# Patient Record
Sex: MALE | Race: WHITE | NOT HISPANIC OR LATINO | Employment: OTHER | URBAN - METROPOLITAN AREA
[De-identification: names, ages, dates, MRNs, and addresses within clinical notes are randomized per-mention and may not be internally consistent; named-entity substitution may affect disease eponyms.]

---

## 2017-01-03 ENCOUNTER — OFFICE VISIT (OUTPATIENT)
Dept: PULMONOLOGY | Facility: CLINIC | Age: 62
End: 2017-01-03
Payer: COMMERCIAL

## 2017-01-03 ENCOUNTER — HOSPITAL ENCOUNTER (OUTPATIENT)
Dept: RADIOLOGY | Facility: HOSPITAL | Age: 62
Discharge: HOME | End: 2017-01-03
Attending: INTERNAL MEDICINE
Payer: COMMERCIAL

## 2017-01-03 VITALS
HEIGHT: 75 IN | DIASTOLIC BLOOD PRESSURE: 80 MMHG | WEIGHT: 276.69 LBS | BODY MASS INDEX: 34.4 KG/M2 | SYSTOLIC BLOOD PRESSURE: 124 MMHG | OXYGEN SATURATION: 95 % | RESPIRATION RATE: 18 BRPM | HEART RATE: 61 BPM

## 2017-01-03 DIAGNOSIS — J44.89 ASTHMA WITH COPD: Primary | ICD-10-CM

## 2017-01-03 DIAGNOSIS — G47.33 OSA ON CPAP: ICD-10-CM

## 2017-01-03 DIAGNOSIS — R06.02 SOB (SHORTNESS OF BREATH): ICD-10-CM

## 2017-01-03 PROCEDURE — 99999 PR PBB SHADOW E&M-EST. PATIENT-LVL IV: CPT | Mod: PBBFAC,,, | Performed by: INTERNAL MEDICINE

## 2017-01-03 PROCEDURE — 1159F MED LIST DOCD IN RCRD: CPT | Mod: S$GLB,,, | Performed by: INTERNAL MEDICINE

## 2017-01-03 PROCEDURE — 71020 XR CHEST PA AND LATERAL: CPT | Mod: 26,,, | Performed by: RADIOLOGY

## 2017-01-03 PROCEDURE — 99214 OFFICE O/P EST MOD 30 MIN: CPT | Mod: S$GLB,,, | Performed by: INTERNAL MEDICINE

## 2017-01-03 RX ORDER — ALBUTEROL SULFATE 0.83 MG/ML
2.5 SOLUTION RESPIRATORY (INHALATION)
Qty: 360 ML | Refills: 12 | Status: SHIPPED | OUTPATIENT
Start: 2017-01-03 | End: 2017-07-11 | Stop reason: SDUPTHER

## 2017-01-03 NOTE — MR AVS SNAPSHOT
[facility]  [address] [facility] [address]  Phone: [phone]  Fax: [phone]                  Yung Mcconnell   1/3/2017 9:00 AM   Office Visit    Description:  Male : 1955   Provider:  Rafal Lozano MD   Department:  [facility]           Reason for Visit     Asthma     Sleep Apnea           Diagnoses this Visit        Comments    Asthma with COPD    -  Primary     KEVEN on CPAP                To Do List           Future Appointments        Provider Department Dept Phone    2017 3:15 PM Lizzie Cates MD [facility] D[facility]atology [phone]    2017 8:20 AM SPIROMETRY, [facility] [phone]    2017 8:40 AM Rafal Lozano MD [facility] Services [phone]      Goals (5 Years of Data)     None      Follow-Up and Disposition     Return in about 6 months (around 7/3/2017), or althea and CPAP download.       These Medications        Disp Refills Start End    formoterol fumarate 12 mcg CpDv 60 capsule 11 1/3/2017 1/3/2018    Inhale 1 capsule (12 mcg total) into the lungs every 12 (twelve) hours. - Inhalation    [facility] [address] [place] [address] [facility] [place] [address] Ph #: [phone]       albuterol (PROVENTIL) 2.5 mg /3 mL (0.083 %) nebulizer solution 360 mL 12 1/3/2017 1/3/2018    Take 3 mLs (2.5 mg total) by nebulization every 6 (six) hours while awake. - Nebulization    [facility]: [facility] [address] [place] [address] [facility] [address] [place] [address] Ph #: [phone]         [facility]     [facility] On [facility] Nurse Care Line -  Assistance  Registered nurses in the [facility] Center provide clinical advisement, health education, appointment booking, and other advisory services.  Call for this free service at [phone].             Medications           Message regarding Medications     Verify the changes and/or  additions to your medication regime listed below are the same as discussed with your clinician today.  If any of these changes or additions are incorrect, please notify your healthcare provider.        START taking these NEW medications        Refills    formoterol fumarate 12 mcg CpDv 11    Sig: Inhale 1 capsule (12 mcg total) into the lungs every 12 (twelve) hours.    Class: Normal    Route: Inhalation    albuterol (PROVENTIL) 2.5 mg /3 mL (0.083 %) nebulizer solution 12    Sig: Take 3 mLs (2.5 mg total) by nebulization every 6 (six) hours while awake.    Class: Normal    Route: Nebulization           Verify that the below list of medications is an accurate representation of the medications you are currently taking.  If none reported, the list may be blank. If incorrect, please contact your healthcare provider. Carry this list with you in case of emergency.           Current Medications     albuterol 90 mcg/actuation inhaler Every 4-6 hours    beclomethasone (QVAR) 80 mcg/actuation Aero 2 puff BID .Wash out mouth after use    cyclobenzaprine (FLEXERIL) 5 MG tablet 1 Tablet Oral Three times a day as needed.    doxycycline (MONODOX) 50 MG Cap Take once daily with food    fluticasone (FLONASE) 50 mcg/actuation nasal spray 2 sprays by Each Nare route once daily.    GLUCOSAMINE HCL/CHONDR ALFONSO A NA (OSTEO BI-FLEX ORAL) Take by mouth.    multivitamin (MULTIPLE VITAMIN) per tablet 1 Tablet Oral Every day    omeprazole (PRILOSEC) 40 MG capsule Take 1 capsule (40 mg total) by mouth once daily. Dispense Generic    pravastatin (PRAVACHOL) 10 MG tablet Take 1 tablet (10 mg total) by mouth once daily.    SOOLANTRA 1 % Crea EDWARD EXT AA QD    tadalafil (CIALIS) 20 MG Tab Take 1 tablet (20 mg total) by mouth daily as needed.    albuterol (PROVENTIL) 2.5 mg /3 mL (0.083 %) nebulizer solution Take 3 mLs (2.5 mg total) by nebulization every 6 (six) hours while awake.    diclofenac 1.3 % PT12 Apply 1 patch topically every 12 (twelve)  "hours as needed.    formoterol fumarate 12 mcg CpDv Inhale 1 capsule (12 mcg total) into the lungs every 12 (twelve) hours.    predniSONE (DELTASONE) 20 MG tablet Take 3 pills daily for 3 days, then 2 pills daily for 3 days, then 1 pill daily for 3 days, then 1/2 pill daily for 4 days.    promethazine-codeine 6.25-10 mg/5 ml (PHENERGAN WITH CODEINE) 6.25-10 mg/5 mL syrup TK 5 ML PO Q 4 HOURS PRF COUGH           Clinical Reference Information           Vital Signs - Last Recorded  Most recent update: 1/3/2017  9:48 AM by Lindsay Ng MA    BP Pulse Resp Ht Wt SpO2    124/80 61 18 6' 3" (1.905 m) 125.5 kg (276 lb 10.8 oz) 95%    BMI                34.58 kg/m2          Blood Pressure          Most Recent Value    BP  124/80      Allergies as of 1/3/2017     Zithromax [Azithromycin]    Aspirin      Immunizations Administered on Date of Encounter - 1/3/2017     None      Orders Placed During Today's Visit      Normal Orders This Visit    CPAP/BIPAP SUPPLIES     Future Labs/Procedures Expected by Expires    Spirometry with/without bronchodilator  7/6/2017 (Approximate) 1/3/2018      Instructions    Formoterol Fumarate Inhalation powder, capsule  What is this medicine?  FORMOTEROL (for MOH te rol) is a slow-acting bronchodilator. It helps to open up the airways of your lungs. This medicine is used to treat COPD and to prevent exercise-induced bronchospasm. It is also used to treat asthma in patients taking other asthma control medicines. This medicine should not be used alone for asthma. Do NOT use for an acute asthma attack. Do NOT use for a COPD attack.  This medicine may be used for other purposes; ask your health care provider or pharmacist if you have questions.  What should I tell my health care provider before I take this medicine?  They need to know if you have any of the following conditions:  · diabetes  · have asthma and are not taking any other asthma medicine  · heart disease or irregular " heartbeat  · high blood pressure  · pheochromocytoma  · seizures  · thyroid disease  · worsening asthma  · an unusual or allergic reaction to formoterol, milk, other medicines, food, dyes, or preservatives  · pregnant or trying to get pregnant  · breast-feeding  How should I use this medicine?  The capsules are only for inhalation through an inhaler device. Do NOT swallow the capsules. Follow the directions on your prescription label. Do not use a spacer device. Do not use more often than directed. Make sure that you are using your inhaler correctly. Ask you doctor or health care provider if you have any questions.  A special MedGuide will be given to you by the pharmacist with each prescription and refill. Be sure to read this information carefully each time.  Talk to your pediatrician regarding the use of this medicine in children. While this drug may be prescribed for children as young as 5 years old for selected conditions, precautions do apply.  Overdosage: If you think you have taken too much of this medicine contact a poison control center or emergency room at once.  NOTE: This medicine is only for you. Do not share this medicine with others.  What if I miss a dose?  If you miss a dose, use it as soon as you can. If it is almost time for your next dose, use only that dose. Do not use double or extra doses.  What may interact with this medicine?  Do not take this medicine with any of the following medications:  · MAOIs like Carbex, Eldepryl, Marplan, Nardil, and Parnate  This medicine may also interact with the following medications:  · caffeine  · cisapride  · diuretics  · furazolidone  · medicines for blood pressure  · medicines for depression, anxiety, or psychotic disturbances  · other medicines for breathing problems  · pimozide  · procarbazine  · risperidone  · sertindole  · some antibiotics like clarithromycin, erythromycin, levofloxacin, and linezolid  · some heart medicines  · steroid hormones like  dexamethasone, cortisone, hydrocortisone  · stimulant medicines for attention disorders, weight loss, or to stay awake  This list may not describe all possible interactions. Give your health care provider a list of all the medicines, herbs, non-prescription drugs, or dietary supplements you use. Also tell them if you smoke, drink alcohol, or use illegal drugs. Some items may interact with your medicine.  What should I watch for while using this medicine?  Visit your doctor for regular check ups. Tell your doctor or health care professional if your symptoms do not get better. If your symptoms get worse or if you need your short-acting inhalers more often, call your doctor right away. Do not use this medicine more than every 12 hours.  If you have asthma, be aware that using this medicine may increase your risk of dying from asthma related problems. Talk to your doctor about the risks and benefits of taking this medicine. NEVER use this medicine for an acute asthma attack.  If you are going to have surgery tell your doctor or health care professional that you are using this medicine.  What side effects may I notice from receiving this medicine?  Side effects that you should report to your doctor or health care professional as soon as possible:  · allergic reactions such as skin rash or itching, hives, swelling of the face, lips or tongue  · chest pain  · difficulty breathing or wheezing that increases or does not go away  · dizziness or fainting  · fever  · irregular heartbeat  · nervousness  · tremors  Side effects that usually do not require medical attention (report to your doctor or health care professional if they continue or are bothersome):  · cough  · headache  · sore throat  · stuffy nose  · stomach upset  This list may not describe all possible side effects. Call your doctor for medical advice about side effects. You may report side effects to FDA at 5-833-FDA-5668.  Where should I keep my medicine?  Keep  out of the reach of children.  Store at room temperature between 20 to 25 degrees C (68 and 77 degrees F). Throw away any unused medicine after the expiration date. Protect from heat and moisture. Remove capsules from the blister pack immediately before using them.  Special rules describing how to store and how long you should keep your medicine may apply. Be sure to read the MedGuide that came with your prescription carefully and follow the directions for storing and using your medicine.  NOTE:This sheet is a summary. It may not cover all possible information. If you have questions about this medicine, talk to your doctor, pharmacist, or health care provider. Copyright© 2016 [employer]      Albuterol Sulfate Nebulizer solution  What is this medicine?  ALBUTEROL (al BYOO ter ole) is a bronchodilator. It helps to open up the airways in your lungs to make it easier to breathe. This medicine is used to treat and to prevent bronchospasm.  This medicine may be used for other purposes; ask your health care provider or pharmacist if you have questions.  What should I tell my health care provider before I take this medicine?  They need to know if you have any of the following conditions:  · diabetes  · heart disease or irregular heartbeat  · high blood pressure  · pheochromocytoma  · seizures  · thyroid disease  · an unusual or allergic reaction to albuterol, levalbuterol, sulfites, other medicines, foods, dyes, or preservatives  · pregnant or trying to get pregnant  · breast-feeding  How should I use this medicine?  This medicine is used in a nebulizer. Nebulizers make a liquid into an aerosol that you breathe in through your mouth or your mouth and nose into your lungs. You will be taught how to use your nebulizer. Follow the directions on your prescription label. Take your medicine at regular intervals. Do not use more often than directed.  Talk to your pediatrician regarding the use of this medicine in children.  Special care may be needed.  Overdosage: If you think you have taken too much of this medicine contact a poison control center or emergency room at once.  NOTE: This medicine is only for you. Do not share this medicine with others.  What if I miss a dose?  If you miss a dose, use it as soon as you can. If it is almost time for your next dose, use only that dose. Do not use double or extra doses.  What may interact with this medicine?  · anti-infectives like chloroquine and pentamidine  · caffeine  · cisapride  · diuretics  · medicines for colds  · medicines for depression or emotional or psychotic conditions  · medicines for weight loss including some herbal products  · methadone  · some antibiotics like clarithromycin, erythromycin, levofloxacin, and linezolid  · some heart medicines  · steroid hormones like dexamethasone, cortisone, hydrocortisone  · theophylline  · thyroid hormones  This list may not describe all possible interactions. Give your health care provider a list of all the medicines, herbs, non-prescription drugs, or dietary supplements you use. Also tell them if you smoke, drink alcohol, or use illegal drugs. Some items may interact with your medicine.  What should I watch for while using this medicine?  Tell your doctor or health care professional if your symptoms do not improve. Do not use extra albuterol. Call your doctor right away if your asthma or bronchitis gets worse while you are using this medicine.  If your mouth gets dry try chewing sugarless gum or sucking hard candy. Drink water as directed.  What side effects may I notice from receiving this medicine?  Side effects that you should report to your doctor or health care professional as soon as possible:  · allergic reactions like skin rash, itching or hives, swelling of the face, lips, or tongue  · breathing problems  · chest pain  · feeling faint or lightheaded, falls  · high blood pressure  · irregular heartbeat  · fever  · muscle cramps  or weakness  · pain, tingling, numbness in the hands or feet  · vomiting  Side effects that usually do not require medical attention (report to your doctor or health care professional if they continue or are bothersome):  · cough  · difficulty sleeping  · headache  · nervousness, trembling  · stomach upset  · stuffy or runny nose  · throat irritation  · unusual taste  This list may not describe all possible side effects. Call your doctor for medical advice about side effects. You may report side effects to FDA at 9-511-CDL-2178.  Where should I keep my medicine?  Keep out of the reach of children.  Store between 2 and 25 degrees C (36 and 77 degrees F). Do not freeze. Protect from light. Throw away any unused medicine after the expiration date. Most products are kept in the foil package until time of use. Some products can be used up to 1 week after they are removed from the foil pouch. Check the instructions that come with your medicine.  NOTE: This sheet is a summary. It may not cover all possible information. If you have questions about this medicine, talk to your doctor, pharmacist, or health care provider.  NOTE:This sheet is a summary. It may not cover all possible information. If you have questions about this medicine, talk to your doctor, pharmacist, or health care provider. Copyright© 2016 [employer] Standard

## 2017-01-03 NOTE — PROGRESS NOTES
Subjective:       Patient ID: Yung Mcconnell is a 61 y.o. male.    Chief Complaint: Asthma and Sleep Apnea    HPI COPD  He presents for evaluation and treatment of COPD.Had 3- 4 episodes of bronchitis this past fall    COPD  He presents for evaluation and treatment of COPD. The patient is not currently have symptoms / an exacerbation. The patient has COPD for approximately 10 years. Symptoms in previous episodes have included dyspnea, cough and wheezing, and typically last 2 weeks. Previous episodes have been exacerbated by moderate activity. Current treatment includes albuterol inhaler, which generally provides some relief of symptoms.  He uses 1 pillows at night. Patient currently is not on home oxygen therapy.. The patient is having no constitutional symptoms, denying fever, chills, anorexia, or weight loss. The patient has not been hospitalized for this condition before. Not smoking The patient is experiencing exercise intolerance (difficulty climbing 2 flights of stairs).      Yung Mcconnell presents for review of CPAP compliance. Information from CPAP machine downloaded and reviewed. Summary of compliance report is as follows:  Percent days with usage: 100 %  Average usage on days used: 7 hours 14 minutes.  Percent of days used > 4 hours : 100 %  device pressure : 11 cmH2O  Average time in large leak per day: minutes  Average AHI: 1.7     Patient has complaints of none  Patient is using CPAP as prescribed and benefiting from therapy.         Past Medical History   Diagnosis Date    Allergy     Asthma, chronic 7/9/2013    Colon polyps     COPD (chronic obstructive pulmonary disease)     GERD (gastroesophageal reflux disease)     Osteoarthritis of both knees 7/9/2013    Sleep apnea      Past Surgical History   Procedure Laterality Date    Cholecystectomy      Lung surgery Right      benign mass     Social History     Social History    Marital status:      Spouse name: N/A    Number of children:  N/A    Years of education: N/A     Occupational History     [employer]     Social History Main Topics    Smoking status: Never Smoker    Smokeless tobacco: Never Used    Alcohol use Yes      Comment: rare    Drug use: No    Sexual activity: Yes     Partners: Female     Birth control/ protection: None     Other Topics Concern    Not on file     Social History Narrative     Review of Systems   Constitutional: Positive for fatigue. Negative for fever.   HENT: Positive for postnasal drip and rhinorrhea. Negative for congestion.    Respiratory: Positive for apnea, cough, sputum production, shortness of breath, dyspnea on extertion, use of rescue inhaler and Paroxysmal Nocturnal Dyspnea.    Cardiovascular: Negative for chest pain, palpitations and leg swelling.   Skin: Negative for rash.   Gastrointestinal: Negative for nausea and abdominal pain.   Neurological: Negative for dizziness, syncope, weakness and light-headedness.   Hematological: Negative for adenopathy. Does not bruise/bleed easily.   Psychiatric/Behavioral: Negative for sleep disturbance. The patient is not nervous/anxious.        Objective:      Physical Exam   Constitutional: He is oriented to person, place, and time. He appears well-developed and well-nourished.   HENT:   Head: Normocephalic and atraumatic.   Mouth/Throat: Oropharyngeal exudate present.   Eyes: Conjunctivae are normal. Pupils are equal, round, and reactive to light.   Neck: Neck supple. No JVD present. No tracheal deviation present. No thyromegaly present.   Cardiovascular: Normal rate, regular rhythm and normal heart sounds.    No murmur heard.  Pulmonary/Chest: Effort normal. He has decreased breath sounds. He has wheezes in the right lower field and the left lower field. He has no rhonchi. He has no rales.   Abdominal: Soft. Bowel sounds are normal.   Musculoskeletal: Normal range of motion. He exhibits no edema or tenderness.   Lymphadenopathy:     He has no  cervical adenopathy.   Neurological: He is alert and oriented to person, place, and time.   Skin: Skin is warm and dry.   Nursing note and vitals reviewed.    Personal Diagnostic Review  Chest X-Ray: I personally reviewed the films and findings are:, air trapping/emphysema  Pulmonary function tests:  No recent  No flowsheet data found.      Assessment:       1. Asthma with COPD    2. KEVEN on CPAP        Outpatient Encounter Prescriptions as of 1/3/2017   Medication Sig Dispense Refill    albuterol 90 mcg/actuation inhaler Every 4-6 hours 3 Inhaler 4    beclomethasone (QVAR) 80 mcg/actuation Aero 2 puff BID .Wash out mouth after use 25.1 each 3    cyclobenzaprine (FLEXERIL) 5 MG tablet 1 Tablet Oral Three times a day as needed. 90 tablet 0    doxycycline (MONODOX) 50 MG Cap Take once daily with food 30 capsule 5    fluticasone (FLONASE) 50 mcg/actuation nasal spray 2 sprays by Each Nare route once daily. 3 Bottle 4    GLUCOSAMINE HCL/CHONDR ALFONSO A NA (OSTEO BI-FLEX ORAL) Take by mouth.      multivitamin (MULTIPLE VITAMIN) per tablet 1 Tablet Oral Every day      omeprazole (PRILOSEC) 40 MG capsule Take 1 capsule (40 mg total) by mouth once daily. Dispense Generic 90 capsule 0    pravastatin (PRAVACHOL) 10 MG tablet Take 1 tablet (10 mg total) by mouth once daily. 90 tablet 3    SOOLANTRA 1 % Crea EDWARD EXT AA QD  5    tadalafil (CIALIS) 20 MG Tab Take 1 tablet (20 mg total) by mouth daily as needed. 20 tablet 11    albuterol (PROVENTIL) 2.5 mg /3 mL (0.083 %) nebulizer solution Take 3 mLs (2.5 mg total) by nebulization every 6 (six) hours while awake. 360 mL 12    diclofenac 1.3 % PT12 Apply 1 patch topically every 12 (twelve) hours as needed. 30 patch 0    formoterol fumarate 12 mcg CpDv Inhale 1 capsule (12 mcg total) into the lungs every 12 (twelve) hours. 60 capsule 11    predniSONE (DELTASONE) 20 MG tablet Take 3 pills daily for 3 days, then 2 pills daily for 3 days, then 1 pill daily for 3 days, then  1/2 pill daily for 4 days. 20 tablet 0    promethazine-codeine 6.25-10 mg/5 ml (PHENERGAN WITH CODEINE) 6.25-10 mg/5 mL syrup TK 5 ML PO Q 4 HOURS PRF COUGH  0     No facility-administered encounter medications on file as of 1/3/2017.      Orders Placed This Encounter   Procedures    CPAP/BIPAP SUPPLIES     Referred to [employer] company:  [employer].  [place] [address]  [phone]  Fax [phone]     Order Specific Question:   Type of mask:     Answer:   FFM     Order Specific Question:   Headgear?     Answer:   Yes     Order Specific Question:   Tubing?     Answer:   Yes     Order Specific Question:   Humidifier chamber?     Answer:   Yes     Order Specific Question:   Chin strap?     Answer:   Yes     Order Specific Question:   Filters?     Answer:   Yes     Order Specific Question:   Length of need (1-99 months):     Answer:   99     Order Specific Question:   Vendor:     Answer:   [employer]     Order Specific Question:   Expected Date of Delivery:     Answer:   1/3/2017    Spirometry with/without bronchodilator     Standing Status:   Future     Standing Expiration Date:   1/3/2018     Plan:       Requested Prescriptions     Signed Prescriptions Disp Refills    formoterol fumarate 12 mcg CpDv 60 capsule 11     Sig: Inhale 1 capsule (12 mcg total) into the lungs every 12 (twelve) hours.    albuterol (PROVENTIL) 2.5 mg /3 mL (0.083 %) nebulizer solution 360 mL 12     Sig: Take 3 mLs (2.5 mg total) by nebulization every 6 (six) hours while awake.     Asthma with COPD  -     formoterol fumarate 12 mcg CpDv; Inhale 1 capsule (12 mcg total) into the lungs every 12 (twelve) hours.  Dispense: 60 capsule; Refill: 11  -     albuterol (PROVENTIL) 2.5 mg /3 mL (0.083 %) nebulizer solution; Take 3 mLs (2.5 mg total) by nebulization every 6 (six) hours while awake.  Dispense: 360 mL; Refill: 12  -     Spirometry with/without bronchodilator; Future; Expected date: 7/6/17    KEVEN on CPAP  -     CPAP/BIPAP  SUPPLIES      Return in about 6 months (around 7/3/2017), or althea and CPAP download.    MEDICAL DECISION MAKING: Moderate to high complexity.  Overall, the multiple problems listed are of moderate to high severity that may impact quality of life and activities of daily living. Side effects of medications, treatment plan as well as options and alternatives reviewed and discussed with patient. There was counseling of patient concerning these issues.    Total time spent in face to face counseling and coordination of care - 40 minutes over 50% of time was used in discussion of prognosis, risks, benefits of treatment, instructions and compliance with regimen . Discussion with other physicians or health care providers (homehealth, durable medical equipment providers).

## 2017-01-03 NOTE — PATIENT INSTRUCTIONS
Formoterol Fumarate Inhalation powder, capsule  What is this medicine?  FORMOTEROL (for MOH te rol) is a slow-acting bronchodilator. It helps to open up the airways of your lungs. This medicine is used to treat COPD and to prevent exercise-induced bronchospasm. It is also used to treat asthma in patients taking other asthma control medicines. This medicine should not be used alone for asthma. Do NOT use for an acute asthma attack. Do NOT use for a COPD attack.  This medicine may be used for other purposes; ask your health care provider or pharmacist if you have questions.  What should I tell my health care provider before I take this medicine?  They need to know if you have any of the following conditions:  · diabetes  · have asthma and are not taking any other asthma medicine  · heart disease or irregular heartbeat  · high blood pressure  · pheochromocytoma  · seizures  · thyroid disease  · worsening asthma  · an unusual or allergic reaction to formoterol, milk, other medicines, food, dyes, or preservatives  · pregnant or trying to get pregnant  · breast-feeding  How should I use this medicine?  The capsules are only for inhalation through an inhaler device. Do NOT swallow the capsules. Follow the directions on your prescription label. Do not use a spacer device. Do not use more often than directed. Make sure that you are using your inhaler correctly. Ask you doctor or health care provider if you have any questions.  A special MedGuide will be given to you by the pharmacist with each prescription and refill. Be sure to read this information carefully each time.  Talk to your pediatrician regarding the use of this medicine in children. While this drug may be prescribed for children as young as 5 years old for selected conditions, precautions do apply.  Overdosage: If you think you have taken too much of this medicine contact a poison control center or emergency room at once.  NOTE: This medicine is only for you. Do  not share this medicine with others.  What if I miss a dose?  If you miss a dose, use it as soon as you can. If it is almost time for your next dose, use only that dose. Do not use double or extra doses.  What may interact with this medicine?  Do not take this medicine with any of the following medications:  · MAOIs like Carbex, Eldepryl, Marplan, Nardil, and Parnate  This medicine may also interact with the following medications:  · caffeine  · cisapride  · diuretics  · furazolidone  · medicines for blood pressure  · medicines for depression, anxiety, or psychotic disturbances  · other medicines for breathing problems  · pimozide  · procarbazine  · risperidone  · sertindole  · some antibiotics like clarithromycin, erythromycin, levofloxacin, and linezolid  · some heart medicines  · steroid hormones like dexamethasone, cortisone, hydrocortisone  · stimulant medicines for attention disorders, weight loss, or to stay awake  This list may not describe all possible interactions. Give your health care provider a list of all the medicines, herbs, non-prescription drugs, or dietary supplements you use. Also tell them if you smoke, drink alcohol, or use illegal drugs. Some items may interact with your medicine.  What should I watch for while using this medicine?  Visit your doctor for regular check ups. Tell your doctor or health care professional if your symptoms do not get better. If your symptoms get worse or if you need your short-acting inhalers more often, call your doctor right away. Do not use this medicine more than every 12 hours.  If you have asthma, be aware that using this medicine may increase your risk of dying from asthma related problems. Talk to your doctor about the risks and benefits of taking this medicine. NEVER use this medicine for an acute asthma attack.  If you are going to have surgery tell your doctor or health care professional that you are using this medicine.  What side effects may I notice  from receiving this medicine?  Side effects that you should report to your doctor or health care professional as soon as possible:  · allergic reactions such as skin rash or itching, hives, swelling of the face, lips or tongue  · chest pain  · difficulty breathing or wheezing that increases or does not go away  · dizziness or fainting  · fever  · irregular heartbeat  · nervousness  · tremors  Side effects that usually do not require medical attention (report to your doctor or health care professional if they continue or are bothersome):  · cough  · headache  · sore throat  · stuffy nose  · stomach upset  This list may not describe all possible side effects. Call your doctor for medical advice about side effects. You may report side effects to FDA at 9-948-DJI-9105.  Where should I keep my medicine?  Keep out of the reach of children.  Store at room temperature between 20 to 25 degrees C (68 and 77 degrees F). Throw away any unused medicine after the expiration date. Protect from heat and moisture. Remove capsules from the blister pack immediately before using them.  Special rules describing how to store and how long you should keep your medicine may apply. Be sure to read the MedGuide that came with your prescription carefully and follow the directions for storing and using your medicine.  NOTE:This sheet is a summary. It may not cover all possible information. If you have questions about this medicine, talk to your doctor, pharmacist, or health care provider. Copyright© 2016 [employer]      Albuterol Sulfate Nebulizer solution  What is this medicine?  ALBUTEROL (al BYOO ter ole) is a bronchodilator. It helps to open up the airways in your lungs to make it easier to breathe. This medicine is used to treat and to prevent bronchospasm.  This medicine may be used for other purposes; ask your health care provider or pharmacist if you have questions.  What should I tell my health care provider before I take this  medicine?  They need to know if you have any of the following conditions:  · diabetes  · heart disease or irregular heartbeat  · high blood pressure  · pheochromocytoma  · seizures  · thyroid disease  · an unusual or allergic reaction to albuterol, levalbuterol, sulfites, other medicines, foods, dyes, or preservatives  · pregnant or trying to get pregnant  · breast-feeding  How should I use this medicine?  This medicine is used in a nebulizer. Nebulizers make a liquid into an aerosol that you breathe in through your mouth or your mouth and nose into your lungs. You will be taught how to use your nebulizer. Follow the directions on your prescription label. Take your medicine at regular intervals. Do not use more often than directed.  Talk to your pediatrician regarding the use of this medicine in children. Special care may be needed.  Overdosage: If you think you have taken too much of this medicine contact a poison control center or emergency room at once.  NOTE: This medicine is only for you. Do not share this medicine with others.  What if I miss a dose?  If you miss a dose, use it as soon as you can. If it is almost time for your next dose, use only that dose. Do not use double or extra doses.  What may interact with this medicine?  · anti-infectives like chloroquine and pentamidine  · caffeine  · cisapride  · diuretics  · medicines for colds  · medicines for depression or emotional or psychotic conditions  · medicines for weight loss including some herbal products  · methadone  · some antibiotics like clarithromycin, erythromycin, levofloxacin, and linezolid  · some heart medicines  · steroid hormones like dexamethasone, cortisone, hydrocortisone  · theophylline  · thyroid hormones  This list may not describe all possible interactions. Give your health care provider a list of all the medicines, herbs, non-prescription drugs, or dietary supplements you use. Also tell them if you smoke, drink alcohol, or use  illegal drugs. Some items may interact with your medicine.  What should I watch for while using this medicine?  Tell your doctor or health care professional if your symptoms do not improve. Do not use extra albuterol. Call your doctor right away if your asthma or bronchitis gets worse while you are using this medicine.  If your mouth gets dry try chewing sugarless gum or sucking hard candy. Drink water as directed.  What side effects may I notice from receiving this medicine?  Side effects that you should report to your doctor or health care professional as soon as possible:  · allergic reactions like skin rash, itching or hives, swelling of the face, lips, or tongue  · breathing problems  · chest pain  · feeling faint or lightheaded, falls  · high blood pressure  · irregular heartbeat  · fever  · muscle cramps or weakness  · pain, tingling, numbness in the hands or feet  · vomiting  Side effects that usually do not require medical attention (report to your doctor or health care professional if they continue or are bothersome):  · cough  · difficulty sleeping  · headache  · nervousness, trembling  · stomach upset  · stuffy or runny nose  · throat irritation  · unusual taste  This list may not describe all possible side effects. Call your doctor for medical advice about side effects. You may report side effects to FDA at 2-763-FDA-3029.  Where should I keep my medicine?  Keep out of the reach of children.  Store between 2 and 25 degrees C (36 and 77 degrees F). Do not freeze. Protect from light. Throw away any unused medicine after the expiration date. Most products are kept in the foil package until time of use. Some products can be used up to 1 week after they are removed from the foil pouch. Check the instructions that come with your medicine.  NOTE: This sheet is a summary. It may not cover all possible information. If you have questions about this medicine, talk to your doctor, pharmacist, or health care  provider.  NOTE:This sheet is a summary. It may not cover all possible information. If you have questions about this medicine, talk to your doctor, pharmacist, or health care provider. Copyright© 2016 [employer]

## 2017-01-04 ENCOUNTER — TELEPHONE (OUTPATIENT)
Dept: PAIN MEDICINE | Facility: CLINIC | Age: 62
End: 2017-01-04

## 2017-01-04 NOTE — TELEPHONE ENCOUNTER
----- Message from Lisa Bhakta sent at 1/4/2017  1:29 PM CST -----  Would like to speak to nurse about injection. Please callback at [phone]. Thanks//cdb

## 2017-01-12 ENCOUNTER — OFFICE VISIT (OUTPATIENT)
Dept: PAIN MEDICINE | Facility: CLINIC | Age: 62
End: 2017-01-12
Payer: COMMERCIAL

## 2017-01-12 VITALS
BODY MASS INDEX: 34.32 KG/M2 | SYSTOLIC BLOOD PRESSURE: 130 MMHG | HEART RATE: 91 BPM | HEIGHT: 75 IN | RESPIRATION RATE: 18 BRPM | DIASTOLIC BLOOD PRESSURE: 85 MMHG | WEIGHT: 276 LBS

## 2017-01-12 DIAGNOSIS — M47.817 LUMBOSACRAL SPONDYLOSIS WITHOUT MYELOPATHY: Primary | ICD-10-CM

## 2017-01-12 DIAGNOSIS — M51.36 DDD (DEGENERATIVE DISC DISEASE), LUMBAR: ICD-10-CM

## 2017-01-12 DIAGNOSIS — M54.16 BILATERAL LUMBAR RADICULOPATHY: Primary | ICD-10-CM

## 2017-01-12 PROCEDURE — 99999 PR PBB SHADOW E&M-EST. PATIENT-LVL III: CPT | Mod: PBBFAC,,, | Performed by: ANESTHESIOLOGY

## 2017-01-12 PROCEDURE — 1159F MED LIST DOCD IN RCRD: CPT | Mod: S$GLB,,, | Performed by: ANESTHESIOLOGY

## 2017-01-12 PROCEDURE — 99213 OFFICE O/P EST LOW 20 MIN: CPT | Mod: S$GLB,,, | Performed by: ANESTHESIOLOGY

## 2017-01-12 RX ORDER — HYDROCODONE BITARTRATE AND ACETAMINOPHEN 7.5; 325 MG/1; MG/1
1 TABLET ORAL 3 TIMES DAILY PRN
Qty: 90 TABLET | Refills: 0 | Status: SHIPPED | OUTPATIENT
Start: 2017-01-12 | End: 2017-02-11

## 2017-01-12 NOTE — PROGRESS NOTES
Chief Pain Complaint:  Right leg pain    History of Present Illness:  This patient is a 61 y.o. male who presents today complaining of the above noted pain/s. The patient describes this pain as follows. Back pain left leg and ankle    - duration of pain: pain for > 1 year, recently returned following LESI in April 2016  - timing (constant, intermittent): constant  - character (sharp, dull, aching, burning): aching, sharp, shooting  - radiating, dermatomal: pain primarily nonradiating   - antecedent trauma, prior spinal surgery: none  - pertinent negatives: No fever, No chills, No weight loss, No bladder dysfunction, No bowel dysfunction, no lower extremity weakness, No saddle anesthesia  - pertinent positives: none  - medications, other therapies tried (physical therapy, injections): norco, flexeril, IBU, apap, celebrex, Medrol Dose pack, gabapentin (did not tolerate), no PT, he underwent left L5/S1 SOFI on 12-18-15 and right L5/S1 SOFI in April 2016      IMAGING (reviewed on 1/12/2017):      10-26-15 XR Lumbar:  Findings: The vertebral bodies demonstrate normal height. There is a couple millimeters of anterolisthesis of L3 on L4. There is moderate to severe disk space narrowing noted from the L2-3 through the L5-S1 levels. Prominent facet arthropathy noted from the L3-4 through L5-S1 levels. There is levoscoliosis of the lumbar spine with the apex located at L4. There are surgical clips noted within the right upper quadrant.       LUMBAR MRI 11-16-15:  History: Low back and left lower extremity pain., left L5 radiculopathy  Standard multiplanar noncontrast MRI sequences of the lumbar spine.  Mild lumbar levoscoliosis is present with pelvic tilting. The distal cord and conus appear grossly normal.  T12-L1: Minor facet arthropathy.  L1-2: Minor facet arthropathy.  L2-3: Minor disk desiccation. Mild hypertrophic ligamentum flavum and facet arthropathy. Mild left foraminal stenosis.  L3-4: Advanced degenerative disk  "disease with prominent disk space narrowing. Degenerative endplate marrow signal changes. Moderate right and mild left hypertrophic facet arthropathy with dorsal sac impingement. Mild central canal stenosis. Mild left and moderate right foraminal stenosis.  L4-5: Moderate degenerative disk disease with disk narrowing. Mild hypertrophic ligament flavum and facet arthropathy right greater than left with right lateral recess stenosis as best seen on axial image number 23. Moderate right foraminal stenosis with mild left foraminal stenosis.  L5-S1: Moderate degenerative disk disease with disk narrowing and desiccation. Moderate left and mild right hypertrophic facet arthritis. Mild right and moderate to severe left foraminal stenosis with possible compression of the exiting left L5 nerve root.      Review of Systems:  CONSTITUTIONAL: No fever, chills, weight loss  SKIN: No rash or itching  RESPIRATORY: No shortness of breath  GASTROINTESTINAL: No diarrhea, No constipation  GENITOURINARY: No urinary incontinence  MUSCULOSKELETAL:  - pain as above  NEUROLOGICAL:   - Pain as above  - Strength in lower extremities normal  - Sensation in lower extremities is abnormal, numbness, pain  - No bowel or bladder incontinence  PSYCHIATRIC: No change in mood noticed     Physical Exam:  Visit Vitals    /85 (BP Location: Right arm, Patient Position: Sitting, BP Method: Automatic)    Pulse 91    Resp 18    Ht 6' 3" (1.905 m)    Wt 125.2 kg (276 lb)    BMI 34.5 kg/m2     General: alert and oriented   Gait: normal gait  Skin: No rashes, No discoloration, No obvious lesions  HEENT: EOMI  Respiratory: respirations nonlabored  Musculoskeletal:  - Any pain on flexion, extension, rotation: pain with flex and extension   - Straight Leg Raise is positive on RIGHT, neg on LEFT  - Any tenderness to palpation across lumbar paraspinal muscles, Sacroiliac joint/s, greater trochanteric bursae: some tenderness along lumbar " paraspinals  Neuro:  - Lower extremities: strength intact b/l  - Reflexes: Patellar 2+ on the (R) & 2+ on the (L)   - Sensory: sensation to light touch intact  Psych: mood and affect appropriate      Assessment:  - Lumbar Radiculopathy   - Lumbar DDD      Plan:  - Patient presents for f/u.  He initially presented with low back and left leg pain along L5 pattern then into the right lower extremity pain along a Right L4/5 pattern.  Lumbar MRI again reviewed in clinic today.  He underwent a LESI in December 2015 and in April 2016, both at L5/S1.  He has no leg pain since the RONY's.  He expresses interest in another rony, I will order today and refill norco.  I discussed in detail the risks, benefits, and alternatives to any and all potential treatment options.  All questions and concerns were fully addressed today in clinic.    >> PDI:  1/12/2017 :: 100    >>ORT:  1/12/2017 :: 0

## 2017-02-21 ENCOUNTER — PATIENT MESSAGE (OUTPATIENT)
Dept: FAMILY MEDICINE | Facility: CLINIC | Age: 62
End: 2017-02-21

## 2017-02-21 DIAGNOSIS — M54.16 LUMBAR RADICULOPATHY: Primary | ICD-10-CM

## 2017-03-01 ENCOUNTER — OFFICE VISIT (OUTPATIENT)
Dept: DERMATOLOGY | Facility: CLINIC | Age: 62
End: 2017-03-01
Payer: COMMERCIAL

## 2017-03-01 ENCOUNTER — HOSPITAL ENCOUNTER (OUTPATIENT)
Dept: RADIOLOGY | Facility: HOSPITAL | Age: 62
Discharge: HOME | End: 2017-03-01
Attending: ANESTHESIOLOGY | Admitting: ANESTHESIOLOGY
Payer: COMMERCIAL

## 2017-03-01 ENCOUNTER — HOSPITAL ENCOUNTER (OUTPATIENT)
Facility: HOSPITAL | Age: 62
Discharge: HOME | End: 2017-03-01
Attending: ANESTHESIOLOGY | Admitting: ANESTHESIOLOGY
Payer: COMMERCIAL

## 2017-03-01 ENCOUNTER — SURGERY (OUTPATIENT)
Age: 62
End: 2017-03-01

## 2017-03-01 VITALS
OXYGEN SATURATION: 95 % | BODY MASS INDEX: 32.83 KG/M2 | HEART RATE: 80 BPM | HEIGHT: 75 IN | DIASTOLIC BLOOD PRESSURE: 73 MMHG | SYSTOLIC BLOOD PRESSURE: 145 MMHG | RESPIRATION RATE: 12 BRPM | WEIGHT: 264 LBS

## 2017-03-01 DIAGNOSIS — L71.9 ROSACEA: ICD-10-CM

## 2017-03-01 DIAGNOSIS — M54.16 LUMBAR RADICULOPATHY: Primary | ICD-10-CM

## 2017-03-01 DIAGNOSIS — D18.00 ANGIOMA: ICD-10-CM

## 2017-03-01 DIAGNOSIS — L82.1 SEBORRHEIC KERATOSIS: Primary | ICD-10-CM

## 2017-03-01 DIAGNOSIS — M54.16 BILATERAL LUMBAR RADICULOPATHY: ICD-10-CM

## 2017-03-01 PROCEDURE — 99999 PR PBB SHADOW E&M-EST. PATIENT-LVL II: CPT | Mod: PBBFAC,,, | Performed by: DERMATOLOGY

## 2017-03-01 PROCEDURE — 63600175 PHARM REV CODE 636 W HCPCS: Performed by: ANESTHESIOLOGY

## 2017-03-01 PROCEDURE — 25500020 PHARM REV CODE 255

## 2017-03-01 PROCEDURE — 99213 OFFICE O/P EST LOW 20 MIN: CPT | Mod: S$GLB,,, | Performed by: DERMATOLOGY

## 2017-03-01 PROCEDURE — 62323 NJX INTERLAMINAR LMBR/SAC: CPT | Mod: ,,, | Performed by: ANESTHESIOLOGY

## 2017-03-01 PROCEDURE — 1160F RVW MEDS BY RX/DR IN RCRD: CPT | Mod: S$GLB,,, | Performed by: DERMATOLOGY

## 2017-03-01 PROCEDURE — 99152 MOD SED SAME PHYS/QHP 5/>YRS: CPT | Mod: ,,, | Performed by: ANESTHESIOLOGY

## 2017-03-01 RX ORDER — METHYLPREDNISOLONE ACETATE 80 MG/ML
INJECTION, SUSPENSION INTRA-ARTICULAR; INTRALESIONAL; INTRAMUSCULAR; SOFT TISSUE
Status: DISCONTINUED | OUTPATIENT
Start: 2017-03-01 | End: 2017-03-01 | Stop reason: HOSPADM

## 2017-03-01 RX ORDER — DOXYCYCLINE 40 MG/1
40 CAPSULE ORAL DAILY
Qty: 30 CAPSULE | Refills: 5 | Status: SHIPPED | OUTPATIENT
Start: 2017-03-01 | End: 2017-11-07

## 2017-03-01 RX ORDER — IVERMECTIN 10 MG/G
1 CREAM TOPICAL DAILY
Qty: 30 G | Refills: 5 | Status: SHIPPED | OUTPATIENT
Start: 2017-03-01 | End: 2017-03-31

## 2017-03-01 RX ORDER — DIAZEPAM 5 MG/1
5 TABLET ORAL ONCE
Status: DISCONTINUED | OUTPATIENT
Start: 2017-03-01 | End: 2017-03-01 | Stop reason: HOSPADM

## 2017-03-01 RX ADMIN — METHYLPREDNISOLONE ACETATE 80 MG: 80 INJECTION, SUSPENSION INTRA-ARTICULAR; INTRALESIONAL; INTRAMUSCULAR; SOFT TISSUE at 10:03

## 2017-03-01 NOTE — MR AVS SNAPSHOT
[facility]  [address] [address] [facility] [address]  Phone: [phone]  Fax: [phone]                  Yung Mcconnell   3/1/2017 1:15 PM   Office Visit    Description:  Male : 1955   Provider:  Lizzie Cates MD   Department:  [facility]           Reason for Visit     Follow-up     Spot           Diagnoses this Visit        Comments    Seborrheic keratosis    -  Primary     Angioma         Rosacea                To Do List           Future Appointments        Provider Department Dept Phone    2017 8:20 AM SPIROMETRY, [facility] [phone]    2017 8:40 AM Rafal Lozano MD [facility] [phone]      Your Future Surgeries/Procedures     Mar 08, 2017   Surgery with Sean Campos MD   [facility]    [address] [address] [phone]              Goals (5 Years of Data)     None      Follow-Up and Disposition     Return in about 6 months (around 2017).       These Medications        Disp Refills Start End    ivermectin (SOOLANTRA) 1 % Crea 30 g 5 3/1/2017 3/31/2017    Apply 1 application topically once daily. - Topical (Top)    Pharmacy: [address] Ph #: [phone]       doxycycline (ORACEA) 40 mg capsule 30 capsule 5 3/1/2017     Take 1 capsule (40 mg total) by mouth once daily. - Oral    Pharmacy: [address] Ph #: [phone]       Notes to Pharmacy: Dispense oracea or generic oracea      [facility] On Call     [facility] On Call Nurse Care Line -  Assistance  Registered nurses in the [facility] On Call Center provide clinical advisement, health education, appointment booking, and other advisory services.  Call for this free service at [phone].             Medications           Message regarding Medications     Verify the changes and/or  additions to your medication regime listed below are the same as discussed with your clinician today.  If any of these changes or additions are incorrect, please notify your healthcare provider.        START taking these NEW medications        Refills    ivermectin (SOOLANTRA) 1 % Crea 5    Sig: Apply 1 application topically once daily.    Class: Normal    Route: Topical (Top)    doxycycline (ORACEA) 40 mg capsule 5    Sig: Take 1 capsule (40 mg total) by mouth once daily.    Class: Normal    Route: Oral      STOP taking these medications     predniSONE (DELTASONE) 20 MG tablet Take 3 pills daily for 3 days, then 2 pills daily for 3 days, then 1 pill daily for 3 days, then 1/2 pill daily for 4 days.    promethazine-codeine 6.25-10 mg/5 ml (PHENERGAN WITH CODEINE) 6.25-10 mg/5 mL syrup TK 5 ML PO Q 4 HOURS PRF COUGH           Verify that the below list of medications is an accurate representation of the medications you are currently taking.  If none reported, the list may be blank. If incorrect, please contact your healthcare provider. Carry this list with you in case of emergency.           Current Medications     albuterol (PROVENTIL) 2.5 mg /3 mL (0.083 %) nebulizer solution Take 3 mLs (2.5 mg total) by nebulization every 6 (six) hours while awake.    albuterol 90 mcg/actuation inhaler Every 4-6 hours    beclomethasone (QVAR) 80 mcg/actuation Aero 2 puff BID .Wash out mouth after use    cyclobenzaprine (FLEXERIL) 5 MG tablet 1 Tablet Oral Three times a day as needed.    diclofenac 1.3 % PT12 Apply 1 patch topically every 12 (twelve) hours as needed.    doxycycline (MONODOX) 50 MG Cap Take once daily with food    doxycycline (ORACEA) 40 mg capsule Take 1 capsule (40 mg total) by mouth once daily.    fluticasone (FLONASE) 50 mcg/actuation nasal spray 2 sprays by Each Nare route once daily.    formoterol fumarate 12 mcg CpDv Inhale 1 capsule (12 mcg total) into the lungs every 12 (twelve) hours.    GLUCOSAMINE  HCL/CHONDR FRANCOIS A NA (OSTEO BI-FLEX ORAL) Take by mouth.    ivermectin (SOOLANTRA) 1 % Crea Apply 1 application topically once daily.    multivitamin (MULTIPLE VITAMIN) per tablet 1 Tablet Oral Every day    omeprazole (PRILOSEC) 40 MG capsule Take 1 capsule (40 mg total) by mouth once daily. Dispense Generic    pravastatin (PRAVACHOL) 10 MG tablet Take 1 tablet (10 mg total) by mouth once daily.    SOOLANTRA 1 % Crea EDWARD EXT AA QD    tadalafil (CIALIS) 20 MG Tab Take 1 tablet (20 mg total) by mouth daily as needed.           Clinical Reference Information           Allergies as of 3/1/2017     Zithromax [Azithromycin]    Aspirin      Immunizations Administered on Date of Encounter - 3/1/2017     None      Language Assistance Services     ATTENTION: Language assistance services are available, free of charge. Please call [phone].      ATENCIÓN: Si habla perry, tiene a francois disposición servicios gratuitos de asistencia lingüística. Llame al [phone].     BUSTER Ý: N?u b?n nói Ti?ng Vi?t, có các d?ch v? h? tr? ngôn ng? mi?n phí dành cho b?n. G?i s? [phone].         [facility] complies with applicable Federal civil rights laws and does not discriminate on the basis of race, color, national origin, age, disability, or sex.

## 2017-03-01 NOTE — H&P
Chief Pain Complaint:  Right leg pain     History of Present Illness:  This patient is a 61 y.o. male who presents today complaining of the above noted pain/s. The patient describes this pain as follows. Back pain left leg and ankle     - duration of pain: pain for > 1 year, recently returned following LESI in April 2016  - timing (constant, intermittent): constant  - character (sharp, dull, aching, burning): aching, sharp, shooting  - radiating, dermatomal: pain primarily nonradiating   - antecedent trauma, prior spinal surgery: none  - pertinent negatives: No fever, No chills, No weight loss, No bladder dysfunction, No bowel dysfunction, no lower extremity weakness, No saddle anesthesia  - pertinent positives: none  - medications, other therapies tried (physical therapy, injections): norco, flexeril, IBU, apap, celebrex, Medrol Dose pack, gabapentin (did not tolerate), no PT, he underwent left L5/S1 SOFI on 12-18-15 and right L5/S1 SOFI in April 2016        IMAGING (reviewed on 1/12/2017):        10-26-15 XR Lumbar:  Findings: The vertebral bodies demonstrate normal height. There is a couple millimeters of anterolisthesis of L3 on L4. There is moderate to severe disk space narrowing noted from the L2-3 through the L5-S1 levels. Prominent facet arthropathy noted from the L3-4 through L5-S1 levels. There is levoscoliosis of the lumbar spine with the apex located at L4. There are surgical clips noted within the right upper quadrant.         LUMBAR MRI 11-16-15:  History: Low back and left lower extremity pain., left L5 radiculopathy  Standard multiplanar noncontrast MRI sequences of the lumbar spine.  Mild lumbar levoscoliosis is present with pelvic tilting. The distal cord and conus appear grossly normal.  T12-L1: Minor facet arthropathy.  L1-2: Minor facet arthropathy.  L2-3: Minor disk desiccation. Mild hypertrophic ligamentum flavum and facet arthropathy. Mild left foraminal stenosis.  L3-4: Advanced degenerative  "disk disease with prominent disk space narrowing. Degenerative endplate marrow signal changes. Moderate right and mild left hypertrophic facet arthropathy with dorsal sac impingement. Mild central canal stenosis. Mild left and moderate right foraminal stenosis.  L4-5: Moderate degenerative disk disease with disk narrowing. Mild hypertrophic ligament flavum and facet arthropathy right greater than left with right lateral recess stenosis as best seen on axial image number 23. Moderate right foraminal stenosis with mild left foraminal stenosis.  L5-S1: Moderate degenerative disk disease with disk narrowing and desiccation. Moderate left and mild right hypertrophic facet arthritis. Mild right and moderate to severe left foraminal stenosis with possible compression of the exiting left L5 nerve root.        Review of Systems:  CONSTITUTIONAL: No fever, chills, weight loss  SKIN: No rash or itching  RESPIRATORY: No shortness of breath  GASTROINTESTINAL: No diarrhea, No constipation  GENITOURINARY: No urinary incontinence  MUSCULOSKELETAL:  - pain as above  NEUROLOGICAL:   - Pain as above  - Strength in lower extremities normal  - Sensation in lower extremities is abnormal, numbness, pain  - No bowel or bladder incontinence  PSYCHIATRIC: No change in mood noticed      Physical Exam:       Visit Vitals    /85 (BP Location: Right arm, Patient Position: Sitting, BP Method: Automatic)    Pulse 91    Resp 18    Ht 6' 3" (1.905 m)    Wt 125.2 kg (276 lb)    BMI 34.5 kg/m2      General: alert and oriented   Gait: normal gait  Skin: No rashes, No discoloration, No obvious lesions  HEENT: EOMI  Respiratory: respirations nonlabored  Musculoskeletal:  - Any pain on flexion, extension, rotation: pain with flex and extension   - Straight Leg Raise is positive on RIGHT, neg on LEFT  - Any tenderness to palpation across lumbar paraspinal muscles, Sacroiliac joint/s, greater trochanteric bursae: some tenderness along lumbar " paraspinals  Neuro:  - Lower extremities: strength intact b/l  - Reflexes: Patellar 2+ on the (R) & 2+ on the (L)   - Sensory: sensation to light touch intact  Psych: mood and affect appropriate     Assessment:  - Lumbar Radiculopathy   - Lumbar DDD     Plan:  - Patient presents for f/u. He initially presented with low back and left leg pain along L5 pattern then into the right lower extremity pain along a Right L4/5 pattern. Lumbar MRI again reviewed in clinic today. He underwent a LESI in December 2015 and in April 2016, both at L5/S1. He has no leg pain since the RONY's. He expresses interest in another rony, I will order today and refill norco. I discussed in detail the risks, benefits, and alternatives to any and all potential treatment options. All questions and concerns were fully addressed today in clinic.     >> PDI:  1/12/2017 :: 100     >>ORT:  1/12/2017 :: 0    * I agree.

## 2017-03-01 NOTE — OP NOTE
PROCEDURE: Lumbar epidural steroid injection under fluoroscopic guidance    LEVEL: L5/S1  SIDE: Right     PROCEDURE DATE: 3/1/2017    PREOPERATIVE DIAGNOSIS: Lumbar radiculopathy  POSTOPERATIVE DIAGNOSIS: Lumbar radiculopathy    PROVIDER: Sean Campos MD  Assistant(s): None    ANESTHESIA: Local, Diazepam 5 mg po    >> 0 mg of VERSED    >> 0 mcg of FENTANYL    INDICATION: The patient has low back pain and radiculopathy symptoms unresponsive to conservative treatments. Fluoroscopy was used to optimize visualization of needle placement and to maximize safety.        PROCEDURE DESCRIPTION / TECHNIQUE:   The patient was seen and identified in the preoperative area. Risks, benefits, complications, and alternatives were discussed with the patient. The patient agreed to proceed with the procedure and signed the consent. The site and side of the procedure was identified and marked. An IV was not placed for this procedure.    The patient was taken to the procedural suite. The patient was positioned in prone orientation on procedure table and a pillow was placed under the abdomen to reduce lumbar lordosis. A time out was performed prior to any intervention. The procedure, site, side, and allergies were stated and agreed to by all present. The lumbosacral area was widely prepped with ChloraPrep. The procedural site was draped in usual sterile fashion. Vital signs were closely monitored throughout this procedure. Conscious sedation was used for this procedure to decrease patient anxiety.    Using anterior-posterior fluoroscopy, the above noted INTERLAMINAR SPACE was identified and the skin over this site of intended entry was marked and then infiltrated with 3-4 mL of PF 1% LIDOCAINE subcutaneously using a 1.5 inch 27 gauge needle. A 20-gauge Tuohy epidural needle was then inserted and advanced toward the epidural space incrementally under fluoroscopic guidance. A Loss of Resistance technique was used as the epidural needle  was advanced. Once loss of resistance was realized and after negative aspiration of blood and spinal fluid, correct needle position within the epidural space was verified with injection of 0.5 to 1 mL of contrast dye (Omnipaque 240). Appropriate epidural spread was seen on imaging. Again, after negative aspiration for blood and spinal fluid a 5 mL mixture containing 2 mL of Methylprednisolone (40 mg/mL) and 3 mL of preservative free Normal Saline was then injected. No pain or paresthesias were noted with injection. There was low resistance during the injection. Washout of epidurogram was seen on fluoroscopy following injection of the above solution. The stylet was replaced and the Tuohy needle was withdrawn intact. The skin was cleansed, and bandages were applied.    Description of Findings: Not applicable    Prosthetic devices, grafts, tissues, or devices implanted: None    Specimen Removed: No    Estimated Blood Loss: minimal    COMPLICATIONS: None    DISPOSITION / PLANS: The patient was transferred to the recovery area in a stable condition for observation. The patient was reexamined prior to discharge. There was no evidence of acute neurologic injury following the procedure.  Patient was discharged from the recovery room after meeting discharge criteria. Home discharge instructions were given to the patient by the staff.

## 2017-03-01 NOTE — PROGRESS NOTES
Subjective:       Patient ID:  Yung Mcconnell is a 61 y.o. male who presents for   Chief Complaint   Patient presents with    Follow-up     pt using soolantra pt sees some improvement    Spot     c/o of 3 spots on back that have changed in color but are asymptamatic     HPI Comments: Hx of rosacea and AK's, last seen on 9/9/16.  He has been using soolantra and doxy 50 mg qD. He states rosacea has improved, but has persisted.        Prior treatments: metrogel, soolantra, doxy 50 mg qD          Spot         Review of Systems   Constitutional: Negative for fever and chills.   Gastrointestinal: Negative for nausea and vomiting.   Skin: Negative for daily sunscreen use, activity-related sunscreen use and recent sunburn.   Hematologic/Lymphatic: Does not bruise/bleed easily.        Objective:    Physical Exam   Constitutional: He appears well-developed and well-nourished. No distress.   Neurological: He is alert and oriented to person, place, and time. He is not disoriented.   Psychiatric: He has a normal mood and affect.   Skin:   Areas Examined (abnormalities noted in diagram):   Head / Face Inspection Performed  Neck Inspection Performed  Chest / Axilla Inspection Performed  Abdomen Inspection Performed  Back Inspection Performed  RUE Inspected  LUE Inspection Performed  Nails and Digits Inspection Performed                   Diagram Legend     Erythematous scaling macule/papule c/w actinic keratosis       Vascular papule c/w angioma      Pigmented verrucoid papule/plaque c/w seborrheic keratosis      Yellow umbilicated papule c/w sebaceous hyperplasia      Irregularly shaped tan macule c/w lentigo     1-2 mm smooth white papules consistent with Milia      Movable subcutaneous cyst with punctum c/w epidermal inclusion cyst      Subcutaneous movable cyst c/w pilar cyst      Firm pink to brown papule c/w dermatofibroma      Pedunculated fleshy papule(s) c/w skin tag(s)      Evenly pigmented macule c/w junctional  nevus     Mildly variegated pigmented, slightly irregular-bordered macule c/w mildly atypical nevus      Flesh colored to evenly pigmented papule c/w intradermal nevus       Pink pearly papule/plaque c/w basal cell carcinoma      Erythematous hyperkeratotic cursted plaque c/w SCC      Surgical scar with no sign of skin cancer recurrence      Open and closed comedones      Inflammatory papules and pustules      Verrucoid papule consistent consistent with wart     Erythematous eczematous patches and plaques     Dystrophic onycholytic nail with subungual debris c/w onychomycosis     Umbilicated papule    Erythematous-base heme-crusted tan verrucoid plaque consistent with inflamed seborrheic keratosis     Erythematous Silvery Scaling Plaque c/w Psoriasis     See annotation      Assessment / Plan:        Seborrheic keratosis  Reassurance given. Discussed diagnosis and that lesions are benign.  AAD handout given.     Angioma  Reassurance given.  Lesions are benign.    Rosacea  -     ivermectin (SOOLANTRA) 1 % Crea; Apply 1 application topically once daily.  Dispense: 30 g; Refill: 5  -     doxycycline (ORACEA) 40 mg capsule; Take 1 capsule (40 mg total) by mouth once daily.  Dispense: 30 capsule; Refill: 5  -     Improving, discussed PDL, pt defers.  Will change from generic doxy to generic oracea.  Continue soolantra.             Return in about 6 months (around 9/1/2017).

## 2017-03-01 NOTE — PLAN OF CARE
Problem: Patient Care Overview  Goal: Plan of Care Review  Outcome: Outcome(s) achieved Date Met:  03/01/17  Patient d/c home in stable condition via wheelchair with ride. Verbalized understanding of d/c instructions. Patient voiced no complaints at this time. Patient stood at side of bed, walked steps with no new motor deficits. Neurologically intact.

## 2017-07-06 ENCOUNTER — OFFICE VISIT (OUTPATIENT)
Dept: PAIN MEDICINE | Facility: CLINIC | Age: 62
End: 2017-07-06
Payer: COMMERCIAL

## 2017-07-06 VITALS
DIASTOLIC BLOOD PRESSURE: 81 MMHG | HEART RATE: 82 BPM | HEIGHT: 75 IN | RESPIRATION RATE: 16 BRPM | SYSTOLIC BLOOD PRESSURE: 127 MMHG | BODY MASS INDEX: 32.83 KG/M2 | WEIGHT: 264 LBS

## 2017-07-06 DIAGNOSIS — M47.812 SPONDYLOSIS OF CERVICAL JOINT WITHOUT MYELOPATHY: Primary | ICD-10-CM

## 2017-07-06 PROCEDURE — 99213 OFFICE O/P EST LOW 20 MIN: CPT | Mod: 25,S$GLB,, | Performed by: ANESTHESIOLOGY

## 2017-07-06 PROCEDURE — 99999 PR PBB SHADOW E&M-EST. PATIENT-LVL IV: CPT | Mod: PBBFAC,,, | Performed by: ANESTHESIOLOGY

## 2017-07-06 PROCEDURE — 96372 THER/PROPH/DIAG INJ SC/IM: CPT | Mod: S$GLB,,, | Performed by: ANESTHESIOLOGY

## 2017-07-06 RX ORDER — TRIAMCINOLONE ACETONIDE 40 MG/ML
40 INJECTION, SUSPENSION INTRA-ARTICULAR; INTRAMUSCULAR
Status: COMPLETED | OUTPATIENT
Start: 2017-07-06 | End: 2017-07-06

## 2017-07-06 RX ORDER — HYDROCODONE BITARTRATE AND ACETAMINOPHEN 7.5; 325 MG/1; MG/1
1 TABLET ORAL 3 TIMES DAILY PRN
Qty: 90 TABLET | Refills: 0 | Status: SHIPPED | OUTPATIENT
Start: 2017-07-06 | End: 2017-08-05

## 2017-07-06 RX ADMIN — TRIAMCINOLONE ACETONIDE 40 MG: 40 INJECTION, SUSPENSION INTRA-ARTICULAR; INTRAMUSCULAR at 03:07

## 2017-07-06 NOTE — PROGRESS NOTES
Chief Pain Complaint:  Neck pain, left shoulder    History of Present Illness:  This patient is a 61 y.o. male who presents today complaining of the above noted pain/s. The patient describes this pain as follows. Neck, left shoulder    - duration of pain: pain for > 1 week  - timing (constant, intermittent): constant  - character (sharp, dull, aching, burning): aching, sharp, shooting  - radiating, dermatomal: pain primarily left shoulder  - antecedent trauma, prior spinal surgery: none  - pertinent negatives: No fever, No chills, No weight loss, No bladder dysfunction, No bowel dysfunction, no lower extremity weakness, No saddle anesthesia  - pertinent positives: none  - medications, other therapies tried (physical therapy, injections): norco, flexeril, IBU, apap, celebrex, Medrol Dose pack, gabapentin (did not tolerate), no PT, he underwent left L5/S1 SOFI on 12-18-15 and right L5/S1 SOFI in April 2016      IMAGING (reviewed on 7/6/2017):      10-26-15 XR Lumbar:  Findings: The vertebral bodies demonstrate normal height. There is a couple millimeters of anterolisthesis of L3 on L4. There is moderate to severe disk space narrowing noted from the L2-3 through the L5-S1 levels. Prominent facet arthropathy noted from the L3-4 through L5-S1 levels. There is levoscoliosis of the lumbar spine with the apex located at L4. There are surgical clips noted within the right upper quadrant.       LUMBAR MRI 11-16-15:  History: Low back and left lower extremity pain., left L5 radiculopathy  Standard multiplanar noncontrast MRI sequences of the lumbar spine.  Mild lumbar levoscoliosis is present with pelvic tilting. The distal cord and conus appear grossly normal.  T12-L1: Minor facet arthropathy.  L1-2: Minor facet arthropathy.  L2-3: Minor disk desiccation. Mild hypertrophic ligamentum flavum and facet arthropathy. Mild left foraminal stenosis.  L3-4: Advanced degenerative disk disease with prominent disk space narrowing.  "Degenerative endplate marrow signal changes. Moderate right and mild left hypertrophic facet arthropathy with dorsal sac impingement. Mild central canal stenosis. Mild left and moderate right foraminal stenosis.  L4-5: Moderate degenerative disk disease with disk narrowing. Mild hypertrophic ligament flavum and facet arthropathy right greater than left with right lateral recess stenosis as best seen on axial image number 23. Moderate right foraminal stenosis with mild left foraminal stenosis.  L5-S1: Moderate degenerative disk disease with disk narrowing and desiccation. Moderate left and mild right hypertrophic facet arthritis. Mild right and moderate to severe left foraminal stenosis with possible compression of the exiting left L5 nerve root.      Review of Systems:  CONSTITUTIONAL: No fever, chills, weight loss  SKIN: No rash or itching  RESPIRATORY: No shortness of breath  GASTROINTESTINAL: No diarrhea, No constipation  GENITOURINARY: No urinary incontinence  MUSCULOSKELETAL:  - pain as above  NEUROLOGICAL:   - Pain as above  - Strength in lower extremities normal  - Sensation in lower extremities is abnormal, numbness, pain  - No bowel or bladder incontinence  PSYCHIATRIC: No change in mood noticed     Physical Exam:  /81 (BP Location: Right arm, Patient Position: Sitting, BP Method: Automatic)   Pulse 82   Resp 16   Ht 6' 3" (1.905 m)   Wt 119.7 kg (264 lb)   BMI 33.00 kg/m²   General: alert and oriented   Gait: normal gait  Skin: No rashes, No discoloration, No obvious lesions  HEENT: EOMI  Respiratory: respirations nonlabored  Musculoskeletal:  - Any pain on flexion, extension, rotation: pain with flex and extension   - Straight Leg Raise is positive on RIGHT, neg on LEFT  - Any tenderness to palpation across lumbar paraspinal muscles, Sacroiliac joint/s, greater trochanteric bursae: some tenderness along lumbar paraspinals  Neuro:  - Lower extremities: strength intact b/l  - Reflexes: Patellar " 2+ on the (R) & 2+ on the (L)   - Sensory: sensation to light touch intact  Psych: mood and affect appropriate      Assessment:  - Lumbar Radiculopathy   - Lumbar DDD      Plan:  - Patient presents for f/u.  He initially presented with low back and left leg pain along L5 pattern then into the right lower extremity pain along a Right L4/5 pattern.  Lumbar MRI again reviewed in clinic today.  He underwent a LESI in December 2015 and in April 2016, both at L5/S1.  He has no leg pain since the RONY's.  He expresses interest in another rony, I will order today and refill norco.  I discussed in detail the risks, benefits, and alternatives to any and all potential treatment options.  All questions and concerns were fully addressed today in clinic.    >> PDI:  1/12/2017 :: 100    >>ORT:  1/12/2017 :: 0

## 2017-07-06 NOTE — PROCEDURES
Procedures   Procedure: Trigger point injection    Muscle/s injected: Thoracic Paraspinals, Cervical Paraspinals and Trapezius muscle    Side: Left    Description of Procedure:  Prior to starting this procedure, risks, benefits, complications, and alternatives were discussed with the patient.  The patient agreed to proceed with the procedure.  The procedural sites were identified, marked, and widely prepped with ChloraPrep.      A 25-gauge 1.5 inch needle was introduced into the above noted muscle/s.  Following negative aspiration, a volume of 1 to 1.5 mL of a solution comprised of 9 mL of 1% Lidocaine and 1 mL of Triamcinolone (40 mg/mL) was injected.  No pain or paresthesia was noted on injection.  This process was repeated at multiple sites throughout the above noted muscle/s until the above noted solution was exhausted.  The patient tolerated the procedure well.  The injection sites were cleaned and bandaged as needed.    Complications: None

## 2017-07-10 ENCOUNTER — TELEPHONE (OUTPATIENT)
Dept: PULMONOLOGY | Facility: CLINIC | Age: 62
End: 2017-07-10

## 2017-07-10 NOTE — TELEPHONE ENCOUNTER
----- Message from Cheyanne Ng sent at 7/10/2017 10:16 AM CDT -----  Pt needs to speak to the nurse regarding why his xray was cancelled/ please call [phone]/ma

## 2017-07-11 ENCOUNTER — OFFICE VISIT (OUTPATIENT)
Dept: PULMONOLOGY | Facility: CLINIC | Age: 62
End: 2017-07-11
Payer: COMMERCIAL

## 2017-07-11 ENCOUNTER — PROCEDURE VISIT (OUTPATIENT)
Dept: PULMONOLOGY | Facility: CLINIC | Age: 62
End: 2017-07-11
Payer: COMMERCIAL

## 2017-07-11 ENCOUNTER — HOSPITAL ENCOUNTER (OUTPATIENT)
Dept: RADIOLOGY | Facility: HOSPITAL | Age: 62
Discharge: HOME | End: 2017-07-11
Attending: INTERNAL MEDICINE
Payer: COMMERCIAL

## 2017-07-11 VITALS
OXYGEN SATURATION: 97 % | DIASTOLIC BLOOD PRESSURE: 82 MMHG | HEART RATE: 63 BPM | HEIGHT: 75 IN | SYSTOLIC BLOOD PRESSURE: 130 MMHG | WEIGHT: 263.88 LBS | BODY MASS INDEX: 32.81 KG/M2

## 2017-07-11 DIAGNOSIS — J45.40 ASTHMA, CHRONIC, MODERATE PERSISTENT, UNCOMPLICATED: ICD-10-CM

## 2017-07-11 DIAGNOSIS — J45.20 MILD INTERMITTENT ASTHMA, UNCOMPLICATED: ICD-10-CM

## 2017-07-11 DIAGNOSIS — J30.1 CHRONIC SEASONAL ALLERGIC RHINITIS DUE TO POLLEN: ICD-10-CM

## 2017-07-11 DIAGNOSIS — M51.36 DDD (DEGENERATIVE DISC DISEASE), LUMBAR: ICD-10-CM

## 2017-07-11 DIAGNOSIS — G47.33 OSA (OBSTRUCTIVE SLEEP APNEA): Primary | ICD-10-CM

## 2017-07-11 DIAGNOSIS — M54.16 RIGHT LUMBAR RADICULOPATHY: ICD-10-CM

## 2017-07-11 DIAGNOSIS — J44.89 ASTHMA WITH COPD: ICD-10-CM

## 2017-07-11 PROCEDURE — 94060 EVALUATION OF WHEEZING: CPT | Mod: S$GLB,,, | Performed by: INTERNAL MEDICINE

## 2017-07-11 PROCEDURE — 71020 XR CHEST PA AND LATERAL: CPT | Mod: 26,,, | Performed by: RADIOLOGY

## 2017-07-11 PROCEDURE — 99214 OFFICE O/P EST MOD 30 MIN: CPT | Mod: 25,S$GLB,, | Performed by: INTERNAL MEDICINE

## 2017-07-11 PROCEDURE — 99999 PR PBB SHADOW E&M-EST. PATIENT-LVL III: CPT | Mod: PBBFAC,,, | Performed by: INTERNAL MEDICINE

## 2017-07-11 RX ORDER — FLUTICASONE PROPIONATE 50 MCG
2 SPRAY, SUSPENSION (ML) NASAL DAILY
Qty: 3 BOTTLE | Refills: 4 | Status: SHIPPED | OUTPATIENT
Start: 2017-07-11 | End: 2018-06-26 | Stop reason: SDUPTHER

## 2017-07-11 RX ORDER — CYCLOBENZAPRINE HCL 5 MG
TABLET ORAL
Qty: 270 TABLET | Refills: 3 | Status: SHIPPED | OUTPATIENT
Start: 2017-07-11 | End: 2018-07-12 | Stop reason: SDUPTHER

## 2017-07-11 RX ORDER — ALBUTEROL SULFATE 0.83 MG/ML
2.5 SOLUTION RESPIRATORY (INHALATION)
Qty: 1080 ML | Refills: 3 | Status: SHIPPED | OUTPATIENT
Start: 2017-07-11 | End: 2018-07-12

## 2017-07-11 RX ORDER — ALBUTEROL SULFATE 90 UG/1
AEROSOL, METERED RESPIRATORY (INHALATION)
Qty: 3 INHALER | Refills: 4 | Status: SHIPPED | OUTPATIENT
Start: 2017-07-11 | End: 2018-06-26 | Stop reason: SDUPTHER

## 2017-07-11 NOTE — PROGRESS NOTES
Subjective:       Patient ID: Yung Mcconnell is a 61 y.o. male.    Chief Complaint: He       No chief complaint on file.    HPI     COPD  He presents for evaluation and treatment of COPD. The patient is not currently have symptoms / an exacerbation. The patient has COPD for approximately 10 years. Symptoms in previous episodes have included dyspnea, cough and wheezing, and typically last 2 weeks. Previous episodes have been exacerbated by moderate activity. Current treatment includes albuterol inhaler, which generally provides some relief of symptoms.  He uses 1 pillows at night. Patient currently is not on home oxygen therapy.. The patient is having no constitutional symptoms, denying fever, chills, anorexia, or weight loss. The patient has not been hospitalized for this condition before. Not smoking The patient is experiencing exercise intolerance (difficulty climbing 2 flights of stairs).     Obstructive Sleep Apnea:   Yung Mcconnell presents for review of CPAP compliance. Information from CPAP machine downloaded and reviewed. Summary of compliance report is as follows:  Percent days with usage: 100 %    Left Shoulder Pain:  2 week history of Left shoulder pain. Slow improvement. Woke up after sleeping in abnormal position  [facility]  States he will follow up with pain managment    Past Medical History:   Diagnosis Date    Allergy     Asthma, chronic 7/9/2013    Colon polyps     COPD (chronic obstructive pulmonary disease)     GERD (gastroesophageal reflux disease)     Osteoarthritis of both knees 7/9/2013    Sleep apnea      Past Surgical History:   Procedure Laterality Date    CHOLECYSTECTOMY      LUNG SURGERY Right     benign mass     Social History     Social History    Marital status:      Spouse name: N/A    Number of children: N/A    Years of education: N/A     Occupational History     [employer]     Social History Main Topics    Smoking status:  Never Smoker    Smokeless tobacco: Never Used    Alcohol use Yes      Comment: rare    Drug use: No    Sexual activity: Yes     Partners: Female     Birth control/ protection: None     Other Topics Concern    Not on file     Social History Narrative    No narrative on file     Review of Systems   Constitutional: Negative for fever and fatigue.   HENT: Positive for postnasal drip and rhinorrhea.    Eyes: Negative for redness and itching.   Respiratory: Positive for apnea and shortness of breath. Negative for cough, wheezing, dyspnea on extertion and Paroxysmal Nocturnal Dyspnea.    Cardiovascular: Negative for chest pain.   Genitourinary: Negative for difficulty urinating and hematuria.   Endocrine: Negative for polyphagia, cold intolerance and heat intolerance.    Musculoskeletal: Positive for arthralgias and back pain.   Skin: Negative for rash.   Gastrointestinal: Negative for nausea, vomiting, abdominal pain and abdominal distention.   Neurological: Negative for dizziness and headaches.   Hematological: Negative for adenopathy. Does not bruise/bleed easily and no excessive bruising.   Psychiatric/Behavioral: The patient is not nervous/anxious.        Objective:      Physical Exam   Constitutional: He is oriented to person, place, and time. He appears well-developed and well-nourished.   HENT:   Head: Normocephalic and atraumatic.   Eyes: Conjunctivae are normal. Pupils are equal, round, and reactive to light.   Neck: Neck supple. No JVD present. No tracheal deviation present. No thyromegaly present.   Cardiovascular: Normal rate, regular rhythm and normal heart sounds.    Pulmonary/Chest: Effort normal. He has decreased breath sounds in the right lower field and the left lower field.   Abdominal: Soft.   Musculoskeletal: Normal range of motion.   Lymphadenopathy:     He has no cervical adenopathy.   Neurological: He is alert and oriented to person, place, and time.   Skin: Skin is warm and dry.   Nursing  note and vitals reviewed.    Personal Diagnostic Review  Chest x-ray: stable , right upper lobe scar  Spirometry: normal    No flowsheet data found.      Assessment:       1. KEVEN (obstructive sleep apnea)    2. Asthma with COPD    3. Chronic seasonal allergic rhinitis due to pollen    4. Asthma, chronic, moderate persistent, uncomplicated    5. Mild intermittent asthma, uncomplicated    6. Right lumbar radiculopathy    7. DDD (degenerative disc disease), lumbar        Outpatient Encounter Prescriptions as of 7/11/2017   Medication Sig Dispense Refill    albuterol (PROVENTIL) 2.5 mg /3 mL (0.083 %) nebulizer solution Take 3 mLs (2.5 mg total) by nebulization every 6 (six) hours while awake. 1080 mL 3    albuterol 90 mcg/actuation inhaler Every 4-6 hours 3 Inhaler 4    beclomethasone (QVAR) 80 mcg/actuation Aero 2 puff BID .Wash out mouth after use 25.1 each 3    cyclobenzaprine (FLEXERIL) 5 MG tablet 1 Tablet Oral Three times a day as needed. 270 tablet 3    diclofenac 1.3 % PT12 Apply 1 patch topically every 12 (twelve) hours as needed. 30 patch 0    doxycycline (MONODOX) 50 MG Cap Take once daily with food 30 capsule 5    doxycycline (ORACEA) 40 mg capsule Take 1 capsule (40 mg total) by mouth once daily. 30 capsule 5    fluticasone (FLONASE) 50 mcg/actuation nasal spray 2 sprays by Each Nare route once daily. 3 Bottle 4    formoterol fumarate 12 mcg CpDv Inhale 1 capsule (12 mcg total) into the lungs every 12 (twelve) hours. 180 capsule 4    GLUCOSAMINE HCL/CHONDR ALFONSO A NA (OSTEO BI-FLEX ORAL) Take by mouth.      hydrocodone-acetaminophen 7.5-325mg (NORCO) 7.5-325 mg per tablet Take 1 tablet by mouth 3 (three) times daily as needed for Pain. 90 tablet 0    multivitamin (MULTIPLE VITAMIN) per tablet 1 Tablet Oral Every day      omeprazole (PRILOSEC) 40 MG capsule Take 1 capsule (40 mg total) by mouth once daily. Dispense Generic 90 capsule 0    pravastatin (PRAVACHOL) 10 MG tablet Take 1 tablet (10 mg  total) by mouth once daily. 90 tablet 3    SOOLANTRA 1 % Crea EDWARD EXT AA QD  5    tadalafil (CIALIS) 20 MG Tab Take 1 tablet (20 mg total) by mouth daily as needed. 20 tablet 11    [DISCONTINUED] albuterol (PROVENTIL) 2.5 mg /3 mL (0.083 %) nebulizer solution Take 3 mLs (2.5 mg total) by nebulization every 6 (six) hours while awake. 360 mL 12    [DISCONTINUED] albuterol 90 mcg/actuation inhaler Every 4-6 hours 3 Inhaler 4    [DISCONTINUED] beclomethasone (QVAR) 80 mcg/actuation Aero 2 puff BID .Wash out mouth after use 25.1 each 3    [DISCONTINUED] cyclobenzaprine (FLEXERIL) 5 MG tablet 1 Tablet Oral Three times a day as needed. 90 tablet 0    [DISCONTINUED] fluticasone (FLONASE) 50 mcg/actuation nasal spray 2 sprays by Each Nare route once daily. 3 Bottle 4    [DISCONTINUED] formoterol fumarate 12 mcg CpDv Inhale 1 capsule (12 mcg total) into the lungs every 12 (twelve) hours. 60 capsule 11     No facility-administered encounter medications on file as of 7/11/2017.      Orders Placed This Encounter   Procedures    CPAP/BIPAP SUPPLIES     Referred to [employer] company:  [employer].  [address] [address]  [phone]  Fax [phone]     Order Specific Question:   Type of mask:     Answer:   FFM     Order Specific Question:   Headgear?     Answer:   Yes     Order Specific Question:   Tubing?     Answer:   Yes     Order Specific Question:   Humidifier chamber?     Answer:   Yes     Order Specific Question:   Chin strap?     Answer:   Yes     Order Specific Question:   Filters?     Answer:   Yes     Order Specific Question:   Other supplies:     Answer:   90 day supply with 4 refills     Order Specific Question:   Length of need (1-99 months):     Answer:   99    X-Ray Chest PA And Lateral     Standing Status:   Future     Standing Expiration Date:   1/11/2019     Order Specific Question:   Reason for Exam:     Answer:   SOB    Spirometry with/without bronchodilator     Standing Status:    Future     Standing Expiration Date:   2018     Plan:       Requested Prescriptions     Signed Prescriptions Disp Refills    cyclobenzaprine (FLEXERIL) 5 MG tablet 270 tablet 3     Si Tablet Oral Three times a day as needed.    albuterol (PROVENTIL) 2.5 mg /3 mL (0.083 %) nebulizer solution 1080 mL 3     Sig: Take 3 mLs (2.5 mg total) by nebulization every 6 (six) hours while awake.    fluticasone (FLONASE) 50 mcg/actuation nasal spray 3 Bottle 4     Si sprays by Each Nare route once daily.    formoterol fumarate 12 mcg CpDv 180 capsule 4     Sig: Inhale 1 capsule (12 mcg total) into the lungs every 12 (twelve) hours.    albuterol 90 mcg/actuation inhaler 3 Inhaler 4     Sig: Every 4-6 hours    beclomethasone (QVAR) 80 mcg/actuation Aero 25.1 each 3     Si puff BID .Wash out mouth after use     KEVEN (obstructive sleep apnea)  -     CPAP/BIPAP SUPPLIES    Asthma with COPD  -     albuterol (PROVENTIL) 2.5 mg /3 mL (0.083 %) nebulizer solution; Take 3 mLs (2.5 mg total) by nebulization every 6 (six) hours while awake.  Dispense: 1080 mL; Refill: 3  -     formoterol fumarate 12 mcg CpDv; Inhale 1 capsule (12 mcg total) into the lungs every 12 (twelve) hours.  Dispense: 180 capsule; Refill: 4  -     X-Ray Chest PA And Lateral; Future; Expected date: 2017  -     Spirometry with/without bronchodilator; Future; Expected date: 2018    Chronic seasonal allergic rhinitis due to pollen  -     fluticasone (FLONASE) 50 mcg/actuation nasal spray; 2 sprays by Each Nare route once daily.  Dispense: 3 Bottle; Refill: 4    Asthma, chronic, moderate persistent, uncomplicated  -     albuterol 90 mcg/actuation inhaler; Every 4-6 hours  Dispense: 3 Inhaler; Refill: 4  -     beclomethasone (QVAR) 80 mcg/actuation Aero; 2 puff BID .Wash out mouth after use  Dispense: 25.1 each; Refill: 3    Mild intermittent asthma, uncomplicated  -     albuterol 90 mcg/actuation inhaler; Every 4-6 hours  Dispense: 3  Inhaler; Refill: 4  -     beclomethasone (QVAR) 80 mcg/actuation Aero; 2 puff BID .Wash out mouth after use  Dispense: 25.1 each; Refill: 3    Right lumbar radiculopathy  -     cyclobenzaprine (FLEXERIL) 5 MG tablet; 1 Tablet Oral Three times a day as needed.  Dispense: 270 tablet; Refill: 3    DDD (degenerative disc disease), lumbar  -     cyclobenzaprine (FLEXERIL) 5 MG tablet; 1 Tablet Oral Three times a day as needed.  Dispense: 270 tablet; Refill: 3           Return in about 1 year (around 7/11/2018) for althea and cxr.    MEDICAL DECISION MAKING: Moderate to high complexity.  Overall, the multiple problems listed are of moderate to high severity that may impact quality of life and activities of daily living. Side effects of medications, treatment plan as well as options and alternatives reviewed and discussed with patient. There was counseling of patient concerning these issues.    Total time spent in face to face counseling and coordination of care - 25  minutes over 50% of time was used in discussion of prognosis, risks, benefits of treatment, instructions and compliance with regimen . Discussion with other physicians or health care providers (DME, NP, pharmacy, respiratory therapy) occurred.

## 2017-07-13 LAB
PRE FEV1 FVC: 74.16 % (ref 65.37–84.73)
PRE FEV1: 3.48 L (ref 3.26–5.07)
PRE FVC: 4.7 L (ref 4.48–6.61)
PRE PEF: 7.56 L/S (ref 7.5–12.82)

## 2017-07-14 ENCOUNTER — PATIENT MESSAGE (OUTPATIENT)
Dept: PAIN MEDICINE | Facility: CLINIC | Age: 62
End: 2017-07-14

## 2017-07-17 ENCOUNTER — OFFICE VISIT (OUTPATIENT)
Dept: PAIN MEDICINE | Facility: CLINIC | Age: 62
End: 2017-07-17
Payer: COMMERCIAL

## 2017-07-17 ENCOUNTER — HOSPITAL ENCOUNTER (OUTPATIENT)
Dept: RADIOLOGY | Facility: HOSPITAL | Age: 62
Discharge: HOME | End: 2017-07-17
Attending: ANESTHESIOLOGY
Payer: COMMERCIAL

## 2017-07-17 VITALS
SYSTOLIC BLOOD PRESSURE: 119 MMHG | WEIGHT: 263 LBS | RESPIRATION RATE: 16 BRPM | BODY MASS INDEX: 32.7 KG/M2 | HEIGHT: 75 IN | DIASTOLIC BLOOD PRESSURE: 78 MMHG | HEART RATE: 58 BPM

## 2017-07-17 DIAGNOSIS — M79.18 MYOFASCIAL PAIN: ICD-10-CM

## 2017-07-17 DIAGNOSIS — M47.812 FACET ARTHROPATHY, CERVICAL: ICD-10-CM

## 2017-07-17 DIAGNOSIS — M47.812 SPONDYLOSIS OF CERVICAL JOINT WITHOUT MYELOPATHY: ICD-10-CM

## 2017-07-17 DIAGNOSIS — M25.512 ACUTE PAIN OF LEFT SHOULDER: ICD-10-CM

## 2017-07-17 DIAGNOSIS — M25.512 ACUTE PAIN OF LEFT SHOULDER: Primary | ICD-10-CM

## 2017-07-17 PROCEDURE — 99214 OFFICE O/P EST MOD 30 MIN: CPT | Mod: S$GLB,,, | Performed by: PHYSICIAN ASSISTANT

## 2017-07-17 PROCEDURE — 73030 X-RAY EXAM OF SHOULDER: CPT | Mod: 26,LT,, | Performed by: RADIOLOGY

## 2017-07-17 PROCEDURE — 99999 PR PBB SHADOW E&M-EST. PATIENT-LVL V: CPT | Mod: PBBFAC,,, | Performed by: PHYSICIAN ASSISTANT

## 2017-07-17 PROCEDURE — 73030 X-RAY EXAM OF SHOULDER: CPT | Mod: TC,PO,LT

## 2017-07-17 PROCEDURE — 72052 X-RAY EXAM NECK SPINE 6/>VWS: CPT | Mod: TC,PO

## 2017-07-17 PROCEDURE — 72052 X-RAY EXAM NECK SPINE 6/>VWS: CPT | Mod: 26,,, | Performed by: RADIOLOGY

## 2017-07-17 NOTE — PROGRESS NOTES
Chief Pain Complaint:  Neck pain, left shoulder    History of Present Illness:  This patient is a 61 y.o. male who presents today complaining of the above noted pain/s. The patient describes this pain as follows. Neck, left shoulder    - duration of pain: pain since ~June 25  - timing (constant, intermittent): constant  - character (sharp, dull, aching, burning): aching, sharp, shooting  - radiating, dermatomal: pain primarily radiating into left shoulder  - antecedent trauma, prior spinal surgery: none  - pertinent negatives: No fever, No chills, No weight loss, No bladder dysfunction, No bowel dysfunction, no lower extremity weakness, No saddle anesthesia  - pertinent positives: none  - medications, other therapies tried (physical therapy, injections):     >> Medications: norco, flexeril, IBU, apap, celebrex, Medrol Dose pack, gabapentin (did not tolerate)    >> No PT    >> Injections:    - left L5/S1 SOFI on 12-18-15    - right L5/S1 SOFI in April 2016   - left cervical/thoracic trigger point injections in the clinic on 7-6-17       IMAGING (reviewed on 7/17/2017):    7/17/17 Left Shoulder X-ray  Comparison: None   4 views  Findings:  Degenerative changes at the a.c. joint with superior marginal osteophyte formation.  Mild irregularity along the greater tuberosity.  No soft tissue calcification or foreign body.  Normal articulation at the glenohumeral joint.  Remaining osseous structures appear intact.       7/17/17 Cervical X-ray  Comparison: None   C-spine 5 views with flexion and extension, total 7 views, 10 images  Findings:  Multilevel anterior and posterior marginal spurring with concomitant uniform loss of disc height throughout the C-spine.  There is faint visualization to the T1-2 level on the swimmer's view.  Limited range of motion noted on flexion-extension views with no evidence of instability.  Specifically no subluxation identified.  Prevertebral soft tissues, C1-2 articulation and odontoid normal in  appearance allowing for positioning.  Multilevel uncal vertebral osteophyte formation and mild foraminal stenosis on the RIGHT.  Evaluation of the LEFT neural foramen Limited secondary positioning.  Findings suggest possible mild to moderate foraminal stenosis in the mid C-spine on the LEFT.       10-26-15 XR Lumbar:  Findings: The vertebral bodies demonstrate normal height. There is a couple millimeters of anterolisthesis of L3 on L4. There is moderate to severe disk space narrowing noted from the L2-3 through the L5-S1 levels. Prominent facet arthropathy noted from the L3-4 through L5-S1 levels. There is levoscoliosis of the lumbar spine with the apex located at L4. There are surgical clips noted within the right upper quadrant.       LUMBAR MRI 11-16-15:  History: Low back and left lower extremity pain., left L5 radiculopathy  Standard multiplanar noncontrast MRI sequences of the lumbar spine.  Mild lumbar levoscoliosis is present with pelvic tilting. The distal cord and conus appear grossly normal.  T12-L1: Minor facet arthropathy.  L1-2: Minor facet arthropathy.  L2-3: Minor disk desiccation. Mild hypertrophic ligamentum flavum and facet arthropathy. Mild left foraminal stenosis.  L3-4: Advanced degenerative disk disease with prominent disk space narrowing. Degenerative endplate marrow signal changes. Moderate right and mild left hypertrophic facet arthropathy with dorsal sac impingement. Mild central canal stenosis. Mild left and moderate right foraminal stenosis.  L4-5: Moderate degenerative disk disease with disk narrowing. Mild hypertrophic ligament flavum and facet arthropathy right greater than left with right lateral recess stenosis as best seen on axial image number 23. Moderate right foraminal stenosis with mild left foraminal stenosis.  L5-S1: Moderate degenerative disk disease with disk narrowing and desiccation. Moderate left and mild right hypertrophic facet arthritis. Mild right and moderate to  "severe left foraminal stenosis with possible compression of the exiting left L5 nerve root.      Review of Systems:  CONSTITUTIONAL: No fever, chills, weight loss  SKIN: No rash or itching  RESPIRATORY: No shortness of breath  GASTROINTESTINAL: No diarrhea, No constipation  GENITOURINARY: No urinary incontinence  MUSCULOSKELETAL:  - pain as above  NEUROLOGICAL:   - Pain as above  - Strength in lower extremities normal  - Sensation in lower extremities is abnormal, numbness, pain  - No bowel or bladder incontinence  PSYCHIATRIC: No change in mood noticed         Physical Exam:  Vitals:  /78 (BP Location: Right arm, Patient Position: Sitting, BP Method: Automatic)   Pulse (!) 58   Resp 16   Ht 6' 3" (1.905 m)   Wt 119.3 kg (263 lb)   BMI 32.87 kg/m²    (reviewed on 7/17/2017)    General: alert and oriented   Gait: normal gait  Skin: No rashes, No discoloration, No obvious lesions  HEENT: EOMI  Respiratory: respirations nonlabored    Musculoskeletal - Cervical:  - Any pain on flexion, extension, rotation:    >> pain with flex and extension on left  - Spurling's:     >> negative bilaterally  - Any tenderness to palpation across lumbar paraspinal muscles, Sacroiliac joint/s, greater trochanteric bursae:    >> some tenderness along left lower cervical paraspinals     Left Shoulder:  - Limited ROM, <40 degree abduction  - Mild tenderness    Musculoskeletal - Lumbar:  - Any pain on flexion, extension, rotation:    >> pain with flex and extension  - Straight Leg Raise:    >> positive on RIGHT    >> negative on LEFT  - Any tenderness to palpation across lumbar paraspinal muscles, Sacroiliac joint/s, greater trochanteric bursae:     >> some tenderness along lumbar paraspinals    Neuro:  - Upper extremities:    >> decreased with left abduction  - Lower extremities:    >> strength intact b/l  - LE Reflexes:     >> Patellar 2+ on the (R) & 2+ on the (L)   - Sensory:    >> sensation to light touch intact of BUE & " BLE    Psych: mood and affect appropriate        Assessment:  - Cervical Spondylosis  - Cervical DDD  - Left Shoulder Pain  - Cervical Radiculopathy     Plan:  Patient presents for follow-up.  He initially presented with low back and left leg pain along L5 pattern then into the right lower extremity pain along a Right L4/5 pattern. He underwent a L5/S1 LESI in December 2015 and in April 2016, and he has not had leg pain since the SOFI's.  - He has been complaining of neck pain since ~6/25/17 after sleeping on a bad pillow. It has been persistent since then. He had trigger point injections in the clinic on 7-6-17 with Dr. Campos with limited relief. He now cannot lift his shoulder while in seated position.  - Order cervical + left shoulder x-ray.  - Schedule left cervical MBB.  - Consider ordering cervical MRI if no pan relief from procedure and ortho referral for left shoulder pain.  - He has Norco to take for severe pain.  RTC for injection f/u. I discussed the risks, benefits, and alternatives to potential treatment options. All questions and concerns were fully addressed today in clinic. Dr. Campos was consulted regarding the patient plan and agrees.            >>ORT:  1/12/2017 :: 0

## 2017-07-18 DIAGNOSIS — M47.812 CERVICAL SPONDYLOSIS WITHOUT MYELOPATHY: Primary | ICD-10-CM

## 2017-07-21 DIAGNOSIS — J45.30 MILD PERSISTENT ASTHMA WITHOUT COMPLICATION: Primary | ICD-10-CM

## 2017-07-21 NOTE — TELEPHONE ENCOUNTER
Foradil is being discontinued  Will send in prescription for Dulera and d/c qvair and foradil  Spoke to patient  (the other replacement would be symbicort)

## 2017-07-25 ENCOUNTER — TELEPHONE (OUTPATIENT)
Dept: PULMONOLOGY | Facility: CLINIC | Age: 62
End: 2017-07-25

## 2017-07-25 DIAGNOSIS — J45.30 MILD PERSISTENT ASTHMA WITHOUT COMPLICATION: ICD-10-CM

## 2017-07-25 RX ORDER — FLUTICASONE FUROATE AND VILANTEROL 200; 25 UG/1; UG/1
1 POWDER RESPIRATORY (INHALATION) DAILY
Qty: 60 EACH | Refills: 11 | Status: SHIPPED | OUTPATIENT
Start: 2017-07-25 | End: 2017-07-25 | Stop reason: SDUPTHER

## 2017-07-25 RX ORDER — FLUTICASONE FUROATE AND VILANTEROL 200; 25 UG/1; UG/1
1 POWDER RESPIRATORY (INHALATION) DAILY
Qty: 60 EACH | Refills: 11 | Status: SHIPPED | OUTPATIENT
Start: 2017-07-25 | End: 2017-11-07

## 2017-07-26 ENCOUNTER — PATIENT MESSAGE (OUTPATIENT)
Dept: ADMINISTRATIVE | Facility: OTHER | Age: 62
End: 2017-07-26

## 2017-07-27 DIAGNOSIS — M54.12 CERVICAL RADICULOPATHY: Primary | ICD-10-CM

## 2017-08-01 ENCOUNTER — TELEPHONE (OUTPATIENT)
Dept: PAIN MEDICINE | Facility: CLINIC | Age: 62
End: 2017-08-01

## 2017-08-01 ENCOUNTER — PATIENT MESSAGE (OUTPATIENT)
Dept: PAIN MEDICINE | Facility: CLINIC | Age: 62
End: 2017-08-01

## 2017-08-01 DIAGNOSIS — M54.12 BRACHIAL NEURITIS OR RADICULITIS NOS: Primary | ICD-10-CM

## 2017-08-01 NOTE — TELEPHONE ENCOUNTER
----- Message from Guillermo Mcgrath sent at 8/1/2017 11:27 AM CDT -----  Contact: pt  He's calling in regards to schedule injection for Friday 8/4/17, please advise, [phone] (cell)

## 2017-08-04 ENCOUNTER — HOSPITAL ENCOUNTER (OUTPATIENT)
Facility: HOSPITAL | Age: 62
Discharge: HOME | End: 2017-08-04
Attending: ANESTHESIOLOGY | Admitting: ANESTHESIOLOGY
Payer: COMMERCIAL

## 2017-08-04 ENCOUNTER — HOSPITAL ENCOUNTER (OUTPATIENT)
Dept: RADIOLOGY | Facility: HOSPITAL | Age: 62
Discharge: HOME | End: 2017-08-04
Attending: ANESTHESIOLOGY | Admitting: ANESTHESIOLOGY
Payer: COMMERCIAL

## 2017-08-04 VITALS
HEIGHT: 75 IN | WEIGHT: 259 LBS | BODY MASS INDEX: 32.2 KG/M2 | SYSTOLIC BLOOD PRESSURE: 151 MMHG | RESPIRATION RATE: 16 BRPM | OXYGEN SATURATION: 95 % | DIASTOLIC BLOOD PRESSURE: 82 MMHG | HEART RATE: 65 BPM

## 2017-08-04 DIAGNOSIS — M47.812 SPONDYLOSIS OF CERVICAL JOINT WITHOUT MYELOPATHY: ICD-10-CM

## 2017-08-04 DIAGNOSIS — M47.812 CERVICAL SPONDYLOSIS: ICD-10-CM

## 2017-08-04 DIAGNOSIS — M47.812 SPONDYLOSIS OF CERVICAL REGION WITHOUT MYELOPATHY OR RADICULOPATHY: Primary | ICD-10-CM

## 2017-08-04 PROCEDURE — 64491 INJ PARAVERT F JNT C/T 2 LEV: CPT

## 2017-08-04 PROCEDURE — 25000003 PHARM REV CODE 250: Performed by: ANESTHESIOLOGY

## 2017-08-04 PROCEDURE — 63600175 PHARM REV CODE 636 W HCPCS

## 2017-08-04 PROCEDURE — 25000003 PHARM REV CODE 250

## 2017-08-04 PROCEDURE — 63600175 PHARM REV CODE 636 W HCPCS: Performed by: ANESTHESIOLOGY

## 2017-08-04 PROCEDURE — 64492 INJ PARAVERT F JNT C/T 3 LEV: CPT | Mod: LT,,, | Performed by: ANESTHESIOLOGY

## 2017-08-04 PROCEDURE — 64490 INJ PARAVERT F JNT C/T 1 LEV: CPT | Mod: LT,,, | Performed by: ANESTHESIOLOGY

## 2017-08-04 PROCEDURE — 64492 INJ PARAVERT F JNT C/T 3 LEV: CPT

## 2017-08-04 PROCEDURE — 64490 INJ PARAVERT F JNT C/T 1 LEV: CPT

## 2017-08-04 PROCEDURE — 64491 INJ PARAVERT F JNT C/T 2 LEV: CPT | Mod: LT,,, | Performed by: ANESTHESIOLOGY

## 2017-08-04 RX ORDER — DIAZEPAM 5 MG/1
5 TABLET ORAL ONCE
Status: COMPLETED | OUTPATIENT
Start: 2017-08-04 | End: 2017-08-04

## 2017-08-04 RX ORDER — DEXAMETHASONE SODIUM PHOSPHATE 4 MG/ML
INJECTION, SOLUTION INTRA-ARTICULAR; INTRALESIONAL; INTRAMUSCULAR; INTRAVENOUS; SOFT TISSUE
Status: DISCONTINUED | OUTPATIENT
Start: 2017-08-04 | End: 2017-08-04 | Stop reason: HOSPADM

## 2017-08-04 RX ORDER — LIDOCAINE HYDROCHLORIDE 20 MG/ML
INJECTION, SOLUTION INFILTRATION; PERINEURAL
Status: DISCONTINUED | OUTPATIENT
Start: 2017-08-04 | End: 2017-08-04 | Stop reason: HOSPADM

## 2017-08-04 RX ADMIN — DIAZEPAM 5 MG: 5 TABLET ORAL at 07:08

## 2017-08-04 NOTE — OP NOTE
"Procedure: CERVICAL Medial Branch Block (posterior approach) under Fluorsocopic Guidance    Side: left    Level:  C3 articular pillar (Corresponding to the C3 medial branch), C4 articular pillar (Corresponding to the C4 medial branch), C5 articular pillar (Corresponding to the C5 medial branch) and C6 articular pillar (Corresponding to the C6 medial branch)     Procedure Date: 8/4/2017    Pre-operative Diagnosis: Cervical Spondylosis  Post-operative Diagnosis: Cervical Spondylosis    Provider: Sean Campos MD  Assistant(s): none     Anesthesia: Local, Diazepam 5 mg po     >> 0 mg of VERSED    >> 0 mcg of FENTANYL     Indication: Cervical pain without radiculopathy. Symptoms unresponsive to conservative treatments. Fluoroscopy was used to optimize visualization of needle placement and to maximize safety.     Procedure Description / Technique:  The patient was seen and identified in the preoperative area. Risks, benefits, complications, and alternatives were discussed with the patient. The patient agreed to proceed with the procedure and signed the consent. The site and side of the procedure was identified and marked. An IV was started. The patient was taken to the procedural suite.     The patient was positioned in PRONE orientation on procedure table. A time out was performed prior to any intervention. The procedure, site, side, and allergies were stated and agreed to by all present. The cervical area was widely prepped with ChloraPrep (10.5 mL) x 2. The procedural site was draped in usual sterile fashion. Vital signs were closely monitored throughout this procedure. Conscious sedation was used for this procedure to decrease patient anxiety.     The articular pillars at the above noted levels and side/s were identified using AP fluoroscopy. The "scallops" of the articular pillars at each level were the sites targeted. Each site was marked and localized with 0.5 mL of 1% PF Lidocaine using a 27 gauge 1.5 inch " needle. A 25 gauge 3.5 inch spinal needle was then advanced at each targeted area under AP fluoroscopic guidance until the needle rested on OS at lateral most border of the articular pillar. After negative aspiration, 0.5 mL of a solution containing 5 mL of 1% PF Lidocaine and 1 mL of Dexamethasone (10 mg/mL) was injected. No pain or paresthesia was noted on injection. This same technique was performed for each of the above noted levels. The stylet was replaced and the needle was removed intact following injection at each targeted site.    Description of Findings: Not applicable    Prosthetic devices, grafts, tissues, or devices implanted: None    Specimen Removed: No    Estimated Blood Loss: minimal    COMPLICATIONS: None    DISPOSITION / PLANS: The patient was transferred to the recovery area in a stable condition for observation. The patient was reexamined prior to discharge. There was no evidence of acute neurologic injury following the procedure.  Patient was discharged from the recovery room after meeting discharge criteria. Home discharge instructions were given to the patient by the staff.

## 2017-08-04 NOTE — DISCHARGE SUMMARY
[facility]  Discharge Note       Description of Procedure: Cervical Medial Branch Block under Fluoroscopic Guidance    Procedure Date: 8/4/2017    Admit Date: 8/4/2017  Discharge Date: 8/4/2017     Attending Physician: Sean Campos   Discharge Provider: Sean Campos    Preoperative Diagnosis:   Active Hospital Problems    Diagnosis  POA    Cervical spondylosis [M47.812]  Yes     Priority: High      Resolved Hospital Problems    Diagnosis Date Resolved POA   No resolved problems to display.        Postoperative Diagnosis: as above, same as preoperative diagnosis    Discharged Condition: Stable    Hospital Course: Patient was admitted for an outpatient procedure. The procedure was tolerated well with no complications.    Final Diagnoses: Same as principal problem.    Disposition: Home, self-care.    Follow up/Patient Instructions:  Follow-up in clinic in 2-3 weeks.    Medications: No medications were prescribed today. The patient was advised to resume normal medication regimen without change.  Specific information was provided regarding restarting any anticoagulant/s.    Discharge Procedure Orders (must include Diet, Follow-up, Activity):  Light activity for the remainder of the day, resume normal activity tomorrow. Resume normal diet. Follow-up in clinic in 2-3 weeks.

## 2017-08-04 NOTE — INTERVAL H&P NOTE
The patient has been examined and the H&P has been reviewed:    I concur with the findings and no changes have occurred since H&P was written.    Anesthesia/Surgery risks, benefits and alternative options discussed and understood by patient/family.          Active Hospital Problems    Diagnosis  POA    Cervical spondylosis [M47.812]  Yes     Priority: High      Resolved Hospital Problems    Diagnosis Date Resolved POA   No resolved problems to display.

## 2017-09-04 ENCOUNTER — PATIENT MESSAGE (OUTPATIENT)
Dept: PULMONOLOGY | Facility: CLINIC | Age: 62
End: 2017-09-04

## 2017-09-04 DIAGNOSIS — J45.31 MILD PERSISTENT ASTHMA WITH ACUTE EXACERBATION: Primary | ICD-10-CM

## 2017-09-07 RX ORDER — LEVOFLOXACIN 500 MG/1
500 TABLET, FILM COATED ORAL DAILY
Qty: 10 TABLET | Refills: 1 | Status: SHIPPED | OUTPATIENT
Start: 2017-09-07 | End: 2017-11-07

## 2017-09-07 RX ORDER — METHYLPREDNISOLONE 4 MG/1
TABLET ORAL
Qty: 1 PACKAGE | Refills: 1 | Status: SHIPPED | OUTPATIENT
Start: 2017-09-07 | End: 2017-09-28

## 2017-09-08 NOTE — TELEPHONE ENCOUNTER
Returned call  Patient with cough and phlegm production  SOB  No high fever or chills  No pleurisy  DX: asthma exacerbation  PLAN:  Antibiotics  Levofloxix  Medol  Office appointment if not improved of fever  Greater than 101      RC for medrol and levofloxin sent to [facility]

## 2017-11-07 ENCOUNTER — OFFICE VISIT (OUTPATIENT)
Dept: FAMILY MEDICINE | Facility: CLINIC | Age: 62
End: 2017-11-07
Payer: COMMERCIAL

## 2017-11-07 VITALS
BODY MASS INDEX: 33.52 KG/M2 | TEMPERATURE: 98 F | DIASTOLIC BLOOD PRESSURE: 82 MMHG | SYSTOLIC BLOOD PRESSURE: 122 MMHG | HEART RATE: 70 BPM | OXYGEN SATURATION: 94 % | HEIGHT: 75 IN | WEIGHT: 269.63 LBS

## 2017-11-07 DIAGNOSIS — E78.5 DYSLIPIDEMIA: Primary | ICD-10-CM

## 2017-11-07 DIAGNOSIS — Z12.5 PROSTATE CANCER SCREENING: ICD-10-CM

## 2017-11-07 DIAGNOSIS — J45.20 MILD INTERMITTENT ASTHMA, UNSPECIFIED WHETHER COMPLICATED: ICD-10-CM

## 2017-11-07 DIAGNOSIS — M47.22 OSTEOARTHRITIS OF SPINE WITH RADICULOPATHY, CERVICAL REGION: ICD-10-CM

## 2017-11-07 PROCEDURE — 99999 PR PBB SHADOW E&M-EST. PATIENT-LVL III: CPT | Mod: PBBFAC,,, | Performed by: FAMILY MEDICINE

## 2017-11-07 PROCEDURE — 99396 PREV VISIT EST AGE 40-64: CPT | Mod: S$GLB,,, | Performed by: FAMILY MEDICINE

## 2017-11-07 RX ORDER — SCOLOPAMINE TRANSDERMAL SYSTEM 1 MG/1
1 PATCH, EXTENDED RELEASE TRANSDERMAL
Qty: 6 PATCH | Refills: 0 | Status: SHIPPED | OUTPATIENT
Start: 2017-11-07 | End: 2017-12-07

## 2017-11-07 RX ORDER — SCOLOPAMINE TRANSDERMAL SYSTEM 1 MG/1
1 PATCH, EXTENDED RELEASE TRANSDERMAL
Qty: 6 PATCH | Refills: 0 | Status: SHIPPED | OUTPATIENT
Start: 2017-11-07 | End: 2017-11-07 | Stop reason: SDUPTHER

## 2017-11-07 NOTE — PROGRESS NOTES
Subjective:       Patient ID: Yung Mcconnell is a 62 y.o. male.    Chief Complaint: Annual Exam    62 y old male with mild intermittent asthma , dlp , c spondylosis  and obesity here for wellness exam . He  had colonoscopy in 2015 , must rep in 5 y . Current with Tdap and  Pneumovax(smoker) . No  Depression. Walks some , watches carbs and calories . 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 Last Opthalmology visit: Current eye exam .  He has Used  Rescue inhaler once monthly since last summer . See Physiatry for C spondylosis . Could  not tolerate Pravastatin 10 mg : leg cramps        Review of Systems   Constitutional: Negative.    HENT: Negative.    Respiratory: Negative.    Cardiovascular: Negative.    Gastrointestinal: Negative.    Musculoskeletal: Positive for arthralgias.   Hematological: Negative.    Psychiatric/Behavioral: Negative.        Objective:      Physical Exam   Constitutional: He is oriented to person, place, and time. He appears well-developed and well-nourished. No distress.   HENT:   Head: Normocephalic and atraumatic.   Right Ear: External ear normal.   Left Ear: External ear normal.   Nose: Nose normal.   Mouth/Throat: No oropharyngeal exudate.   Eyes: Conjunctivae and EOM are normal. Pupils are equal, round, and reactive to light. Right eye exhibits no discharge. Left eye exhibits no discharge. No scleral icterus.   Neck: Normal range of motion. Neck supple. No JVD present. No tracheal deviation present. No thyromegaly present.   Cardiovascular: Normal rate, regular rhythm, normal heart sounds and intact distal pulses.  Exam reveals no gallop and no friction rub.    No murmur heard.  Pulmonary/Chest: Effort normal and breath sounds normal. No stridor. No respiratory distress. He has no wheezes. He has no rales. He exhibits no tenderness.   Abdominal: Soft. Bowel sounds are normal. He exhibits no distension. There is no tenderness. There is no rebound and no  guarding.   Musculoskeletal: Normal range of motion. He exhibits no edema or tenderness.   Lymphadenopathy:     He has no cervical adenopathy.   Neurological: He is alert and oriented to person, place, and time. He has normal reflexes. He displays normal reflexes. No cranial nerve deficit. He exhibits normal muscle tone. Coordination normal.   Skin: Skin is warm and dry. No rash noted. He is not diaphoretic. No erythema. No pallor.   Psychiatric: He has a normal mood and affect. His behavior is normal. Judgment and thought content normal.       Assessment:     Yung was seen today for annual exam.    Diagnoses and all orders for this visit:    Dyslipidemia  -     CBC auto differential; Future  -     Comprehensive metabolic panel; Future  -     Hemoglobin A1c; Future  -     Lipid panel; Future    Prostate cancer screening  -     PSA, Screening; Future    Osteoarthritis of spine with radiculopathy, cervical region    Mild intermittent asthma, unspecified whether complicated    Other orders  -     Discontinue: scopolamine (TRANSDERM-SCOP) 1.3-1.5 mg (1 mg over 3 days); Place 1 patch onto the skin every 72 hours.  -     scopolamine (TRANSDERM-SCOP) 1.3-1.5 mg (1 mg over 3 days); Place 1 patch onto the skin every 72 hours.      Plan:   The patient is asked to make an attempt to improve diet and exercise patterns to aid in medical management of this problem.  Try pravastatin 5 mg every other day . Notify if SE recur     Stable . Fu with physiatry   Stable . F.u with pulm     Discussed and recommended healthy eating habits and lifestyle changes including daily exercise of 30 mins, low salt diet, low fat diet and weight loss/maintenance to a normal body mass index 25 or below. I recommend routine use of seatbelts. Discouraged illicit drug use including tobacco use. I recommend no alcohol use but if you decide to drink, limit to 1 drink per day for females and 2 drinks per day for males. Keep up with your yearly physical  visit with age appropriate screenings, vaccinations, and labs.

## 2017-11-11 ENCOUNTER — LAB VISIT (OUTPATIENT)
Dept: LAB | Facility: HOSPITAL | Age: 62
End: 2017-11-11
Attending: FAMILY MEDICINE
Payer: COMMERCIAL

## 2017-11-11 DIAGNOSIS — Z12.5 PROSTATE CANCER SCREENING: ICD-10-CM

## 2017-11-11 DIAGNOSIS — E78.5 DYSLIPIDEMIA: ICD-10-CM

## 2017-11-11 LAB
ALBUMIN SERPL BCP-MCNC: 3.8 G/DL
ALP SERPL-CCNC: 68 U/L
ALT SERPL W/O P-5'-P-CCNC: 29 U/L
ANION GAP SERPL CALC-SCNC: 11 MMOL/L
AST SERPL-CCNC: 24 U/L
BASOPHILS # BLD AUTO: 0.06 K/UL
BASOPHILS NFR BLD: 1 %
BILIRUB SERPL-MCNC: 0.9 MG/DL
BUN SERPL-MCNC: 16 MG/DL
CALCIUM SERPL-MCNC: 9.6 MG/DL
CHLORIDE SERPL-SCNC: 105 MMOL/L
CHOLEST SERPL-MCNC: 197 MG/DL
CHOLEST/HDLC SERPL: 4.5 {RATIO}
CO2 SERPL-SCNC: 27 MMOL/L
COMPLEXED PSA SERPL-MCNC: 0.43 NG/ML
CREAT SERPL-MCNC: 0.8 MG/DL
DIFFERENTIAL METHOD: ABNORMAL
EOSINOPHIL # BLD AUTO: 0.2 K/UL
EOSINOPHIL NFR BLD: 2.8 %
ERYTHROCYTE [DISTWIDTH] IN BLOOD BY AUTOMATED COUNT: 13.9 %
EST. GFR  (AFRICAN AMERICAN): >60 ML/MIN/1.73 M^2
EST. GFR  (NON AFRICAN AMERICAN): >60 ML/MIN/1.73 M^2
ESTIMATED AVG GLUCOSE: 100 MG/DL
GLUCOSE SERPL-MCNC: 95 MG/DL
HBA1C MFR BLD HPLC: 5.1 %
HCT VFR BLD AUTO: 41.8 %
HDLC SERPL-MCNC: 44 MG/DL
HDLC SERPL: 22.3 %
HGB BLD-MCNC: 14.8 G/DL
IMM GRANULOCYTES # BLD AUTO: 0.02 K/UL
IMM GRANULOCYTES NFR BLD AUTO: 0.3 %
LDLC SERPL CALC-MCNC: 134.2 MG/DL
LYMPHOCYTES # BLD AUTO: 2.7 K/UL
LYMPHOCYTES NFR BLD: 44.4 %
MCH RBC QN AUTO: 31.7 PG
MCHC RBC AUTO-ENTMCNC: 35.4 G/DL
MCV RBC AUTO: 90 FL
MONOCYTES # BLD AUTO: 0.5 K/UL
MONOCYTES NFR BLD: 8 %
NEUTROPHILS # BLD AUTO: 2.6 K/UL
NEUTROPHILS NFR BLD: 43.5 %
NONHDLC SERPL-MCNC: 153 MG/DL
NRBC BLD-RTO: 0 /100 WBC
PLATELET # BLD AUTO: 226 K/UL
PMV BLD AUTO: 10.3 FL
POTASSIUM SERPL-SCNC: 3.8 MMOL/L
PROT SERPL-MCNC: 7.1 G/DL
RBC # BLD AUTO: 4.67 M/UL
SODIUM SERPL-SCNC: 143 MMOL/L
TRIGL SERPL-MCNC: 94 MG/DL
WBC # BLD AUTO: 5.97 K/UL

## 2017-11-11 PROCEDURE — 80061 LIPID PANEL: CPT

## 2017-11-11 PROCEDURE — 84153 ASSAY OF PSA TOTAL: CPT

## 2017-11-11 PROCEDURE — 85025 COMPLETE CBC W/AUTO DIFF WBC: CPT

## 2017-11-11 PROCEDURE — 83036 HEMOGLOBIN GLYCOSYLATED A1C: CPT

## 2017-11-11 PROCEDURE — 36415 COLL VENOUS BLD VENIPUNCTURE: CPT | Mod: PO

## 2017-11-11 PROCEDURE — 80053 COMPREHEN METABOLIC PANEL: CPT

## 2017-11-13 ENCOUNTER — TELEPHONE (OUTPATIENT)
Dept: FAMILY MEDICINE | Facility: CLINIC | Age: 62
End: 2017-11-13

## 2017-11-13 NOTE — TELEPHONE ENCOUNTER
----- Message from Jamilah Pelletier sent at 11/13/2017 10:30 AM CST -----  Contact: pt  States he's returning a call regarding his lab results. Please call pt at [phone]. Thank you

## 2018-02-08 ENCOUNTER — PATIENT MESSAGE (OUTPATIENT)
Dept: FAMILY MEDICINE | Facility: CLINIC | Age: 63
End: 2018-02-08

## 2018-02-08 DIAGNOSIS — N52.9 ERECTILE DYSFUNCTION, UNSPECIFIED ERECTILE DYSFUNCTION TYPE: ICD-10-CM

## 2018-02-08 RX ORDER — TADALAFIL 20 MG/1
20 TABLET ORAL DAILY PRN
Qty: 20 TABLET | Refills: 3 | Status: SHIPPED | OUTPATIENT
Start: 2018-02-08 | End: 2018-09-11 | Stop reason: SDUPTHER

## 2018-02-09 ENCOUNTER — PATIENT MESSAGE (OUTPATIENT)
Dept: FAMILY MEDICINE | Facility: CLINIC | Age: 63
End: 2018-02-09

## 2018-04-03 ENCOUNTER — OFFICE VISIT (OUTPATIENT)
Dept: FAMILY MEDICINE | Facility: CLINIC | Age: 63
End: 2018-04-03
Payer: COMMERCIAL

## 2018-04-03 VITALS
WEIGHT: 266.13 LBS | OXYGEN SATURATION: 95 % | HEIGHT: 75 IN | DIASTOLIC BLOOD PRESSURE: 62 MMHG | SYSTOLIC BLOOD PRESSURE: 110 MMHG | BODY MASS INDEX: 33.09 KG/M2 | TEMPERATURE: 98 F | HEART RATE: 73 BPM

## 2018-04-03 DIAGNOSIS — L72.3 SEBACEOUS CYST: ICD-10-CM

## 2018-04-03 DIAGNOSIS — J02.9 PHARYNGITIS, UNSPECIFIED ETIOLOGY: Primary | ICD-10-CM

## 2018-04-03 LAB
CTP QC/QA: YES
S PYO RRNA THROAT QL PROBE: NEGATIVE

## 2018-04-03 PROCEDURE — 87081 CULTURE SCREEN ONLY: CPT

## 2018-04-03 PROCEDURE — 87880 STREP A ASSAY W/OPTIC: CPT | Mod: QW,S$GLB,, | Performed by: FAMILY MEDICINE

## 2018-04-03 PROCEDURE — 99999 PR PBB SHADOW E&M-EST. PATIENT-LVL III: CPT | Mod: PBBFAC,,, | Performed by: FAMILY MEDICINE

## 2018-04-03 PROCEDURE — 99213 OFFICE O/P EST LOW 20 MIN: CPT | Mod: S$GLB,,, | Performed by: FAMILY MEDICINE

## 2018-04-03 NOTE — PROGRESS NOTES
"Subjective:       Patient ID: Yung Mcconnell is a 62 y.o. male.    Chief Complaint: Sore Throat and Bump on face      HPI Comments:       Current Outpatient Prescriptions:     albuterol (PROVENTIL) 2.5 mg /3 mL (0.083 %) nebulizer solution, Take 3 mLs (2.5 mg total) by nebulization every 6 (six) hours while awake., Disp: 1080 mL, Rfl: 3    albuterol 90 mcg/actuation inhaler, Every 4-6 hours (Patient taking differently: every 4 (four) hours as needed. Every 4-6 hours), Disp: 3 Inhaler, Rfl: 4    cyclobenzaprine (FLEXERIL) 5 MG tablet, 1 Tablet Oral Three times a day as needed., Disp: 270 tablet, Rfl: 3    fluticasone (FLONASE) 50 mcg/actuation nasal spray, 2 sprays by Each Nare route once daily. (Patient taking differently: 2 sprays by Each Nare route as needed. ), Disp: 3 Bottle, Rfl: 4    GLUCOSAMINE HCL/CHONDR ALFONSO A NA (OSTEO BI-FLEX ORAL), Take 1 tablet by mouth once daily. , Disp: , Rfl:     multivitamin (MULTIPLE VITAMIN) per tablet, 1 Tablet Oral Every day, Disp: , Rfl:     omeprazole (PRILOSEC) 40 MG capsule, Take 1 capsule (40 mg total) by mouth once daily. Dispense Generic, Disp: 90 capsule, Rfl: 0    SOOLANTRA 1 % Crea, EDWARD EXT AA prn, Disp: , Rfl: 5    tadalafil (CIALIS) 20 MG Tab, Take 1 tablet (20 mg total) by mouth daily as needed., Disp: 20 tablet, Rfl: 3    diclofenac 1.3 % PT12, Apply 1 patch topically every 12 (twelve) hours as needed., Disp: 30 patch, Rfl: 0    pravastatin (PRAVACHOL) 10 MG tablet, Take 1 tablet (10 mg total) by mouth once daily., Disp: 90 tablet, Rfl: 3      He's had a sore throat with strep exposure in the last 48 hours but seems to be getting better.  Not much cough.  History of asthma.  No smoking.  No fever, chills or body aches.    More concerned about a "bump" on his left cheek.  This been there for a few months.  No change in size or color.  No drainage      Review of Systems   Constitutional: Negative for activity change, appetite change and fever.   HENT: " "Positive for congestion and sore throat.    Respiratory: Negative for cough and shortness of breath.    Cardiovascular: Negative for chest pain.   Gastrointestinal: Negative for abdominal pain, diarrhea and nausea.   Genitourinary: Negative for difficulty urinating.   Musculoskeletal: Negative for arthralgias and myalgias.   Neurological: Negative for dizziness and headaches.       Objective:      Vitals:    04/03/18 1001   BP: 110/62   Pulse: 73   Temp: 98.4 °F (36.9 °C)   TempSrc: Tympanic   SpO2: 95%   Weight: 120.7 kg (266 lb 1.5 oz)   Height: 6' 3" (1.905 m)     Physical Exam   Constitutional: He is oriented to person, place, and time. He appears well-developed and well-nourished. No distress.   HENT:   Head: Normocephalic.       Right Ear: Tympanic membrane, external ear and ear canal normal.   Left Ear: Tympanic membrane, external ear and ear canal normal.   Nose: No mucosal edema or rhinorrhea.   Mouth/Throat: Mucous membranes are normal. Posterior oropharyngeal erythema present. No oropharyngeal exudate or posterior oropharyngeal edema.   Neck: Neck supple. No thyromegaly present.   Cardiovascular: Normal rate, regular rhythm and normal heart sounds.    No murmur heard.  Pulmonary/Chest: Effort normal and breath sounds normal. He has no wheezes. He has no rales.   Abdominal: Soft. He exhibits no distension.   Musculoskeletal: He exhibits no edema.   Lymphadenopathy:     He has no cervical adenopathy.   Neurological: He is alert and oriented to person, place, and time.   Skin: Skin is warm and dry. He is not diaphoretic.   Psychiatric: He has a normal mood and affect. His behavior is normal. Judgment and thought content normal.   Nursing note and vitals reviewed.      Assessment:       1. Pharyngitis, unspecified etiology    2. Sebaceous cyst        Plan:   Pharyngitis, unspecified etiology  Comments:  Rapid strep negative.  Symptomatic care if needed  Orders:  -     POCT rapid strep A  -     Strep A " culture, throat    Sebaceous cyst  Comments:  Left cheek.  Reassured.  Notify M.D. if change in size or color

## 2018-04-05 ENCOUNTER — TELEPHONE (OUTPATIENT)
Dept: FAMILY MEDICINE | Facility: CLINIC | Age: 63
End: 2018-04-05

## 2018-04-06 ENCOUNTER — PATIENT MESSAGE (OUTPATIENT)
Dept: FAMILY MEDICINE | Facility: CLINIC | Age: 63
End: 2018-04-06

## 2018-04-06 LAB — BACTERIA THROAT CULT: NORMAL

## 2018-04-06 RX ORDER — AMOXICILLIN AND CLAVULANATE POTASSIUM 875; 125 MG/1; MG/1
1 TABLET, FILM COATED ORAL EVERY 12 HOURS
Qty: 20 TABLET | Refills: 0 | Status: ON HOLD | OUTPATIENT
Start: 2018-04-06 | End: 2018-06-24 | Stop reason: HOSPADM

## 2018-06-18 ENCOUNTER — HOSPITAL ENCOUNTER (INPATIENT)
Facility: HOSPITAL | Age: 63
LOS: 6 days | Discharge: HOME | DRG: 355 | End: 2018-06-24
Attending: EMERGENCY MEDICINE | Admitting: INTERNAL MEDICINE
Payer: COMMERCIAL

## 2018-06-18 DIAGNOSIS — K56.609 SBO (SMALL BOWEL OBSTRUCTION): ICD-10-CM

## 2018-06-18 PROBLEM — K42.9 UMBILICAL HERNIA WITHOUT OBSTRUCTION AND WITHOUT GANGRENE: Status: ACTIVE | Noted: 2018-06-18

## 2018-06-18 PROBLEM — D72.829 LEUKOCYTOSIS: Status: ACTIVE | Noted: 2018-06-18

## 2018-06-18 LAB
ALBUMIN SERPL BCP-MCNC: 4.2 G/DL
ALP SERPL-CCNC: 94 U/L
ALT SERPL W/O P-5'-P-CCNC: 36 U/L
ANION GAP SERPL CALC-SCNC: 14 MMOL/L
AST SERPL-CCNC: 30 U/L
BASOPHILS # BLD AUTO: 0.02 K/UL
BASOPHILS NFR BLD: 0.2 %
BILIRUB SERPL-MCNC: 1.8 MG/DL
BILIRUB UR QL STRIP: ABNORMAL
BUN SERPL-MCNC: 18 MG/DL
CALCIUM SERPL-MCNC: 10.4 MG/DL
CHLORIDE SERPL-SCNC: 98 MMOL/L
CLARITY UR: CLEAR
CO2 SERPL-SCNC: 25 MMOL/L
COLOR UR: YELLOW
CREAT SERPL-MCNC: 0.9 MG/DL
DIFFERENTIAL METHOD: ABNORMAL
EOSINOPHIL # BLD AUTO: 0.2 K/UL
EOSINOPHIL NFR BLD: 1.2 %
ERYTHROCYTE [DISTWIDTH] IN BLOOD BY AUTOMATED COUNT: 14.2 %
EST. GFR  (AFRICAN AMERICAN): >60 ML/MIN/1.73 M^2
EST. GFR  (NON AFRICAN AMERICAN): >60 ML/MIN/1.73 M^2
GLUCOSE SERPL-MCNC: 110 MG/DL
GLUCOSE UR QL STRIP: NEGATIVE
HCT VFR BLD AUTO: 49.4 %
HGB BLD-MCNC: 17.7 G/DL
HGB UR QL STRIP: NEGATIVE
KETONES UR QL STRIP: ABNORMAL
LEUKOCYTE ESTERASE UR QL STRIP: NEGATIVE
LIPASE SERPL-CCNC: 17 U/L
LYMPHOCYTES # BLD AUTO: 1.8 K/UL
LYMPHOCYTES NFR BLD: 14.1 %
MCH RBC QN AUTO: 31.3 PG
MCHC RBC AUTO-ENTMCNC: 35.8 G/DL
MCV RBC AUTO: 87 FL
MONOCYTES # BLD AUTO: 0.9 K/UL
MONOCYTES NFR BLD: 7.3 %
NEUTROPHILS # BLD AUTO: 9.9 K/UL
NEUTROPHILS NFR BLD: 77.2 %
NITRITE UR QL STRIP: NEGATIVE
PH UR STRIP: 6 [PH] (ref 5–8)
PLATELET # BLD AUTO: 256 K/UL
PMV BLD AUTO: 9.8 FL
POTASSIUM SERPL-SCNC: 3.7 MMOL/L
PROT SERPL-MCNC: 8.3 G/DL
PROT UR QL STRIP: NEGATIVE
RBC # BLD AUTO: 5.65 M/UL
SODIUM SERPL-SCNC: 137 MMOL/L
SP GR UR STRIP: 1.02 (ref 1–1.03)
TROPONIN I SERPL DL<=0.01 NG/ML-MCNC: <0.006 NG/ML
URN SPEC COLLECT METH UR: ABNORMAL
UROBILINOGEN UR STRIP-ACNC: 1 EU/DL
WBC # BLD AUTO: 12.8 K/UL

## 2018-06-18 PROCEDURE — 25000003 PHARM REV CODE 250: Performed by: EMERGENCY MEDICINE

## 2018-06-18 PROCEDURE — 96375 TX/PRO/DX INJ NEW DRUG ADDON: CPT

## 2018-06-18 PROCEDURE — 93010 ELECTROCARDIOGRAM REPORT: CPT | Mod: ,,, | Performed by: INTERNAL MEDICINE

## 2018-06-18 PROCEDURE — 84484 ASSAY OF TROPONIN QUANT: CPT

## 2018-06-18 PROCEDURE — 11000001 HC ACUTE MED/SURG PRIVATE ROOM

## 2018-06-18 PROCEDURE — 99285 EMERGENCY DEPT VISIT HI MDM: CPT | Mod: 25

## 2018-06-18 PROCEDURE — 96374 THER/PROPH/DIAG INJ IV PUSH: CPT

## 2018-06-18 PROCEDURE — 83690 ASSAY OF LIPASE: CPT

## 2018-06-18 PROCEDURE — 80053 COMPREHEN METABOLIC PANEL: CPT

## 2018-06-18 PROCEDURE — C9113 INJ PANTOPRAZOLE SODIUM, VIA: HCPCS | Performed by: EMERGENCY MEDICINE

## 2018-06-18 PROCEDURE — 99233 SBSQ HOSP IP/OBS HIGH 50: CPT | Mod: ,,, | Performed by: SURGERY

## 2018-06-18 PROCEDURE — 63600175 PHARM REV CODE 636 W HCPCS: Performed by: EMERGENCY MEDICINE

## 2018-06-18 PROCEDURE — 36415 COLL VENOUS BLD VENIPUNCTURE: CPT

## 2018-06-18 PROCEDURE — 85025 COMPLETE CBC W/AUTO DIFF WBC: CPT

## 2018-06-18 PROCEDURE — 25000003 PHARM REV CODE 250: Performed by: NURSE PRACTITIONER

## 2018-06-18 PROCEDURE — 81003 URINALYSIS AUTO W/O SCOPE: CPT

## 2018-06-18 PROCEDURE — 63600175 PHARM REV CODE 636 W HCPCS: Performed by: NURSE PRACTITIONER

## 2018-06-18 PROCEDURE — 96361 HYDRATE IV INFUSION ADD-ON: CPT

## 2018-06-18 PROCEDURE — 25500020 PHARM REV CODE 255: Performed by: EMERGENCY MEDICINE

## 2018-06-18 RX ORDER — MORPHINE SULFATE 4 MG/ML
2 INJECTION, SOLUTION INTRAMUSCULAR; INTRAVENOUS EVERY 6 HOURS PRN
Status: DISCONTINUED | OUTPATIENT
Start: 2018-06-18 | End: 2018-06-21

## 2018-06-18 RX ORDER — IPRATROPIUM BROMIDE AND ALBUTEROL SULFATE 2.5; .5 MG/3ML; MG/3ML
3 SOLUTION RESPIRATORY (INHALATION) EVERY 4 HOURS PRN
Status: DISCONTINUED | OUTPATIENT
Start: 2018-06-18 | End: 2018-06-24 | Stop reason: HOSPADM

## 2018-06-18 RX ORDER — HYDROMORPHONE HYDROCHLORIDE 1 MG/ML
0.5 INJECTION, SOLUTION INTRAMUSCULAR; INTRAVENOUS; SUBCUTANEOUS
Status: COMPLETED | OUTPATIENT
Start: 2018-06-18 | End: 2018-06-18

## 2018-06-18 RX ORDER — ONDANSETRON 2 MG/ML
4 INJECTION INTRAMUSCULAR; INTRAVENOUS
Status: COMPLETED | OUTPATIENT
Start: 2018-06-18 | End: 2018-06-18

## 2018-06-18 RX ORDER — HYDRALAZINE HYDROCHLORIDE 20 MG/ML
10 INJECTION INTRAMUSCULAR; INTRAVENOUS EVERY 8 HOURS PRN
Status: DISCONTINUED | OUTPATIENT
Start: 2018-06-18 | End: 2018-06-24 | Stop reason: HOSPADM

## 2018-06-18 RX ORDER — ONDANSETRON 2 MG/ML
4 INJECTION INTRAMUSCULAR; INTRAVENOUS EVERY 8 HOURS PRN
Status: DISCONTINUED | OUTPATIENT
Start: 2018-06-18 | End: 2018-06-24 | Stop reason: HOSPADM

## 2018-06-18 RX ORDER — SODIUM CHLORIDE 9 MG/ML
INJECTION, SOLUTION INTRAVENOUS CONTINUOUS
Status: DISCONTINUED | OUTPATIENT
Start: 2018-06-18 | End: 2018-06-21

## 2018-06-18 RX ADMIN — ONDANSETRON 4 MG: 2 INJECTION INTRAMUSCULAR; INTRAVENOUS at 11:06

## 2018-06-18 RX ADMIN — IOHEXOL 75 ML: 350 INJECTION, SOLUTION INTRAVENOUS at 12:06

## 2018-06-18 RX ADMIN — DICYCLOMINE HYDROCHLORIDE 50 ML: 10 SOLUTION ORAL at 03:06

## 2018-06-18 RX ADMIN — MORPHINE SULFATE 2 MG: 4 INJECTION INTRAVENOUS at 05:06

## 2018-06-18 RX ADMIN — MORPHINE SULFATE 2 MG: 4 INJECTION INTRAVENOUS at 11:06

## 2018-06-18 RX ADMIN — ONDANSETRON 4 MG: 2 INJECTION INTRAMUSCULAR; INTRAVENOUS at 05:06

## 2018-06-18 RX ADMIN — DEXTROSE 40 MG: 50 INJECTION, SOLUTION INTRAVENOUS at 03:06

## 2018-06-18 RX ADMIN — Medication 0.5 MG: at 03:06

## 2018-06-18 RX ADMIN — IOHEXOL 30 ML: 350 INJECTION, SOLUTION INTRAVENOUS at 10:06

## 2018-06-18 RX ADMIN — SODIUM CHLORIDE 1000 ML: 0.9 INJECTION, SOLUTION INTRAVENOUS at 11:06

## 2018-06-18 RX ADMIN — PROMETHAZINE HYDROCHLORIDE 6.25 MG: 25 INJECTION INTRAMUSCULAR; INTRAVENOUS at 11:06

## 2018-06-18 RX ADMIN — SODIUM CHLORIDE: 0.9 INJECTION, SOLUTION INTRAVENOUS at 03:06

## 2018-06-18 NOTE — HOSPITAL COURSE
Patient presented to the emergency room with a three-day history of nausea and vomiting.  He has also had some loose stools or diarrhea.  Abdominal x-rays and CAT scans were concerning for a bowel obstruction.  He was admitted by the medical service and surgery was consulted    Less abdominal pain, three bowel movement    Vomited 4 times yesterday after clear liquids were started, feels bloated, little pain    Postop day 1 from a laparoscopy and umbilical hernia repair.  Patient states he is feeling better.  No nausea.  NG tube in place. Pain controlled    Postop day 2 denies any nausea, passing flatus and bowel movement

## 2018-06-18 NOTE — SUBJECTIVE & OBJECTIVE
No current facility-administered medications on file prior to encounter.      Current Outpatient Prescriptions on File Prior to Encounter   Medication Sig    albuterol (PROVENTIL) 2.5 mg /3 mL (0.083 %) nebulizer solution Take 3 mLs (2.5 mg total) by nebulization every 6 (six) hours while awake.    albuterol 90 mcg/actuation inhaler Every 4-6 hours (Patient taking differently: every 4 (four) hours as needed. Every 4-6 hours)    amoxicillin-clavulanate 875-125mg (AUGMENTIN) 875-125 mg per tablet Take 1 tablet by mouth every 12 (twelve) hours.    cyclobenzaprine (FLEXERIL) 5 MG tablet 1 Tablet Oral Three times a day as needed.    diclofenac 1.3 % PT12 Apply 1 patch topically every 12 (twelve) hours as needed.    fluticasone (FLONASE) 50 mcg/actuation nasal spray 2 sprays by Each Nare route once daily. (Patient taking differently: 2 sprays by Each Nare route as needed. )    GLUCOSAMINE HCL/CHONDR ALFONSO A NA (OSTEO BI-FLEX ORAL) Take 1 tablet by mouth once daily.     multivitamin (MULTIPLE VITAMIN) per tablet 1 Tablet Oral Every day    omeprazole (PRILOSEC) 40 MG capsule Take 1 capsule (40 mg total) by mouth once daily. Dispense Generic    pravastatin (PRAVACHOL) 10 MG tablet Take 1 tablet (10 mg total) by mouth once daily.    SOOLANTRA 1 % Crea EDWARD EXT AA prn    tadalafil (CIALIS) 20 MG Tab Take 1 tablet (20 mg total) by mouth daily as needed.       Review of patient's allergies indicates:   Allergen Reactions    Zithromax [azithromycin] Palpitations    Lipitor [atorvastatin] Other (See Comments)     Leg cramps/hand cramps       Past Medical History:   Diagnosis Date    Allergy     Asthma, chronic 7/9/2013    Colon polyps     COPD (chronic obstructive pulmonary disease)     GERD (gastroesophageal reflux disease)     High cholesterol     Osteoarthritis of both knees 7/9/2013    Sleep apnea      Past Surgical History:   Procedure Laterality Date    CHOLECYSTECTOMY      LUNG SURGERY Right     benign  mass     Family History     Problem Relation (Age of Onset)    Aneurysm Father    Cancer Mother    Colon polyps Father    Diabetes Mother    Heart disease Father    Hypertension Mother        Social History Main Topics    Smoking status: Never Smoker    Smokeless tobacco: Never Used    Alcohol use Yes      Comment: rare    Drug use: No    Sexual activity: Yes     Partners: Female     Birth control/ protection: None     Review of Systems   Constitutional: Positive for activity change.   HENT: Negative.    Eyes: Negative.    Respiratory: Negative.    Cardiovascular: Negative.    Gastrointestinal:        Less pain today. Nausea resolved  Passing flatus and loose bm.    Endocrine: Negative.    Genitourinary: Negative.    Musculoskeletal: Negative.    Skin: Negative.    Allergic/Immunologic: Negative.    Neurological: Negative.    Hematological: Negative.    Psychiatric/Behavioral: Negative.      Objective:     Vital Signs (Most Recent):  Temp: 98.4 °F (36.9 °C) (06/18/18 1625)  Pulse: 65 (06/18/18 1625)  Resp: 16 (06/18/18 1625)  BP: (!) 163/76 (06/18/18 1625)  SpO2: 96 % (06/18/18 1625) Vital Signs (24h Range):  Temp:  [98 °F (36.7 °C)-98.8 °F (37.1 °C)] 98.4 °F (36.9 °C)  Pulse:  [63-70] 65  Resp:  [16-24] 16  SpO2:  [94 %-98 %] 96 %  BP: (132-163)/(70-85) 163/76     Weight: 118.4 kg (261 lb)  Body mass index is 32.62 kg/m².    Physical Exam   Constitutional: He is oriented to person, place, and time. He appears well-developed and well-nourished. No distress.   HENT:   Head: Normocephalic and atraumatic.   Eyes: EOM are normal.   Neck: Normal range of motion.   Cardiovascular: Normal rate, regular rhythm and normal heart sounds.    Pulmonary/Chest: Effort normal and breath sounds normal. No respiratory distress.   Abdominal: Soft. Bowel sounds are normal. He exhibits distension (somewhat less distended). Tenderness: Mild diffuse. There is no rebound and no guarding. No hernia ( reducible umbilical at a trocar  site).   Ng with bilious drainage   Genitourinary: Penis normal.   Musculoskeletal: Normal range of motion. He exhibits no edema.   Neurological: He is alert and oriented to person, place, and time.   Skin: Skin is warm and dry. Capillary refill takes less than 2 seconds.   Psychiatric: He has a normal mood and affect. His behavior is normal. Judgment and thought content normal.   Nursing note and vitals reviewed.      Significant Labs:  CBC:     Recent Labs  Lab 06/19/18  0506   WBC 13.31*   RBC 5.03   HGB 15.8   HCT 44.8      MCV 89   MCH 31.4*   MCHC 35.3     BMP:     Recent Labs  Lab 06/19/18  0506         K 3.9      CO2 28   BUN 19   CREATININE 0.9   CALCIUM 9.1     CMP:     Recent Labs  Lab 06/19/18  0506      CALCIUM 9.1   ALBUMIN 3.5   PROT 6.8      K 3.9   CO2 28      BUN 19   CREATININE 0.9   ALKPHOS 78   ALT 37   AST 27   BILITOT 2.1*       Significant Diagnostics:      Nasogastric tube is coiled within the stomach.  Dilated small bowel loops are noted measuring up to 3.6 cm.  No definite transition.  Contrast and feces are seen within the colon.  In comparison to the prior study, there is no adverse interval changes

## 2018-06-18 NOTE — HPI
Patient reports a 3 day history of epigastric abdominal pain.  Pain is sharp, crampy, stopping, and achy in nature.  If it was associated with multiple episodes of nausea and vomiting starting on Saturday.  He also had several episodes of loose stool.    The patient stated that when he ever he drank some liquid several hours later he would vomit.  He has never had any abdominal pain like this before.  He denies any blood per rectum.    He states that his a bulge of his umbilicus that was not there before.    He reports having city water.  He has eaten no unusual foods recently nor eaten out recently

## 2018-06-18 NOTE — PLAN OF CARE
SALVADOR met with patient and his spouse Carmela [phone].  Patient is independent with ADL's at home.  Patient/spouse voiced no d/c concerns  No anticipated needs at present. Case mgt to follow up with d/c needs.     06/18/18 1444   Discharge Assessment   Assessment Type Discharge Planning Assessment   Confirmed/corrected address and phone number on facesheet? Yes   Assessment information obtained from? Caregiver;Patient   Communicated expected length of stay with patient/caregiver no   Current cognitive status: Alert/Oriented   Current Functional Status: Independent   Lives With spouse   Able to Return to Prior Arrangements yes   Is patient able to care for self after discharge? Yes   Who are your caregiver(s) and their phone number(s)? Carmela Mcconnell (spouse) [phone]   Patient's perception of discharge disposition home or selfcare   Readmission Within The Last 30 Days no previous admission in last 30 days   Patient currently being followed by outpatient case management? No   Patient currently receives any other outside agency services? No   Equipment Currently Used at Home none   Do you have any problems affording any of your prescribed medications? No   Is the patient taking medications as prescribed? yes   Does the patient have transportation home? Yes   Transportation Available family or friend will provide   Does the patient receive services at the [facility]? No   Discharge Plan A Home with family

## 2018-06-18 NOTE — CONSULTS
[facility] -   General Surgery  Consult Note    Patient Name: Yung Mcconnell  MRN: [id number]  Code Status: Full Code  Admission Date: 6/18/2018  Hospital Length of Stay: 0 days  Attending Physician: Long Whatley MD  Primary Care Provider: LEVI GOVEA MD    Patient information was obtained from patient, spouse/SO, past medical records and ER records.     Consults  Subjective:     Principal Problem: SBO (small bowel obstruction)    History of Present Illness: Patient reports a 3 day history of epigastric abdominal pain.  Pain is sharp, crampy, stopping, and achy in nature.  If it was associated with multiple episodes of nausea and vomiting starting on Saturday.  He also had several episodes of loose stool.    The patient stated that when he ever he drank some liquid several hours later he would vomit.  He has never had any abdominal pain like this before.  He denies any blood per rectum.    He states that his a bulge of his umbilicus that was not there before.    He reports having city water.  He has eaten no unusual foods recently nor eaten out recently    No current facility-administered medications on file prior to encounter.      Current Outpatient Prescriptions on File Prior to Encounter   Medication Sig    albuterol (PROVENTIL) 2.5 mg /3 mL (0.083 %) nebulizer solution Take 3 mLs (2.5 mg total) by nebulization every 6 (six) hours while awake.    albuterol 90 mcg/actuation inhaler Every 4-6 hours (Patient taking differently: every 4 (four) hours as needed. Every 4-6 hours)    amoxicillin-clavulanate 875-125mg (AUGMENTIN) 875-125 mg per tablet Take 1 tablet by mouth every 12 (twelve) hours.    cyclobenzaprine (FLEXERIL) 5 MG tablet 1 Tablet Oral Three times a day as needed.    diclofenac 1.3 % PT12 Apply 1 patch topically every 12 (twelve) hours as needed.    fluticasone (FLONASE) 50 mcg/actuation nasal spray 2 sprays by Each Nare route once daily. (Patient taking differently: 2  sprays by Each Nare route as needed. )    GLUCOSAMINE HCL/CHONDR ALFONSO A NA (OSTEO BI-FLEX ORAL) Take 1 tablet by mouth once daily.     multivitamin (MULTIPLE VITAMIN) per tablet 1 Tablet Oral Every day    omeprazole (PRILOSEC) 40 MG capsule Take 1 capsule (40 mg total) by mouth once daily. Dispense Generic    pravastatin (PRAVACHOL) 10 MG tablet Take 1 tablet (10 mg total) by mouth once daily.    SOOLANTRA 1 % Crea EDWARD EXT AA prn    tadalafil (CIALIS) 20 MG Tab Take 1 tablet (20 mg total) by mouth daily as needed.       Review of patient's allergies indicates:   Allergen Reactions    Zithromax [azithromycin] Palpitations    Lipitor [atorvastatin] Other (See Comments)     Leg cramps/hand cramps       Past Medical History:   Diagnosis Date    Allergy     Asthma, chronic 7/9/2013    Colon polyps     COPD (chronic obstructive pulmonary disease)     GERD (gastroesophageal reflux disease)     High cholesterol     Osteoarthritis of both knees 7/9/2013    Sleep apnea      Past Surgical History:   Procedure Laterality Date    CHOLECYSTECTOMY      LUNG SURGERY Right     benign mass     Family History     Problem Relation (Age of Onset)    Aneurysm Father    Cancer Mother    Colon polyps Father    Diabetes Mother    Heart disease Father    Hypertension Mother        Social History Main Topics    Smoking status: Never Smoker    Smokeless tobacco: Never Used    Alcohol use Yes      Comment: rare    Drug use: No    Sexual activity: Yes     Partners: Female     Birth control/ protection: None     Review of Systems   Constitutional: Negative.    HENT: Negative.    Eyes: Negative.    Respiratory: Negative.    Cardiovascular: Negative.    Gastrointestinal: Positive for abdominal pain, diarrhea, nausea and vomiting.        Bulging umbilicus   Endocrine: Negative.    Genitourinary: Negative.    Musculoskeletal: Negative.    Skin: Negative.    Allergic/Immunologic: Negative.    Neurological: Negative.     Hematological: Negative.    Psychiatric/Behavioral: Negative.      Objective:     Vital Signs (Most Recent):  Temp: 98.4 °F (36.9 °C) (06/18/18 1625)  Pulse: 65 (06/18/18 1625)  Resp: 16 (06/18/18 1625)  BP: (!) 163/76 (06/18/18 1625)  SpO2: 96 % (06/18/18 1625) Vital Signs (24h Range):  Temp:  [98 °F (36.7 °C)-98.8 °F (37.1 °C)] 98.4 °F (36.9 °C)  Pulse:  [63-70] 65  Resp:  [16-24] 16  SpO2:  [94 %-98 %] 96 %  BP: (132-163)/(70-85) 163/76     Weight: 118.4 kg (261 lb)  Body mass index is 32.62 kg/m².    Physical Exam   Constitutional: He is oriented to person, place, and time. He appears well-developed and well-nourished. No distress.   HENT:   Head: Normocephalic and atraumatic.   Neck: Normal range of motion.   Cardiovascular: Normal rate, regular rhythm and normal heart sounds.    Pulmonary/Chest: Effort normal and breath sounds normal.   Abdominal: Soft. Bowel sounds are normal. He exhibits distension. Tenderness: Mild diffuse. There is no rebound and no guarding. No hernia ( reducible umbilical at a trocar site).   Genitourinary: Penis normal.   Musculoskeletal: He exhibits no edema.   Neurological: He is alert and oriented to person, place, and time.   Skin: Skin is warm and dry. Capillary refill takes less than 2 seconds.   Psychiatric: He has a normal mood and affect. His behavior is normal. Judgment and thought content normal.   Nursing note and vitals reviewed.      Significant Labs:  CBC:   Recent Labs  Lab 06/18/18  1050   WBC 12.80*   RBC 5.65   HGB 17.7   HCT 49.4      MCV 87   MCH 31.3*   MCHC 35.8     BMP:   Recent Labs  Lab 06/18/18  1050         K 3.7   CL 98   CO2 25   BUN 18   CREATININE 0.9   CALCIUM 10.4     CMP:   Recent Labs  Lab 06/18/18  1050      CALCIUM 10.4   ALBUMIN 4.2   PROT 8.3      K 3.7   CO2 25   CL 98   BUN 18   CREATININE 0.9   ALKPHOS 94   ALT 36   AST 30   BILITOT 1.8*       Significant Diagnostics:      EXAMINATION:  XR ABDOMEN FLAT AND  ERECT    CLINICAL HISTORY:  Abdominal Pain;    TECHNIQUE:  3 View of the abdomen was performed.    COMPARISON:  None    FINDINGS:  Dilated loops of small bowel are seen measuring up to 4.3 cm with some transition within the lower abdomen.  Air and feces is noted within the colon.    No obvious free air.  No portal venous gas.  Cholecystectomy clips are noted.    No acute fracture. Moderate DJD within the lower lumbar spine.    Lung bases demonstrate mild basilar atelectasis or small infiltrates..   Impression       Dilated loops of small bowel are seen measuring up to 4.3 cm with some transition within the lower abdomen.  Findings are concerning for partial or early small bowel obstruction.  Cross-sectional imaging with oral and IV contrast recommended.  Findings were discussed with Dr. Carrillo at 06/18/2018 11:10 a.m.      Electronically signed by: Hussain Whitfield MD  Date: 06/18/2018  Time: 11:11         Assessment/Plan:     * SBO (small bowel obstruction)    NG tube to low continuous suction as patient continues to have nausea and vomiting  Repeat x-rays tomorrow and if there is no significant improvement or worsening possible laparoscopy versus laparotomy.  If the x-ray shows some improvement are equivocal then a Gastrografin small-bowel follow-through        KEVEN (obstructive sleep apnea)    Management per Medicine        GERD (gastroesophageal reflux disease)    IV PPI per Medicine          VTE Risk Mitigation         Ordered     IP VTE HIGH RISK PATIENT  Once      06/18/18 1423     Place KELLY hose  Until discontinued      06/18/18 1423     Place sequential compression device  Until discontinued      06/18/18 1423          Thank you for your consult. I will follow-up with patient. Please contact us if you have any additional questions.    Casper Martinez MD  General Surgery  [facility] -

## 2018-06-18 NOTE — ASSESSMENT & PLAN NOTE
- WBC 12.80K  - Currently afebrile but tachypneic at 24 BPM  - UA negative for infectious process. Will obtain CXR  - SIRS -- low threshold to start antibiotics due to unknown source of possible infection. May be stress response  - Daily CBC

## 2018-06-18 NOTE — H&P
[facility] Hospital [facility]  History & Physical    Patient Name: Yung Mcconnell  MRN: [id number]  Admission Date: 6/18/2018  Attending Physician: Long Whatley MD   Primary Care Provider: LEVI GOVEA MD         Patient information was obtained from patient, spouse/SO and ER records.     Subjective:     Principal Problem:SBO (small bowel obstruction)    Chief Complaint:   Chief Complaint   Patient presents with    Abdominal Pain     epigastric area with NVD since Saturday        HPI: Yung Mcconnell is a 62 year old male with a PMHx of KEVEN on CPAP, OA, HLD, GERD, COPD, and Asthma who presented to the Emergency Department with c/o abdominal pain since Saturday afternoon. Associated symptoms include: N/V/D. Pt denies being around sick/ill contacts. Pt reports having gallbladder removed laparoscopically about 5-6 years ago. Pt denies any other abdominal surgeries. ED workup revealed: WBC 12.80K, Total bilirubin 1.8, UA with 1+ ketones and bilirubin. KUB showed dilated loops of bowel are seen measuring up to 4.3 cm with some transition within the lower abdomen, findings concerning for partial or early small bowel obstruction. CT abdomen/pelvis with contrast showed small bowel loops are dilated to the mid ileum consistent with partial or early complete bowel obstruction. ED consulted General surgery. Pt admitted to Med Surg for partial SBO.     Past Medical History:   Diagnosis Date    Allergy     Asthma, chronic 7/9/2013    Colon polyps     COPD (chronic obstructive pulmonary disease)     GERD (gastroesophageal reflux disease)     High cholesterol     Osteoarthritis of both knees 7/9/2013    Sleep apnea        Past Surgical History:   Procedure Laterality Date    CHOLECYSTECTOMY      LUNG SURGERY Right     benign mass       Review of patient's allergies indicates:   Allergen Reactions    Zithromax [azithromycin] Palpitations    Lipitor [atorvastatin] Other (See Comments)      Leg cramps/hand cramps       No current facility-administered medications on file prior to encounter.      Current Outpatient Prescriptions on File Prior to Encounter   Medication Sig    albuterol (PROVENTIL) 2.5 mg /3 mL (0.083 %) nebulizer solution Take 3 mLs (2.5 mg total) by nebulization every 6 (six) hours while awake.    albuterol 90 mcg/actuation inhaler Every 4-6 hours (Patient taking differently: every 4 (four) hours as needed. Every 4-6 hours)    amoxicillin-clavulanate 875-125mg (AUGMENTIN) 875-125 mg per tablet Take 1 tablet by mouth every 12 (twelve) hours.    cyclobenzaprine (FLEXERIL) 5 MG tablet 1 Tablet Oral Three times a day as needed.    diclofenac 1.3 % PT12 Apply 1 patch topically every 12 (twelve) hours as needed.    fluticasone (FLONASE) 50 mcg/actuation nasal spray 2 sprays by Each Nare route once daily. (Patient taking differently: 2 sprays by Each Nare route as needed. )    GLUCOSAMINE HCL/CHONDR ALFONSO A NA (OSTEO BI-FLEX ORAL) Take 1 tablet by mouth once daily.     multivitamin (MULTIPLE VITAMIN) per tablet 1 Tablet Oral Every day    omeprazole (PRILOSEC) 40 MG capsule Take 1 capsule (40 mg total) by mouth once daily. Dispense Generic    pravastatin (PRAVACHOL) 10 MG tablet Take 1 tablet (10 mg total) by mouth once daily.    SOOLANTRA 1 % Crea EDWARD EXT AA prn    tadalafil (CIALIS) 20 MG Tab Take 1 tablet (20 mg total) by mouth daily as needed.     Family History     Problem Relation (Age of Onset)    Aneurysm Father    Cancer Mother    Colon polyps Father    Diabetes Mother    Heart disease Father    Hypertension Mother        Social History Main Topics    Smoking status: Never Smoker    Smokeless tobacco: Never Used    Alcohol use Yes      Comment: rare    Drug use: No    Sexual activity: Yes     Partners: Female     Birth control/ protection: None     Review of Systems   Constitutional: Positive for activity change. Negative for chills and fatigue.   HENT: Negative.     Respiratory: Negative for apnea, cough, choking, chest tightness, shortness of breath, wheezing and stridor.    Cardiovascular: Negative for chest pain, palpitations and leg swelling.   Gastrointestinal: Positive for abdominal distention, abdominal pain, diarrhea, nausea and vomiting. Negative for anal bleeding, blood in stool, constipation and rectal pain.   Endocrine: Negative.    Genitourinary: Negative.    Musculoskeletal: Negative.    Skin: Negative.    Allergic/Immunologic: Negative.    Neurological: Negative for dizziness, tremors, seizures, syncope, facial asymmetry, speech difficulty, weakness, light-headedness, numbness and headaches.   Hematological: Negative.    Psychiatric/Behavioral: Negative.      Objective:     Vital Signs (Most Recent):  Temp: 98.8 °F (37.1 °C) (06/18/18 1023)  Pulse: 63 (06/18/18 1233)  Resp: (!) 24 (06/18/18 1233)  BP: 132/75 (06/18/18 1233)  SpO2: 96 % (06/18/18 1233) Vital Signs (24h Range):  Temp:  [98.8 °F (37.1 °C)] 98.8 °F (37.1 °C)  Pulse:  [63-70] 63  Resp:  [16-24] 24  SpO2:  [94 %-96 %] 96 %  BP: (132-152)/(70-85) 132/75     Weight: 117.9 kg (260 lb)  Body mass index is 32.5 kg/m².    Physical Exam   Constitutional: He appears well-developed. He is cooperative. He is easily aroused. No distress.   HENT:   Head: Normocephalic and atraumatic.   Eyes: EOM are normal.   Neck: Normal range of motion. Neck supple.   Cardiovascular: Normal rate, regular rhythm, normal heart sounds and intact distal pulses.    No murmur heard.  Pulmonary/Chest: Effort normal and breath sounds normal. No respiratory distress. He has no wheezes. He has no rales. He exhibits no tenderness.   Abdominal: Soft. Bowel sounds are normal. He exhibits distension (mild). There is no tenderness. There is no rebound and no guarding.   Genitourinary:   Genitourinary Comments: Deferred   Musculoskeletal: Normal range of motion. He exhibits no edema, tenderness or deformity.   Neurological: He is alert and  easily aroused.   Skin: Skin is warm and dry. Capillary refill takes less than 2 seconds.   Psychiatric: He has a normal mood and affect. His behavior is normal. Judgment and thought content normal.   Nursing note and vitals reviewed.        CRANIAL NERVES     CN III, IV, VI   Extraocular motions are normal.        Significant Labs:   CBC:   Recent Labs  Lab 06/18/18  1050   WBC 12.80*   HGB 17.7   HCT 49.4        CMP:   Recent Labs  Lab 06/18/18  1050      K 3.7   CL 98   CO2 25      BUN 18   CREATININE 0.9   CALCIUM 10.4   PROT 8.3   ALBUMIN 4.2   BILITOT 1.8*   ALKPHOS 94   AST 30   ALT 36   ANIONGAP 14   EGFRNONAA >60     Lipase:   Recent Labs  Lab 06/18/18  1050   LIPASE 17     Troponin:   Recent Labs  Lab 06/18/18  1050   TROPONINI <0.006     Urine Studies:   Recent Labs  Lab 06/18/18  1247   COLORU Yellow   APPEARANCEUA Clear   PHUR 6.0   SPECGRAV 1.025   PROTEINUA Negative   GLUCUA Negative   KETONESU 1+*   BILIRUBINUA 1+*   OCCULTUA Negative   NITRITE Negative   UROBILINOGEN 1.0   LEUKOCYTESUR Negative       Significant Imaging:   Imaging Results          CT Abdomen Pelvis With Contrast (Final result)  Result time 06/18/18 13:41:03    Final result by Hussain Whitfield MD (06/18/18 13:41:03)                 Impression:      Small bowel loops are dilated to the mid ileum consistent with partial or early complete bowel obstruction.  Findings discussed with Dr. Carrillo at 1:26 p.m. on 06/18/2018.    See above for additional findings.    All CT scans at this facility use dose modulation, iterative reconstruction, and/or weight based dosing when appropriate to reduce radiation dose to as low as reasonable achievable.      Electronically signed by: Hussain Whitfield MD  Date:    06/18/2018  Time:    13:41             Narrative:    EXAMINATION:  CT ABDOMEN PELVIS WITH CONTRAST    CLINICAL HISTORY:  Nausea, vomiting, diarrhea;    TECHNIQUE:  Low dose axial images, sagittal and coronal reformations were  obtained from the lung bases to the pubic symphysis following the IV administration of 75 mL of Omnipaque 350.  Oral contrast was also administered.    COMPARISON:  07/30/2015    FINDINGS:  Heart: Normal size. No effusion.    Lung Bases: Mild dependent changes and/or atelectasis and scarring within the lower lobes.  No consolidation.  Diffuse calcification is seen with mild pleural thickening involving the right hemithorax which could reflect prior hemothorax versus asbestos related pleural disease.    Liver: Normal size and attenuation. No focal lesions.    Gallbladder: Surgically absent    Bile Ducts: No dilatation.    Pancreas: No mass. No peripancreatic fat stranding.    Spleen: Normal.    Adrenals: Normal.    Kidneys/Ureters: Normal enhancement.  No mass or  hydroureteronephrosis.    Bladder: No wall thickening.    Reproductive organs: Normal.    GI Tract/Mesentery: Mild gastric distension.  Small bowel loops are dilated to the mid ileum consistent with partial or early complete bowel obstruction.  Appendix is normal.  Air and feces is seen within the colon.  No evidence of pneumatosis.    Peritoneal Space: No ascites or free air.    Retroperitoneum: No significant adenopathy.    Abdominal wall: Small fat containing right inguinal hernia.  Small fat containing umbilical hernia.    Vasculature: No aneurysm.    Bones: No acute fracture. No suspicious lytic or sclerotic lesions.                               X-Ray Abdomen Flat And Erect (Final result)  Result time 06/18/18 11:11:20    Final result by Hussain Whitfield MD (06/18/18 11:11:20)                 Impression:      Dilated loops of small bowel are seen measuring up to 4.3 cm with some transition within the lower abdomen.  Findings are concerning for partial or early small bowel obstruction.  Cross-sectional imaging with oral and IV contrast recommended.  Findings were discussed with Dr. Carrillo at 06/18/2018 11:10 a.m.      Electronically signed by: Hussain  MD Roseann  Date:    06/18/2018  Time:    11:11             Narrative:    EXAMINATION:  XR ABDOMEN FLAT AND ERECT    CLINICAL HISTORY:  Abdominal Pain;    TECHNIQUE:  3 View of the abdomen was performed.    COMPARISON:  None    FINDINGS:  Dilated loops of small bowel are seen measuring up to 4.3 cm with some transition within the lower abdomen.  Air and feces is noted within the colon.    No obvious free air.  No portal venous gas.  Cholecystectomy clips are noted.    No acute fracture. Moderate DJD within the lower lumbar spine.    Lung bases demonstrate mild basilar atelectasis or small infiltrates..                              Assessment/Plan:     * SBO (small bowel obstruction)    - Admit to Med Surg  - Partial vs SBO  - Pt reports abdominal pain x 3 days with N/V/D. Pt reports had gallbladder removed about 5-6 years ago, no other abdominal surgeries reported  - KUB findings concerning for partial or early small bowel obstruction  - CT abdomen/pelvis with contrast showed small bowel loops are dilated to the mid ileum consistent with partial or early complete bowel obstruction  - Inpatient consult to General surgery  - NPO  - IVF  - Analgesics/Antiemetics prn          Leukocytosis    - WBC 12.80K  - Currently afebrile but tachypneic at 24 BPM  - UA negative for infectious process. Will obtain CXR  - SIRS -- low threshold to start antibiotics due to unknown source of possible infection. May be stress response  - Daily CBC          Asthma with COPD    - Currently stable  - Duonebs prn          KEVEN (obstructive sleep apnea)    - Nightly CPAP -- titrate to patient's O2 saturation and comfort          GERD (gastroesophageal reflux disease)    - IV PPI            VTE Risk Mitigation         Ordered     IP VTE HIGH RISK PATIENT  Once      06/18/18 1423     Place KELLY hose  Until discontinued      06/18/18 1423     Place sequential compression device  Until discontinued      06/18/18 1423             Thao Moss,  FNP  Department of Hospital Medicine   [facility]

## 2018-06-18 NOTE — ED PROVIDER NOTES
SCRIBE #1 NOTE: I, Corinne Mack, am scribing for, and in the presence of, Brigido Carrillo MD. I have scribed the entire note.      History      Chief Complaint   Patient presents with    Abdominal Pain     epigastric area with NVD since Saturday       Review of patient's allergies indicates:   Allergen Reactions    Zithromax [azithromycin] Palpitations    Lipitor [atorvastatin] Other (See Comments)     Leg cramps/hand cramps        HPI   HPI    6/18/2018, 10:45 AM   History obtained from the patient      History of Present Illness: Yung Mcconnell is a 62 y.o. male patient with PMHx of GERD who presents to the Emergency Department for abd pain which onset gradually 3 days ago. Symptoms are constant and moderate in severity. No mitigating or exacerbating factors reported. Associated sxs include N/V/D. Patient denies any fever, chills, back pain, neck pain, HA, dizziness, and all other sxs at this time. No prior Tx reported. No further complaints or concerns at this time.         Arrival mode: Personal vehicle    PCP: LEVI GOVEA MD       Past Medical History:  Past Medical History:   Diagnosis Date    Allergy     Asthma, chronic 7/9/2013    Colon polyps     COPD (chronic obstructive pulmonary disease)     GERD (gastroesophageal reflux disease)     High cholesterol     Osteoarthritis of both knees 7/9/2013    Sleep apnea        Past Surgical History:  Past Surgical History:   Procedure Laterality Date    CHOLECYSTECTOMY      LUNG SURGERY Right     benign mass         Family History:  Family History   Problem Relation Age of Onset    Hypertension Mother     Diabetes Mother     Cancer Mother         Breast Ca     Heart disease Father         CAD , CHF     Aneurysm Father     Colon polyps Father     Melanoma Neg Hx     Eczema Neg Hx     Lupus Neg Hx     Psoriasis Neg Hx        Social History:  Social History     Social History Main Topics    Smoking status: Never Smoker    Smokeless  tobacco: Never Used    Alcohol use Yes      Comment: rare    Drug use: No    Sexual activity: Yes     Partners: Female     Birth control/ protection: None       ROS   Review of Systems   Constitutional: Negative for chills and fever.   Respiratory: Negative for cough and shortness of breath.    Cardiovascular: Negative for chest pain and leg swelling.   Gastrointestinal: Positive for abdominal pain, diarrhea, nausea and vomiting.   Musculoskeletal: Negative for back pain, neck pain and neck stiffness.   Skin: Negative for rash and wound.   Neurological: Negative for dizziness, light-headedness, numbness and headaches.   All other systems reviewed and are negative.    Physical Exam      Initial Vitals [06/18/18 1023]   BP Pulse Resp Temp SpO2   (!) 148/83 70 18 98.8 °F (37.1 °C) 95 %      MAP       --          Physical Exam  Nursing Notes and Vital Signs Reviewed.  Constitutional: Patient is in no apparent distress. Well-developed and well-nourished.  Head: Atraumatic. Normocephalic.  Eyes: PERRL. EOM intact. Conjunctivae are not pale. No scleral icterus.  ENT: Mucous membranes are moist. Oropharynx is clear and symmetric.    Neck: Supple. Full ROM. No lymphadenopathy.  Cardiovascular: Regular rate. Regular rhythm. No murmurs, rubs, or gallops. Distal pulses are 2+ and symmetric.  Pulmonary/Chest: No respiratory distress. Clear to auscultation bilaterally. No wheezing or rales.  Abdominal: Soft and distended.  There is minimal tenderness to palpation.  No rebound, guarding, or rigidity. Hyperactive, high-pitched bowel sounds.  Musculoskeletal: Moves all extremities. No obvious deformities.   Skin: Warm and dry.  Neurological:  Alert, awake, and appropriate.  Normal speech.  No acute focal neurological deficits are appreciated.  Psychiatric: Normal affect. Good eye contact. Appropriate in content.    ED Course    Procedures  ED Vital Signs:  Vitals:    06/18/18 1023 06/18/18 1111 06/18/18 1202 06/18/18 1233   BP:  "(!) 148/83 (!) 152/70 (!) 152/85 132/75   Pulse: 70 69 64 63   Resp: 18 (!) 24 16 (!) 24   Temp: 98.8 °F (37.1 °C)      TempSrc: Oral      SpO2: 95% 95% (!) 94% 96%   Weight: 117.9 kg (260 lb)      Height: 6' 3" (1.905 m)          Abnormal Lab Results:  Labs Reviewed   CBC W/ AUTO DIFFERENTIAL - Abnormal; Notable for the following:        Result Value    WBC 12.80 (*)     MCH 31.3 (*)     Gran # (ANC) 9.9 (*)     Gran% 77.2 (*)     Lymph% 14.1 (*)     All other components within normal limits   COMPREHENSIVE METABOLIC PANEL - Abnormal; Notable for the following:     Total Bilirubin 1.8 (*)     All other components within normal limits   URINALYSIS - Abnormal; Notable for the following:     Ketones, UA 1+ (*)     Bilirubin (UA) 1+ (*)     All other components within normal limits   LIPASE   TROPONIN I        All Lab Results:  Results for orders placed or performed during the hospital encounter of 06/18/18   CBC W/ AUTO DIFFERENTIAL   Result Value Ref Range    WBC 12.80 (H) 3.90 - 12.70 K/uL    RBC 5.65 4.60 - 6.20 M/uL    Hemoglobin 17.7 14.0 - 18.0 g/dL    Hematocrit 49.4 40.0 - 54.0 %    MCV 87 82 - 98 fL    MCH 31.3 (H) 27.0 - 31.0 pg    MCHC 35.8 32.0 - 36.0 g/dL    RDW 14.2 11.5 - 14.5 %    Platelets 256 150 - 350 K/uL    MPV 9.8 9.2 - 12.9 fL    Gran # (ANC) 9.9 (H) 1.8 - 7.7 K/uL    Lymph # 1.8 1.0 - 4.8 K/uL    Mono # 0.9 0.3 - 1.0 K/uL    Eos # 0.2 0.0 - 0.5 K/uL    Baso # 0.02 0.00 - 0.20 K/uL    Gran% 77.2 (H) 38.0 - 73.0 %    Lymph% 14.1 (L) 18.0 - 48.0 %    Mono% 7.3 4.0 - 15.0 %    Eosinophil% 1.2 0.0 - 8.0 %    Basophil% 0.2 0.0 - 1.9 %    Differential Method Automated    Comp. Metabolic Panel   Result Value Ref Range    Sodium 137 136 - 145 mmol/L    Potassium 3.7 3.5 - 5.1 mmol/L    Chloride 98 95 - 110 mmol/L    CO2 25 23 - 29 mmol/L    Glucose 110 70 - 110 mg/dL    BUN, Bld 18 8 - 23 mg/dL    Creatinine 0.9 0.5 - 1.4 mg/dL    Calcium 10.4 8.7 - 10.5 mg/dL    Total Protein 8.3 6.0 - 8.4 g/dL    " Albumin 4.2 3.5 - 5.2 g/dL    Total Bilirubin 1.8 (H) 0.1 - 1.0 mg/dL    Alkaline Phosphatase 94 55 - 135 U/L    AST 30 10 - 40 U/L    ALT 36 10 - 44 U/L    Anion Gap 14 8 - 16 mmol/L    eGFR if African American >60 >60 mL/min/1.73 m^2    eGFR if non African American >60 >60 mL/min/1.73 m^2   Lipase   Result Value Ref Range    Lipase 17 4 - 60 U/L   Urinalysis - Clean Catch   Result Value Ref Range    Specimen UA Urine, Clean Catch     Color, UA Yellow Yellow, Straw, Jyoti    Appearance, UA Clear Clear    pH, UA 6.0 5.0 - 8.0    Specific Gravity, UA 1.025 1.005 - 1.030    Protein, UA Negative Negative    Glucose, UA Negative Negative    Ketones, UA 1+ (A) Negative    Bilirubin (UA) 1+ (A) Negative    Occult Blood UA Negative Negative    Nitrite, UA Negative Negative    Urobilinogen, UA 1.0 <2.0 EU/dL    Leukocytes, UA Negative Negative   Troponin I   Result Value Ref Range    Troponin I <0.006 0.000 - 0.026 ng/mL       Imaging Results:  Imaging Results          CT Abdomen Pelvis With Contrast (In process)                X-Ray Abdomen Flat And Erect (Final result)  Result time 06/18/18 11:11:20    Final result by Hussain Whitfield MD (06/18/18 11:11:20)                 Impression:      Dilated loops of small bowel are seen measuring up to 4.3 cm with some transition within the lower abdomen.  Findings are concerning for partial or early small bowel obstruction.  Cross-sectional imaging with oral and IV contrast recommended.  Findings were discussed with Dr. Carrillo at 06/18/2018 11:10 a.m.      Electronically signed by: Hussain Whitfield MD  Date:    06/18/2018  Time:    11:11             Narrative:    EXAMINATION:  XR ABDOMEN FLAT AND ERECT    CLINICAL HISTORY:  Abdominal Pain;    TECHNIQUE:  3 View of the abdomen was performed.    COMPARISON:  None    FINDINGS:  Dilated loops of small bowel are seen measuring up to 4.3 cm with some transition within the lower abdomen.  Air and feces is noted within the colon.    No obvious  free air.  No portal venous gas.  Cholecystectomy clips are noted.    No acute fracture. Moderate DJD within the lower lumbar spine.    Lung bases demonstrate mild basilar atelectasis or small infiltrates..                                 The EKG was ordered, reviewed, and independently interpreted by the ED provider.  Interpretation time: 1051  Rate: 68 BPM  Rhythm: normal sinus rhythm  Interpretation: Nonspecific ST and T wave abnormalities. No STEMI.             The Emergency Provider reviewed the vital signs and test results, which are outlined above.    ED Discussion     1:28 PM: Re-evaluated pt. I have discussed test results, shared treatment plan, and the need for admission with patient and family at bedside. Pt and family express understanding at this time and agree with all information. All questions answered. Pt and family have no further questions or concerns at this time. Pt is ready for admit.    1:30 PM: Discussed case with PHIL Wilson ([facility]). Dr. Whatley agrees with current care and management of pt and accepts admission.   Admitting Service: Hospital medicine   Admitting Physician: Dr. Whatley  Admit to: Med-Surg    1:37 PM: Dr. Carrillo discussed the pt's case with Dr. Martinez (General Surgery) who states he agrees with current plan of care and will see the pt.      ED Medication(s):  Medications   sodium chloride 0.9% bolus 1,000 mL (0 mLs Intravenous Stopped 6/18/18 1300)   ondansetron injection 4 mg (4 mg Intravenous Given 6/18/18 1112)   omnipaque 350 iohexol 30 mL (30 mLs Oral Given 6/18/18 1055)   omnipaque 350 iohexol 75 mL (75 mLs Intravenous Given 6/18/18 1257)       New Prescriptions    No medications on file             Medical Decision Making    Medical Decision Making:   Clinical Tests:   Lab Tests: Ordered and Reviewed  Radiological Study: Ordered and Reviewed  Medical Tests: Ordered and Reviewed           Scribe Attestation:   Scribe #1: I performed the above  scribed service and the documentation accurately describes the services I performed. I attest to the accuracy of the note.    Attending:   Physician Attestation Statement for Scribe #1: I, Brigido Carrillo MD, personally performed the services described in this documentation, as scribed by Corinne Mack, in my presence, and it is both accurate and complete.          Clinical Impression       ICD-10-CM ICD-9-CM   1. SBO (small bowel obstruction) K56.609 560.9         Disposition:   Disposition: Admitted  Condition: Stable           Brigido Carrillo MD  06/20/18 1246

## 2018-06-18 NOTE — HPI
Yung Mcconnell is a 62 year old male with a PMHx of KEVEN on CPAP, OA, HLD, GERD, COPD, and Asthma who presented to the Emergency Department with c/o abdominal pain since Saturday afternoon. Associated symptoms include: N/V/D. Pt denies being around sick/ill contacts. Pt reports having gallbladder removed laparoscopically about 5-6 years ago. Pt denies any other abdominal surgeries. ED workup revealed: WBC 12.80K, Total bilirubin 1.8, UA with 1+ ketones and bilirubin. KUB showed dilated loops of bowel are seen measuring up to 4.3 cm with some transition within the lower abdomen, findings concerning for partial or early small bowel obstruction. CT abdomen/pelvis with contrast showed small bowel loops are dilated to the mid ileum consistent with partial or early complete bowel obstruction. ED consulted General surgery. Pt admitted to Med Surg for partial SBO.

## 2018-06-18 NOTE — ASSESSMENT & PLAN NOTE
- Admit to Med Surg  - Partial vs SBO  - Pt reports abdominal pain x 3 days with N/V/D. Pt reports had gallbladder removed about 5-6 years ago, no other abdominal surgeries reported  - KUB findings concerning for partial or early small bowel obstruction  - CT abdomen/pelvis with contrast showed small bowel loops are dilated to the mid ileum consistent with partial or early complete bowel obstruction  - Inpatient consult to General surgery  - NPO  - IVF  - Analgesics/Antiemetics prn

## 2018-06-18 NOTE — PLAN OF CARE
Problem: Patient Care Overview  Goal: Plan of Care Review  Outcome: Ongoing (interventions implemented as appropriate)  Fall precautions maintained, pt free from falls/injuries  PT repositions and ambulates independently  C/o pain, relieved by iv pain meds, zofran given for nausea  Pt is npo, receiving iv fluids  POC and medications reviewed with pt, pt verbalized understanding.  Side rails x 2 up, bed locked and low.  No signs/symptoms of acute distress.  Chart check done. Will cont to monitor.

## 2018-06-18 NOTE — ASSESSMENT & PLAN NOTE
Follow up XR shows less dilated bowel and contrast in the colon  He feels somewhat better today with less abdominal distention and pain.   Will proceed with SBFT today  If obstruction noted on SBFT he will need surgical intervention. This was discussed with patient.

## 2018-06-18 NOTE — SUBJECTIVE & OBJECTIVE
Past Medical History:   Diagnosis Date    Allergy     Asthma, chronic 7/9/2013    Colon polyps     COPD (chronic obstructive pulmonary disease)     GERD (gastroesophageal reflux disease)     High cholesterol     Osteoarthritis of both knees 7/9/2013    Sleep apnea        Past Surgical History:   Procedure Laterality Date    CHOLECYSTECTOMY      LUNG SURGERY Right     benign mass       Review of patient's allergies indicates:   Allergen Reactions    Zithromax [azithromycin] Palpitations    Lipitor [atorvastatin] Other (See Comments)     Leg cramps/hand cramps       No current facility-administered medications on file prior to encounter.      Current Outpatient Prescriptions on File Prior to Encounter   Medication Sig    albuterol (PROVENTIL) 2.5 mg /3 mL (0.083 %) nebulizer solution Take 3 mLs (2.5 mg total) by nebulization every 6 (six) hours while awake.    albuterol 90 mcg/actuation inhaler Every 4-6 hours (Patient taking differently: every 4 (four) hours as needed. Every 4-6 hours)    amoxicillin-clavulanate 875-125mg (AUGMENTIN) 875-125 mg per tablet Take 1 tablet by mouth every 12 (twelve) hours.    cyclobenzaprine (FLEXERIL) 5 MG tablet 1 Tablet Oral Three times a day as needed.    diclofenac 1.3 % PT12 Apply 1 patch topically every 12 (twelve) hours as needed.    fluticasone (FLONASE) 50 mcg/actuation nasal spray 2 sprays by Each Nare route once daily. (Patient taking differently: 2 sprays by Each Nare route as needed. )    GLUCOSAMINE HCL/CHONDR ALFONSO A NA (OSTEO BI-FLEX ORAL) Take 1 tablet by mouth once daily.     multivitamin (MULTIPLE VITAMIN) per tablet 1 Tablet Oral Every day    omeprazole (PRILOSEC) 40 MG capsule Take 1 capsule (40 mg total) by mouth once daily. Dispense Generic    pravastatin (PRAVACHOL) 10 MG tablet Take 1 tablet (10 mg total) by mouth once daily.    SOOLANTRA 1 % Crea EDWADR EXT AA prn    tadalafil (CIALIS) 20 MG Tab Take 1 tablet (20 mg total) by mouth daily as  needed.     Family History     Problem Relation (Age of Onset)    Aneurysm Father    Cancer Mother    Colon polyps Father    Diabetes Mother    Heart disease Father    Hypertension Mother        Social History Main Topics    Smoking status: Never Smoker    Smokeless tobacco: Never Used    Alcohol use Yes      Comment: rare    Drug use: No    Sexual activity: Yes     Partners: Female     Birth control/ protection: None     Review of Systems   Constitutional: Positive for activity change. Negative for chills and fatigue.   HENT: Negative.    Respiratory: Negative for apnea, cough, choking, chest tightness, shortness of breath, wheezing and stridor.    Cardiovascular: Negative for chest pain, palpitations and leg swelling.   Gastrointestinal: Positive for abdominal distention, abdominal pain, diarrhea, nausea and vomiting. Negative for anal bleeding, blood in stool, constipation and rectal pain.   Endocrine: Negative.    Genitourinary: Negative.    Musculoskeletal: Negative.    Skin: Negative.    Allergic/Immunologic: Negative.    Neurological: Negative for dizziness, tremors, seizures, syncope, facial asymmetry, speech difficulty, weakness, light-headedness, numbness and headaches.   Hematological: Negative.    Psychiatric/Behavioral: Negative.      Objective:     Vital Signs (Most Recent):  Temp: 98.8 °F (37.1 °C) (06/18/18 1023)  Pulse: 63 (06/18/18 1233)  Resp: (!) 24 (06/18/18 1233)  BP: 132/75 (06/18/18 1233)  SpO2: 96 % (06/18/18 1233) Vital Signs (24h Range):  Temp:  [98.8 °F (37.1 °C)] 98.8 °F (37.1 °C)  Pulse:  [63-70] 63  Resp:  [16-24] 24  SpO2:  [94 %-96 %] 96 %  BP: (132-152)/(70-85) 132/75     Weight: 117.9 kg (260 lb)  Body mass index is 32.5 kg/m².    Physical Exam   Constitutional: He appears well-developed. He is cooperative. He is easily aroused. No distress.   HENT:   Head: Normocephalic and atraumatic.   Eyes: EOM are normal.   Neck: Normal range of motion. Neck supple.   Cardiovascular:  Normal rate, regular rhythm, normal heart sounds and intact distal pulses.    No murmur heard.  Pulmonary/Chest: Effort normal and breath sounds normal. No respiratory distress. He has no wheezes. He has no rales. He exhibits no tenderness.   Abdominal: Soft. Bowel sounds are normal. He exhibits distension (mild). There is no tenderness. There is no rebound and no guarding.   Genitourinary:   Genitourinary Comments: Deferred   Musculoskeletal: Normal range of motion. He exhibits no edema, tenderness or deformity.   Neurological: He is alert and easily aroused.   Skin: Skin is warm and dry. Capillary refill takes less than 2 seconds.   Psychiatric: He has a normal mood and affect. His behavior is normal. Judgment and thought content normal.   Nursing note and vitals reviewed.        CRANIAL NERVES     CN III, IV, VI   Extraocular motions are normal.        Significant Labs:   CBC:   Recent Labs  Lab 06/18/18  1050   WBC 12.80*   HGB 17.7   HCT 49.4        CMP:   Recent Labs  Lab 06/18/18  1050      K 3.7   CL 98   CO2 25      BUN 18   CREATININE 0.9   CALCIUM 10.4   PROT 8.3   ALBUMIN 4.2   BILITOT 1.8*   ALKPHOS 94   AST 30   ALT 36   ANIONGAP 14   EGFRNONAA >60     Lipase:   Recent Labs  Lab 06/18/18  1050   LIPASE 17     Troponin:   Recent Labs  Lab 06/18/18  1050   TROPONINI <0.006     Urine Studies:   Recent Labs  Lab 06/18/18  1247   COLORU Yellow   APPEARANCEUA Clear   PHUR 6.0   SPECGRAV 1.025   PROTEINUA Negative   GLUCUA Negative   KETONESU 1+*   BILIRUBINUA 1+*   OCCULTUA Negative   NITRITE Negative   UROBILINOGEN 1.0   LEUKOCYTESUR Negative       Significant Imaging:   Imaging Results          CT Abdomen Pelvis With Contrast (Final result)  Result time 06/18/18 13:41:03    Final result by Hussain Whitfield MD (06/18/18 13:41:03)                 Impression:      Small bowel loops are dilated to the mid ileum consistent with partial or early complete bowel obstruction.  Findings discussed  with Dr. Carrillo at 1:26 p.m. on 06/18/2018.    See above for additional findings.    All CT scans at this facility use dose modulation, iterative reconstruction, and/or weight based dosing when appropriate to reduce radiation dose to as low as reasonable achievable.      Electronically signed by: Hussain Whitfield MD  Date:    06/18/2018  Time:    13:41             Narrative:    EXAMINATION:  CT ABDOMEN PELVIS WITH CONTRAST    CLINICAL HISTORY:  Nausea, vomiting, diarrhea;    TECHNIQUE:  Low dose axial images, sagittal and coronal reformations were obtained from the lung bases to the pubic symphysis following the IV administration of 75 mL of Omnipaque 350.  Oral contrast was also administered.    COMPARISON:  07/30/2015    FINDINGS:  Heart: Normal size. No effusion.    Lung Bases: Mild dependent changes and/or atelectasis and scarring within the lower lobes.  No consolidation.  Diffuse calcification is seen with mild pleural thickening involving the right hemithorax which could reflect prior hemothorax versus asbestos related pleural disease.    Liver: Normal size and attenuation. No focal lesions.    Gallbladder: Surgically absent    Bile Ducts: No dilatation.    Pancreas: No mass. No peripancreatic fat stranding.    Spleen: Normal.    Adrenals: Normal.    Kidneys/Ureters: Normal enhancement.  No mass or  hydroureteronephrosis.    Bladder: No wall thickening.    Reproductive organs: Normal.    GI Tract/Mesentery: Mild gastric distension.  Small bowel loops are dilated to the mid ileum consistent with partial or early complete bowel obstruction.  Appendix is normal.  Air and feces is seen within the colon.  No evidence of pneumatosis.    Peritoneal Space: No ascites or free air.    Retroperitoneum: No significant adenopathy.    Abdominal wall: Small fat containing right inguinal hernia.  Small fat containing umbilical hernia.    Vasculature: No aneurysm.    Bones: No acute fracture. No suspicious lytic or sclerotic  lesions.                               X-Ray Abdomen Flat And Erect (Final result)  Result time 06/18/18 11:11:20    Final result by Hussain Whitfield MD (06/18/18 11:11:20)                 Impression:      Dilated loops of small bowel are seen measuring up to 4.3 cm with some transition within the lower abdomen.  Findings are concerning for partial or early small bowel obstruction.  Cross-sectional imaging with oral and IV contrast recommended.  Findings were discussed with Dr. Carrillo at 06/18/2018 11:10 a.m.      Electronically signed by: Hussain Whitfield MD  Date:    06/18/2018  Time:    11:11             Narrative:    EXAMINATION:  XR ABDOMEN FLAT AND ERECT    CLINICAL HISTORY:  Abdominal Pain;    TECHNIQUE:  3 View of the abdomen was performed.    COMPARISON:  None    FINDINGS:  Dilated loops of small bowel are seen measuring up to 4.3 cm with some transition within the lower abdomen.  Air and feces is noted within the colon.    No obvious free air.  No portal venous gas.  Cholecystectomy clips are noted.    No acute fracture. Moderate DJD within the lower lumbar spine.    Lung bases demonstrate mild basilar atelectasis or small infiltrates..

## 2018-06-19 ENCOUNTER — ANESTHESIA EVENT (OUTPATIENT)
Dept: SURGERY | Facility: HOSPITAL | Age: 63
DRG: 355 | End: 2018-06-19
Payer: COMMERCIAL

## 2018-06-19 LAB
ALBUMIN SERPL BCP-MCNC: 3.5 G/DL
ALP SERPL-CCNC: 78 U/L
ALT SERPL W/O P-5'-P-CCNC: 37 U/L
ANION GAP SERPL CALC-SCNC: 10 MMOL/L
AST SERPL-CCNC: 27 U/L
BASOPHILS # BLD AUTO: 0.02 K/UL
BASOPHILS NFR BLD: 0.2 %
BILIRUB SERPL-MCNC: 2.1 MG/DL
BUN SERPL-MCNC: 19 MG/DL
CALCIUM SERPL-MCNC: 9.1 MG/DL
CHLORIDE SERPL-SCNC: 100 MMOL/L
CO2 SERPL-SCNC: 28 MMOL/L
CREAT SERPL-MCNC: 0.9 MG/DL
DIFFERENTIAL METHOD: ABNORMAL
EOSINOPHIL # BLD AUTO: 0.1 K/UL
EOSINOPHIL NFR BLD: 1.1 %
ERYTHROCYTE [DISTWIDTH] IN BLOOD BY AUTOMATED COUNT: 14.3 %
EST. GFR  (AFRICAN AMERICAN): >60 ML/MIN/1.73 M^2
EST. GFR  (NON AFRICAN AMERICAN): >60 ML/MIN/1.73 M^2
GLUCOSE SERPL-MCNC: 101 MG/DL
HCT VFR BLD AUTO: 44.8 %
HGB BLD-MCNC: 15.8 G/DL
LYMPHOCYTES # BLD AUTO: 2.3 K/UL
LYMPHOCYTES NFR BLD: 17.3 %
MCH RBC QN AUTO: 31.4 PG
MCHC RBC AUTO-ENTMCNC: 35.3 G/DL
MCV RBC AUTO: 89 FL
MONOCYTES # BLD AUTO: 1.2 K/UL
MONOCYTES NFR BLD: 9 %
NEUTROPHILS # BLD AUTO: 9.7 K/UL
NEUTROPHILS NFR BLD: 72.4 %
PLATELET # BLD AUTO: 217 K/UL
PMV BLD AUTO: 9.6 FL
POTASSIUM SERPL-SCNC: 3.9 MMOL/L
PROT SERPL-MCNC: 6.8 G/DL
RBC # BLD AUTO: 5.03 M/UL
SODIUM SERPL-SCNC: 138 MMOL/L
WBC # BLD AUTO: 13.31 K/UL

## 2018-06-19 PROCEDURE — 85025 COMPLETE CBC W/AUTO DIFF WBC: CPT

## 2018-06-19 PROCEDURE — 11000001 HC ACUTE MED/SURG PRIVATE ROOM

## 2018-06-19 PROCEDURE — 63600175 PHARM REV CODE 636 W HCPCS: Performed by: EMERGENCY MEDICINE

## 2018-06-19 PROCEDURE — 99900035 HC TECH TIME PER 15 MIN (STAT)

## 2018-06-19 PROCEDURE — 36415 COLL VENOUS BLD VENIPUNCTURE: CPT

## 2018-06-19 PROCEDURE — 25500020 PHARM REV CODE 255: Performed by: INTERNAL MEDICINE

## 2018-06-19 PROCEDURE — 27000190 HC CPAP FULL FACE MASK W/VALVE

## 2018-06-19 PROCEDURE — 99232 SBSQ HOSP IP/OBS MODERATE 35: CPT | Mod: ,,, | Performed by: PHYSICIAN ASSISTANT

## 2018-06-19 PROCEDURE — C9113 INJ PANTOPRAZOLE SODIUM, VIA: HCPCS | Performed by: EMERGENCY MEDICINE

## 2018-06-19 PROCEDURE — 94660 CPAP INITIATION&MGMT: CPT

## 2018-06-19 PROCEDURE — 63600175 PHARM REV CODE 636 W HCPCS: Performed by: NURSE PRACTITIONER

## 2018-06-19 PROCEDURE — 80053 COMPREHEN METABOLIC PANEL: CPT

## 2018-06-19 PROCEDURE — 25000003 PHARM REV CODE 250: Performed by: EMERGENCY MEDICINE

## 2018-06-19 RX ORDER — METRONIDAZOLE 500 MG/100ML
500 INJECTION, SOLUTION INTRAVENOUS ONCE
Status: CANCELLED | OUTPATIENT
Start: 2018-06-19

## 2018-06-19 RX ADMIN — ONDANSETRON 4 MG: 2 INJECTION INTRAMUSCULAR; INTRAVENOUS at 10:06

## 2018-06-19 RX ADMIN — DIATRIZOATE MEGLUMINE AND DIATRIZOATE SODIUM 240 ML: 660; 100 LIQUID ORAL; RECTAL at 10:06

## 2018-06-19 RX ADMIN — DEXTROSE 40 MG: 50 INJECTION, SOLUTION INTRAVENOUS at 08:06

## 2018-06-19 RX ADMIN — MORPHINE SULFATE 2 MG: 4 INJECTION INTRAVENOUS at 01:06

## 2018-06-19 RX ADMIN — MORPHINE SULFATE 2 MG: 4 INJECTION INTRAVENOUS at 06:06

## 2018-06-19 NOTE — ASSESSMENT & PLAN NOTE
- WBC increased from 12.80K to 13.31K.   - Currently afebrile.   - SIRS -- low threshold to start antibiotics due to unknown source of possible infection. May be stress response  - Daily CBC

## 2018-06-19 NOTE — ASSESSMENT & PLAN NOTE
-Obtain small bowel follow through to establish partial vs SBO  - Pt reports abdominal pain x 3 days with N/V/D. Pt reports had gallbladder removed about 5-6 years ago, no other abdominal surgeries reported  - KUB findings concerning for partial or early small bowel obstruction  - CT abdomen/pelvis with contrast showed small bowel loops are dilated to the mid ileum consistent with partial or early complete bowel obstruction  - General surgery may do surgery pending the small bowel follow through   - NPO  - IVF  - Analgesics/Antiemetics prn

## 2018-06-19 NOTE — PLAN OF CARE
CM went to the patient's room and was in the prodcedure. CM spoke with the patient's spouse and no needs at this time. CM to f/u for safe transition

## 2018-06-19 NOTE — ASSESSMENT & PLAN NOTE
Likely related to a trocar site from his cholecystectomy.  If the patient requires surgery this admission the hernia can be dealt with if not this can be repaired electively

## 2018-06-19 NOTE — HOSPITAL COURSE
Patient had Laparoscopy and umbilical hernia repair [id number]. Following surgery, he ambulated 4 x day. Bowel sounds returned, he had BM's, and tolerated advanced diet to solid foods. He will follow-up with Dr. Martinez in 2 weeks. Patient seen and examined and deemed stable for d/c.

## 2018-06-19 NOTE — PROGRESS NOTES
"Patient states it feels like NGT is coming out. NGT in place, however, it has come out some. Patient states it feels as if it is a "ball stuck in his throat". Secured NGT and messaged MD to verify if re-advancing is appropriate.    Dr. Martinez responds stating to clamp the NGT and monitor.     Spoke with patient about Dr. Martinez response and attempted to advance. Patient states "It hurts so bad, I just want it out." Educated patient the risks of having the NGT removed and patient verbalized understanding.     Messaged Dr. Martinez again and expressed the patient's concerns. Dr. Martinez stated to remove NGT and have patient remain on ice chips only.     Removed NGT. Patient tolerated. Catheter in place. Will monitor. Patient understands to only have ice chips by mouth  "

## 2018-06-19 NOTE — NURSING
Patient returned to room from radiology. SHEREE. Patient a/ox4. No complaints at this time. Patient asking about removal of NGT. Will message MD. Patient verbalized understanding.

## 2018-06-19 NOTE — PLAN OF CARE
Problem: Patient Care Overview  Goal: Plan of Care Review  Outcome: Ongoing (interventions implemented as appropriate)  Pt NG tube LCWS, pH of 4, light brown/green drainage, n/v intermitted. Pt NPO. IVF per MD order. Pt abdomen firm, non tender, distended occasionally. Pt remains free of injury, pain managed adequately, no s/s of distress. 24 hour chart check completed. Will continue to monitor.

## 2018-06-19 NOTE — PROGRESS NOTES
[facility] -   General Surgery  Progress Note    Subjective:     History of Present Illness:  Patient reports a 3 day history of epigastric abdominal pain.  Pain is sharp, crampy, stopping, and achy in nature.  If it was associated with multiple episodes of nausea and vomiting starting on Saturday.  He also had several episodes of loose stool.    The patient stated that when he ever he drank some liquid several hours later he would vomit.  He has never had any abdominal pain like this before.  He denies any blood per rectum.    He states that his a bulge of his umbilicus that was not there before.    He reports having city water.  He has eaten no unusual foods recently nor eaten out recently    Post-Op Info:  Procedure(s) (LRB):  LAPAROTOMY,EXPLORATORY (N/A)         No current facility-administered medications on file prior to encounter.      Current Outpatient Prescriptions on File Prior to Encounter   Medication Sig    albuterol (PROVENTIL) 2.5 mg /3 mL (0.083 %) nebulizer solution Take 3 mLs (2.5 mg total) by nebulization every 6 (six) hours while awake.    albuterol 90 mcg/actuation inhaler Every 4-6 hours (Patient taking differently: every 4 (four) hours as needed. Every 4-6 hours)    amoxicillin-clavulanate 875-125mg (AUGMENTIN) 875-125 mg per tablet Take 1 tablet by mouth every 12 (twelve) hours.    cyclobenzaprine (FLEXERIL) 5 MG tablet 1 Tablet Oral Three times a day as needed.    diclofenac 1.3 % PT12 Apply 1 patch topically every 12 (twelve) hours as needed.    fluticasone (FLONASE) 50 mcg/actuation nasal spray 2 sprays by Each Nare route once daily. (Patient taking differently: 2 sprays by Each Nare route as needed. )    GLUCOSAMINE HCL/CHONDR ALFONSO A NA (OSTEO BI-FLEX ORAL) Take 1 tablet by mouth once daily.     multivitamin (MULTIPLE VITAMIN) per tablet 1 Tablet Oral Every day    omeprazole (PRILOSEC) 40 MG capsule Take 1 capsule (40 mg total) by mouth once daily. Dispense Generic     pravastatin (PRAVACHOL) 10 MG tablet Take 1 tablet (10 mg total) by mouth once daily.    SOOLANTRA 1 % Crea EDWARD EXT AA prn    tadalafil (CIALIS) 20 MG Tab Take 1 tablet (20 mg total) by mouth daily as needed.       Review of patient's allergies indicates:   Allergen Reactions    Zithromax [azithromycin] Palpitations    Lipitor [atorvastatin] Other (See Comments)     Leg cramps/hand cramps       Past Medical History:   Diagnosis Date    Allergy     Asthma, chronic 7/9/2013    Colon polyps     COPD (chronic obstructive pulmonary disease)     GERD (gastroesophageal reflux disease)     High cholesterol     Osteoarthritis of both knees 7/9/2013    Sleep apnea      Past Surgical History:   Procedure Laterality Date    CHOLECYSTECTOMY      LUNG SURGERY Right     benign mass     Family History     Problem Relation (Age of Onset)    Aneurysm Father    Cancer Mother    Colon polyps Father    Diabetes Mother    Heart disease Father    Hypertension Mother        Social History Main Topics    Smoking status: Never Smoker    Smokeless tobacco: Never Used    Alcohol use Yes      Comment: rare    Drug use: No    Sexual activity: Yes     Partners: Female     Birth control/ protection: None     Review of Systems   Constitutional: Positive for activity change.   HENT: Negative.    Eyes: Negative.    Respiratory: Negative.    Cardiovascular: Negative.    Gastrointestinal:        Less pain today. Nausea resolved  Passing flatus and loose bm.    Endocrine: Negative.    Genitourinary: Negative.    Musculoskeletal: Negative.    Skin: Negative.    Allergic/Immunologic: Negative.    Neurological: Negative.    Hematological: Negative.    Psychiatric/Behavioral: Negative.      Objective:     Vital Signs (Most Recent):  Temp: 98.4 °F (36.9 °C) (06/18/18 1625)  Pulse: 65 (06/18/18 1625)  Resp: 16 (06/18/18 1625)  BP: (!) 163/76 (06/18/18 1625)  SpO2: 96 % (06/18/18 1625) Vital Signs (24h Range):  Temp:  [98 °F (36.7  °C)-98.8 °F (37.1 °C)] 98.4 °F (36.9 °C)  Pulse:  [63-70] 65  Resp:  [16-24] 16  SpO2:  [94 %-98 %] 96 %  BP: (132-163)/(70-85) 163/76     Weight: 118.4 kg (261 lb)  Body mass index is 32.62 kg/m².    Physical Exam   Constitutional: He is oriented to person, place, and time. He appears well-developed and well-nourished. No distress.   HENT:   Head: Normocephalic and atraumatic.   Eyes: EOM are normal.   Neck: Normal range of motion.   Cardiovascular: Normal rate, regular rhythm and normal heart sounds.    Pulmonary/Chest: Effort normal and breath sounds normal. No respiratory distress.   Abdominal: Soft. Bowel sounds are normal. He exhibits distension (somewhat less distended). Tenderness: Mild diffuse. There is no rebound and no guarding. No hernia ( reducible umbilical at a trocar site).   Ng with bilious drainage   Genitourinary: Penis normal.   Musculoskeletal: Normal range of motion. He exhibits no edema.   Neurological: He is alert and oriented to person, place, and time.   Skin: Skin is warm and dry. Capillary refill takes less than 2 seconds.   Psychiatric: He has a normal mood and affect. His behavior is normal. Judgment and thought content normal.   Nursing note and vitals reviewed.      Significant Labs:  CBC:     Recent Labs  Lab 06/19/18  0506   WBC 13.31*   RBC 5.03   HGB 15.8   HCT 44.8      MCV 89   MCH 31.4*   MCHC 35.3     BMP:     Recent Labs  Lab 06/19/18  0506         K 3.9      CO2 28   BUN 19   CREATININE 0.9   CALCIUM 9.1     CMP:     Recent Labs  Lab 06/19/18  0506      CALCIUM 9.1   ALBUMIN 3.5   PROT 6.8      K 3.9   CO2 28      BUN 19   CREATININE 0.9   ALKPHOS 78   ALT 37   AST 27   BILITOT 2.1*       Significant Diagnostics:      Nasogastric tube is coiled within the stomach.  Dilated small bowel loops are noted measuring up to 3.6 cm.  No definite transition.  Contrast and feces are seen within the colon.  In comparison to the prior study,  there is no adverse interval changes    Assessment/Plan:     * SBO (small bowel obstruction)    Follow up XR shows less dilated bowel and contrast in the colon  He feels somewhat better today with less abdominal distention and pain.   Will proceed with SBFT today  If obstruction noted on SBFT he will need surgical intervention. This was discussed with patient.         Umbilical hernia without obstruction and without gangrene    Likely related to a trocar site from his cholecystectomy.  If the patient requires surgery this admission the hernia can be dealt with if not this can be repaired electively        KEVEN (obstructive sleep apnea)    Management per Medicine        GERD (gastroesophageal reflux disease)    IV PPI per Medicine            Edwige Mccall PA-C  General Surgery  [facility]

## 2018-06-19 NOTE — PLAN OF CARE
Problem: Patient Care Overview  Goal: Plan of Care Review  Outcome: Ongoing (interventions implemented as appropriate)  Fall precautions maintained. Pt free from falls/injuries. Pt repositions and ambulates indendently. Pt has occasional c/o pain. Pain moderately controlled with PRN medication. NPO diet maintained and tolerated. NGT maintained at low continuous. Patient tolerated. Fluid promotion with IV fluids. Patient turns self every 2 hours. POC and meds reviewed. Patient verbalized understanding. Side rails up x2. Bed Low and locked. Personal items and call light within reach. No signs/symptoms of acute distress. Chart check done. Will monitor

## 2018-06-19 NOTE — CONSULTS
[facility] -   General Surgery  Consult Note    Patient Name: Yung Mcconnell  MRN: [id number]  Code Status: Full Code  Admission Date: 6/18/2018  Hospital Length of Stay: 0 days  Attending Physician: Long Whatley MD  Primary Care Provider: LEVI GOVEA MD    Patient information was obtained from patient, spouse/SO, past medical records and ER records.     Inpatient consult to General surgery  Consult performed by: ZEYAD KAMARA  Consult ordered by: MIKE DISLA  Reason for consult: Bowel obstruction  Assessment/Recommendations: NG tube  IV fluids  Repeated x-rays in the morning  May need small-bowel follow-through        Subjective:     Principal Problem: SBO (small bowel obstruction)    History of Present Illness: Patient reports a 3 day history of epigastric abdominal pain.  Pain is sharp, crampy, stopping, and achy in nature.  If it was associated with multiple episodes of nausea and vomiting starting on Saturday.  He also had several episodes of loose stool.    The patient stated that when he ever he drank some liquid several hours later he would vomit.  He has never had any abdominal pain like this before.  He denies any blood per rectum.    He states that his a bulge of his umbilicus that was not there before.    He reports having city water.  He has eaten no unusual foods recently nor eaten out recently    No new subjective & objective note has been filed under this hospital service since the last note was generated.    Assessment/Plan:     * SBO (small bowel obstruction)    NG tube to low continuous suction as patient continues to have nausea and vomiting  Repeat x-rays tomorrow and if there is no significant improvement or worsening possible laparoscopy versus laparotomy.  If the x-ray shows some improvement are equivocal then a Gastrografin small-bowel follow-through        KEVEN (obstructive sleep apnea)    Management per Medicine        GERD (gastroesophageal reflux disease)     IV PPI per Medicine          VTE Risk Mitigation         Ordered     IP VTE HIGH RISK PATIENT  Once      06/18/18 1423     Place KELLY hose  Until discontinued      06/18/18 1423     Place sequential compression device  Until discontinued      06/18/18 1423          Thank you for your consult. I will follow-up with patient. Please contact us if you have any additional questions.    Casper Martinez MD  General Surgery  [facility]

## 2018-06-19 NOTE — SUBJECTIVE & OBJECTIVE
Interval History: The patient reports abdominal pain, distention, and tenderness in the epigastric region. He denies fever, shortness of breath and chest pain. He reports recent nausea and vomiting.     Review of Systems   Constitutional: Positive for appetite change (decrease). Negative for chills, diaphoresis, fatigue and fever.   HENT: Negative for drooling, ear pain, rhinorrhea and sore throat.    Eyes: Negative.    Respiratory: Negative for cough, shortness of breath and wheezing.    Cardiovascular: Negative for chest pain, palpitations and leg swelling.   Gastrointestinal: Positive for abdominal pain, constipation, nausea and vomiting. Negative for diarrhea.   Endocrine: Negative.    Genitourinary: Negative for dysuria, hematuria and urgency.   Musculoskeletal: Negative.    Skin: Negative for color change and wound.   Allergic/Immunologic: Negative.    Neurological: Negative for dizziness, syncope and speech difficulty.   Hematological: Negative.    Psychiatric/Behavioral: Negative.      Objective:     Vital Signs (Most Recent):  Temp: 97.8 °F (36.6 °C) (06/19/18 0824)  Pulse: 65 (06/19/18 0824)  Resp: 16 (06/19/18 0824)  BP: 132/65 (06/19/18 0824)  SpO2: (!) 93 % (06/19/18 0824) Vital Signs (24h Range):  Temp:  [97.8 °F (36.6 °C)-99.1 °F (37.3 °C)] 97.8 °F (36.6 °C)  Pulse:  [63-82] 65  Resp:  [16-24] 16  SpO2:  [93 %-98 %] 93 %  BP: (132-163)/(65-81) 132/65     Weight: 118.4 kg (261 lb)  Body mass index is 32.62 kg/m².    Intake/Output Summary (Last 24 hours) at 06/19/18 1202  Last data filed at 06/19/18 0600   Gross per 24 hour   Intake           331.67 ml   Output             1550 ml   Net         -1218.33 ml      Physical Exam   Constitutional: He is oriented to person, place, and time. He appears well-developed and well-nourished. No distress.   HENT:   Head: Normocephalic and atraumatic.   Eyes: EOM are normal.   Neck: Normal range of motion. Neck supple.   Cardiovascular: Normal rate, regular rhythm  and normal heart sounds.    Pulmonary/Chest: Effort normal and breath sounds normal. No respiratory distress.   Abdominal: Soft. Bowel sounds are normal. He exhibits distension. There is tenderness. There is rigidity and guarding.       Musculoskeletal: Normal range of motion. He exhibits edema.   Neurological: He is alert and oriented to person, place, and time.   Skin: Skin is warm and dry.   Psychiatric: He has a normal mood and affect. His behavior is normal. Judgment and thought content normal.   Nursing note and vitals reviewed.      Significant Labs:   CBC:   Recent Labs  Lab 06/18/18  1050 06/19/18  0506   WBC 12.80* 13.31*   HGB 17.7 15.8   HCT 49.4 44.8    217     CMP:   Recent Labs  Lab 06/18/18  1050 06/19/18  0506    138   K 3.7 3.9   CL 98 100   CO2 25 28    101   BUN 18 19   CREATININE 0.9 0.9   CALCIUM 10.4 9.1   PROT 8.3 6.8   ALBUMIN 4.2 3.5   BILITOT 1.8* 2.1*   ALKPHOS 94 78   AST 30 27   ALT 36 37   ANIONGAP 14 10   EGFRNONAA >60 >60       Significant Imaging:   Imaging Results          X-Ray Chest PA And Lateral (Final result)  Result time 06/18/18 15:20:58    Final result by Hussain Whitfield MD (06/18/18 15:20:58)                 Impression:      No acute process seen.      Electronically signed by: Hussain Whitfield MD  Date:    06/18/2018  Time:    15:20             Narrative:    EXAMINATION:  XR CHEST PA AND LATERAL    CLINICAL HISTORY:  leukocytosis, r/o infectious process;    COMPARISON:  None    FINDINGS:  Cardiac silhouette is normal.  The lungs demonstrate no evidence of active disease.  Mild atelectasis or scarring bilateral costophrenic angles.  Aorta demonstrates atherosclerotic disease.  No evidence of pleural effusion or pneumothorax.  Bones appear intact.                               CT Abdomen Pelvis With Contrast (Final result)  Result time 06/18/18 13:41:03    Final result by Hussain Whitfield MD (06/18/18 13:41:03)                 Impression:      Small bowel  loops are dilated to the mid ileum consistent with partial or early complete bowel obstruction.  Findings discussed with Dr. Carrillo at 1:26 p.m. on 06/18/2018.    See above for additional findings.    All CT scans at this facility use dose modulation, iterative reconstruction, and/or weight based dosing when appropriate to reduce radiation dose to as low as reasonable achievable.      Electronically signed by: Hussain Whitfield MD  Date:    06/18/2018  Time:    13:41             Narrative:    EXAMINATION:  CT ABDOMEN PELVIS WITH CONTRAST    CLINICAL HISTORY:  Nausea, vomiting, diarrhea;    TECHNIQUE:  Low dose axial images, sagittal and coronal reformations were obtained from the lung bases to the pubic symphysis following the IV administration of 75 mL of Omnipaque 350.  Oral contrast was also administered.    COMPARISON:  07/30/2015    FINDINGS:  Heart: Normal size. No effusion.    Lung Bases: Mild dependent changes and/or atelectasis and scarring within the lower lobes.  No consolidation.  Diffuse calcification is seen with mild pleural thickening involving the right hemithorax which could reflect prior hemothorax versus asbestos related pleural disease.    Liver: Normal size and attenuation. No focal lesions.    Gallbladder: Surgically absent    Bile Ducts: No dilatation.    Pancreas: No mass. No peripancreatic fat stranding.    Spleen: Normal.    Adrenals: Normal.    Kidneys/Ureters: Normal enhancement.  No mass or  hydroureteronephrosis.    Bladder: No wall thickening.    Reproductive organs: Normal.    GI Tract/Mesentery: Mild gastric distension.  Small bowel loops are dilated to the mid ileum consistent with partial or early complete bowel obstruction.  Appendix is normal.  Air and feces is seen within the colon.  No evidence of pneumatosis.    Peritoneal Space: No ascites or free air.    Retroperitoneum: No significant adenopathy.    Abdominal wall: Small fat containing right inguinal hernia.  Small fat  containing umbilical hernia.    Vasculature: No aneurysm.    Bones: No acute fracture. No suspicious lytic or sclerotic lesions.                               X-Ray Abdomen Flat And Erect (Final result)  Result time 06/18/18 11:11:20    Final result by Hussain Whitfield MD (06/18/18 11:11:20)                 Impression:      Dilated loops of small bowel are seen measuring up to 4.3 cm with some transition within the lower abdomen.  Findings are concerning for partial or early small bowel obstruction.  Cross-sectional imaging with oral and IV contrast recommended.  Findings were discussed with Dr. Carrillo at 06/18/2018 11:10 a.m.      Electronically signed by: Hussain Whitfield MD  Date:    06/18/2018  Time:    11:11             Narrative:    EXAMINATION:  XR ABDOMEN FLAT AND ERECT    CLINICAL HISTORY:  Abdominal Pain;    TECHNIQUE:  3 View of the abdomen was performed.    COMPARISON:  None    FINDINGS:  Dilated loops of small bowel are seen measuring up to 4.3 cm with some transition within the lower abdomen.  Air and feces is noted within the colon.    No obvious free air.  No portal venous gas.  Cholecystectomy clips are noted.    No acute fracture. Moderate DJD within the lower lumbar spine.    Lung bases demonstrate mild basilar atelectasis or small infiltrates..

## 2018-06-19 NOTE — PROGRESS NOTES
[facility] Hospital Medicine  Progress Note    Patient Name: Yung Mcconnell  MRN: [id number]  Patient Class: IP- Inpatient   Admission Date: 6/18/2018  Length of Stay: 1 days  Attending Physician: Long Whatley MD  Primary Care Provider: LEVI GOVEA MD        Subjective:     Principal Problem:SBO (small bowel obstruction)    HPI:  Yung Mcconnell is a 62 year old male with a PMHx of KEVEN on CPAP, OA, HLD, GERD, COPD, and Asthma who presented to the Emergency Department with c/o abdominal pain since Saturday afternoon. Associated symptoms include: N/V/D. Pt denies being around sick/ill contacts. Pt reports having gallbladder removed laparoscopically about 5-6 years ago. Pt denies any other abdominal surgeries. ED workup revealed: WBC 12.80K, Total bilirubin 1.8, UA with 1+ ketones and bilirubin. KUB showed dilated loops of bowel are seen measuring up to 4.3 cm with some transition within the lower abdomen, findings concerning for partial or early small bowel obstruction. CT abdomen/pelvis with contrast showed small bowel loops are dilated to the mid ileum consistent with partial or early complete bowel obstruction. ED consulted General surgery. Pt admitted to Med Surg for partial SBO.     Hospital Course:  6/19/18- Yung Mcconnell is a 62 year old male who presents with a small bowel obstruction. A Small bowel follow through is being preformed today. Pending the results, the patient may have surgery.     Interval History: The patient reports abdominal pain, distention, and tenderness in the epigastric region. He denies fever, shortness of breath and chest pain. He reports recent nausea and vomiting.     Review of Systems   Constitutional: Positive for appetite change (decrease). Negative for chills, diaphoresis, fatigue and fever.   HENT: Negative for drooling, ear pain, rhinorrhea and sore throat.    Eyes: Negative.    Respiratory: Negative for cough, shortness of breath and wheezing.     Cardiovascular: Negative for chest pain, palpitations and leg swelling.   Gastrointestinal: Positive for abdominal pain, constipation, nausea and vomiting. Negative for diarrhea.   Endocrine: Negative.    Genitourinary: Negative for dysuria, hematuria and urgency.   Musculoskeletal: Negative.    Skin: Negative for color change and wound.   Allergic/Immunologic: Negative.    Neurological: Negative for dizziness, syncope and speech difficulty.   Hematological: Negative.    Psychiatric/Behavioral: Negative.      Objective:     Vital Signs (Most Recent):  Temp: 97.8 °F (36.6 °C) (06/19/18 0824)  Pulse: 65 (06/19/18 0824)  Resp: 16 (06/19/18 0824)  BP: 132/65 (06/19/18 0824)  SpO2: (!) 93 % (06/19/18 0824) Vital Signs (24h Range):  Temp:  [97.8 °F (36.6 °C)-99.1 °F (37.3 °C)] 97.8 °F (36.6 °C)  Pulse:  [63-82] 65  Resp:  [16-24] 16  SpO2:  [93 %-98 %] 93 %  BP: (132-163)/(65-81) 132/65     Weight: 118.4 kg (261 lb)  Body mass index is 32.62 kg/m².    Intake/Output Summary (Last 24 hours) at 06/19/18 1202  Last data filed at 06/19/18 0600   Gross per 24 hour   Intake           331.67 ml   Output             1550 ml   Net         -1218.33 ml      Physical Exam   Constitutional: He is oriented to person, place, and time. He appears well-developed and well-nourished. No distress.   HENT:   Head: Normocephalic and atraumatic.   Eyes: EOM are normal.   Neck: Normal range of motion. Neck supple.   Cardiovascular: Normal rate, regular rhythm and normal heart sounds.    Pulmonary/Chest: Effort normal and breath sounds normal. No respiratory distress.   Abdominal: Soft. Bowel sounds are absent. He exhibits distension. There is tenderness. There is rigidity and guarding.       Musculoskeletal: Normal range of motion. He exhibits no edema.   Neurological: He is alert and oriented to person, place, and time.   Skin: Skin is warm and dry.   Psychiatric: He has a normal mood and affect. His behavior is normal. Judgment and thought  content normal.   Nursing note and vitals reviewed.      Significant Labs:   CBC:   Recent Labs  Lab 06/18/18  1050 06/19/18  0506   WBC 12.80* 13.31*   HGB 17.7 15.8   HCT 49.4 44.8    217     CMP:   Recent Labs  Lab 06/18/18  1050 06/19/18  0506    138   K 3.7 3.9   CL 98 100   CO2 25 28    101   BUN 18 19   CREATININE 0.9 0.9   CALCIUM 10.4 9.1   PROT 8.3 6.8   ALBUMIN 4.2 3.5   BILITOT 1.8* 2.1*   ALKPHOS 94 78   AST 30 27   ALT 36 37   ANIONGAP 14 10   EGFRNONAA >60 >60       Significant Imaging:   Imaging Results          X-Ray Chest PA And Lateral (Final result)  Result time 06/18/18 15:20:58    Final result by Hussain Whitfield MD (06/18/18 15:20:58)                 Impression:      No acute process seen.      Electronically signed by: Hussain Whitfield MD  Date:    06/18/2018  Time:    15:20             Narrative:    EXAMINATION:  XR CHEST PA AND LATERAL    CLINICAL HISTORY:  leukocytosis, r/o infectious process;    COMPARISON:  None    FINDINGS:  Cardiac silhouette is normal.  The lungs demonstrate no evidence of active disease.  Mild atelectasis or scarring bilateral costophrenic angles.  Aorta demonstrates atherosclerotic disease.  No evidence of pleural effusion or pneumothorax.  Bones appear intact.                               CT Abdomen Pelvis With Contrast (Final result)  Result time 06/18/18 13:41:03    Final result by Hussain Whitfield MD (06/18/18 13:41:03)                 Impression:      Small bowel loops are dilated to the mid ileum consistent with partial or early complete bowel obstruction.  Findings discussed with Dr. Carrillo at 1:26 p.m. on 06/18/2018.    See above for additional findings.    All CT scans at this facility use dose modulation, iterative reconstruction, and/or weight based dosing when appropriate to reduce radiation dose to as low as reasonable achievable.      Electronically signed by: Hussain Whitfield MD  Date:    06/18/2018  Time:    13:41             Narrative:     EXAMINATION:  CT ABDOMEN PELVIS WITH CONTRAST    CLINICAL HISTORY:  Nausea, vomiting, diarrhea;    TECHNIQUE:  Low dose axial images, sagittal and coronal reformations were obtained from the lung bases to the pubic symphysis following the IV administration of 75 mL of Omnipaque 350.  Oral contrast was also administered.    COMPARISON:  07/30/2015    FINDINGS:  Heart: Normal size. No effusion.    Lung Bases: Mild dependent changes and/or atelectasis and scarring within the lower lobes.  No consolidation.  Diffuse calcification is seen with mild pleural thickening involving the right hemithorax which could reflect prior hemothorax versus asbestos related pleural disease.    Liver: Normal size and attenuation. No focal lesions.    Gallbladder: Surgically absent    Bile Ducts: No dilatation.    Pancreas: No mass. No peripancreatic fat stranding.    Spleen: Normal.    Adrenals: Normal.    Kidneys/Ureters: Normal enhancement.  No mass or  hydroureteronephrosis.    Bladder: No wall thickening.    Reproductive organs: Normal.    GI Tract/Mesentery: Mild gastric distension.  Small bowel loops are dilated to the mid ileum consistent with partial or early complete bowel obstruction.  Appendix is normal.  Air and feces is seen within the colon.  No evidence of pneumatosis.    Peritoneal Space: No ascites or free air.    Retroperitoneum: No significant adenopathy.    Abdominal wall: Small fat containing right inguinal hernia.  Small fat containing umbilical hernia.    Vasculature: No aneurysm.    Bones: No acute fracture. No suspicious lytic or sclerotic lesions.                               X-Ray Abdomen Flat And Erect (Final result)  Result time 06/18/18 11:11:20    Final result by Hussain Whitfield MD (06/18/18 11:11:20)                 Impression:      Dilated loops of small bowel are seen measuring up to 4.3 cm with some transition within the lower abdomen.  Findings are concerning for partial or early small bowel  obstruction.  Cross-sectional imaging with oral and IV contrast recommended.  Findings were discussed with Dr. Carrillo at 06/18/2018 11:10 a.m.      Electronically signed by: Hussain Whitfield MD  Date:    06/18/2018  Time:    11:11             Narrative:    EXAMINATION:  XR ABDOMEN FLAT AND ERECT    CLINICAL HISTORY:  Abdominal Pain;    TECHNIQUE:  3 View of the abdomen was performed.    COMPARISON:  None    FINDINGS:  Dilated loops of small bowel are seen measuring up to 4.3 cm with some transition within the lower abdomen.  Air and feces is noted within the colon.    No obvious free air.  No portal venous gas.  Cholecystectomy clips are noted.    No acute fracture. Moderate DJD within the lower lumbar spine.    Lung bases demonstrate mild basilar atelectasis or small infiltrates..                              Assessment/Plan:      * SBO (small bowel obstruction)    -Obtain small bowel follow through to establish partial vs SBO  - Pt reports abdominal pain x 3 days with N/V/D. Pt reports had gallbladder removed about 5-6 years ago, no other abdominal surgeries reported  - KUB findings concerning for partial or early small bowel obstruction  - CT abdomen/pelvis with contrast showed small bowel loops are dilated to the mid ileum consistent with partial or early complete bowel obstruction  - General surgery may do surgery pending the small bowel follow through   - NPO  - IVF  - Analgesics/Antiemetics prn          Leukocytosis    - WBC increased from 12.80K to 13.31K.   - Currently afebrile.   - SIRS -- low threshold to start antibiotics due to unknown source of possible infection. May be stress response  - Daily CBC          Asthma with COPD    - Currently stable  - Duonebs prn          KEVEN (obstructive sleep apnea)    - Nightly CPAP -- titrate to patient's O2 saturation and comfort          GERD (gastroesophageal reflux disease)    - IV PPI            VTE Risk Mitigation         Ordered     IP VTE HIGH RISK PATIENT  Once       06/18/18 1423     Place KELLY hose  Until discontinued      06/18/18 1423     Place sequential compression device  Until discontinued      06/18/18 1423              Matilda Garcia NP  Department of Hospital Medicine   [facility]

## 2018-06-20 ENCOUNTER — ANESTHESIA (OUTPATIENT)
Dept: SURGERY | Facility: HOSPITAL | Age: 63
DRG: 355 | End: 2018-06-20
Payer: COMMERCIAL

## 2018-06-20 PROBLEM — D72.829 LEUKOCYTOSIS: Status: RESOLVED | Noted: 2018-06-18 | Resolved: 2018-06-20

## 2018-06-20 LAB
ALBUMIN SERPL BCP-MCNC: 3.5 G/DL
ALP SERPL-CCNC: 74 U/L
ALT SERPL W/O P-5'-P-CCNC: 42 U/L
ANION GAP SERPL CALC-SCNC: 12 MMOL/L
AST SERPL-CCNC: 31 U/L
BASOPHILS # BLD AUTO: 0.03 K/UL
BASOPHILS NFR BLD: 0.3 %
BILIRUB SERPL-MCNC: 1.7 MG/DL
BUN SERPL-MCNC: 21 MG/DL
CALCIUM SERPL-MCNC: 9.1 MG/DL
CHLORIDE SERPL-SCNC: 106 MMOL/L
CO2 SERPL-SCNC: 20 MMOL/L
CREAT SERPL-MCNC: 0.8 MG/DL
DIFFERENTIAL METHOD: ABNORMAL
EOSINOPHIL # BLD AUTO: 0.3 K/UL
EOSINOPHIL NFR BLD: 3.4 %
ERYTHROCYTE [DISTWIDTH] IN BLOOD BY AUTOMATED COUNT: 14.4 %
EST. GFR  (AFRICAN AMERICAN): >60 ML/MIN/1.73 M^2
EST. GFR  (NON AFRICAN AMERICAN): >60 ML/MIN/1.73 M^2
GLUCOSE SERPL-MCNC: 76 MG/DL
HCT VFR BLD AUTO: 43.8 %
HGB BLD-MCNC: 15.6 G/DL
LYMPHOCYTES # BLD AUTO: 2.7 K/UL
LYMPHOCYTES NFR BLD: 28.5 %
MCH RBC QN AUTO: 31.7 PG
MCHC RBC AUTO-ENTMCNC: 35.6 G/DL
MCV RBC AUTO: 89 FL
MONOCYTES # BLD AUTO: 0.7 K/UL
MONOCYTES NFR BLD: 7.7 %
NEUTROPHILS # BLD AUTO: 5.7 K/UL
NEUTROPHILS NFR BLD: 60.1 %
PLATELET # BLD AUTO: 202 K/UL
PMV BLD AUTO: 9.8 FL
POTASSIUM SERPL-SCNC: 3.5 MMOL/L
PROT SERPL-MCNC: 6.9 G/DL
RBC # BLD AUTO: 4.92 M/UL
SODIUM SERPL-SCNC: 138 MMOL/L
WBC # BLD AUTO: 9.55 K/UL

## 2018-06-20 PROCEDURE — 63600175 PHARM REV CODE 636 W HCPCS: Performed by: NURSE PRACTITIONER

## 2018-06-20 PROCEDURE — 25000003 PHARM REV CODE 250: Performed by: NURSE PRACTITIONER

## 2018-06-20 PROCEDURE — 36415 COLL VENOUS BLD VENIPUNCTURE: CPT

## 2018-06-20 PROCEDURE — 80053 COMPREHEN METABOLIC PANEL: CPT

## 2018-06-20 PROCEDURE — C9113 INJ PANTOPRAZOLE SODIUM, VIA: HCPCS | Performed by: EMERGENCY MEDICINE

## 2018-06-20 PROCEDURE — 63600175 PHARM REV CODE 636 W HCPCS: Performed by: EMERGENCY MEDICINE

## 2018-06-20 PROCEDURE — 25000003 PHARM REV CODE 250: Performed by: EMERGENCY MEDICINE

## 2018-06-20 PROCEDURE — 11000001 HC ACUTE MED/SURG PRIVATE ROOM

## 2018-06-20 PROCEDURE — 99232 SBSQ HOSP IP/OBS MODERATE 35: CPT | Mod: ,,, | Performed by: SURGERY

## 2018-06-20 PROCEDURE — 85025 COMPLETE CBC W/AUTO DIFF WBC: CPT

## 2018-06-20 RX ADMIN — PROMETHAZINE HYDROCHLORIDE 6.25 MG: 25 INJECTION INTRAMUSCULAR; INTRAVENOUS at 03:06

## 2018-06-20 RX ADMIN — MORPHINE SULFATE 2 MG: 4 INJECTION INTRAVENOUS at 10:06

## 2018-06-20 RX ADMIN — SODIUM CHLORIDE: 0.9 INJECTION, SOLUTION INTRAVENOUS at 02:06

## 2018-06-20 RX ADMIN — ONDANSETRON 4 MG: 2 INJECTION INTRAMUSCULAR; INTRAVENOUS at 12:06

## 2018-06-20 RX ADMIN — MORPHINE SULFATE 2 MG: 4 INJECTION INTRAVENOUS at 03:06

## 2018-06-20 RX ADMIN — DEXTROSE 40 MG: 50 INJECTION, SOLUTION INTRAVENOUS at 08:06

## 2018-06-20 RX ADMIN — ONDANSETRON 4 MG: 2 INJECTION INTRAMUSCULAR; INTRAVENOUS at 10:06

## 2018-06-20 NOTE — PROGRESS NOTES
[facility] -   General Surgery  Progress Note    Subjective:     History of Present Illness:  Patient reports a 3 day history of epigastric abdominal pain.  Pain is sharp, crampy, stopping, and achy in nature.  If it was associated with multiple episodes of nausea and vomiting starting on Saturday.  He also had several episodes of loose stool.    The patient stated that when he ever he drank some liquid several hours later he would vomit.  He has never had any abdominal pain like this before.  He denies any blood per rectum.    He states that his a bulge of his umbilicus that was not there before.    He reports having city water.  He has eaten no unusual foods recently nor eaten out recently    Post-Op Info:  Procedure(s) (LRB):  LAPAROTOMY,EXPLORATORY (N/A)         Interval History: NO PAIN, BOWEL MOVEMENTS    Medications:  Continuous Infusions:   sodium chloride 0.9% 100 mL/hr at 06/20/18 0214     Scheduled Meds:   pantoprazole 40 mg in dextrose 5 % 100 mL IVPB (over 15 minutes) (ready to mix system)  40 mg Intravenous Daily     PRN Meds:albuterol-ipratropium, hydrALAZINE, morphine, ondansetron, promethazine (PHENERGAN) IVPB     Review of patient's allergies indicates:   Allergen Reactions    Zithromax [azithromycin] Palpitations    Lipitor [atorvastatin] Other (See Comments)     Leg cramps/hand cramps     Objective:     Vital Signs (Most Recent):  Temp: 98 °F (36.7 °C) (06/20/18 0719)  Pulse: 62 (06/20/18 0719)  Resp: 16 (06/20/18 0719)  BP: 123/66 (06/20/18 0719)  SpO2: 95 % (06/20/18 0719) Vital Signs (24h Range):  Temp:  [97.9 °F (36.6 °C)-98.3 °F (36.8 °C)] 98 °F (36.7 °C)  Pulse:  [58-83] 62  Resp:  [12-18] 16  SpO2:  [95 %-100 %] 95 %  BP: (118-163)/(66-79) 123/66     Weight: 118.4 kg (261 lb)  Body mass index is 32.62 kg/m².    Intake/Output - Last 3 Shifts       06/18 0700 - 06/19 0659 06/19 0700 - 06/20 0659 06/20 0700 - 06/21 0659    P.O.  0     I.V. (mL/kg) 331.7 (2.8) 3351.7 (28.3)      IV Piggyback 50      Total Intake(mL/kg) 381.7 (3.2) 3351.7 (28.3)     Urine (mL/kg/hr) 350      Drains 1200 400 (0.1)     Total Output 1550 400      Net -1168.3 +2951.7             Stool Occurrence  3 x           Physical Exam   Constitutional: He is oriented to person, place, and time. He appears well-developed and well-nourished. No distress.   HENT:   Head: Normocephalic and atraumatic.   Eyes: No scleral icterus.   Cardiovascular: Normal rate, regular rhythm and normal heart sounds.    Pulmonary/Chest: Effort normal and breath sounds normal.   Abdominal: Soft. Bowel sounds are normal. Distention: less. A hernia (reducible umbilical) is present.   Neurological: He is alert and oriented to person, place, and time.   Skin: Skin is warm and dry. Capillary refill takes less than 2 seconds.   Nursing note and vitals reviewed.      Significant Labs:  CBC:   Recent Labs  Lab 06/20/18  0542   WBC 9.55   RBC 4.92   HGB 15.6   HCT 43.8      MCV 89   MCH 31.7*   MCHC 35.6     BMP:   Recent Labs  Lab 06/20/18  0542   GLU 76      K 3.5      CO2 20*   BUN 21   CREATININE 0.8   CALCIUM 9.1     CMP:   Recent Labs  Lab 06/20/18  0542   GLU 76   CALCIUM 9.1   ALBUMIN 3.5   PROT 6.9      K 3.5   CO2 20*      BUN 21   CREATININE 0.8   ALKPHOS 74   ALT 42   AST 31   BILITOT 1.7*       Significant Diagnostics:      EXAMINATION:  XR ABDOMEN FLAT AND ERECT    CLINICAL HISTORY:  follow up after sbft;    TECHNIQUE:  Single frontal view of the chest was performed.    COMPARISON:  06/19/2018    FINDINGS:  Dilated small bowel loops are seen measuring up to 4.1 cm.  Nasogastric tube is not identified.  Contrast is seen within the colon.  Findings could reflect ileus or partial small bowel obstruction.  In comparison to the prior study, there is no adverse interval changes   Impression       In comparison to the prior study, there is no adverse interval changes      Electronically signed by: Hussain Whitfield  MD  Date: 06/20/2018  Time: 07:5         Assessment/Plan:     * SBO (small bowel obstruction)    SBFT NO OBSTRUCTION  SB STILL DILATED, BUT NO WORSE  CLEAR LIQUIDS  AM XRAYS        Umbilical hernia without obstruction and without gangrene    Likely related to a trocar site from his cholecystectomy.  If the patient requires surgery this admission the hernia can be dealt with if not this can be repaired electively        KEVEN (obstructive sleep apnea)    Management per Medicine        GERD (gastroesophageal reflux disease)    IV PPI per Medicine            Casper Martinez MD  General Surgery  [facility]

## 2018-06-20 NOTE — ASSESSMENT & PLAN NOTE
-Obtain small bowel follow through to establish partial vs SBO  -Pt reports abdominal pain x 3 days with N/V/D. Pt reports had gallbladder removed about 5-6 years ago, no other abdominal surgeries reported  -CT abdomen/pelvis with contrast showed small bowel loops are dilated to the mid ileum consistent with partial or early complete bowel obstruction  -SBFT negative.  -KUB 6/20/18 shows ileus vs SBO  -plans for clear liquid today  -surgical intervention still undetermined  -Analgesics/Antiemetics prn

## 2018-06-20 NOTE — SUBJECTIVE & OBJECTIVE
Interval History: KUB still shows ileus vs SBO. No nausea or vomiting today. Plans to start clear liquids today.     Review of Systems   Constitutional: Positive for appetite change (decrease). Negative for chills, diaphoresis, fatigue and fever.   HENT: Negative for drooling, ear pain, rhinorrhea and sore throat.    Eyes: Negative.    Respiratory: Negative for cough, shortness of breath and wheezing.    Cardiovascular: Negative for chest pain, palpitations and leg swelling.   Gastrointestinal: Positive for abdominal pain, constipation, nausea and vomiting. Negative for diarrhea.   Endocrine: Negative.    Genitourinary: Negative for dysuria, hematuria and urgency.   Musculoskeletal: Negative.    Skin: Negative for color change and wound.   Allergic/Immunologic: Negative.    Neurological: Negative for dizziness, syncope and speech difficulty.   Hematological: Negative.    Psychiatric/Behavioral: Negative.      Objective:     Vital Signs (Most Recent):  Temp: 98 °F (36.7 °C) (06/20/18 0719)  Pulse: 62 (06/20/18 0719)  Resp: 16 (06/20/18 0719)  BP: 123/66 (06/20/18 0719)  SpO2: 95 % (06/20/18 0719) Vital Signs (24h Range):  Temp:  [97.9 °F (36.6 °C)-98.3 °F (36.8 °C)] 98 °F (36.7 °C)  Pulse:  [58-83] 62  Resp:  [12-18] 16  SpO2:  [95 %-100 %] 95 %  BP: (118-163)/(66-79) 123/66     Weight: 118.4 kg (261 lb)  Body mass index is 32.62 kg/m².    Intake/Output Summary (Last 24 hours) at 06/20/18 1137  Last data filed at 06/20/18 0800   Gross per 24 hour   Intake          3451.66 ml   Output              400 ml   Net          3051.66 ml      Physical Exam   Constitutional: He is oriented to person, place, and time. He appears well-developed and well-nourished. No distress.   HENT:   Head: Normocephalic and atraumatic.   Eyes: EOM are normal.   Neck: Normal range of motion. Neck supple.   Cardiovascular: Normal rate, regular rhythm and normal heart sounds.    Pulmonary/Chest: Effort normal and breath sounds normal. No  respiratory distress.   Abdominal: Soft. Bowel sounds are normal. He exhibits distension. There is tenderness. There is rigidity and guarding.       Musculoskeletal: Normal range of motion. He exhibits edema.   Neurological: He is alert and oriented to person, place, and time.   Skin: Skin is warm and dry.   Psychiatric: He has a normal mood and affect. His behavior is normal. Judgment and thought content normal.   Nursing note and vitals reviewed.    Significant Labs:   CBC:   Recent Labs  Lab 06/19/18  0506 06/20/18  0542   WBC 13.31* 9.55   HGB 15.8 15.6   HCT 44.8 43.8    202     CMP:   Recent Labs  Lab 06/19/18  0506 06/20/18  0542    138   K 3.9 3.5    106   CO2 28 20*    76   BUN 19 21   CREATININE 0.9 0.8   CALCIUM 9.1 9.1   PROT 6.8 6.9   ALBUMIN 3.5 3.5   BILITOT 2.1* 1.7*   ALKPHOS 78 74   AST 27 31   ALT 37 42   ANIONGAP 10 12   EGFRNONAA >60 >60       Significant Imaging:   Imaging Results          X-Ray Chest PA And Lateral (Final result)  Result time 06/18/18 15:20:58    Final result by Hussain Whitfield MD (06/18/18 15:20:58)                 Impression:      No acute process seen.      Electronically signed by: Hussain Whitfield MD  Date:    06/18/2018  Time:    15:20             Narrative:    EXAMINATION:  XR CHEST PA AND LATERAL    CLINICAL HISTORY:  leukocytosis, r/o infectious process;    COMPARISON:  None    FINDINGS:  Cardiac silhouette is normal.  The lungs demonstrate no evidence of active disease.  Mild atelectasis or scarring bilateral costophrenic angles.  Aorta demonstrates atherosclerotic disease.  No evidence of pleural effusion or pneumothorax.  Bones appear intact.                               CT Abdomen Pelvis With Contrast (Final result)  Result time 06/18/18 13:41:03    Final result by Hussain Whitfield MD (06/18/18 13:41:03)                 Impression:      Small bowel loops are dilated to the mid ileum consistent with partial or early complete bowel  obstruction.  Findings discussed with Dr. Carrillo at 1:26 p.m. on 06/18/2018.    See above for additional findings.    All CT scans at this facility use dose modulation, iterative reconstruction, and/or weight based dosing when appropriate to reduce radiation dose to as low as reasonable achievable.      Electronically signed by: Hussain Whitfield MD  Date:    06/18/2018  Time:    13:41             Narrative:    EXAMINATION:  CT ABDOMEN PELVIS WITH CONTRAST    CLINICAL HISTORY:  Nausea, vomiting, diarrhea;    TECHNIQUE:  Low dose axial images, sagittal and coronal reformations were obtained from the lung bases to the pubic symphysis following the IV administration of 75 mL of Omnipaque 350.  Oral contrast was also administered.    COMPARISON:  07/30/2015    FINDINGS:  Heart: Normal size. No effusion.    Lung Bases: Mild dependent changes and/or atelectasis and scarring within the lower lobes.  No consolidation.  Diffuse calcification is seen with mild pleural thickening involving the right hemithorax which could reflect prior hemothorax versus asbestos related pleural disease.    Liver: Normal size and attenuation. No focal lesions.    Gallbladder: Surgically absent    Bile Ducts: No dilatation.    Pancreas: No mass. No peripancreatic fat stranding.    Spleen: Normal.    Adrenals: Normal.    Kidneys/Ureters: Normal enhancement.  No mass or  hydroureteronephrosis.    Bladder: No wall thickening.    Reproductive organs: Normal.    GI Tract/Mesentery: Mild gastric distension.  Small bowel loops are dilated to the mid ileum consistent with partial or early complete bowel obstruction.  Appendix is normal.  Air and feces is seen within the colon.  No evidence of pneumatosis.    Peritoneal Space: No ascites or free air.    Retroperitoneum: No significant adenopathy.    Abdominal wall: Small fat containing right inguinal hernia.  Small fat containing umbilical hernia.    Vasculature: No aneurysm.    Bones: No acute fracture. No  suspicious lytic or sclerotic lesions.                               X-Ray Abdomen Flat And Erect (Final result)  Result time 06/18/18 11:11:20    Final result by Hussain Whitfield MD (06/18/18 11:11:20)                 Impression:      Dilated loops of small bowel are seen measuring up to 4.3 cm with some transition within the lower abdomen.  Findings are concerning for partial or early small bowel obstruction.  Cross-sectional imaging with oral and IV contrast recommended.  Findings were discussed with Dr. Carrillo at 06/18/2018 11:10 a.m.      Electronically signed by: Hussain Whitfield MD  Date:    06/18/2018  Time:    11:11             Narrative:    EXAMINATION:  XR ABDOMEN FLAT AND ERECT    CLINICAL HISTORY:  Abdominal Pain;    TECHNIQUE:  3 View of the abdomen was performed.    COMPARISON:  None    FINDINGS:  Dilated loops of small bowel are seen measuring up to 4.3 cm with some transition within the lower abdomen.  Air and feces is noted within the colon.    No obvious free air.  No portal venous gas.  Cholecystectomy clips are noted.    No acute fracture. Moderate DJD within the lower lumbar spine.    Lung bases demonstrate mild basilar atelectasis or small infiltrates..

## 2018-06-20 NOTE — PROGRESS NOTES
[facility] Hospital Medicine  Progress Note    Patient Name: Yung Mcconnell  MRN: [id number]  Patient Class: IP- Inpatient   Admission Date: 6/18/2018  Length of Stay: 2 days  Attending Physician: Long Whatley MD  Primary Care Provider: LEVI GOVEA MD    Subjective:     Principal Problem:SBO (small bowel obstruction)    HPI:  Yung Mcconnell is a 62 year old male with a PMHx of KEVEN on CPAP, OA, HLD, GERD, COPD, and Asthma who presented to the Emergency Department with c/o abdominal pain since Saturday afternoon. Associated symptoms include: N/V/D. Pt denies being around sick/ill contacts. Pt reports having gallbladder removed laparoscopically about 5-6 years ago. Pt denies any other abdominal surgeries. ED workup revealed: WBC 12.80K, Total bilirubin 1.8, UA with 1+ ketones and bilirubin. KUB showed dilated loops of bowel are seen measuring up to 4.3 cm with some transition within the lower abdomen, findings concerning for partial or early small bowel obstruction. CT abdomen/pelvis with contrast showed small bowel loops are dilated to the mid ileum consistent with partial or early complete bowel obstruction. ED consulted General surgery. Pt admitted to Med Surg for partial SBO.     Hospital Course:  6/19/18- Yung Mcconnell is a 62 year old male who presents with a small bowel obstruction. A Small bowel follow through is being preformed today. Pending the results, the patient may have surgery.     6/20/18 SBFT negative. KUB today again showed ileus vs SBO. Had bowel movement yesterday. Electrolytes stable. Clear liquids started today.     Interval History: KUB still shows ileus vs SBO. No nausea or vomiting today. Plans to start clear liquids today.     Review of Systems   Constitutional: Positive for appetite change (decrease). Negative for chills, diaphoresis, fatigue and fever.   HENT: Negative for drooling, ear pain, rhinorrhea and sore throat.    Eyes: Negative.    Respiratory:  Negative for cough, shortness of breath and wheezing.    Cardiovascular: Negative for chest pain, palpitations and leg swelling.   Gastrointestinal: Positive for abdominal pain, constipation, nausea and vomiting. Negative for diarrhea.   Endocrine: Negative.    Genitourinary: Negative for dysuria, hematuria and urgency.   Musculoskeletal: Negative.    Skin: Negative for color change and wound.   Allergic/Immunologic: Negative.    Neurological: Negative for dizziness, syncope and speech difficulty.   Hematological: Negative.    Psychiatric/Behavioral: Negative.      Objective:     Vital Signs (Most Recent):  Temp: 98 °F (36.7 °C) (06/20/18 0719)  Pulse: 62 (06/20/18 0719)  Resp: 16 (06/20/18 0719)  BP: 123/66 (06/20/18 0719)  SpO2: 95 % (06/20/18 0719) Vital Signs (24h Range):  Temp:  [97.9 °F (36.6 °C)-98.3 °F (36.8 °C)] 98 °F (36.7 °C)  Pulse:  [58-83] 62  Resp:  [12-18] 16  SpO2:  [95 %-100 %] 95 %  BP: (118-163)/(66-79) 123/66     Weight: 118.4 kg (261 lb)  Body mass index is 32.62 kg/m².    Intake/Output Summary (Last 24 hours) at 06/20/18 1137  Last data filed at 06/20/18 0800   Gross per 24 hour   Intake          3451.66 ml   Output              400 ml   Net          3051.66 ml      Physical Exam   Constitutional: He is oriented to person, place, and time. He appears well-developed and well-nourished. No distress.   HENT:   Head: Normocephalic and atraumatic.   Eyes: EOM are normal.   Neck: Normal range of motion. Neck supple.   Cardiovascular: Normal rate, regular rhythm and normal heart sounds.    Pulmonary/Chest: Effort normal and breath sounds normal. No respiratory distress.   Abdominal: Soft. Bowel sounds are normal. He exhibits distension. There is tenderness. There is rigidity and guarding.       Musculoskeletal: Normal range of motion. He exhibits edema.   Neurological: He is alert and oriented to person, place, and time.   Skin: Skin is warm and dry.   Psychiatric: He has a normal mood and affect.  His behavior is normal. Judgment and thought content normal.   Nursing note and vitals reviewed.    Significant Labs:   CBC:   Recent Labs  Lab 06/19/18  0506 06/20/18  0542   WBC 13.31* 9.55   HGB 15.8 15.6   HCT 44.8 43.8    202     CMP:   Recent Labs  Lab 06/19/18  0506 06/20/18  0542    138   K 3.9 3.5    106   CO2 28 20*    76   BUN 19 21   CREATININE 0.9 0.8   CALCIUM 9.1 9.1   PROT 6.8 6.9   ALBUMIN 3.5 3.5   BILITOT 2.1* 1.7*   ALKPHOS 78 74   AST 27 31   ALT 37 42   ANIONGAP 10 12   EGFRNONAA >60 >60       Significant Imaging:   Imaging Results          X-Ray Chest PA And Lateral (Final result)  Result time 06/18/18 15:20:58    Final result by Hussain Whitfield MD (06/18/18 15:20:58)                 Impression:      No acute process seen.      Electronically signed by: Hussain Whitfield MD  Date:    06/18/2018  Time:    15:20             Narrative:    EXAMINATION:  XR CHEST PA AND LATERAL    CLINICAL HISTORY:  leukocytosis, r/o infectious process;    COMPARISON:  None    FINDINGS:  Cardiac silhouette is normal.  The lungs demonstrate no evidence of active disease.  Mild atelectasis or scarring bilateral costophrenic angles.  Aorta demonstrates atherosclerotic disease.  No evidence of pleural effusion or pneumothorax.  Bones appear intact.                               CT Abdomen Pelvis With Contrast (Final result)  Result time 06/18/18 13:41:03    Final result by Hussain Whitfield MD (06/18/18 13:41:03)                 Impression:      Small bowel loops are dilated to the mid ileum consistent with partial or early complete bowel obstruction.  Findings discussed with Dr. Carrillo at 1:26 p.m. on 06/18/2018.    See above for additional findings.    All CT scans at this facility use dose modulation, iterative reconstruction, and/or weight based dosing when appropriate to reduce radiation dose to as low as reasonable achievable.      Electronically signed by: Hussain Whitfield  MD  Date:    06/18/2018  Time:    13:41             Narrative:    EXAMINATION:  CT ABDOMEN PELVIS WITH CONTRAST    CLINICAL HISTORY:  Nausea, vomiting, diarrhea;    TECHNIQUE:  Low dose axial images, sagittal and coronal reformations were obtained from the lung bases to the pubic symphysis following the IV administration of 75 mL of Omnipaque 350.  Oral contrast was also administered.    COMPARISON:  07/30/2015    FINDINGS:  Heart: Normal size. No effusion.    Lung Bases: Mild dependent changes and/or atelectasis and scarring within the lower lobes.  No consolidation.  Diffuse calcification is seen with mild pleural thickening involving the right hemithorax which could reflect prior hemothorax versus asbestos related pleural disease.    Liver: Normal size and attenuation. No focal lesions.    Gallbladder: Surgically absent    Bile Ducts: No dilatation.    Pancreas: No mass. No peripancreatic fat stranding.    Spleen: Normal.    Adrenals: Normal.    Kidneys/Ureters: Normal enhancement.  No mass or  hydroureteronephrosis.    Bladder: No wall thickening.    Reproductive organs: Normal.    GI Tract/Mesentery: Mild gastric distension.  Small bowel loops are dilated to the mid ileum consistent with partial or early complete bowel obstruction.  Appendix is normal.  Air and feces is seen within the colon.  No evidence of pneumatosis.    Peritoneal Space: No ascites or free air.    Retroperitoneum: No significant adenopathy.    Abdominal wall: Small fat containing right inguinal hernia.  Small fat containing umbilical hernia.    Vasculature: No aneurysm.    Bones: No acute fracture. No suspicious lytic or sclerotic lesions.                               X-Ray Abdomen Flat And Erect (Final result)  Result time 06/18/18 11:11:20    Final result by Hussain Whitfield MD (06/18/18 11:11:20)                 Impression:      Dilated loops of small bowel are seen measuring up to 4.3 cm with some transition within the lower abdomen.   Findings are concerning for partial or early small bowel obstruction.  Cross-sectional imaging with oral and IV contrast recommended.  Findings were discussed with Dr. Carrillo at 06/18/2018 11:10 a.m.      Electronically signed by: Hussain Whitfield MD  Date:    06/18/2018  Time:    11:11             Narrative:    EXAMINATION:  XR ABDOMEN FLAT AND ERECT    CLINICAL HISTORY:  Abdominal Pain;    TECHNIQUE:  3 View of the abdomen was performed.    COMPARISON:  None    FINDINGS:  Dilated loops of small bowel are seen measuring up to 4.3 cm with some transition within the lower abdomen.  Air and feces is noted within the colon.    No obvious free air.  No portal venous gas.  Cholecystectomy clips are noted.    No acute fracture. Moderate DJD within the lower lumbar spine.    Lung bases demonstrate mild basilar atelectasis or small infiltrates..                                  Assessment/Plan:      * SBO (small bowel obstruction)    -Obtain small bowel follow through to establish partial vs SBO  -Pt reports abdominal pain x 3 days with N/V/D. Pt reports had gallbladder removed about 5-6 years ago, no other abdominal surgeries reported  -CT abdomen/pelvis with contrast showed small bowel loops are dilated to the mid ileum consistent with partial or early complete bowel obstruction  -SBFT negative.  -KUB 6/20/18 shows ileus vs SBO  -plans for clear liquid today  -surgical intervention still undetermined  -Analgesics/Antiemetics prn          Asthma with COPD    - Currently stable  - Duonebs prn          KEVEN (obstructive sleep apnea)    - Nightly CPAP -- titrate to patient's O2 saturation and comfort          GERD (gastroesophageal reflux disease)    - IV PPI            VTE Risk Mitigation         Ordered     IP VTE HIGH RISK PATIENT  Once      06/18/18 1423     Place KELLY hose  Until discontinued      06/18/18 1423     Place sequential compression device  Until discontinued      06/18/18 1423        Matilda Garcia NP  Department  of [facility] Medicine   [facility]

## 2018-06-20 NOTE — PLAN OF CARE
Problem: Patient Care Overview  Goal: Plan of Care Review  Outcome: Ongoing (interventions implemented as appropriate)  Fall precautions maintained. Pt free from falls/injuries. Pt repositions and ambulates independently. Pt has frequent c/o nausea. Pain moderately controlled with PRN medication. Fluid promotion with IV fluids. Patient turns self every 2 hours. POC and meds reviewed. Patient verbalized understanding. Side rails up x2. Bed Low and locked. Personal items and call light within reach. No signs/symptoms of acute distress. Chart check done. Will monitor

## 2018-06-20 NOTE — PROGRESS NOTES
Patient vomiting and continued complaints of nausea. Admin PRN promethazine per MAR. Messages PHIL Stokes. Informed of patient's condition at this time. Patient stating he feels as if burning acid is in the bilateral upper quadrants of his abdomen.     PHIL Stokes replies stating she will speak to Dr. Martinez and to have patient be NPO.     Informed patient. Patient verbalized understanding and is receiving moderate relief from the promethazine.    Will continue to monitor

## 2018-06-20 NOTE — PLAN OF CARE
Problem: Patient Care Overview  Goal: Plan of Care Review  Outcome: Ongoing (interventions implemented as appropriate)  Pt has not complained of any pain. Abdomen is distended with hypoactive bowel sounds. Pt has had 1 loose bowel movement.  Pt denies any nausea/vomiting. IV fluids are infusing. No injuries. Will continue to monitor. 12 hour chart check is completed.

## 2018-06-20 NOTE — SUBJECTIVE & OBJECTIVE
Interval History: NO PAIN, BOWEL MOVEMENTS    Medications:  Continuous Infusions:   sodium chloride 0.9% 100 mL/hr at 06/20/18 0214     Scheduled Meds:   pantoprazole 40 mg in dextrose 5 % 100 mL IVPB (over 15 minutes) (ready to mix system)  40 mg Intravenous Daily     PRN Meds:albuterol-ipratropium, hydrALAZINE, morphine, ondansetron, promethazine (PHENERGAN) IVPB     Review of patient's allergies indicates:   Allergen Reactions    Zithromax [azithromycin] Palpitations    Lipitor [atorvastatin] Other (See Comments)     Leg cramps/hand cramps     Objective:     Vital Signs (Most Recent):  Temp: 98 °F (36.7 °C) (06/20/18 0719)  Pulse: 62 (06/20/18 0719)  Resp: 16 (06/20/18 0719)  BP: 123/66 (06/20/18 0719)  SpO2: 95 % (06/20/18 0719) Vital Signs (24h Range):  Temp:  [97.9 °F (36.6 °C)-98.3 °F (36.8 °C)] 98 °F (36.7 °C)  Pulse:  [58-83] 62  Resp:  [12-18] 16  SpO2:  [95 %-100 %] 95 %  BP: (118-163)/(66-79) 123/66     Weight: 118.4 kg (261 lb)  Body mass index is 32.62 kg/m².    Intake/Output - Last 3 Shifts       06/18 0700 - 06/19 0659 06/19 0700 - 06/20 0659 06/20 0700 - 06/21 0659    P.O.  0     I.V. (mL/kg) 331.7 (2.8) 3351.7 (28.3)     IV Piggyback 50      Total Intake(mL/kg) 381.7 (3.2) 3351.7 (28.3)     Urine (mL/kg/hr) 350      Drains 1200 400 (0.1)     Total Output 1550 400      Net -1168.3 +2951.7             Stool Occurrence  3 x           Physical Exam   Constitutional: He is oriented to person, place, and time. He appears well-developed and well-nourished. No distress.   HENT:   Head: Normocephalic and atraumatic.   Eyes: No scleral icterus.   Cardiovascular: Normal rate, regular rhythm and normal heart sounds.    Pulmonary/Chest: Effort normal and breath sounds normal.   Abdominal: Soft. Bowel sounds are normal. Distention: less. A hernia (reducible umbilical) is present.   Neurological: He is alert and oriented to person, place, and time.   Skin: Skin is warm and dry. Capillary refill takes less  than 2 seconds.   Nursing note and vitals reviewed.      Significant Labs:  CBC:   Recent Labs  Lab 06/20/18  0542   WBC 9.55   RBC 4.92   HGB 15.6   HCT 43.8      MCV 89   MCH 31.7*   MCHC 35.6     BMP:   Recent Labs  Lab 06/20/18  0542   GLU 76      K 3.5      CO2 20*   BUN 21   CREATININE 0.8   CALCIUM 9.1     CMP:   Recent Labs  Lab 06/20/18  0542   GLU 76   CALCIUM 9.1   ALBUMIN 3.5   PROT 6.9      K 3.5   CO2 20*      BUN 21   CREATININE 0.8   ALKPHOS 74   ALT 42   AST 31   BILITOT 1.7*       Significant Diagnostics:      EXAMINATION:  XR ABDOMEN FLAT AND ERECT    CLINICAL HISTORY:  follow up after sbft;    TECHNIQUE:  Single frontal view of the chest was performed.    COMPARISON:  06/19/2018    FINDINGS:  Dilated small bowel loops are seen measuring up to 4.1 cm.  Nasogastric tube is not identified.  Contrast is seen within the colon.  Findings could reflect ileus or partial small bowel obstruction.  In comparison to the prior study, there is no adverse interval changes   Impression       In comparison to the prior study, there is no adverse interval changes      Electronically signed by: Hussain Whitfield MD  Date: 06/20/2018  Time: 07:5

## 2018-06-21 LAB
ALBUMIN SERPL BCP-MCNC: 3.2 G/DL
ALP SERPL-CCNC: 71 U/L
ALT SERPL W/O P-5'-P-CCNC: 43 U/L
ANION GAP SERPL CALC-SCNC: 9 MMOL/L
AST SERPL-CCNC: 29 U/L
BASOPHILS # BLD AUTO: 0.02 K/UL
BASOPHILS NFR BLD: 0.3 %
BILIRUB SERPL-MCNC: 1.2 MG/DL
BUN SERPL-MCNC: 15 MG/DL
CALCIUM SERPL-MCNC: 8.5 MG/DL
CHLORIDE SERPL-SCNC: 104 MMOL/L
CO2 SERPL-SCNC: 26 MMOL/L
CREAT SERPL-MCNC: 0.7 MG/DL
DIFFERENTIAL METHOD: ABNORMAL
EOSINOPHIL # BLD AUTO: 0.2 K/UL
EOSINOPHIL NFR BLD: 2.1 %
ERYTHROCYTE [DISTWIDTH] IN BLOOD BY AUTOMATED COUNT: 14 %
EST. GFR  (AFRICAN AMERICAN): >60 ML/MIN/1.73 M^2
EST. GFR  (NON AFRICAN AMERICAN): >60 ML/MIN/1.73 M^2
GLUCOSE SERPL-MCNC: 86 MG/DL
HCT VFR BLD AUTO: 41.5 %
HGB BLD-MCNC: 15 G/DL
LYMPHOCYTES # BLD AUTO: 1.8 K/UL
LYMPHOCYTES NFR BLD: 25.3 %
MAGNESIUM SERPL-MCNC: 1.8 MG/DL
MCH RBC QN AUTO: 31.8 PG
MCHC RBC AUTO-ENTMCNC: 36.1 G/DL
MCV RBC AUTO: 88 FL
MONOCYTES # BLD AUTO: 0.7 K/UL
MONOCYTES NFR BLD: 10.3 %
NEUTROPHILS # BLD AUTO: 4.4 K/UL
NEUTROPHILS NFR BLD: 62 %
PHOSPHATE SERPL-MCNC: 3 MG/DL
PLATELET # BLD AUTO: 221 K/UL
PMV BLD AUTO: 9.9 FL
POTASSIUM SERPL-SCNC: 3.3 MMOL/L
PROT SERPL-MCNC: 6.4 G/DL
RBC # BLD AUTO: 4.72 M/UL
SODIUM SERPL-SCNC: 139 MMOL/L
WBC # BLD AUTO: 7.08 K/UL

## 2018-06-21 PROCEDURE — 63600175 PHARM REV CODE 636 W HCPCS: Performed by: SURGERY

## 2018-06-21 PROCEDURE — S0030 INJECTION, METRONIDAZOLE: HCPCS | Performed by: SURGERY

## 2018-06-21 PROCEDURE — 25000003 PHARM REV CODE 250: Performed by: NURSE PRACTITIONER

## 2018-06-21 PROCEDURE — 36000708 HC OR TIME LEV III 1ST 15 MIN: Performed by: SURGERY

## 2018-06-21 PROCEDURE — 83735 ASSAY OF MAGNESIUM: CPT

## 2018-06-21 PROCEDURE — 25000003 PHARM REV CODE 250: Performed by: NURSE ANESTHETIST, CERTIFIED REGISTERED

## 2018-06-21 PROCEDURE — 63600175 PHARM REV CODE 636 W HCPCS: Performed by: NURSE ANESTHETIST, CERTIFIED REGISTERED

## 2018-06-21 PROCEDURE — 0WQF0ZZ REPAIR ABDOMINAL WALL, OPEN APPROACH: ICD-10-PCS | Performed by: SURGERY

## 2018-06-21 PROCEDURE — 80053 COMPREHEN METABOLIC PANEL: CPT

## 2018-06-21 PROCEDURE — 63600175 PHARM REV CODE 636 W HCPCS: Performed by: EMERGENCY MEDICINE

## 2018-06-21 PROCEDURE — 94799 UNLISTED PULMONARY SVC/PX: CPT

## 2018-06-21 PROCEDURE — 99233 SBSQ HOSP IP/OBS HIGH 50: CPT | Mod: 57,,, | Performed by: SURGERY

## 2018-06-21 PROCEDURE — 49585 PR REPAIR UMBILICAL HERN,5+Y/O,REDUC: CPT | Mod: ,,, | Performed by: SURGERY

## 2018-06-21 PROCEDURE — 71000033 HC RECOVERY, INTIAL HOUR: Performed by: SURGERY

## 2018-06-21 PROCEDURE — 84100 ASSAY OF PHOSPHORUS: CPT

## 2018-06-21 PROCEDURE — 49320 DIAG LAPARO SEPARATE PROC: CPT | Mod: 59,,, | Performed by: SURGERY

## 2018-06-21 PROCEDURE — 37000009 HC ANESTHESIA EA ADD 15 MINS: Performed by: SURGERY

## 2018-06-21 PROCEDURE — 25000003 PHARM REV CODE 250: Performed by: SURGERY

## 2018-06-21 PROCEDURE — 11000001 HC ACUTE MED/SURG PRIVATE ROOM

## 2018-06-21 PROCEDURE — 36000709 HC OR TIME LEV III EA ADD 15 MIN: Performed by: SURGERY

## 2018-06-21 PROCEDURE — 85025 COMPLETE CBC W/AUTO DIFF WBC: CPT

## 2018-06-21 PROCEDURE — 27201423 OPTIME MED/SURG SUP & DEVICES STERILE SUPPLY: Performed by: SURGERY

## 2018-06-21 PROCEDURE — 63600175 PHARM REV CODE 636 W HCPCS: Performed by: ANESTHESIOLOGY

## 2018-06-21 PROCEDURE — 63600175 PHARM REV CODE 636 W HCPCS: Performed by: NURSE PRACTITIONER

## 2018-06-21 PROCEDURE — C9113 INJ PANTOPRAZOLE SODIUM, VIA: HCPCS | Performed by: EMERGENCY MEDICINE

## 2018-06-21 PROCEDURE — 25000003 PHARM REV CODE 250: Performed by: EMERGENCY MEDICINE

## 2018-06-21 PROCEDURE — 37000008 HC ANESTHESIA 1ST 15 MINUTES: Performed by: SURGERY

## 2018-06-21 PROCEDURE — 0WJF4ZZ INSPECTION OF ABDOMINAL WALL, PERCUTANEOUS ENDOSCOPIC APPROACH: ICD-10-PCS | Performed by: SURGERY

## 2018-06-21 RX ORDER — ENOXAPARIN SODIUM 100 MG/ML
40 INJECTION SUBCUTANEOUS EVERY 24 HOURS
Status: DISCONTINUED | OUTPATIENT
Start: 2018-06-22 | End: 2018-06-24 | Stop reason: HOSPADM

## 2018-06-21 RX ORDER — ONDANSETRON 2 MG/ML
INJECTION INTRAMUSCULAR; INTRAVENOUS
Status: DISCONTINUED | OUTPATIENT
Start: 2018-06-21 | End: 2018-06-21

## 2018-06-21 RX ORDER — DIPHENHYDRAMINE HYDROCHLORIDE 50 MG/ML
25 INJECTION INTRAMUSCULAR; INTRAVENOUS EVERY 4 HOURS PRN
Status: DISCONTINUED | OUTPATIENT
Start: 2018-06-21 | End: 2018-06-24 | Stop reason: HOSPADM

## 2018-06-21 RX ORDER — SODIUM CHLORIDE, SODIUM LACTATE, POTASSIUM CHLORIDE, CALCIUM CHLORIDE 600; 310; 30; 20 MG/100ML; MG/100ML; MG/100ML; MG/100ML
INJECTION, SOLUTION INTRAVENOUS CONTINUOUS PRN
Status: DISCONTINUED | OUTPATIENT
Start: 2018-06-21 | End: 2018-06-21

## 2018-06-21 RX ORDER — CHLORHEXIDINE GLUCONATE ORAL RINSE 1.2 MG/ML
10 SOLUTION DENTAL 2 TIMES DAILY
Status: DISCONTINUED | OUTPATIENT
Start: 2018-06-21 | End: 2018-06-24 | Stop reason: HOSPADM

## 2018-06-21 RX ORDER — MEPERIDINE HYDROCHLORIDE 50 MG/ML
12.5 INJECTION INTRAMUSCULAR; INTRAVENOUS; SUBCUTANEOUS ONCE AS NEEDED
Status: DISCONTINUED | OUTPATIENT
Start: 2018-06-21 | End: 2018-06-21

## 2018-06-21 RX ORDER — METRONIDAZOLE 500 MG/100ML
500 INJECTION, SOLUTION INTRAVENOUS
Status: DISCONTINUED | OUTPATIENT
Start: 2018-06-21 | End: 2018-06-21

## 2018-06-21 RX ORDER — SUCCINYLCHOLINE CHLORIDE 20 MG/ML
INJECTION INTRAMUSCULAR; INTRAVENOUS
Status: DISCONTINUED | OUTPATIENT
Start: 2018-06-21 | End: 2018-06-21

## 2018-06-21 RX ORDER — PROPOFOL 10 MG/ML
VIAL (ML) INTRAVENOUS
Status: DISCONTINUED | OUTPATIENT
Start: 2018-06-21 | End: 2018-06-21

## 2018-06-21 RX ORDER — ONDANSETRON 2 MG/ML
4 INJECTION INTRAMUSCULAR; INTRAVENOUS ONCE AS NEEDED
Status: DISCONTINUED | OUTPATIENT
Start: 2018-06-21 | End: 2018-06-21

## 2018-06-21 RX ORDER — ROCURONIUM BROMIDE 10 MG/ML
INJECTION, SOLUTION INTRAVENOUS
Status: DISCONTINUED | OUTPATIENT
Start: 2018-06-21 | End: 2018-06-21

## 2018-06-21 RX ORDER — FENTANYL CITRATE 50 UG/ML
INJECTION, SOLUTION INTRAMUSCULAR; INTRAVENOUS
Status: DISCONTINUED | OUTPATIENT
Start: 2018-06-21 | End: 2018-06-21

## 2018-06-21 RX ORDER — SODIUM CHLORIDE 0.9 % (FLUSH) 0.9 %
3 SYRINGE (ML) INJECTION
Status: DISCONTINUED | OUTPATIENT
Start: 2018-06-21 | End: 2018-06-21

## 2018-06-21 RX ORDER — LIDOCAINE HYDROCHLORIDE 10 MG/ML
INJECTION INFILTRATION; PERINEURAL
Status: DISCONTINUED | OUTPATIENT
Start: 2018-06-21 | End: 2018-06-21

## 2018-06-21 RX ORDER — BUPIVACAINE HYDROCHLORIDE 2.5 MG/ML
INJECTION, SOLUTION EPIDURAL; INFILTRATION; INTRACAUDAL
Status: DISCONTINUED | OUTPATIENT
Start: 2018-06-21 | End: 2018-06-21 | Stop reason: HOSPADM

## 2018-06-21 RX ORDER — SODIUM CHLORIDE, SODIUM LACTATE, POTASSIUM CHLORIDE, CALCIUM CHLORIDE 600; 310; 30; 20 MG/100ML; MG/100ML; MG/100ML; MG/100ML
INJECTION, SOLUTION INTRAVENOUS CONTINUOUS
Status: DISCONTINUED | OUTPATIENT
Start: 2018-06-21 | End: 2018-06-22

## 2018-06-21 RX ORDER — HYDROMORPHONE HYDROCHLORIDE 1 MG/ML
0.5 INJECTION, SOLUTION INTRAMUSCULAR; INTRAVENOUS; SUBCUTANEOUS
Status: DISCONTINUED | OUTPATIENT
Start: 2018-06-21 | End: 2018-06-23

## 2018-06-21 RX ORDER — LIDOCAINE HYDROCHLORIDE 20 MG/ML
JELLY TOPICAL
Status: DISCONTINUED | OUTPATIENT
Start: 2018-06-21 | End: 2018-06-21

## 2018-06-21 RX ORDER — HYDROMORPHONE HYDROCHLORIDE 1 MG/ML
0.2 INJECTION, SOLUTION INTRAMUSCULAR; INTRAVENOUS; SUBCUTANEOUS EVERY 5 MIN PRN
Status: DISCONTINUED | OUTPATIENT
Start: 2018-06-21 | End: 2018-06-21

## 2018-06-21 RX ORDER — METRONIDAZOLE 500 MG/100ML
500 INJECTION, SOLUTION INTRAVENOUS ONCE
Status: COMPLETED | OUTPATIENT
Start: 2018-06-21 | End: 2018-06-21

## 2018-06-21 RX ORDER — MORPHINE SULFATE 4 MG/ML
2 INJECTION, SOLUTION INTRAMUSCULAR; INTRAVENOUS EVERY 5 MIN PRN
Status: DISCONTINUED | OUTPATIENT
Start: 2018-06-21 | End: 2018-06-21

## 2018-06-21 RX ORDER — HYDROMORPHONE HYDROCHLORIDE 1 MG/ML
1 INJECTION, SOLUTION INTRAMUSCULAR; INTRAVENOUS; SUBCUTANEOUS EVERY 4 HOURS PRN
Status: DISCONTINUED | OUTPATIENT
Start: 2018-06-21 | End: 2018-06-24

## 2018-06-21 RX ORDER — ONDANSETRON 8 MG/1
8 TABLET, ORALLY DISINTEGRATING ORAL EVERY 8 HOURS PRN
Status: DISCONTINUED | OUTPATIENT
Start: 2018-06-21 | End: 2018-06-24 | Stop reason: HOSPADM

## 2018-06-21 RX ORDER — OXYCODONE AND ACETAMINOPHEN 5; 325 MG/1; MG/1
1 TABLET ORAL
Status: DISCONTINUED | OUTPATIENT
Start: 2018-06-21 | End: 2018-06-21

## 2018-06-21 RX ORDER — DEXAMETHASONE SODIUM PHOSPHATE 4 MG/ML
INJECTION, SOLUTION INTRA-ARTICULAR; INTRALESIONAL; INTRAMUSCULAR; INTRAVENOUS; SOFT TISSUE
Status: DISCONTINUED | OUTPATIENT
Start: 2018-06-21 | End: 2018-06-21

## 2018-06-21 RX ADMIN — LIDOCAINE HYDROCHLORIDE 2 ML: 20 JELLY TOPICAL at 05:06

## 2018-06-21 RX ADMIN — PROMETHAZINE HYDROCHLORIDE 6.25 MG: 25 INJECTION INTRAMUSCULAR; INTRAVENOUS at 03:06

## 2018-06-21 RX ADMIN — MORPHINE SULFATE 2 MG: 4 INJECTION INTRAVENOUS at 05:06

## 2018-06-21 RX ADMIN — FENTANYL CITRATE 50 MCG: 50 INJECTION, SOLUTION INTRAMUSCULAR; INTRAVENOUS at 05:06

## 2018-06-21 RX ADMIN — DEXAMETHASONE SODIUM PHOSPHATE 4 MG: 4 INJECTION, SOLUTION INTRA-ARTICULAR; INTRALESIONAL; INTRAMUSCULAR; INTRAVENOUS; SOFT TISSUE at 05:06

## 2018-06-21 RX ADMIN — ONDANSETRON 4 MG: 2 INJECTION, SOLUTION INTRAMUSCULAR; INTRAVENOUS at 06:06

## 2018-06-21 RX ADMIN — SODIUM CHLORIDE, SODIUM LACTATE, POTASSIUM CHLORIDE, AND CALCIUM CHLORIDE: 600; 310; 30; 20 INJECTION, SOLUTION INTRAVENOUS at 05:06

## 2018-06-21 RX ADMIN — ROCURONIUM BROMIDE 5 MG: 10 INJECTION, SOLUTION INTRAVENOUS at 05:06

## 2018-06-21 RX ADMIN — CEFTRIAXONE SODIUM 2 G: 2 INJECTION, POWDER, FOR SOLUTION INTRAMUSCULAR; INTRAVENOUS at 05:06

## 2018-06-21 RX ADMIN — MORPHINE SULFATE 2 MG: 4 INJECTION INTRAVENOUS at 06:06

## 2018-06-21 RX ADMIN — MORPHINE SULFATE 2 MG: 4 INJECTION INTRAVENOUS at 11:06

## 2018-06-21 RX ADMIN — ROCURONIUM BROMIDE 25 MG: 10 INJECTION, SOLUTION INTRAVENOUS at 05:06

## 2018-06-21 RX ADMIN — SODIUM CHLORIDE: 0.9 INJECTION, SOLUTION INTRAVENOUS at 09:06

## 2018-06-21 RX ADMIN — SUCCINYLCHOLINE CHLORIDE 120 MG: 20 INJECTION, SOLUTION INTRAMUSCULAR; INTRAVENOUS at 05:06

## 2018-06-21 RX ADMIN — DEXTROSE 40 MG: 50 INJECTION, SOLUTION INTRAVENOUS at 09:06

## 2018-06-21 RX ADMIN — CHLORHEXIDINE GLUCONATE 10 ML: 1.2 RINSE ORAL at 08:06

## 2018-06-21 RX ADMIN — METRONIDAZOLE 500 MG: 500 SOLUTION INTRAVENOUS at 05:06

## 2018-06-21 RX ADMIN — ONDANSETRON 4 MG: 2 INJECTION INTRAMUSCULAR; INTRAVENOUS at 11:06

## 2018-06-21 RX ADMIN — LIDOCAINE HYDROCHLORIDE 50 MG: 10 INJECTION, SOLUTION INFILTRATION; PERINEURAL at 05:06

## 2018-06-21 RX ADMIN — SODIUM CHLORIDE, SODIUM LACTATE, POTASSIUM CHLORIDE, AND CALCIUM CHLORIDE: .6; .31; .03; .02 INJECTION, SOLUTION INTRAVENOUS at 08:06

## 2018-06-21 RX ADMIN — HYDROMORPHONE HYDROCHLORIDE 0.5 MG: 1 INJECTION, SOLUTION INTRAMUSCULAR; INTRAVENOUS; SUBCUTANEOUS at 10:06

## 2018-06-21 RX ADMIN — PROPOFOL 200 MG: 10 INJECTION, EMULSION INTRAVENOUS at 05:06

## 2018-06-21 RX ADMIN — METRONIDAZOLE 500 MG: 500 INJECTION, SOLUTION INTRAVENOUS at 12:06

## 2018-06-21 NOTE — OP NOTE
[facility]  Surgery Department  Operative Note    SUMMARY     Date of Procedure: 6/21/2018     Procedure: Procedure(s) (LRB):  LAPAROSCOPY, DIAGNOSTIC (N/A)  REPAIR, HERNIA, UMBILICAL, AGE 5 YEARS OR OLDER (N/A)     Surgeon(s) and Role:     * Casper Martinez MD - Primary    Assisting Surgeon: None    Pre-Operative Diagnosis: SBO (small bowel obstruction) [K56.609]    Post-Operative Diagnosis: Post-Op Diagnosis Codes:     * SBO (small bowel obstruction) [K56.609]    Anesthesia: General    Technical Procedures Used:  Laparoscopy and open umbilical hernia repair with suture    Description of the Findings of the Procedure:     He was brought into the operative room placed on the operative table in the supine position.  IV antibiotics were administered after the induction of general endotracheal anesthesia. A Mart catheter was inserted.  A nasogastric tube was already in place. Pneumatic compression devices were placed on the lower extremities.  The abdomen was clipped, prepped and draped in a standard manner.    A time-out was performed.    The patient was placed in reverse Trendelenburg position and rolled to his right.  A small incision was made in left upper quadrant.  A Veress needle was inserted as the fascia was elevated.  Position was confirmed and pneumoperitoneum was established put a 5 mm trocar was placed using a visualization technique. A 5 mm trocar was placed in the left mid abdomen and left lower abdomen.    The patient was then rolled to his left and placed in Trendelenburg position.  The terminal ileum was identified at its junction with the cecum and the small bowel was traced in a retrograde manner..  The ileum had a segment that was slightly hyperemic but the small bowel was grossly normal.  We did see a transition from normal bowel slightly dilated bowel but there was no mechanical obstruction causing this problem.  The small bowel was then inspected from the ligament of tritz to  the terminal ileum and no abnormalities were once again noted.  The lateral trocars removed and no bleeding was noted.  The upper abdominal trocar was then removed and the abdomen was desufflated.    A curvilinear infraumbilical incision was made.  Subcutaneous tissues were dissected using electrocautery.  The undersurface of the umbilicus was then dissected from the hernia sac.  The fascia was exposed circumferentially.  The hernia sac was incised and the preperitoneal fat was reduced.  The hernia was repaired with 0 Prolene suture in a interrupted fashion x5.  Each suture was placed individually and held.  Once all sutures were placed they were tied. The wound was then irrigated.  Marcaine was infiltrated.    The undersurface of the umbilicus was tucked to the fascia.  The loose areolar tissue around the umbilical stalk was loosely closed with 3 0 Vicryl. The deep dermis was closed with 3 0 Vicryl   The skin was closed with 4 0 Monocryl.    The lateral incisions were closed with 3 0 Vicryl. Steri-Strips and dry sterile dressings were placed.  Counts were correct.  Patient tolerated procedure well    Significant Surgical Tasks Conducted by the Assistant(s), if Applicable:  None    Complications: No    Estimated Blood Loss (EBL): * 10 mL  IV fluids 500 mL  Marcaine 0.25% 30 mL           Implants: * No implants in log *    Specimens:   Specimen (12h ago through future)    None                  Condition: Stable    Disposition: PACU - hemodynamically stable.    Attestation: I performed the procedure.

## 2018-06-21 NOTE — SUBJECTIVE & OBJECTIVE
Interval History: KUB unchanged. NGT in placed. Plans for surgery later today.     Review of Systems   Constitutional: Positive for appetite change (decrease). Negative for chills, diaphoresis, fatigue and fever.   HENT: Negative for drooling, ear pain, rhinorrhea and sore throat.    Eyes: Negative.    Respiratory: Negative for cough, shortness of breath and wheezing.    Cardiovascular: Negative for chest pain, palpitations and leg swelling.   Gastrointestinal: Positive for abdominal pain, constipation, nausea and vomiting. Negative for diarrhea.   Endocrine: Negative.    Genitourinary: Negative for dysuria, hematuria and urgency.   Musculoskeletal: Negative.    Skin: Negative for color change and wound.   Allergic/Immunologic: Negative.    Neurological: Negative for dizziness, syncope and speech difficulty.   Hematological: Negative.    Psychiatric/Behavioral: Negative.       Objective:     Vital Signs (Most Recent):  Temp: 97.8 °F (36.6 °C) (06/21/18 1117)  Pulse: (!) 57 (06/21/18 1117)  Resp: 18 (06/21/18 1117)  BP: 129/73 (06/21/18 1117)  SpO2: 96 % (06/21/18 1117) Vital Signs (24h Range):  Temp:  [97.5 °F (36.4 °C)-98.2 °F (36.8 °C)] 97.8 °F (36.6 °C)  Pulse:  [53-78] 57  Resp:  [18] 18  SpO2:  [96 %-99 %] 96 %  BP: (129-176)/(68-81) 129/73     Weight: 118.4 kg (261 lb)  Body mass index is 32.62 kg/m².    Intake/Output Summary (Last 24 hours) at 06/21/18 1553  Last data filed at 06/21/18 1500   Gross per 24 hour   Intake          1450.84 ml   Output             1651 ml   Net          -200.16 ml      Physical Exam   Constitutional: He is oriented to person, place, and time. He appears well-developed and well-nourished. No distress.   HENT:   Head: Atraumatic.   ngt   Neck: Normal range of motion. Neck supple.   Cardiovascular: Normal rate, regular rhythm and normal heart sounds.    Pulmonary/Chest: Effort normal and breath sounds normal.   Abdominal: Soft. Bowel sounds are normal. He exhibits distension. There  is no tenderness. A hernia (umbilical) is present.   Neurological: He is alert and oriented to person, place, and time.   Skin: Skin is warm. Capillary refill takes less than 2 seconds.   Psychiatric: His behavior is normal. Thought content normal.   Nursing note and vitals reviewed.    Significant Labs:   CBC:   Recent Labs  Lab 06/20/18  0542 06/21/18  0508   WBC 9.55 7.08   HGB 15.6 15.0   HCT 43.8 41.5    221     CMP:   Recent Labs  Lab 06/20/18  0542 06/21/18  0508    139   K 3.5 3.3*    104   CO2 20* 26   GLU 76 86   BUN 21 15   CREATININE 0.8 0.7   CALCIUM 9.1 8.5*   PROT 6.9 6.4   ALBUMIN 3.5 3.2*   BILITOT 1.7* 1.2*   ALKPHOS 74 71   AST 31 29   ALT 42 43   ANIONGAP 12 9   EGFRNONAA >60 >60       Significant Imaging:   Imaging Results          X-Ray Chest PA And Lateral (Final result)  Result time 06/18/18 15:20:58    Final result by Hussain Whitfield MD (06/18/18 15:20:58)                 Impression:      No acute process seen.      Electronically signed by: Hussain Whitfield MD  Date:    06/18/2018  Time:    15:20             Narrative:    EXAMINATION:  XR CHEST PA AND LATERAL    CLINICAL HISTORY:  leukocytosis, r/o infectious process;    COMPARISON:  None    FINDINGS:  Cardiac silhouette is normal.  The lungs demonstrate no evidence of active disease.  Mild atelectasis or scarring bilateral costophrenic angles.  Aorta demonstrates atherosclerotic disease.  No evidence of pleural effusion or pneumothorax.  Bones appear intact.                               CT Abdomen Pelvis With Contrast (Final result)  Result time 06/18/18 13:41:03    Final result by Hussain Whitfield MD (06/18/18 13:41:03)                 Impression:      Small bowel loops are dilated to the mid ileum consistent with partial or early complete bowel obstruction.  Findings discussed with Dr. Carrillo at 1:26 p.m. on 06/18/2018.    See above for additional findings.    All CT scans at this facility use dose modulation, iterative  reconstruction, and/or weight based dosing when appropriate to reduce radiation dose to as low as reasonable achievable.      Electronically signed by: Hussain Whitfield MD  Date:    06/18/2018  Time:    13:41             Narrative:    EXAMINATION:  CT ABDOMEN PELVIS WITH CONTRAST    CLINICAL HISTORY:  Nausea, vomiting, diarrhea;    TECHNIQUE:  Low dose axial images, sagittal and coronal reformations were obtained from the lung bases to the pubic symphysis following the IV administration of 75 mL of Omnipaque 350.  Oral contrast was also administered.    COMPARISON:  07/30/2015    FINDINGS:  Heart: Normal size. No effusion.    Lung Bases: Mild dependent changes and/or atelectasis and scarring within the lower lobes.  No consolidation.  Diffuse calcification is seen with mild pleural thickening involving the right hemithorax which could reflect prior hemothorax versus asbestos related pleural disease.    Liver: Normal size and attenuation. No focal lesions.    Gallbladder: Surgically absent    Bile Ducts: No dilatation.    Pancreas: No mass. No peripancreatic fat stranding.    Spleen: Normal.    Adrenals: Normal.    Kidneys/Ureters: Normal enhancement.  No mass or  hydroureteronephrosis.    Bladder: No wall thickening.    Reproductive organs: Normal.    GI Tract/Mesentery: Mild gastric distension.  Small bowel loops are dilated to the mid ileum consistent with partial or early complete bowel obstruction.  Appendix is normal.  Air and feces is seen within the colon.  No evidence of pneumatosis.    Peritoneal Space: No ascites or free air.    Retroperitoneum: No significant adenopathy.    Abdominal wall: Small fat containing right inguinal hernia.  Small fat containing umbilical hernia.    Vasculature: No aneurysm.    Bones: No acute fracture. No suspicious lytic or sclerotic lesions.                               X-Ray Abdomen Flat And Erect (Final result)  Result time 06/18/18 11:11:20    Final result by Hussain Whitfield,  MD (06/18/18 11:11:20)                 Impression:      Dilated loops of small bowel are seen measuring up to 4.3 cm with some transition within the lower abdomen.  Findings are concerning for partial or early small bowel obstruction.  Cross-sectional imaging with oral and IV contrast recommended.  Findings were discussed with Dr. Carrillo at 06/18/2018 11:10 a.m.      Electronically signed by: Hussain Whitfield MD  Date:    06/18/2018  Time:    11:11             Narrative:    EXAMINATION:  XR ABDOMEN FLAT AND ERECT    CLINICAL HISTORY:  Abdominal Pain;    TECHNIQUE:  3 View of the abdomen was performed.    COMPARISON:  None    FINDINGS:  Dilated loops of small bowel are seen measuring up to 4.3 cm with some transition within the lower abdomen.  Air and feces is noted within the colon.    No obvious free air.  No portal venous gas.  Cholecystectomy clips are noted.    No acute fracture. Moderate DJD within the lower lumbar spine.    Lung bases demonstrate mild basilar atelectasis or small infiltrates..

## 2018-06-21 NOTE — PLAN OF CARE
Pt resting on stretcher s/p Diag Lap with umbilical hernia repair performed under general anesthesia by Dr. Martinez. Respirations even and unlabored on 98% face tent with O2 sats of 100% and will be weaned to room air shortly. NG remains to rt nare and secured. Abd sites remain c/d/i. Mart intact and secured. VSS. Will cont to monitor. See flow sheet for detailed assessment.

## 2018-06-21 NOTE — TRANSFER OF CARE
"Anesthesia Transfer of Care Note    Patient: Yugn Mcconnell    Procedure(s) Performed: Procedure(s) (LRB):  LAPAROSCOPY, DIAGNOSTIC (N/A)  REPAIR, HERNIA, UMBILICAL, AGE 3 YEARS OR OLDER (N/A)    Patient location: PACU    Anesthesia Type: general    Transport from OR: Transported from OR on room air with adequate spontaneous ventilation    Post pain: adequate analgesia    Post assessment: no apparent anesthetic complications    Post vital signs: stable    Level of consciousness: awake    Nausea/Vomiting: no nausea/vomiting    Complications: none    Transfer of care protocol was followed      Last vitals:   Visit Vitals  /67 (BP Location: Right arm, Patient Position: Lying)   Pulse 66   Temp 36.7 °C (98 °F) (Oral)   Resp 14   Ht 6' 3" (1.905 m)   Wt 118.4 kg (261 lb)   SpO2 96%   BMI 32.62 kg/m²     "

## 2018-06-21 NOTE — SUBJECTIVE & OBJECTIVE
Interval History: s    Medications:  Continuous Infusions:   sodium chloride 0.9% 100 mL/hr at 06/20/18 0214     Scheduled Meds:   metronidazole  500 mg Intravenous Once    pantoprazole 40 mg in dextrose 5 % 100 mL IVPB (over 15 minutes) (ready to mix system)  40 mg Intravenous Daily     PRN Meds:albuterol-ipratropium, hydrALAZINE, morphine, ondansetron, promethazine (PHENERGAN) IVPB     Review of patient's allergies indicates:   Allergen Reactions    Zithromax [azithromycin] Palpitations    Lipitor [atorvastatin] Other (See Comments)     Leg cramps/hand cramps     Objective:     Vital Signs (Most Recent):  Temp: 98.2 °F (36.8 °C) (06/21/18 0337)  Pulse: 65 (06/21/18 0337)  Resp: 18 (06/21/18 0337)  BP: (!) 145/77 (06/21/18 0337)  SpO2: 99 % (06/21/18 0337) Vital Signs (24h Range):  Temp:  [97.5 °F (36.4 °C)-98.4 °F (36.9 °C)] 98.2 °F (36.8 °C)  Pulse:  [53-78] 65  Resp:  [18] 18  SpO2:  [96 %-99 %] 99 %  BP: (144-176)/(70-81) 145/77     Weight: 118.4 kg (261 lb)  Body mass index is 32.62 kg/m².    Intake/Output - Last 3 Shifts       06/19 0700 - 06/20 0659 06/20 0700 - 06/21 0659 06/21 0700 - 06/22 0659    P.O. 0 380     I.V. (mL/kg) 3351.7 (28.3) 1120.8 (9.5)     IV Piggyback  50     Total Intake(mL/kg) 3351.7 (28.3) 1550.8 (13.1)     Urine (mL/kg/hr)  400 (0.1)     Drains 400 (0.1) 850 (0.3)     Total Output 400 1250      Net +2951.7 +300.8             Urine Occurrence  3 x     Stool Occurrence 3 x            Physical Exam   Constitutional: He is oriented to person, place, and time. He appears well-developed and well-nourished. No distress.   HENT:   Head: Atraumatic.   ngt   Neck: Normal range of motion. Neck supple.   Cardiovascular: Normal rate, regular rhythm and normal heart sounds.    Pulmonary/Chest: Effort normal and breath sounds normal.   Abdominal: Soft. Bowel sounds are normal. He exhibits distension. There is no tenderness. A hernia (umbilical) is present.   Neurological: He is alert and  oriented to person, place, and time.   Skin: Skin is warm. Capillary refill takes less than 2 seconds.   Psychiatric: His behavior is normal. Thought content normal.   Nursing note and vitals reviewed.      Significant Labs:  CBC:   Recent Labs  Lab 06/21/18  0508   WBC 7.08   RBC 4.72   HGB 15.0   HCT 41.5      MCV 88   MCH 31.8*   MCHC 36.1*     BMP:   Recent Labs  Lab 06/21/18  0508   GLU 86      K 3.3*      CO2 26   BUN 15   CREATININE 0.7   CALCIUM 8.5*   MG 1.8     CMP:   Recent Labs  Lab 06/21/18  0508   GLU 86   CALCIUM 8.5*   ALBUMIN 3.2*   PROT 6.4      K 3.3*   CO2 26      BUN 15   CREATININE 0.7   ALKPHOS 71   ALT 43   AST 29   BILITOT 1.2*       Significant Diagnostics:  abdominal films:  persistently dilated small bowel loops

## 2018-06-21 NOTE — PLAN OF CARE
Problem: Patient Care Overview  Goal: Plan of Care Review  Outcome: Ongoing (interventions implemented as appropriate)  Patient remains free of falls and safety precautions maintained. Afebrile. Pain controlled. Patient NG tube in place at low continuous. 2L NC. Patient out of room for surgery at this time. Will continue to monitor. 12 hour chart check.

## 2018-06-21 NOTE — PROGRESS NOTES
NGT placed via right nare @ 64cm. LCWS. Pt tolerated well, but still c/o nausea. Brownish liquid flowing back. PH measured 5-6. KUB ordered to verify placement. Son-in-law at BS. Will continue to monitor.

## 2018-06-21 NOTE — ANESTHESIA POSTPROCEDURE EVALUATION
"Anesthesia Post Evaluation    Patient: Yung Mcconnell    Procedure(s) Performed: Procedure(s) (LRB):  LAPAROSCOPY, DIAGNOSTIC (N/A)  REPAIR, HERNIA, UMBILICAL, AGE 3 YEARS OR OLDER (N/A)    Final Anesthesia Type: general  Patient location during evaluation: PACU  Patient participation: Yes- Able to Participate  Level of consciousness: oriented and awake  Post-procedure vital signs: reviewed and stable  Pain management: adequate  Airway patency: patent  PONV status at discharge: No PONV  Anesthetic complications: no      Cardiovascular status: blood pressure returned to baseline and hemodynamically stable  Respiratory status: unassisted, room air and spontaneous ventilation  Hydration status: euvolemic  Follow-up not needed.        Visit Vitals  BP (!) 173/80   Pulse 75   Temp 36.5 °C (97.7 °F) (Temporal)   Resp (!) 26   Ht 6' 3" (1.905 m)   Wt 118.4 kg (261 lb)   SpO2 100%   BMI 32.62 kg/m²       Pain/Jim Score: Pain Assessment Performed: Yes (6/21/2018  3:29 PM)  Presence of Pain: complains of pain/discomfort (6/21/2018  3:29 PM)  Pain Rating Prior to Med Admin: 6 (6/21/2018  6:50 PM)  Pain Rating Post Med Admin: 2 (6/21/2018 11:57 AM)      "

## 2018-06-21 NOTE — ASSESSMENT & PLAN NOTE
-SBFT negative, cut repeat images and symptoms suggest otherwise.   -failed clear liquids, again with NGT  -persistent small bowel dilatation throughout the abdomen  -plans for surgical intervention today  -monitor electrolytes

## 2018-06-21 NOTE — ANESTHESIA PREPROCEDURE EVALUATION
06/21/2018  Yung Mcconnell is a 62 y.o., male.    Pre-op Assessment    I have reviewed the Patient Summary Reports.     I have reviewed the Nursing Notes.   I have reviewed the Medications.     Review of Systems  Anesthesia Hx:  Hx of Anesthetic complications + PONV   Social:  Non-Smoker, Social Alcohol Use    EENT/Dental:   chronic allergic rhinitis   Cardiovascular:   hyperlipidemia ECG has been reviewed. EKG (6/18/18) Normal sinus rhythm  Low voltage QRS  Borderline Abnormal ECG  When compared with ECG of 26-AUG-2016 19:23,  No significant change was found   Pulmonary:   COPD Asthma Sleep Apnea, CPAP    Hepatic/GI:   GERD Colon polyps  Umbilical hernia  Small bowel obstruction   Musculoskeletal:   Arthritis  Lumbar radiculopathy  Cervical spondylosis   Neurological:   Neuromuscular Disease,        Physical Exam  General:  Obesity, Well nourished    Airway/Jaw/Neck:  Airway Findings: Mouth Opening: Normal Tongue: Normal  General Airway Assessment: Adult  Mallampati: II  TM Distance: Normal, at least 6 cm  Jaw/Neck Findings:  Neck ROM: Normal ROM  Neck Findings:  Girth Increased      Dental:  Dental Findings: In tact   Chest/Lungs:  Chest/Lungs Findings: Normal Respiratory Rate     Heart/Vascular:  Heart Findings: Rate: Normal  Rhythm: Regular Rhythm        Mental Status:  Mental Status Findings:  Cooperative, Alert and Oriented         Anesthesia Plan  Type of Anesthesia, risks & benefits discussed:  Anesthesia Type:  general  Patient's Preference:   Intra-op Monitoring Plan: standard ASA monitors  Intra-op Monitoring Plan Comments: +/- arterial line  Post Op Pain Control Plan: per primary service following discharge from PACU  Post Op Pain Control Plan Comments:   Induction:   IV  Beta Blocker:  Patient is not currently on a Beta-Blocker (No further documentation required).       Informed Consent: Patient  understands risks and agrees with Anesthesia plan.  Questions answered. Anesthesia consent signed with patient.  ASA Score: 3     Day of Surgery Review of History & Physical:    H&P update referred to the surgeon.     Anesthesia Plan Notes: Labs (6/21/18) WBC 7.08, Hgb 15, Hct 41.5, Plt 221, K 3.3, Cr 0.7, Glu 86

## 2018-06-21 NOTE — PROGRESS NOTES
[facility] Hospital Medicine  Progress Note    Patient Name: Yung Mcconnell  MRN: [id number]  Patient Class: IP- Inpatient   Admission Date: 6/18/2018  Length of Stay: 3 days  Attending Physician: Leticia Woo MD  Primary Care Provider: LEVI GOVEA MD    Subjective:     Principal Problem:SBO (small bowel obstruction)    HPI:  Yung Mcconnell is a 62 year old male with a PMHx of KEVEN on CPAP, OA, HLD, GERD, COPD, and Asthma who presented to the Emergency Department with c/o abdominal pain since Saturday afternoon. Associated symptoms include: N/V/D. Pt denies being around sick/ill contacts. Pt reports having gallbladder removed laparoscopically about 5-6 years ago. Pt denies any other abdominal surgeries. ED workup revealed: WBC 12.80K, Total bilirubin 1.8, UA with 1+ ketones and bilirubin. KUB showed dilated loops of bowel are seen measuring up to 4.3 cm with some transition within the lower abdomen, findings concerning for partial or early small bowel obstruction. CT abdomen/pelvis with contrast showed small bowel loops are dilated to the mid ileum consistent with partial or early complete bowel obstruction. ED consulted General surgery. Pt admitted to Med Surg for partial SBO.     Hospital Course:  6/19/18- Yung Mcconnell is a 62 year old male who presents with a small bowel obstruction. A Small bowel follow through is being preformed today. Pending the results, the patient may have surgery.     6/20/18 SBFT negative. KUB today again showed ileus vs SBO. Had bowel movement yesterday. Electrolytes stable. Clear liquids started today.     6/21/18 KUB unchanged. Plans for surgical intervention later today. Spouse at bedside.     Interval History: KUB unchanged. NGT in placed. Plans for surgery later today.     Review of Systems   Constitutional: Positive for appetite change (decrease). Negative for chills, diaphoresis, fatigue and fever.   HENT: Negative for drooling, ear pain,  rhinorrhea and sore throat.    Eyes: Negative.    Respiratory: Negative for cough, shortness of breath and wheezing.    Cardiovascular: Negative for chest pain, palpitations and leg swelling.   Gastrointestinal: Positive for abdominal pain, constipation, nausea and vomiting. Negative for diarrhea.   Endocrine: Negative.    Genitourinary: Negative for dysuria, hematuria and urgency.   Musculoskeletal: Negative.    Skin: Negative for color change and wound.   Allergic/Immunologic: Negative.    Neurological: Negative for dizziness, syncope and speech difficulty.   Hematological: Negative.    Psychiatric/Behavioral: Negative.       Objective:     Vital Signs (Most Recent):  Temp: 97.8 °F (36.6 °C) (06/21/18 1117)  Pulse: (!) 57 (06/21/18 1117)  Resp: 18 (06/21/18 1117)  BP: 129/73 (06/21/18 1117)  SpO2: 96 % (06/21/18 1117) Vital Signs (24h Range):  Temp:  [97.5 °F (36.4 °C)-98.2 °F (36.8 °C)] 97.8 °F (36.6 °C)  Pulse:  [53-78] 57  Resp:  [18] 18  SpO2:  [96 %-99 %] 96 %  BP: (129-176)/(68-81) 129/73     Weight: 118.4 kg (261 lb)  Body mass index is 32.62 kg/m².    Intake/Output Summary (Last 24 hours) at 06/21/18 1553  Last data filed at 06/21/18 1500   Gross per 24 hour   Intake          1450.84 ml   Output             1651 ml   Net          -200.16 ml      Physical Exam   Constitutional: He is oriented to person, place, and time. He appears well-developed and well-nourished. No distress.   HENT:   Head: Atraumatic.   ngt   Neck: Normal range of motion. Neck supple.   Cardiovascular: Normal rate, regular rhythm and normal heart sounds.    Pulmonary/Chest: Effort normal and breath sounds normal.   Abdominal: Soft. Bowel sounds are normal. He exhibits distension. There is no tenderness. A hernia (umbilical) is present.   Neurological: He is alert and oriented to person, place, and time.   Skin: Skin is warm. Capillary refill takes less than 2 seconds.   Psychiatric: His behavior is normal. Thought content normal.    Nursing note and vitals reviewed.    Significant Labs:   CBC:   Recent Labs  Lab 06/20/18  0542 06/21/18  0508   WBC 9.55 7.08   HGB 15.6 15.0   HCT 43.8 41.5    221     CMP:   Recent Labs  Lab 06/20/18  0542 06/21/18  0508    139   K 3.5 3.3*    104   CO2 20* 26   GLU 76 86   BUN 21 15   CREATININE 0.8 0.7   CALCIUM 9.1 8.5*   PROT 6.9 6.4   ALBUMIN 3.5 3.2*   BILITOT 1.7* 1.2*   ALKPHOS 74 71   AST 31 29   ALT 42 43   ANIONGAP 12 9   EGFRNONAA >60 >60       Significant Imaging:   Imaging Results          X-Ray Chest PA And Lateral (Final result)  Result time 06/18/18 15:20:58    Final result by Hussain Whitfield MD (06/18/18 15:20:58)                 Impression:      No acute process seen.      Electronically signed by: Hussain Whitfield MD  Date:    06/18/2018  Time:    15:20             Narrative:    EXAMINATION:  XR CHEST PA AND LATERAL    CLINICAL HISTORY:  leukocytosis, r/o infectious process;    COMPARISON:  None    FINDINGS:  Cardiac silhouette is normal.  The lungs demonstrate no evidence of active disease.  Mild atelectasis or scarring bilateral costophrenic angles.  Aorta demonstrates atherosclerotic disease.  No evidence of pleural effusion or pneumothorax.  Bones appear intact.                               CT Abdomen Pelvis With Contrast (Final result)  Result time 06/18/18 13:41:03    Final result by Hussain Whitfield MD (06/18/18 13:41:03)                 Impression:      Small bowel loops are dilated to the mid ileum consistent with partial or early complete bowel obstruction.  Findings discussed with Dr. Carrillo at 1:26 p.m. on 06/18/2018.    See above for additional findings.    All CT scans at this facility use dose modulation, iterative reconstruction, and/or weight based dosing when appropriate to reduce radiation dose to as low as reasonable achievable.      Electronically signed by: Hussain Whitfield MD  Date:    06/18/2018  Time:    13:41             Narrative:    EXAMINATION:  CT  ABDOMEN PELVIS WITH CONTRAST    CLINICAL HISTORY:  Nausea, vomiting, diarrhea;    TECHNIQUE:  Low dose axial images, sagittal and coronal reformations were obtained from the lung bases to the pubic symphysis following the IV administration of 75 mL of Omnipaque 350.  Oral contrast was also administered.    COMPARISON:  07/30/2015    FINDINGS:  Heart: Normal size. No effusion.    Lung Bases: Mild dependent changes and/or atelectasis and scarring within the lower lobes.  No consolidation.  Diffuse calcification is seen with mild pleural thickening involving the right hemithorax which could reflect prior hemothorax versus asbestos related pleural disease.    Liver: Normal size and attenuation. No focal lesions.    Gallbladder: Surgically absent    Bile Ducts: No dilatation.    Pancreas: No mass. No peripancreatic fat stranding.    Spleen: Normal.    Adrenals: Normal.    Kidneys/Ureters: Normal enhancement.  No mass or  hydroureteronephrosis.    Bladder: No wall thickening.    Reproductive organs: Normal.    GI Tract/Mesentery: Mild gastric distension.  Small bowel loops are dilated to the mid ileum consistent with partial or early complete bowel obstruction.  Appendix is normal.  Air and feces is seen within the colon.  No evidence of pneumatosis.    Peritoneal Space: No ascites or free air.    Retroperitoneum: No significant adenopathy.    Abdominal wall: Small fat containing right inguinal hernia.  Small fat containing umbilical hernia.    Vasculature: No aneurysm.    Bones: No acute fracture. No suspicious lytic or sclerotic lesions.                               X-Ray Abdomen Flat And Erect (Final result)  Result time 06/18/18 11:11:20    Final result by Hussain Whitfield MD (06/18/18 11:11:20)                 Impression:      Dilated loops of small bowel are seen measuring up to 4.3 cm with some transition within the lower abdomen.  Findings are concerning for partial or early small bowel obstruction.   Cross-sectional imaging with oral and IV contrast recommended.  Findings were discussed with Dr. Carrillo at 06/18/2018 11:10 a.m.      Electronically signed by: Hussain Whitfield MD  Date:    06/18/2018  Time:    11:11             Narrative:    EXAMINATION:  XR ABDOMEN FLAT AND ERECT    CLINICAL HISTORY:  Abdominal Pain;    TECHNIQUE:  3 View of the abdomen was performed.    COMPARISON:  None    FINDINGS:  Dilated loops of small bowel are seen measuring up to 4.3 cm with some transition within the lower abdomen.  Air and feces is noted within the colon.    No obvious free air.  No portal venous gas.  Cholecystectomy clips are noted.    No acute fracture. Moderate DJD within the lower lumbar spine.    Lung bases demonstrate mild basilar atelectasis or small infiltrates..                                  Assessment/Plan:      * SBO (small bowel obstruction)    -SBFT negative, cut repeat images and symptoms suggest otherwise.   -failed clear liquids, again with NGT  -persistent small bowel dilatation throughout the abdomen  -plans for surgical intervention today  -monitor electrolytes        Umbilical hernia without obstruction and without gangrene    -surgery following        Asthma with COPD    - Currently stable  - Duonebs prn          KEVEN (obstructive sleep apnea)    - Nightly CPAP -- titrate to patient's O2 saturation and comfort          GERD (gastroesophageal reflux disease)    - IV PPI            VTE Risk Mitigation         Ordered     IP VTE HIGH RISK PATIENT  Once      06/18/18 1423     Place KELLY hose  Until discontinued      06/18/18 1423     Place sequential compression device  Until discontinued      06/18/18 1423        Matilda Garcia NP  Department of Hospital Medicine   [facility]

## 2018-06-21 NOTE — PROGRESS NOTES
[facility]   General Surgery  Progress Note    Subjective:     History of Present Illness:  Patient reports a 3 day history of epigastric abdominal pain.  Pain is sharp, crampy, stopping, and achy in nature.  If it was associated with multiple episodes of nausea and vomiting starting on Saturday.  He also had several episodes of loose stool.    The patient stated that when he ever he drank some liquid several hours later he would vomit.  He has never had any abdominal pain like this before.  He denies any blood per rectum.    He states that his a bulge of his umbilicus that was not there before.    He reports having city water.  He has eaten no unusual foods recently nor eaten out recently    Post-Op Info:  Procedure(s) (LRB):  LAPAROTOMY,EXPLORATORY (N/A)         Interval History: s    Medications:  Continuous Infusions:   sodium chloride 0.9% 100 mL/hr at 06/20/18 0214     Scheduled Meds:   metronidazole  500 mg Intravenous Once    pantoprazole 40 mg in dextrose 5 % 100 mL IVPB (over 15 minutes) (ready to mix system)  40 mg Intravenous Daily     PRN Meds:albuterol-ipratropium, hydrALAZINE, morphine, ondansetron, promethazine (PHENERGAN) IVPB     Review of patient's allergies indicates:   Allergen Reactions    Zithromax [azithromycin] Palpitations    Lipitor [atorvastatin] Other (See Comments)     Leg cramps/hand cramps     Objective:     Vital Signs (Most Recent):  Temp: 98.2 °F (36.8 °C) (06/21/18 0337)  Pulse: 65 (06/21/18 0337)  Resp: 18 (06/21/18 0337)  BP: (!) 145/77 (06/21/18 0337)  SpO2: 99 % (06/21/18 0337) Vital Signs (24h Range):  Temp:  [97.5 °F (36.4 °C)-98.4 °F (36.9 °C)] 98.2 °F (36.8 °C)  Pulse:  [53-78] 65  Resp:  [18] 18  SpO2:  [96 %-99 %] 99 %  BP: (144-176)/(70-81) 145/77     Weight: 118.4 kg (261 lb)  Body mass index is 32.62 kg/m².    Intake/Output - Last 3 Shifts       06/19 0700 - 06/20 0659 06/20 0700 - 06/21 0659 06/21 0700 - 06/22 0659    P.O. 0 380     I.V. (mL/kg)  3351.7 (28.3) 1120.8 (9.5)     IV Piggyback  50     Total Intake(mL/kg) 3351.7 (28.3) 1550.8 (13.1)     Urine (mL/kg/hr)  400 (0.1)     Drains 400 (0.1) 850 (0.3)     Total Output 400 1250      Net +2951.7 +300.8             Urine Occurrence  3 x     Stool Occurrence 3 x            Physical Exam   Constitutional: He is oriented to person, place, and time. He appears well-developed and well-nourished. No distress.   HENT:   Head: Atraumatic.   ngt   Neck: Normal range of motion. Neck supple.   Cardiovascular: Normal rate, regular rhythm and normal heart sounds.    Pulmonary/Chest: Effort normal and breath sounds normal.   Abdominal: Soft. Bowel sounds are normal. He exhibits distension. There is no tenderness. A hernia (umbilical) is present.   Neurological: He is alert and oriented to person, place, and time.   Skin: Skin is warm. Capillary refill takes less than 2 seconds.   Psychiatric: His behavior is normal. Thought content normal.   Nursing note and vitals reviewed.      Significant Labs:  CBC:   Recent Labs  Lab 06/21/18  0508   WBC 7.08   RBC 4.72   HGB 15.0   HCT 41.5      MCV 88   MCH 31.8*   MCHC 36.1*     BMP:   Recent Labs  Lab 06/21/18  0508   GLU 86      K 3.3*      CO2 26   BUN 15   CREATININE 0.7   CALCIUM 8.5*   MG 1.8     CMP:   Recent Labs  Lab 06/21/18  0508   GLU 86   CALCIUM 8.5*   ALBUMIN 3.2*   PROT 6.4      K 3.3*   CO2 26      BUN 15   CREATININE 0.7   ALKPHOS 71   ALT 43   AST 29   BILITOT 1.2*       Significant Diagnostics:  abdominal films:  persistently dilated small bowel loops    Assessment/Plan:     * SBO (small bowel obstruction)    SBFT NO OBSTRUCTION, but did not tolerated clear liquids  Suspect partial sbo  Laparoscopy/laparotomy today at 430    Need for surgery discussed  Risks include infection, bleeding, hernia, bowel injury, and anastomotic leak or stricture with bowel resection          Umbilical hernia without obstruction and without gangrene     Likely related to a trocar site from his cholecystectomy.    Repair with suture        KEVEN (obstructive sleep apnea)    Management per Medicine        GERD (gastroesophageal reflux disease)    IV PPI per Medicine            Casper Martinez MD  General Surgery  [facility]

## 2018-06-21 NOTE — PLAN OF CARE
Problem: Patient Care Overview  Goal: Plan of Care Review  Outcome: Ongoing (interventions implemented as appropriate)  Reviewed POC, including indications and possible side effects of administered medications. Pt verbalized understanding and teach back. C/o pain and continuous N/V. Stated he vomited four times between 5pm and shift change. Reported no relief from antiemetics or nonpharmalogical interventions. Dr. Martinez ordered to place NGT if pt would allow. NGT placed to Right nare with LCWS. Placement verified via aspirate contents and xray. Dk brown liquid drainage, then dark green later in shift. Along with pain medication and antiemetics, pt reports significant relief. Son-in-law at BS. Remains free from injury.    12 hour chart check complete.

## 2018-06-22 ENCOUNTER — TELEPHONE (OUTPATIENT)
Dept: PULMONOLOGY | Facility: CLINIC | Age: 63
End: 2018-06-22

## 2018-06-22 LAB
ALBUMIN SERPL BCP-MCNC: 3.3 G/DL
ALP SERPL-CCNC: 67 U/L
ALT SERPL W/O P-5'-P-CCNC: 42 U/L
ANION GAP SERPL CALC-SCNC: 15 MMOL/L
ANION GAP SERPL CALC-SCNC: 15 MMOL/L
AST SERPL-CCNC: 25 U/L
BASOPHILS # BLD AUTO: 0.01 K/UL
BASOPHILS NFR BLD: 0.1 %
BILIRUB SERPL-MCNC: 0.9 MG/DL
BUN SERPL-MCNC: 11 MG/DL
BUN SERPL-MCNC: 11 MG/DL
CALCIUM SERPL-MCNC: 8.9 MG/DL
CALCIUM SERPL-MCNC: 8.9 MG/DL
CHLORIDE SERPL-SCNC: 102 MMOL/L
CHLORIDE SERPL-SCNC: 102 MMOL/L
CO2 SERPL-SCNC: 23 MMOL/L
CO2 SERPL-SCNC: 23 MMOL/L
CREAT SERPL-MCNC: 0.7 MG/DL
CREAT SERPL-MCNC: 0.7 MG/DL
DIFFERENTIAL METHOD: ABNORMAL
EOSINOPHIL # BLD AUTO: 0 K/UL
EOSINOPHIL NFR BLD: 0.1 %
ERYTHROCYTE [DISTWIDTH] IN BLOOD BY AUTOMATED COUNT: 13.8 %
EST. GFR  (AFRICAN AMERICAN): >60 ML/MIN/1.73 M^2
EST. GFR  (AFRICAN AMERICAN): >60 ML/MIN/1.73 M^2
EST. GFR  (NON AFRICAN AMERICAN): >60 ML/MIN/1.73 M^2
EST. GFR  (NON AFRICAN AMERICAN): >60 ML/MIN/1.73 M^2
GLUCOSE SERPL-MCNC: 84 MG/DL
GLUCOSE SERPL-MCNC: 84 MG/DL
HCT VFR BLD AUTO: 41.7 %
HGB BLD-MCNC: 14.9 G/DL
LYMPHOCYTES # BLD AUTO: 2.1 K/UL
LYMPHOCYTES NFR BLD: 27.8 %
MAGNESIUM SERPL-MCNC: 1.9 MG/DL
MCH RBC QN AUTO: 31.1 PG
MCHC RBC AUTO-ENTMCNC: 35.7 G/DL
MCV RBC AUTO: 87 FL
MONOCYTES # BLD AUTO: 0.4 K/UL
MONOCYTES NFR BLD: 5.4 %
NEUTROPHILS # BLD AUTO: 5.1 K/UL
NEUTROPHILS NFR BLD: 66.6 %
PHOSPHATE SERPL-MCNC: 3.7 MG/DL
PLATELET # BLD AUTO: 231 K/UL
PMV BLD AUTO: 9.8 FL
POTASSIUM SERPL-SCNC: 3.3 MMOL/L
POTASSIUM SERPL-SCNC: 3.3 MMOL/L
PROT SERPL-MCNC: 6.5 G/DL
RBC # BLD AUTO: 4.79 M/UL
SODIUM SERPL-SCNC: 140 MMOL/L
SODIUM SERPL-SCNC: 140 MMOL/L
WBC # BLD AUTO: 7.62 K/UL

## 2018-06-22 PROCEDURE — 99900038 HC OT GENERIC THERAPY SCREENING (STAT)

## 2018-06-22 PROCEDURE — 97161 PT EVAL LOW COMPLEX 20 MIN: CPT

## 2018-06-22 PROCEDURE — 85025 COMPLETE CBC W/AUTO DIFF WBC: CPT

## 2018-06-22 PROCEDURE — G8980 MOBILITY D/C STATUS: HCPCS | Mod: CH

## 2018-06-22 PROCEDURE — 96372 THER/PROPH/DIAG INJ SC/IM: CPT

## 2018-06-22 PROCEDURE — 94799 UNLISTED PULMONARY SVC/PX: CPT

## 2018-06-22 PROCEDURE — 25000003 PHARM REV CODE 250: Performed by: SURGERY

## 2018-06-22 PROCEDURE — 25000242 PHARM REV CODE 250 ALT 637 W/ HCPCS: Performed by: NURSE PRACTITIONER

## 2018-06-22 PROCEDURE — 94640 AIRWAY INHALATION TREATMENT: CPT

## 2018-06-22 PROCEDURE — 99900035 HC TECH TIME PER 15 MIN (STAT)

## 2018-06-22 PROCEDURE — 94760 N-INVAS EAR/PLS OXIMETRY 1: CPT

## 2018-06-22 PROCEDURE — 97165 OT EVAL LOW COMPLEX 30 MIN: CPT

## 2018-06-22 PROCEDURE — 97535 SELF CARE MNGMENT TRAINING: CPT

## 2018-06-22 PROCEDURE — 63600175 PHARM REV CODE 636 W HCPCS: Performed by: EMERGENCY MEDICINE

## 2018-06-22 PROCEDURE — 25000003 PHARM REV CODE 250: Performed by: EMERGENCY MEDICINE

## 2018-06-22 PROCEDURE — 36415 COLL VENOUS BLD VENIPUNCTURE: CPT

## 2018-06-22 PROCEDURE — 83735 ASSAY OF MAGNESIUM: CPT

## 2018-06-22 PROCEDURE — 63600175 PHARM REV CODE 636 W HCPCS: Performed by: SURGERY

## 2018-06-22 PROCEDURE — G8978 MOBILITY CURRENT STATUS: HCPCS | Mod: CH

## 2018-06-22 PROCEDURE — 97116 GAIT TRAINING THERAPY: CPT

## 2018-06-22 PROCEDURE — C9113 INJ PANTOPRAZOLE SODIUM, VIA: HCPCS | Performed by: EMERGENCY MEDICINE

## 2018-06-22 PROCEDURE — 80053 COMPREHEN METABOLIC PANEL: CPT

## 2018-06-22 PROCEDURE — 11000001 HC ACUTE MED/SURG PRIVATE ROOM

## 2018-06-22 PROCEDURE — 27000221 HC OXYGEN, UP TO 24 HOURS

## 2018-06-22 PROCEDURE — G8979 MOBILITY GOAL STATUS: HCPCS | Mod: CH

## 2018-06-22 PROCEDURE — 84100 ASSAY OF PHOSPHORUS: CPT

## 2018-06-22 PROCEDURE — 27100132 HC NEBULIZER, FILTERED TREATMENT

## 2018-06-22 RX ORDER — DEXTROSE MONOHYDRATE, SODIUM CHLORIDE, AND POTASSIUM CHLORIDE 50; 1.49; 4.5 G/1000ML; G/1000ML; G/1000ML
INJECTION, SOLUTION INTRAVENOUS CONTINUOUS
Status: DISCONTINUED | OUTPATIENT
Start: 2018-06-22 | End: 2018-06-23

## 2018-06-22 RX ADMIN — SODIUM CHLORIDE, SODIUM LACTATE, POTASSIUM CHLORIDE, AND CALCIUM CHLORIDE: .6; .31; .03; .02 INJECTION, SOLUTION INTRAVENOUS at 04:06

## 2018-06-22 RX ADMIN — DEXTROSE 40 MG: 50 INJECTION, SOLUTION INTRAVENOUS at 08:06

## 2018-06-22 RX ADMIN — ONDANSETRON 4 MG: 2 INJECTION INTRAMUSCULAR; INTRAVENOUS at 08:06

## 2018-06-22 RX ADMIN — POTASSIUM CHLORIDE, DEXTROSE MONOHYDRATE AND SODIUM CHLORIDE: 150; 5; 450 INJECTION, SOLUTION INTRAVENOUS at 09:06

## 2018-06-22 RX ADMIN — IPRATROPIUM BROMIDE AND ALBUTEROL SULFATE 3 ML: .5; 3 SOLUTION RESPIRATORY (INHALATION) at 04:06

## 2018-06-22 RX ADMIN — POTASSIUM PHOSPHATE, MONOBASIC AND POTASSIUM PHOSPHATE, DIBASIC 15 MMOL: 224; 236 INJECTION, SOLUTION, CONCENTRATE INTRAVENOUS at 09:06

## 2018-06-22 RX ADMIN — CHLORHEXIDINE GLUCONATE 10 ML: 1.2 RINSE ORAL at 08:06

## 2018-06-22 RX ADMIN — HYDROMORPHONE HYDROCHLORIDE 0.5 MG: 1 INJECTION, SOLUTION INTRAMUSCULAR; INTRAVENOUS; SUBCUTANEOUS at 07:06

## 2018-06-22 RX ADMIN — PROMETHAZINE HYDROCHLORIDE 6.25 MG: 25 INJECTION INTRAMUSCULAR; INTRAVENOUS at 12:06

## 2018-06-22 RX ADMIN — HYDROMORPHONE HYDROCHLORIDE 0.5 MG: 1 INJECTION, SOLUTION INTRAMUSCULAR; INTRAVENOUS; SUBCUTANEOUS at 03:06

## 2018-06-22 RX ADMIN — ENOXAPARIN SODIUM 40 MG: 100 INJECTION SUBCUTANEOUS at 06:06

## 2018-06-22 RX ADMIN — HYDROMORPHONE HYDROCHLORIDE 0.5 MG: 1 INJECTION, SOLUTION INTRAMUSCULAR; INTRAVENOUS; SUBCUTANEOUS at 06:06

## 2018-06-22 RX ADMIN — HYDROMORPHONE HYDROCHLORIDE 0.5 MG: 1 INJECTION, SOLUTION INTRAMUSCULAR; INTRAVENOUS; SUBCUTANEOUS at 10:06

## 2018-06-22 NOTE — PLAN OF CARE
Problem: Patient Care Overview  Goal: Plan of Care Review  Outcome: Ongoing (interventions implemented as appropriate)  Pt has complains of pain to left side of abdomen. Pain medication is effective.  Abdomen is non distended. NG tube is in place on low continuous suction. Light green output noted. Pt complains of nausea. PRN medication given upon request. Dressing is dry and intact. IV fluids are infusing. No injuries. Will continue to monitor. 12 hour chart check is completed.

## 2018-06-22 NOTE — SUBJECTIVE & OBJECTIVE
Interval History: Mart removed, NG to be removed later today. Feeling better. OT/PT ordered.    Review of Systems   Constitutional: Positive for appetite change (decrease). Negative for chills, diaphoresis, fatigue and fever.   HENT: Negative for drooling, ear pain, rhinorrhea and sore throat.    Eyes: Negative.    Respiratory: Negative for cough, shortness of breath and wheezing.    Cardiovascular: Negative for chest pain, palpitations and leg swelling.   Gastrointestinal: Positive for abdominal pain (surgical) and constipation. Negative for diarrhea.   Endocrine: Negative.    Genitourinary: Negative for dysuria, hematuria and urgency.   Musculoskeletal: Negative.    Skin: Negative for color change and wound.   Allergic/Immunologic: Negative.    Neurological: Negative for dizziness, syncope and speech difficulty.   Hematological: Negative.    Psychiatric/Behavioral: Negative.      Objective:     Vital Signs (Most Recent):  Temp: 98.7 °F (37.1 °C) (06/22/18 1608)  Pulse: (!) 57 (06/22/18 1608)  Resp: 18 (06/22/18 1608)  BP: 127/64 (06/22/18 1608)  SpO2: 99 % (06/22/18 1608) Vital Signs (24h Range):  Temp:  [97.3 °F (36.3 °C)-98.7 °F (37.1 °C)] 98.7 °F (37.1 °C)  Pulse:  [51-80] 57  Resp:  [11-27] 18  SpO2:  [91 %-100 %] 99 %  BP: (125-173)/(64-80) 127/64     Weight: 118.4 kg (261 lb)  Body mass index is 32.62 kg/m².    Intake/Output Summary (Last 24 hours) at 06/22/18 1618  Last data filed at 06/22/18 1130   Gross per 24 hour   Intake             1845 ml   Output             1702 ml   Net              143 ml      Physical Exam   Constitutional: He is oriented to person, place, and time. He appears well-nourished. No distress.   HENT:   Head: Normocephalic and atraumatic.   Eyes: Pupils are equal, round, and reactive to light.   Neck: Normal range of motion. Neck supple.   Cardiovascular: Normal rate, regular rhythm and normal heart sounds.    Pulmonary/Chest: Effort normal and breath sounds normal.   Abdominal:  Soft. He exhibits distension. There is tenderness.   Mild distention, mild incisional tenderness, Minimal bowel sounds   Neurological: He is alert and oriented to person, place, and time.   Skin: Skin is warm.   Psychiatric: He has a normal mood and affect. His behavior is normal. Judgment and thought content normal.   Vitals reviewed.    Significant Labs:   CBC:   Recent Labs  Lab 06/21/18  0508 06/22/18  0444   WBC 7.08 7.62   HGB 15.0 14.9   HCT 41.5 41.7    231     CMP:   Recent Labs  Lab 06/21/18  0508 06/22/18  0444    140  140   K 3.3* 3.3*  3.3*    102  102   CO2 26 23  23   GLU 86 84  84   BUN 15 11  11   CREATININE 0.7 0.7  0.7   CALCIUM 8.5* 8.9  8.9   PROT 6.4 6.5   ALBUMIN 3.2* 3.3*   BILITOT 1.2* 0.9   ALKPHOS 71 67   AST 29 25   ALT 43 42   ANIONGAP 9 15  15   EGFRNONAA >60 >60  >60       Significant Imaging: I have reviewed all pertinent imaging results/findings within the past 24 hours.

## 2018-06-22 NOTE — PROGRESS NOTES
[facility] Hospital Medicine  Progress Note    Patient Name: Yung Mcconnell  MRN: [id number]  Patient Class: IP- Inpatient   Admission Date: 6/18/2018  Length of Stay: 4 days  Attending Physician: Leticia Woo MD  Primary Care Provider: LEVI GOVEA MD    Subjective:     Principal Problem:SBO (small bowel obstruction)    HPI:  Yung Mcconnlel is a 62 year old male with a PMHx of KEVEN on CPAP, OA, HLD, GERD, COPD, and Asthma who presented to the Emergency Department with c/o abdominal pain since Saturday afternoon. Associated symptoms include: N/V/D. Pt denies being around sick/ill contacts. Pt reports having gallbladder removed laparoscopically about 5-6 years ago. Pt denies any other abdominal surgeries. ED workup revealed: WBC 12.80K, Total bilirubin 1.8, UA with 1+ ketones and bilirubin. KUB showed dilated loops of bowel are seen measuring up to 4.3 cm with some transition within the lower abdomen, findings concerning for partial or early small bowel obstruction. CT abdomen/pelvis with contrast showed small bowel loops are dilated to the mid ileum consistent with partial or early complete bowel obstruction. ED consulted General surgery. Pt admitted to Med Surg for partial SBO.     Hospital Course:  6/19/18- Yung Mcconnell is a 62 year old male who presents with a small bowel obstruction. A Small bowel follow through is being preformed today. Pending the results, the patient may have surgery.     6/20/18 SBFT negative. KUB today again showed ileus vs SBO. Had bowel movement yesterday. Electrolytes stable. Clear liquids started today.     6/21/18 KUB unchanged. S/p exp lap with hernia repair.     6/22/18 - NG tube in place and will be removed later today. Mart removed. OT/PT ordered. Minimal bowel sounds. Feels better today.    Interval History: Mart removed, NG to be removed later today. Feeling better. OT/PT ordered.    Review of Systems   Constitutional: Positive for appetite  change (decrease). Negative for chills, diaphoresis, fatigue and fever.   HENT: Negative for drooling, ear pain, rhinorrhea and sore throat.    Eyes: Negative.    Respiratory: Negative for cough, shortness of breath and wheezing.    Cardiovascular: Negative for chest pain, palpitations and leg swelling.   Gastrointestinal: Positive for abdominal pain (surgical) and constipation. Negative for diarrhea.   Endocrine: Negative.    Genitourinary: Negative for dysuria, hematuria and urgency.   Musculoskeletal: Negative.    Skin: Negative for color change and wound.   Allergic/Immunologic: Negative.    Neurological: Negative for dizziness, syncope and speech difficulty.   Hematological: Negative.    Psychiatric/Behavioral: Negative.      Objective:     Vital Signs (Most Recent):  Temp: 98.7 °F (37.1 °C) (06/22/18 1608)  Pulse: (!) 57 (06/22/18 1608)  Resp: 18 (06/22/18 1608)  BP: 127/64 (06/22/18 1608)  SpO2: 99 % (06/22/18 1608) Vital Signs (24h Range):  Temp:  [97.3 °F (36.3 °C)-98.7 °F (37.1 °C)] 98.7 °F (37.1 °C)  Pulse:  [51-80] 57  Resp:  [11-27] 18  SpO2:  [91 %-100 %] 99 %  BP: (125-173)/(64-80) 127/64     Weight: 118.4 kg (261 lb)  Body mass index is 32.62 kg/m².    Intake/Output Summary (Last 24 hours) at 06/22/18 1618  Last data filed at 06/22/18 1130   Gross per 24 hour   Intake             1845 ml   Output             1702 ml   Net              143 ml      Physical Exam   Constitutional: He is oriented to person, place, and time. He appears well-nourished. No distress.   HENT:   Head: Normocephalic and atraumatic.   Eyes: Pupils are equal, round, and reactive to light.   Neck: Normal range of motion. Neck supple.   Cardiovascular: Normal rate, regular rhythm and normal heart sounds.    Pulmonary/Chest: Effort normal and breath sounds normal.   Abdominal: Soft. He exhibits distension. There is tenderness.   Mild distention, mild incisional tenderness, Minimal bowel sounds   Neurological: He is alert and  oriented to person, place, and time.   Skin: Skin is warm.   Psychiatric: He has a normal mood and affect. His behavior is normal. Judgment and thought content normal.   Vitals reviewed.    Significant Labs:   CBC:   Recent Labs  Lab 06/21/18  0508 06/22/18  0444   WBC 7.08 7.62   HGB 15.0 14.9   HCT 41.5 41.7    231     CMP:   Recent Labs  Lab 06/21/18  0508 06/22/18  0444    140  140   K 3.3* 3.3*  3.3*    102  102   CO2 26 23  23   GLU 86 84  84   BUN 15 11  11   CREATININE 0.7 0.7  0.7   CALCIUM 8.5* 8.9  8.9   PROT 6.4 6.5   ALBUMIN 3.2* 3.3*   BILITOT 1.2* 0.9   ALKPHOS 71 67   AST 29 25   ALT 43 42   ANIONGAP 9 15  15   EGFRNONAA >60 >60  >60       Significant Imaging: I have reviewed all pertinent imaging results/findings within the past 24 hours.    Assessment/Plan:      * SBO (small bowel obstruction)    -SBFT negative, cut repeat images and symptoms suggest otherwise.   -failed clear liquids  -persistent small bowel dilatation throughout the abdomen  -s/p exp lap with hernia repair 6/21/18  -monitor electrolytes        Umbilical hernia without obstruction and without gangrene    -surgery following        Asthma with COPD    - Currently stable  - Duonebs prn          KEVEN (obstructive sleep apnea)    - Nightly CPAP -- titrate to patient's O2 saturation and comfort          GERD (gastroesophageal reflux disease)    - IV PPI            VTE Risk Mitigation         Ordered     enoxaparin injection 40 mg  Daily      06/21/18 1933     IP VTE HIGH RISK PATIENT  Once      06/21/18 1933     Place sequential compression device  Until discontinued      06/21/18 1933        Sumaya Youssef NP  Department of Hospital Medicine   [facility]

## 2018-06-22 NOTE — PROGRESS NOTES
Patient seen and examined. He is ambulating down the tejeda very well with OT/PT. Brittny and LUCAS out. Ice chips. ppt

## 2018-06-22 NOTE — ASSESSMENT & PLAN NOTE
-SBFT negative, cut repeat images and symptoms suggest otherwise.   -failed clear liquids  -persistent small bowel dilatation throughout the abdomen  -s/p exp lap with hernia repair 6/21/18  -monitor electrolytes

## 2018-06-22 NOTE — PLAN OF CARE
Problem: Patient Care Overview  Goal: Plan of Care Review  Outcome: Ongoing (interventions implemented as appropriate)  Fall precautions maintained. Pt free from falls/injuries. Pt repositions and ambulates with assistance. Pt has minimal c/o pain. Pain well controlled with PRN medication. NPO with ice chips diet maintained and tolerated. Fluid promotion with PO fluids and IV fluids. Patient turns every 2 hours. POC and meds reviewed. Patient verbalized understanding. Side rails up x2. Bed Low and locked. Personal items and call light within reach. No signs/symptoms of acute distress. Chart check done. Will monitor

## 2018-06-22 NOTE — PROGRESS NOTES
Checked patient's residuals. 10 cc noted. Will continue to monitor. Patient aware to continue only having ice chips

## 2018-06-22 NOTE — TELEPHONE ENCOUNTER
----- Message from Shannon Moses sent at 6/22/2018 12:46 PM CDT -----  Contact: SPOUSE   She's calling in regards to speak with nurse she has a few question concerning appt Tuesday jeremy call back at [phone] (home) [phone] (work)

## 2018-06-22 NOTE — PROGRESS NOTES
[facility] -   General Surgery  Progress Note    Subjective:     History of Present Illness:  Patient reports a 3 day history of epigastric abdominal pain.  Pain is sharp, crampy, stopping, and achy in nature.  If it was associated with multiple episodes of nausea and vomiting starting on Saturday.  He also had several episodes of loose stool.    The patient stated that when he ever he drank some liquid several hours later he would vomit.  He has never had any abdominal pain like this before.  He denies any blood per rectum.    He states that his a bulge of his umbilicus that was not there before.    He reports having city water.  He has eaten no unusual foods recently nor eaten out recently    Post-Op Info:  Procedure(s) (LRB):  LAPAROSCOPY, DIAGNOSTIC (N/A)  REPAIR, HERNIA, UMBILICAL, AGE 5 YEARS OR OLDER (N/A)   1 Day Post-Op     Interval History:  Complaint slightly of NG tube and Mart.  Mild abdominal pain.  No nausea    Medications:  Continuous Infusions:   dextrose 5 % and 0.45 % NaCl with KCl 20 mEq 100 mL/hr at 06/22/18 0933     Scheduled Meds:   chlorhexidine  10 mL Mouth/Throat BID    enoxaparin  40 mg Subcutaneous Daily    nozaseptin   Each Nare BID    pantoprazole 40 mg in dextrose 5 % 100 mL IVPB (over 15 minutes) (ready to mix system)  40 mg Intravenous Daily    potassium phosphate IVPB  15 mmol Intravenous Once     PRN Meds:albuterol-ipratropium, diphenhydrAMINE, hydrALAZINE, HYDROmorphone, HYDROmorphone, ondansetron, ondansetron, promethazine (PHENERGAN) IVPB     Review of patient's allergies indicates:   Allergen Reactions    Zithromax [azithromycin] Palpitations    Lipitor [atorvastatin] Other (See Comments)     Leg cramps/hand cramps     Objective:     Vital Signs (Most Recent):  Temp: 98.3 °F (36.8 °C) (06/22/18 0833)  Pulse: (!) 51 (06/22/18 0833)  Resp: 16 (06/22/18 0833)  BP: 132/68 (06/22/18 0833)  SpO2: 96 % (06/22/18 0833) Vital Signs (24h Range):  Temp:  [97.3 °F  (36.3 °C)-98.6 °F (37 °C)] 98.3 °F (36.8 °C)  Pulse:  [51-80] 51  Resp:  [11-27] 16  SpO2:  [91 %-100 %] 96 %  BP: (125-173)/(64-80) 132/68     Weight: 118.4 kg (261 lb)  Body mass index is 32.62 kg/m².    Intake/Output - Last 3 Shifts       06/20 0700 - 06/21 0659 06/21 0700 - 06/22 0659 06/22 0700 - 06/23 0659    P.O. 380 0     I.V. (mL/kg) 1120.8 (9.5) 1785 (15.1)     NG/GT  60     IV Piggyback 50 100     Total Intake(mL/kg) 1550.8 (13.1) 1945 (16.4)     Urine (mL/kg/hr) 400 (0.1) 1427 (0.5)     Emesis/NG output  1 (0)     Drains 850 (0.3) 475 (0.2)     Total Output 1250 1903      Net +300.8 +42             Urine Occurrence 3 x            Physical Exam   Constitutional: He is oriented to person, place, and time. He appears well-nourished. No distress.   HENT:   Head: Normocephalic and atraumatic.   Eyes: Pupils are equal, round, and reactive to light.   Neck: Normal range of motion. Neck supple.   Cardiovascular: Normal rate, regular rhythm and normal heart sounds.    Pulmonary/Chest: Effort normal and breath sounds normal.   Abdominal: Soft. Bowel sounds are normal. He exhibits distension. There is tenderness.   Mild distention, mild incisional tenderness   Neurological: He is alert and oriented to person, place, and time.   Skin: Skin is warm.   Psychiatric: He has a normal mood and affect. His behavior is normal. Judgment and thought content normal.   Vitals reviewed.      Significant Labs:  CBC:   Recent Labs  Lab 06/22/18  0444   WBC 7.62   RBC 4.79   HGB 14.9   HCT 41.7      MCV 87   MCH 31.1*   MCHC 35.7     BMP:   Recent Labs  Lab 06/22/18  0444   GLU 84  84     140   K 3.3*  3.3*     102   CO2 23  23   BUN 11  11   CREATININE 0.7  0.7   CALCIUM 8.9  8.9   MG 1.9     CMP:   Recent Labs  Lab 06/22/18  0444   GLU 84  84   CALCIUM 8.9  8.9   ALBUMIN 3.3*   PROT 6.5     140   K 3.3*  3.3*   CO2 23  23     102   BUN 11  11   CREATININE 0.7  0.7   ALKPHOS 67   ALT  42   AST 25   BILITOT 0.9       Significant Diagnostics:  I have reviewed and interpreted all pertinent imaging results/findings within the past 24 hours.     EXAMINATION:  XR ABDOMEN FLAT AND ERECT    CLINICAL HISTORY:  Follow-up for SBO after negative laparoscopy;    TECHNIQUE:  Single frontal view of the chest was performed.    COMPARISON:  06/21/2018    FINDINGS:  Improved bowel gas pattern with decreased small bowel distention.  Moderate amount of air is seen throughout the colon.  No free air is seen.  Bones demonstrate advanced degenerative changes of the lower lumbar spine nasogastric tube remains in position.  In comparison to the prior study, there is no adverse interval changes   Impression       In comparison to the prior study, there is no adverse interval changes      Electronically signed by: Hussain Whitfield MD  Date: 06/22/2018  Time: 08:14         Assessment/Plan:     * SBO (small bowel obstruction)    Postop day 1.  Laparoscopy with no obvious mechanical reason for obstruction.  Patient's x-rays show decrease in small bowel dilatation.  Umbilical hernia was repaired.  Remove NG tube.  Remove Mart catheter.  Keep NPO except for ice chips.  Advanced eyelid slowly starting tomorrow          Umbilical hernia without obstruction and without gangrene    Likely related to a trocar site from his cholecystectomy.    Repair with suture        KEVEN (obstructive sleep apnea)    Management per Medicine        GERD (gastroesophageal reflux disease)    IV PPI per Medicine            Casper Martinez MD  General Surgery  [facility] -

## 2018-06-22 NOTE — PROGRESS NOTES
Per MD orders, patient experiencing no nausea or vomiting. No acute distress noted. Patient tolerating ice chips. NGT removed per order. Patient tolerated. Will monitor.

## 2018-06-22 NOTE — MEDICAL/APP STUDENT
[facility]  General Surgery   Progress Note      Admit Date: 6/18/2018  Attending: Dr. Martinez  S/P: Procedure(s) (LRB):  LAPAROSCOPY, DIAGNOSTIC (N/A)  REPAIR, HERNIA, UMBILICAL, AGE 5 YEARS OR OLDER (N/A)    Post-operative Day: 1 Day Post-Op    Hospital Day: 5    SUBJECTIVE:     Pt feels well today. Mild abdo pain consistent with surgery yesterday- pain medication effective. No nausea or vomiting this morning. Pt complains of the onset of post nasal drip which he has experienced in the past, and states is sometimes a prodrome to lung infection.He recieved duonebs this morning, which helped a little, and requests Zyrtec which usually clears up his PND. Pt also asks if his varma can be removed today so that he may shower.    OBJECTIVE:     Vital Signs (Most Recent)  Temp: 98 °F (36.7 °C) (06/22/18 0406)  Pulse: 80 (06/22/18 0421)  Resp: 17 (06/22/18 0421)  BP: 136/64 (06/22/18 0406)  SpO2: 95 % (06/22/18 0421)    Vital Signs Range (Last 24H):  Temp:  [97.3 °F (36.3 °C)-98.6 °F (37 °C)]   Pulse:  [57-80]   Resp:  [11-27]   BP: (125-173)/(64-80)   SpO2:  [91 %-100 %]     I & O (Last 24H):  Intake/Output Summary (Last 24 hours) at 06/22/18 0659  Last data filed at 06/22/18 0652   Gross per 24 hour   Intake             1945 ml   Output             1903 ml   Net               42 ml     Physical Exam:  General: well developed, well nourished, no distress  Lungs:  clear to auscultation bilaterally and normal respiratory effort  Heart: regular rate and rhythm, S1, S2 normal, no murmur, rub or gallop and normal apical impulse  Abdomen: expected post-operative tenderness    Wound/Incision:  Not examined    Laboratory:  CBC:   Recent Labs  Lab 06/22/18  0444   WBC 7.62   RBC 4.79   HGB 14.9   HCT 41.7      MCV 87   MCH 31.1*   MCHC 35.7     BMP:   Recent Labs  Lab 06/22/18  0444   GLU 84  84     140   K 3.3*  3.3*     102   CO2 23  23   BUN 11  11   CREATININE 0.7  0.7   CALCIUM 8.9  8.9      CMP:   Recent Labs  Lab 06/22/18  0444   GLU 84  84   CALCIUM 8.9  8.9   ALBUMIN 3.3*   PROT 6.5     140   K 3.3*  3.3*   CO2 23  23     102   BUN 11  11   CREATININE 0.7  0.7   ALKPHOS 67   ALT 42   AST 25   BILITOT 0.9     Labs within the past 24 hours have been reviewed.    ASSESSMENT/PLAN:     ASSESSMENT:  Mr. Mcconnell is a 61yo M w small bowel obstruction POD 1 s/p ex lap. He is feeling well today.      Plan:   Small bowel obstruction  Found areas of hyperemia and area of dialated bowel, but did not find mechanical obstruction on ex lap.  Monitor for post op complications (infection, bleeding)    Umbilical hernia without obstruction or gangrene  Likely related to trocar site from previous cholecsytectomy   Repaired with suture during ex lap yesterday    Post Nasal Drip  Likely secondary to NG tube  ENT consult    Obstructive sleep apnea  Mgmt per medicine    Gastro-esophageal reflux disease  Mgmt with PPI per medicine    Velvet Young

## 2018-06-22 NOTE — SUBJECTIVE & OBJECTIVE
Interval History:  Complaint slightly of NG tube and Mart.  Mild abdominal pain.  No nausea    Medications:  Continuous Infusions:   dextrose 5 % and 0.45 % NaCl with KCl 20 mEq 100 mL/hr at 06/22/18 0933     Scheduled Meds:   chlorhexidine  10 mL Mouth/Throat BID    enoxaparin  40 mg Subcutaneous Daily    nozaseptin   Each Nare BID    pantoprazole 40 mg in dextrose 5 % 100 mL IVPB (over 15 minutes) (ready to mix system)  40 mg Intravenous Daily    potassium phosphate IVPB  15 mmol Intravenous Once     PRN Meds:albuterol-ipratropium, diphenhydrAMINE, hydrALAZINE, HYDROmorphone, HYDROmorphone, ondansetron, ondansetron, promethazine (PHENERGAN) IVPB     Review of patient's allergies indicates:   Allergen Reactions    Zithromax [azithromycin] Palpitations    Lipitor [atorvastatin] Other (See Comments)     Leg cramps/hand cramps     Objective:     Vital Signs (Most Recent):  Temp: 98.3 °F (36.8 °C) (06/22/18 0833)  Pulse: (!) 51 (06/22/18 0833)  Resp: 16 (06/22/18 0833)  BP: 132/68 (06/22/18 0833)  SpO2: 96 % (06/22/18 0833) Vital Signs (24h Range):  Temp:  [97.3 °F (36.3 °C)-98.6 °F (37 °C)] 98.3 °F (36.8 °C)  Pulse:  [51-80] 51  Resp:  [11-27] 16  SpO2:  [91 %-100 %] 96 %  BP: (125-173)/(64-80) 132/68     Weight: 118.4 kg (261 lb)  Body mass index is 32.62 kg/m².    Intake/Output - Last 3 Shifts       06/20 0700 - 06/21 0659 06/21 0700 - 06/22 0659 06/22 0700 - 06/23 0659    P.O. 380 0     I.V. (mL/kg) 1120.8 (9.5) 1785 (15.1)     NG/GT  60     IV Piggyback 50 100     Total Intake(mL/kg) 1550.8 (13.1) 1945 (16.4)     Urine (mL/kg/hr) 400 (0.1) 1427 (0.5)     Emesis/NG output  1 (0)     Drains 850 (0.3) 475 (0.2)     Total Output 1250 1903      Net +300.8 +42             Urine Occurrence 3 x            Physical Exam   Constitutional: He is oriented to person, place, and time. He appears well-nourished. No distress.   HENT:   Head: Normocephalic and atraumatic.   Eyes: Pupils are equal, round, and reactive to  light.   Neck: Normal range of motion. Neck supple.   Cardiovascular: Normal rate, regular rhythm and normal heart sounds.    Pulmonary/Chest: Effort normal and breath sounds normal.   Abdominal: Soft. Bowel sounds are normal. He exhibits distension. There is tenderness.   Mild distention, mild incisional tenderness   Neurological: He is alert and oriented to person, place, and time.   Skin: Skin is warm.   Psychiatric: He has a normal mood and affect. His behavior is normal. Judgment and thought content normal.   Vitals reviewed.      Significant Labs:  CBC:   Recent Labs  Lab 06/22/18  0444   WBC 7.62   RBC 4.79   HGB 14.9   HCT 41.7      MCV 87   MCH 31.1*   MCHC 35.7     BMP:   Recent Labs  Lab 06/22/18 0444   GLU 84  84     140   K 3.3*  3.3*     102   CO2 23  23   BUN 11  11   CREATININE 0.7  0.7   CALCIUM 8.9  8.9   MG 1.9     CMP:   Recent Labs  Lab 06/22/18  0444   GLU 84  84   CALCIUM 8.9  8.9   ALBUMIN 3.3*   PROT 6.5     140   K 3.3*  3.3*   CO2 23  23     102   BUN 11  11   CREATININE 0.7  0.7   ALKPHOS 67   ALT 42   AST 25   BILITOT 0.9       Significant Diagnostics:  I have reviewed and interpreted all pertinent imaging results/findings within the past 24 hours.     EXAMINATION:  XR ABDOMEN FLAT AND ERECT    CLINICAL HISTORY:  Follow-up for SBO after negative laparoscopy;    TECHNIQUE:  Single frontal view of the chest was performed.    COMPARISON:  06/21/2018    FINDINGS:  Improved bowel gas pattern with decreased small bowel distention.  Moderate amount of air is seen throughout the colon.  No free air is seen.  Bones demonstrate advanced degenerative changes of the lower lumbar spine nasogastric tube remains in position.  In comparison to the prior study, there is no adverse interval changes   Impression       In comparison to the prior study, there is no adverse interval changes      Electronically signed by: Hussain Whitfield MD  Date: 06/22/2018  Time:  08:14

## 2018-06-22 NOTE — ASSESSMENT & PLAN NOTE
Postop day 1.  Laparoscopy with no obvious mechanical reason for obstruction.  Patient's x-rays show decrease in small bowel dilatation.  Umbilical hernia was repaired.  Remove NG tube.  Remove Mart catheter.  Keep NPO except for ice chips.  Advanced eyelid slowly starting tomorrow

## 2018-06-22 NOTE — PT/OT/SLP EVAL
Physical Therapy Evaluation    Patient Name:  Yung Mcconnell   MRN:  [id number]    Recommendations:     Discharge Recommendations:  home   Discharge Equipment Recommendations: none   Barriers to discharge: None    Assessment:     Yung Mcconnell is a 62 y.o. male admitted with a medical diagnosis of SBO (small bowel obstruction).         Recent Surgery: Procedure(s) (LRB):  LAPAROSCOPY, DIAGNOSTIC (N/A)  REPAIR, HERNIA, UMBILICAL, AGE 3 YEARS OR OLDER (N/A) 1 Day Post-Op    Plan:     · PT WILL BE D/C TO MOBILITY PROGRAM FOR WALKING MAINTENANCE.  · Plan of Care Expires:  06/22/18   Plan of Care Reviewed with: patient    Subjective     Communicated with SUP IN BED WITH WIFE prior to session.  Patient found SUP IN BED  upon PT entry to room, agreeable to evaluation.      Chief Complaint: NONE   Patient comments/goals: GO HOME   Pain/Comfort:  · Pain Rating 1: 2/10  · Location 1: abdomen  · Pain Rating Post-Intervention 1: 2/10    Patients cultural, spiritual, Muslim conflicts given the current situation:      Living Environment:  PT LIVES AT HOME WITH WIFE AND HAS NO STEPS TO ENTER HOME  Prior to admission, patients level of function was IND.  Patient has the following equipment: none.  DME owned (not currently used): rolling walker, single point cane, bedside commode and shower chair.  Upon discharge, patient will have assistance from WIFE.    Objective:     Patient found with: peripheral IV, oxygen, NG tube     General Precautions: Standard,     Orthopedic Precautions:N/A   Braces: N/A     Exams:  · RLE ROM: WNL  · RLE Strength: WNL  · LLE ROM: WNL  · LLE Strength: WNL    Functional Mobility:  · PT SUP>SIT >STAND WITH NO AD AND GT TRAINED X 500' IND. PT RETURNED TO RM T/F TO EOB AND SUP IN BED IND. PT EDUCATED ON ROLE OF P.T. AND MOBILITY PROGRAM. PT WILL BE D/C TO MOBILITY PROGRAM FOR WALKING    AM-PAC 6 CLICK MOBILITY  Total Score:24     Patient left supine with call button in reach and WIFE present.    GOALS:     Physical Therapy Goals     Not on file                History:     Past Medical History:   Diagnosis Date    Allergy     Asthma, chronic 7/9/2013    Colon polyps     COPD (chronic obstructive pulmonary disease)     GERD (gastroesophageal reflux disease)     High cholesterol     Osteoarthritis of both knees 7/9/2013    Sleep apnea        Past Surgical History:   Procedure Laterality Date    CHOLECYSTECTOMY      DIAGNOSTIC LAPAROSCOPY N/A 6/21/2018    Procedure: LAPAROSCOPY, DIAGNOSTIC;  Surgeon: Casper Martinez MD;  Location: [facility];  Service: General;  Laterality: N/A;    LUNG SURGERY Right     benign mass    UMBILICAL HERNIA REPAIR N/A 6/21/2018    Procedure: REPAIR, HERNIA, UMBILICAL, AGE 5 YEARS OR OLDER;  Surgeon: Casper Martinez MD;  Location: [facility];  Service: General;  Laterality: N/A;       Clinical Decision Making:     History  Co-morbidities and personal factors that may impact the plan of care Examination  Body Structures and Functions, activity limitations and participation restrictions that may impact the plan of care Clinical Presentation   Decision Making/ Complexity Score   Co-morbidities:   [] Time since onset of injury / illness / exacerbation  [] Status of current condition  []Patient's cognitive status and safety concerns    [] Multiple Medical Problems (see med hx)  Personal Factors:   [] Patient's age  [] Prior Level of function   [] Patient's home situation (environment and family support)  [] Patient's level of motivation  [] Expected progression of patient      HISTORY:(criteria)    [] 60112 - no personal factors/history    [] 39084 - has 1-2 personal factor/comorbidity     [] 89017 - has >3 personal factor/comorbidity     Body Regions:  [] Objective examination findings  [] Head     []  Neck  [] Trunk   [] Upper Extremity  [] Lower Extremity    Body Systems:  [] For communication ability, affect, cognition, language, and learning style: the assessment of the ability to  make needs known, consciousness, orientation (person, place, and time), expected emotional /behavioral responses, and learning preferences (eg, learning barriers, education  needs)  [] For the neuromuscular system: a general assessment of gross coordinated movement (eg, balance, gait, locomotion, transfers, and transitions) and motor function  (motor control and motor learning)  [] For the musculoskeletal system: the assessment of gross symmetry, gross range of motion, gross strength, height, and weight  [] For the integumentary system: the assessment of pliability(texture), presence of scar formation, skin color, and skin integrity  [] For cardiovascular/pulmonary system: the assessment of heart rate, respiratory rate, blood pressure, and edema     Activity limitations:    [] Patient's cognitive status and saf ety concerns          [] Status of current condition      [] Weight bearing restriction  [] Cardiopulmunary Restriction    Participation Restrictions:   [] Goals and goal agreement with the patient     [] Rehab potential (prognosis) and probable outcome      Examination of Body System: (criteria)    [] 74717 - addressing 1-2 elements    [] 08940 - addressing a total of 3 or more elements     [] 18732 -  Addressing a total of 4 or more elements         Clinical Presentation: (criteria)  Choose one     On examination of body system using standardized tests and measures patient presents with (CHOOSE ONE) elements from any of the following: body structures and functions, activity limitations, and/or participation restrictions.  Leading to a clinical presentation that is considered (CHOOSE ONE)                              Clinical Decision Making  (Eval Complexity):  Choose One     Time Tracking:     PT Received On: 06/22/18  PT Start Time: 1315     PT Stop Time: 1340  PT Total Time (min): 25 min     Billable Minutes: Evaluation 15 and Gait Training 10      Jillian Estrada, PT  06/22/2018

## 2018-06-23 LAB
ALBUMIN SERPL BCP-MCNC: 3.2 G/DL
ALP SERPL-CCNC: 62 U/L
ALT SERPL W/O P-5'-P-CCNC: 51 U/L
ANION GAP SERPL CALC-SCNC: 10 MMOL/L
AST SERPL-CCNC: 34 U/L
BASOPHILS # BLD AUTO: 0.04 K/UL
BASOPHILS NFR BLD: 0.5 %
BILIRUB SERPL-MCNC: 0.9 MG/DL
BUN SERPL-MCNC: 9 MG/DL
CALCIUM SERPL-MCNC: 8.9 MG/DL
CHLORIDE SERPL-SCNC: 103 MMOL/L
CO2 SERPL-SCNC: 26 MMOL/L
CREAT SERPL-MCNC: 0.7 MG/DL
DIFFERENTIAL METHOD: ABNORMAL
EOSINOPHIL # BLD AUTO: 0.3 K/UL
EOSINOPHIL NFR BLD: 3.4 %
ERYTHROCYTE [DISTWIDTH] IN BLOOD BY AUTOMATED COUNT: 14 %
EST. GFR  (AFRICAN AMERICAN): >60 ML/MIN/1.73 M^2
EST. GFR  (NON AFRICAN AMERICAN): >60 ML/MIN/1.73 M^2
GLUCOSE SERPL-MCNC: 93 MG/DL
HCT VFR BLD AUTO: 38.2 %
HGB BLD-MCNC: 13.8 G/DL
LYMPHOCYTES # BLD AUTO: 2.6 K/UL
LYMPHOCYTES NFR BLD: 34.2 %
MCH RBC QN AUTO: 31.4 PG
MCHC RBC AUTO-ENTMCNC: 36.1 G/DL
MCV RBC AUTO: 87 FL
MONOCYTES # BLD AUTO: 0.8 K/UL
MONOCYTES NFR BLD: 10.7 %
NEUTROPHILS # BLD AUTO: 3.9 K/UL
NEUTROPHILS NFR BLD: 51.2 %
PLATELET # BLD AUTO: 204 K/UL
PMV BLD AUTO: 9.6 FL
POTASSIUM SERPL-SCNC: 3.2 MMOL/L
PROT SERPL-MCNC: 6.1 G/DL
RBC # BLD AUTO: 4.4 M/UL
SODIUM SERPL-SCNC: 139 MMOL/L
WBC # BLD AUTO: 7.68 K/UL

## 2018-06-23 PROCEDURE — 63600175 PHARM REV CODE 636 W HCPCS: Performed by: EMERGENCY MEDICINE

## 2018-06-23 PROCEDURE — 25000003 PHARM REV CODE 250: Performed by: SURGERY

## 2018-06-23 PROCEDURE — 27000221 HC OXYGEN, UP TO 24 HOURS

## 2018-06-23 PROCEDURE — 25000003 PHARM REV CODE 250: Performed by: EMERGENCY MEDICINE

## 2018-06-23 PROCEDURE — C9113 INJ PANTOPRAZOLE SODIUM, VIA: HCPCS | Performed by: EMERGENCY MEDICINE

## 2018-06-23 PROCEDURE — 99900035 HC TECH TIME PER 15 MIN (STAT)

## 2018-06-23 PROCEDURE — 85025 COMPLETE CBC W/AUTO DIFF WBC: CPT

## 2018-06-23 PROCEDURE — 94799 UNLISTED PULMONARY SVC/PX: CPT

## 2018-06-23 PROCEDURE — 80053 COMPREHEN METABOLIC PANEL: CPT

## 2018-06-23 PROCEDURE — 94761 N-INVAS EAR/PLS OXIMETRY MLT: CPT

## 2018-06-23 PROCEDURE — 94640 AIRWAY INHALATION TREATMENT: CPT

## 2018-06-23 PROCEDURE — 25000242 PHARM REV CODE 250 ALT 637 W/ HCPCS: Performed by: NURSE PRACTITIONER

## 2018-06-23 PROCEDURE — 25000003 PHARM REV CODE 250: Performed by: NURSE PRACTITIONER

## 2018-06-23 PROCEDURE — 11000001 HC ACUTE MED/SURG PRIVATE ROOM

## 2018-06-23 PROCEDURE — 36415 COLL VENOUS BLD VENIPUNCTURE: CPT

## 2018-06-23 PROCEDURE — 63600175 PHARM REV CODE 636 W HCPCS: Performed by: SURGERY

## 2018-06-23 RX ORDER — SYRING-NEEDL,DISP,INSUL,0.3 ML 29 G X1/2"
296 SYRINGE, EMPTY DISPOSABLE MISCELLANEOUS
Status: DISCONTINUED | OUTPATIENT
Start: 2018-06-23 | End: 2018-06-23

## 2018-06-23 RX ORDER — BISACODYL 5 MG
10 TABLET, DELAYED RELEASE (ENTERIC COATED) ORAL ONCE
Status: DISCONTINUED | OUTPATIENT
Start: 2018-06-23 | End: 2018-06-23

## 2018-06-23 RX ORDER — CETIRIZINE HYDROCHLORIDE 10 MG/1
10 TABLET ORAL DAILY
Status: DISCONTINUED | OUTPATIENT
Start: 2018-06-23 | End: 2018-06-23

## 2018-06-23 RX ORDER — POTASSIUM CHLORIDE 20 MEQ/1
40 TABLET, EXTENDED RELEASE ORAL ONCE
Status: COMPLETED | OUTPATIENT
Start: 2018-06-23 | End: 2018-06-23

## 2018-06-23 RX ORDER — PANTOPRAZOLE SODIUM 40 MG/1
40 TABLET, DELAYED RELEASE ORAL DAILY
Status: DISCONTINUED | OUTPATIENT
Start: 2018-06-24 | End: 2018-06-24 | Stop reason: HOSPADM

## 2018-06-23 RX ORDER — PSEUDOEPHEDRINE/ACETAMINOPHEN 30MG-500MG
100 TABLET ORAL
Status: DISCONTINUED | OUTPATIENT
Start: 2018-06-23 | End: 2018-06-23

## 2018-06-23 RX ORDER — HYDROCODONE BITARTRATE AND ACETAMINOPHEN 10; 325 MG/1; MG/1
1 TABLET ORAL EVERY 4 HOURS PRN
Status: DISCONTINUED | OUTPATIENT
Start: 2018-06-23 | End: 2018-06-24

## 2018-06-23 RX ADMIN — HYDROMORPHONE HYDROCHLORIDE 0.5 MG: 1 INJECTION, SOLUTION INTRAMUSCULAR; INTRAVENOUS; SUBCUTANEOUS at 08:06

## 2018-06-23 RX ADMIN — CHLORHEXIDINE GLUCONATE 10 ML: 1.2 RINSE ORAL at 08:06

## 2018-06-23 RX ADMIN — IPRATROPIUM BROMIDE AND ALBUTEROL SULFATE 3 ML: .5; 3 SOLUTION RESPIRATORY (INHALATION) at 02:06

## 2018-06-23 RX ADMIN — POTASSIUM CHLORIDE, DEXTROSE MONOHYDRATE AND SODIUM CHLORIDE: 150; 5; 450 INJECTION, SOLUTION INTRAVENOUS at 02:06

## 2018-06-23 RX ADMIN — POTASSIUM CHLORIDE 40 MEQ: 1500 TABLET, EXTENDED RELEASE ORAL at 05:06

## 2018-06-23 RX ADMIN — POTASSIUM CHLORIDE, DEXTROSE MONOHYDRATE AND SODIUM CHLORIDE: 150; 5; 450 INJECTION, SOLUTION INTRAVENOUS at 01:06

## 2018-06-23 RX ADMIN — IPRATROPIUM BROMIDE AND ALBUTEROL SULFATE 3 ML: .5; 3 SOLUTION RESPIRATORY (INHALATION) at 08:06

## 2018-06-23 RX ADMIN — DEXTROSE 40 MG: 50 INJECTION, SOLUTION INTRAVENOUS at 08:06

## 2018-06-23 RX ADMIN — HYDROCODONE BITARTRATE AND ACETAMINOPHEN 1 TABLET: 10; 325 TABLET ORAL at 08:06

## 2018-06-23 RX ADMIN — ENOXAPARIN SODIUM 40 MG: 100 INJECTION SUBCUTANEOUS at 05:06

## 2018-06-23 NOTE — PT/OT/SLP PROGRESS
Occupational Therapy      Patient Name:  Yung Mcconnell   MRN:  [id number]    EVAL INITIATED VIA CHART REVIEW WILL COMPLETE ON LATER DATE AND OR TIME  Yvonne Menon OT  6/22/2018

## 2018-06-23 NOTE — SUBJECTIVE & OBJECTIVE
Interval History:  denies any nausea, passing flatus and bowel movement    Medications:  Continuous Infusions:   dextrose 5 % and 0.45 % NaCl with KCl 20 mEq 100 mL/hr at 06/23/18 0148     Scheduled Meds:   chlorhexidine  10 mL Mouth/Throat BID    enoxaparin  40 mg Subcutaneous Daily    nozaseptin   Each Nare BID    pantoprazole 40 mg in dextrose 5 % 100 mL IVPB (over 15 minutes) (ready to mix system)  40 mg Intravenous Daily     PRN Meds:albuterol-ipratropium, diphenhydrAMINE, hydrALAZINE, HYDROcodone-acetaminophen, HYDROmorphone, ondansetron, ondansetron, promethazine (PHENERGAN) IVPB     Review of patient's allergies indicates:   Allergen Reactions    Zithromax [azithromycin] Palpitations    Lipitor [atorvastatin] Other (See Comments)     Leg cramps/hand cramps     Objective:     Vital Signs (Most Recent):  Temp: 97.7 °F (36.5 °C) (06/23/18 0751)  Pulse: 69 (06/23/18 0751)  Resp: 18 (06/23/18 0751)  BP: 136/75 (06/23/18 0751)  SpO2: (!) 93 % (06/23/18 0751) Vital Signs (24h Range):  Temp:  [97.5 °F (36.4 °C)-98.7 °F (37.1 °C)] 97.7 °F (36.5 °C)  Pulse:  [57-69] 69  Resp:  [16-20] 18  SpO2:  [88 %-99 %] 93 %  BP: (126-136)/(64-75) 136/75     Weight: 118.4 kg (261 lb)  Body mass index is 32.62 kg/m².    Intake/Output - Last 3 Shifts       06/21 0700 - 06/22 0659 06/22 0700 - 06/23 0659 06/23 0700 - 06/24 0659    P.O. 0  360    I.V. (mL/kg) 1785 (15.1) 1200 (10.1)     NG/GT 60 60     IV Piggyback 100      Total Intake(mL/kg) 1945 (16.4) 1260 (10.6) 360 (3)    Urine (mL/kg/hr) 1427 (0.5) 850 (0.3)     Emesis/NG output 1 (0)      Drains 475 (0.2) 250 (0.1)     Total Output 1903 1100      Net +42 +160 +360           Urine Occurrence  4 x           Physical Exam   Constitutional: He is oriented to person, place, and time. He appears well-nourished. No distress.   HENT:   Head: Normocephalic and atraumatic.   Eyes: Pupils are equal, round, and reactive to light.   Neck: Normal range of motion. Neck supple.    Cardiovascular: Normal rate, regular rhythm and normal heart sounds.    Pulmonary/Chest: Effort normal and breath sounds normal.   Abdominal: Soft. Bowel sounds are normal. He exhibits distension. There is tenderness.   Mild distention, mild incisional tenderness   Neurological: He is alert and oriented to person, place, and time.   Skin: Skin is warm.   Psychiatric: He has a normal mood and affect. His behavior is normal. Judgment and thought content normal.   Vitals reviewed.      Significant Labs:  CBC:     Recent Labs  Lab 06/23/18  0548   WBC 7.68   RBC 4.40*   HGB 13.8*   HCT 38.2*      MCV 87   MCH 31.4*   MCHC 36.1*     BMP:     Recent Labs  Lab 06/22/18  0444   GLU 84  84     140   K 3.3*  3.3*     102   CO2 23  23   BUN 11  11   CREATININE 0.7  0.7   CALCIUM 8.9  8.9   MG 1.9     CMP:     Recent Labs  Lab 06/22/18  0444   GLU 84  84   CALCIUM 8.9  8.9   ALBUMIN 3.3*   PROT 6.5     140   K 3.3*  3.3*   CO2 23  23     102   BUN 11  11   CREATININE 0.7  0.7   ALKPHOS 67   ALT 42   AST 25   BILITOT 0.9       Significant Diagnostics:  I have reviewed and interpreted all pertinent imaging results/findings within the past 24 hours.     EXAMINATION:  XR ABDOMEN FLAT AND ERECT    CLINICAL HISTORY:  Follow-up for SBO after negative laparoscopy;    TECHNIQUE:  Single frontal view of the chest was performed.    COMPARISON:  06/21/2018    FINDINGS:  Improved bowel gas pattern with decreased small bowel distention.  Moderate amount of air is seen throughout the colon.  No free air is seen.  Bones demonstrate advanced degenerative changes of the lower lumbar spine nasogastric tube remains in position.  In comparison to the prior study, there is no adverse interval changes   Impression       In comparison to the prior study, there is no adverse interval changes      Electronically signed by: Hussain Whitfield MD  Date: 06/22/2018  Time: 08:14

## 2018-06-23 NOTE — ASSESSMENT & PLAN NOTE
Postop day 2.  Laparoscopy with no obvious mechanical reason for obstruction.  Umbilical hernia was repaired.    Clear liquid diet  P.o. meds  Ambulate

## 2018-06-23 NOTE — PROGRESS NOTES
Patient seen and examined. Tolerating sips of clear liquids throughout the day. Labs checked and K+ 3.1. Will replace potassium. He has walked 5 x down the and feels better. D/c when  agrees. IV infiltrated and will leave out for now. ppt

## 2018-06-23 NOTE — PROGRESS NOTES
[facility] Hospital Medicine  Progress Note    Patient Name: Yung Mcconnell  MRN: [id number]  Patient Class: IP- Inpatient   Admission Date: 6/18/2018  Length of Stay: 5 days  Attending Physician: Leticia Woo MD  Primary Care Provider: LEVI GOVEA MD    Subjective:     Principal Problem:SBO (small bowel obstruction)    HPI:  Yung Mcconnell is a 62 year old male with a PMHx of KEVEN on CPAP, OA, HLD, GERD, COPD, and Asthma who presented to the Emergency Department with c/o abdominal pain since Saturday afternoon. Associated symptoms include: N/V/D. Pt denies being around sick/ill contacts. Pt reports having gallbladder removed laparoscopically about 5-6 years ago. Pt denies any other abdominal surgeries. ED workup revealed: WBC 12.80K, Total bilirubin 1.8, UA with 1+ ketones and bilirubin. KUB showed dilated loops of bowel are seen measuring up to 4.3 cm with some transition within the lower abdomen, findings concerning for partial or early small bowel obstruction. CT abdomen/pelvis with contrast showed small bowel loops are dilated to the mid ileum consistent with partial or early complete bowel obstruction. ED consulted General surgery. Pt admitted to Med Surg for partial SBO.     Hospital Course:  6/19/18- Yung Mcconnell is a 62 year old male who presents with a small bowel obstruction. A Small bowel follow through is being preformed today. Pending the results, the patient may have surgery.     6/20/18 SBFT negative. KUB today again showed ileus vs SBO. Had bowel movement yesterday. Electrolytes stable. Clear liquids started today.     6/21/18 KUB unchanged. S/p exp lap with hernia repair.     6/22/18 - NG tube in place and will be removed later today. Mart removed. OT/PT ordered. Minimal bowel sounds. Feels better today.    6/22/18 - tolerating sips of clear liquids, encouraged to ambulate as much as possible. Check Potassium.    Interval History: tolerating sips of clear liquids.  Ambulated once this am down the tejeda. Increase in bowel sounds noted. Had BM this am.    Review of Systems   Constitutional: Positive for appetite change (decrease). Negative for chills, diaphoresis, fatigue and fever.   HENT: Negative for drooling, ear pain, rhinorrhea and sore throat.    Eyes: Negative.    Respiratory: Negative for cough, shortness of breath and wheezing.    Cardiovascular: Negative for chest pain, palpitations and leg swelling.   Gastrointestinal: Positive for abdominal pain (surgical). Negative for diarrhea.   Endocrine: Negative.    Genitourinary: Negative for dysuria, hematuria and urgency.   Musculoskeletal: Negative.    Skin: Negative for color change and wound.   Allergic/Immunologic: Negative.    Neurological: Negative for dizziness, syncope and speech difficulty.   Hematological: Negative.    Psychiatric/Behavioral: Negative.      Objective:     Vital Signs (Most Recent):  Temp: 97.5 °F (36.4 °C) (06/23/18 1147)  Pulse: 62 (06/23/18 1147)  Resp: 18 (06/23/18 1147)  BP: 117/66 (06/23/18 1147)  SpO2: 95 % (06/23/18 1147) Vital Signs (24h Range):  Temp:  [97.5 °F (36.4 °C)-98.7 °F (37.1 °C)] 97.5 °F (36.4 °C)  Pulse:  [57-69] 62  Resp:  [16-20] 18  SpO2:  [88 %-99 %] 95 %  BP: (117-136)/(64-75) 117/66     Weight: 118.4 kg (261 lb)  Body mass index is 32.62 kg/m².    Intake/Output Summary (Last 24 hours) at 06/23/18 1525  Last data filed at 06/23/18 0900   Gross per 24 hour   Intake             1560 ml   Output                0 ml   Net             1560 ml      Physical Exam   Constitutional: He is oriented to person, place, and time. He appears well-nourished. No distress.   HENT:   Head: Normocephalic and atraumatic.   Eyes: Pupils are equal, round, and reactive to light.   Neck: Normal range of motion. Neck supple.   Cardiovascular: Normal rate, regular rhythm and normal heart sounds.    Pulmonary/Chest: Effort normal and breath sounds normal.   Abdominal: Soft. Bowel sounds are normal. He  exhibits distension. There is tenderness (surgical incision - clean and dry).   Mild distention, mild incisional tenderness   Neurological: He is alert and oriented to person, place, and time.   Skin: Skin is warm.   Psychiatric: He has a normal mood and affect. His behavior is normal. Judgment and thought content normal.   Vitals reviewed.    Significant Labs: All pertinent labs within the past 24 hours have been reviewed.    Significant Imaging: I have reviewed all pertinent imaging results/findings within the past 24 hours.    Assessment/Plan:      * SBO (small bowel obstruction)    -SBFT negative, cut repeat images and symptoms suggest otherwise.   -failed clear liquids pre-surgery, now tolerating sips  -s/p exp lap with hernia repair 6/21/18  -monitor electrolytes and replace  -encourage ambulation        Umbilical hernia without obstruction and without gangrene    -surgery following: clear liquids now        Asthma with COPD    - Currently stable  - Duonebs prn          KEVEN (obstructive sleep apnea)    - Nightly CPAP -- titrate to patient's O2 saturation and comfort          GERD (gastroesophageal reflux disease)    - IV PPI            VTE Risk Mitigation         Ordered     enoxaparin injection 40 mg  Daily      06/21/18 1933     IP VTE HIGH RISK PATIENT  Once      06/21/18 1933     Place sequential compression device  Until discontinued      06/21/18 1933        Sumaya Youssef NP  Department of Hospital Medicine   [facility]

## 2018-06-23 NOTE — PLAN OF CARE
Problem: Patient Care Overview  Goal: Plan of Care Review  Outcome: Ongoing (interventions implemented as appropriate)  Pt is tolerating NC well.

## 2018-06-23 NOTE — SUBJECTIVE & OBJECTIVE
Interval History: tolerating sips of clear liquids. Ambulated once this am down the tejeda. Increase in bowel sounds noted. Had BM this am.    Review of Systems   Constitutional: Positive for appetite change (decrease). Negative for chills, diaphoresis, fatigue and fever.   HENT: Negative for drooling, ear pain, rhinorrhea and sore throat.    Eyes: Negative.    Respiratory: Negative for cough, shortness of breath and wheezing.    Cardiovascular: Negative for chest pain, palpitations and leg swelling.   Gastrointestinal: Positive for abdominal pain (surgical). Negative for diarrhea.   Endocrine: Negative.    Genitourinary: Negative for dysuria, hematuria and urgency.   Musculoskeletal: Negative.    Skin: Negative for color change and wound.   Allergic/Immunologic: Negative.    Neurological: Negative for dizziness, syncope and speech difficulty.   Hematological: Negative.    Psychiatric/Behavioral: Negative.      Objective:     Vital Signs (Most Recent):  Temp: 97.5 °F (36.4 °C) (06/23/18 1147)  Pulse: 62 (06/23/18 1147)  Resp: 18 (06/23/18 1147)  BP: 117/66 (06/23/18 1147)  SpO2: 95 % (06/23/18 1147) Vital Signs (24h Range):  Temp:  [97.5 °F (36.4 °C)-98.7 °F (37.1 °C)] 97.5 °F (36.4 °C)  Pulse:  [57-69] 62  Resp:  [16-20] 18  SpO2:  [88 %-99 %] 95 %  BP: (117-136)/(64-75) 117/66     Weight: 118.4 kg (261 lb)  Body mass index is 32.62 kg/m².    Intake/Output Summary (Last 24 hours) at 06/23/18 1525  Last data filed at 06/23/18 0900   Gross per 24 hour   Intake             1560 ml   Output                0 ml   Net             1560 ml      Physical Exam   Constitutional: He is oriented to person, place, and time. He appears well-nourished. No distress.   HENT:   Head: Normocephalic and atraumatic.   Eyes: Pupils are equal, round, and reactive to light.   Neck: Normal range of motion. Neck supple.   Cardiovascular: Normal rate, regular rhythm and normal heart sounds.    Pulmonary/Chest: Effort normal and breath sounds  normal.   Abdominal: Soft. Bowel sounds are normal. He exhibits distension. There is tenderness (surgical incision - clean and dry).   Mild distention, mild incisional tenderness   Neurological: He is alert and oriented to person, place, and time.   Skin: Skin is warm.   Psychiatric: He has a normal mood and affect. His behavior is normal. Judgment and thought content normal.   Vitals reviewed.    Significant Labs: All pertinent labs within the past 24 hours have been reviewed.    Significant Imaging: I have reviewed all pertinent imaging results/findings within the past 24 hours.

## 2018-06-23 NOTE — PLAN OF CARE
Problem: Patient Care Overview  Goal: Plan of Care Review  Outcome: Ongoing (interventions implemented as appropriate)  Reviewed POC, including indications and possible side effects of administered medications. Pt verbalized understanding and teach back. Pain managed with repositioning and medications. Voiding independently without difficulty. Lap site x 4 with gauze dressings dry and intact with small amount of dried drainage to Left lateral site. Remains free from injury.    12 hour chart check complete.

## 2018-06-23 NOTE — PT/OT/SLP EVAL
Occupational Therapy   Evaluation    Name: Yung Mcconnell  MRN: [id number]  Admitting Diagnosis:  SBO (small bowel obstruction) 1 Day Post-Op    Recommendations:     Discharge Recommendations: home  Discharge Equipment Recommendations:  none  Barriers to discharge:       History:     Occupational Profile:  Living Environment: lives with spouse split level house  Previous level of function: (i) adl's and functional mobility  Roles and Routines: occupational therapy  Equipment Owned:  walker, rolling, wheelchair, cane, straight, bedside commode, bath bench  Assistance upon Discharge:     Past Medical History:   Diagnosis Date    Allergy     Asthma, chronic 7/9/2013    Colon polyps     COPD (chronic obstructive pulmonary disease)     GERD (gastroesophageal reflux disease)     High cholesterol     Osteoarthritis of both knees 7/9/2013    Sleep apnea        Past Surgical History:   Procedure Laterality Date    CHOLECYSTECTOMY      DIAGNOSTIC LAPAROSCOPY N/A 6/21/2018    Procedure: LAPAROSCOPY, DIAGNOSTIC;  Surgeon: Casper Martinez MD;  Location: [facility];  Service: General;  Laterality: N/A;    LUNG SURGERY Right     benign mass    UMBILICAL HERNIA REPAIR N/A 6/21/2018    Procedure: REPAIR, HERNIA, UMBILICAL, AGE 5 YEARS OR OLDER;  Surgeon: Casper Martinez MD;  Location: [facility];  Service: General;  Laterality: N/A;       Subjective     Chief Complaint:   Patient/Family stated goals:   Communicated with: nurse and epic chart prior to session.  Pain/Comfort:  · Pain Rating 1: 0/10    Patients cultural, spiritual, Church conflicts given the current situation:      Objective:     Patient found with:      General Precautions: Standard, fall   Orthopedic Precautions:N/A   Braces: N/A     Occupational Performance:    Bed Mobility:    · Patient completed Rolling/Turning to Left with  independence  · Patient completed Rolling/Turning to Right with independence  · Patient completed Scooting/Bridging with  "independence  · Patient completed Supine to Sit with independence  · Patient completed Sit to Supine with independence    Functional Mobility/Transfers:  · Patient completed Sit <> Stand Transfer with independence  with  no assistive device   · Functional Mobility:(i) with adl's    Activities of Daily Living:  · UB Dressing: independence x1  · LB Dressing: independence x1  toileting (I)  Cognitive/Visual Perceptual:  Cognitive/Psychosocial Skills:     -       Oriented to: Person, Place, Time and Situation   -       Follows Commands/attention:Follows multistep  commands  -       Communication: clear/fluent  -       Memory: No Deficits noted  -       Safety awareness/insight to disability: intact   Visual/Perceptual:      -Intact    Physical Exam:  Upper Extremity Range of Motion:     -       Right Upper Extremity: WFL  -       Left Upper Extremity: WFL  Upper Extremity Strength:    -       Right Upper Extremity: WFL  -       Left Upper Extremity: WFL   Strength:    -       Right Upper Extremity: WFL  -       Left Upper Extremity: WFL    Patient left HOB elevated with all lines intact, call button in reach and NURSE notified    AMPA 6 Click:  AMPA Total Score: 24    Treatment & Education:    Education:    Assessment:     Yung Mcconnell is a 62 y.o. male with a medical diagnosis of SBO (small bowel obstruction).  He presents with Performance (I) WITH ADL'S AND FUNCTIONAL MOBILITY AND T/F'S  Rehab Prognosis: GOOD patient would benefit from acute skilled OT services to address these deficits and reach maximum level of function.         Clinical Decision Makin.  OT Low:  "Pt evaluation falls under low complexity for evaluation coding due to performance deficits noted in 1-3 areas as stated above and 0 co-morbities affecting current functional status. Data obtained from problem focused assessments. No modifications or assistance was required for completion of evaluation. Only brief occupational profile and " "history review completed."     Plan:     Patient to be seen   to address the above listed problems via    · Plan of Care Expires:    · Plan of Care Reviewed with:      This Plan of care has been discussed with the patient who was involved in its development and understands and is in agreement with the identified goals and treatment plan    GOALS:    Occupational Therapy Goals     Not on file                Time Tracking:     OT Date of Treatment: 06/22/18  OT Start Time: 1931  OT Stop Time: 1955  OT Total Time (min): 24 min    Billable Minutes:Evaluation 10 MINUTES  Self Care/Home Management 14 MINUTES    Yvonne Menon OT  6/22/2018    "

## 2018-06-23 NOTE — PLAN OF CARE
Problem: Patient Care Overview  Goal: Plan of Care Review  Outcome: Ongoing (interventions implemented as appropriate)  Free from falls and injuries this shift. Pain controled. Tolerated clear liquid diet. No nausea or vomiting. Ambulating in hallway multiple times today. Chart check completed.

## 2018-06-23 NOTE — PROGRESS NOTES
[facility] -   General Surgery  Progress Note    Subjective:     History of Present Illness:  Patient reports a 3 day history of epigastric abdominal pain.  Pain is sharp, crampy, stopping, and achy in nature.  If it was associated with multiple episodes of nausea and vomiting starting on Saturday.  He also had several episodes of loose stool.    The patient stated that when he ever he drank some liquid several hours later he would vomit.  He has never had any abdominal pain like this before.  He denies any blood per rectum.    He states that his a bulge of his umbilicus that was not there before.    He reports having city water.  He has eaten no unusual foods recently nor eaten out recently    Post-Op Info:  Procedure(s) (LRB):  LAPAROSCOPY, DIAGNOSTIC (N/A)  REPAIR, HERNIA, UMBILICAL, AGE 5 YEARS OR OLDER (N/A)   2 Days Post-Op     Interval History:  denies any nausea, passing flatus and bowel movement    Medications:  Continuous Infusions:   dextrose 5 % and 0.45 % NaCl with KCl 20 mEq 100 mL/hr at 06/23/18 0148     Scheduled Meds:   chlorhexidine  10 mL Mouth/Throat BID    enoxaparin  40 mg Subcutaneous Daily    nozaseptin   Each Nare BID    pantoprazole 40 mg in dextrose 5 % 100 mL IVPB (over 15 minutes) (ready to mix system)  40 mg Intravenous Daily     PRN Meds:albuterol-ipratropium, diphenhydrAMINE, hydrALAZINE, HYDROcodone-acetaminophen, HYDROmorphone, ondansetron, ondansetron, promethazine (PHENERGAN) IVPB     Review of patient's allergies indicates:   Allergen Reactions    Zithromax [azithromycin] Palpitations    Lipitor [atorvastatin] Other (See Comments)     Leg cramps/hand cramps     Objective:     Vital Signs (Most Recent):  Temp: 97.7 °F (36.5 °C) (06/23/18 0751)  Pulse: 69 (06/23/18 0751)  Resp: 18 (06/23/18 0751)  BP: 136/75 (06/23/18 0751)  SpO2: (!) 93 % (06/23/18 0751) Vital Signs (24h Range):  Temp:  [97.5 °F (36.4 °C)-98.7 °F (37.1 °C)] 97.7 °F (36.5 °C)  Pulse:  [57-69]  69  Resp:  [16-20] 18  SpO2:  [88 %-99 %] 93 %  BP: (126-136)/(64-75) 136/75     Weight: 118.4 kg (261 lb)  Body mass index is 32.62 kg/m².    Intake/Output - Last 3 Shifts       06/21 0700 - 06/22 0659 06/22 0700 - 06/23 0659 06/23 0700 - 06/24 0659    P.O. 0  360    I.V. (mL/kg) 1785 (15.1) 1200 (10.1)     NG/GT 60 60     IV Piggyback 100      Total Intake(mL/kg) 1945 (16.4) 1260 (10.6) 360 (3)    Urine (mL/kg/hr) 1427 (0.5) 850 (0.3)     Emesis/NG output 1 (0)      Drains 475 (0.2) 250 (0.1)     Total Output 1903 1100      Net +42 +160 +360           Urine Occurrence  4 x           Physical Exam   Constitutional: He is oriented to person, place, and time. He appears well-nourished. No distress.   HENT:   Head: Normocephalic and atraumatic.   Eyes: Pupils are equal, round, and reactive to light.   Neck: Normal range of motion. Neck supple.   Cardiovascular: Normal rate, regular rhythm and normal heart sounds.    Pulmonary/Chest: Effort normal and breath sounds normal.   Abdominal: Soft. Bowel sounds are normal. He exhibits distension. There is tenderness.   Mild distention, mild incisional tenderness   Neurological: He is alert and oriented to person, place, and time.   Skin: Skin is warm.   Psychiatric: He has a normal mood and affect. His behavior is normal. Judgment and thought content normal.   Vitals reviewed.      Significant Labs:  CBC:     Recent Labs  Lab 06/23/18  0548   WBC 7.68   RBC 4.40*   HGB 13.8*   HCT 38.2*      MCV 87   MCH 31.4*   MCHC 36.1*     BMP:     Recent Labs  Lab 06/22/18  0444   GLU 84  84     140   K 3.3*  3.3*     102   CO2 23  23   BUN 11  11   CREATININE 0.7  0.7   CALCIUM 8.9  8.9   MG 1.9     CMP:     Recent Labs  Lab 06/22/18  0444   GLU 84  84   CALCIUM 8.9  8.9   ALBUMIN 3.3*   PROT 6.5     140   K 3.3*  3.3*   CO2 23  23     102   BUN 11  11   CREATININE 0.7  0.7   ALKPHOS 67   ALT 42   AST 25   BILITOT 0.9       Significant  Diagnostics:  I have reviewed and interpreted all pertinent imaging results/findings within the past 24 hours.     EXAMINATION:  XR ABDOMEN FLAT AND ERECT    CLINICAL HISTORY:  Follow-up for SBO after negative laparoscopy;    TECHNIQUE:  Single frontal view of the chest was performed.    COMPARISON:  06/21/2018    FINDINGS:  Improved bowel gas pattern with decreased small bowel distention.  Moderate amount of air is seen throughout the colon.  No free air is seen.  Bones demonstrate advanced degenerative changes of the lower lumbar spine nasogastric tube remains in position.  In comparison to the prior study, there is no adverse interval changes   Impression       In comparison to the prior study, there is no adverse interval changes      Electronically signed by: Hussain Whitfield MD  Date: 06/22/2018  Time: 08:14         Assessment/Plan:     * SBO (small bowel obstruction)    Postop day 2.  Laparoscopy with no obvious mechanical reason for obstruction.  Umbilical hernia was repaired.    Clear liquid diet  P.o. meds  Ambulate        Umbilical hernia without obstruction and without gangrene    Likely related to a trocar site from his cholecystectomy.    Repair with suture        KEVEN (obstructive sleep apnea)    Management per Medicine        GERD (gastroesophageal reflux disease)    IV PPI per Medicine            Juanito Dickerson MD  General Surgery  [facility]

## 2018-06-23 NOTE — ASSESSMENT & PLAN NOTE
-SBFT negative, cut repeat images and symptoms suggest otherwise.   -failed clear liquids pre-surgery, now tolerating sips  -s/p exp lap with hernia repair 6/21/18  -monitor electrolytes and replace  -encourage ambulation

## 2018-06-24 VITALS
HEIGHT: 75 IN | RESPIRATION RATE: 18 BRPM | BODY MASS INDEX: 32.45 KG/M2 | TEMPERATURE: 98 F | OXYGEN SATURATION: 96 % | WEIGHT: 261 LBS | DIASTOLIC BLOOD PRESSURE: 70 MMHG | HEART RATE: 74 BPM | SYSTOLIC BLOOD PRESSURE: 125 MMHG

## 2018-06-24 LAB
ALBUMIN SERPL BCP-MCNC: 3.1 G/DL
ALP SERPL-CCNC: 68 U/L
ALT SERPL W/O P-5'-P-CCNC: 83 U/L
ANION GAP SERPL CALC-SCNC: 10 MMOL/L
AST SERPL-CCNC: 77 U/L
BASOPHILS # BLD AUTO: 0.06 K/UL
BASOPHILS NFR BLD: 0.9 %
BILIRUB SERPL-MCNC: 1 MG/DL
BUN SERPL-MCNC: 5 MG/DL
CALCIUM SERPL-MCNC: 8.9 MG/DL
CHLORIDE SERPL-SCNC: 103 MMOL/L
CO2 SERPL-SCNC: 28 MMOL/L
CREAT SERPL-MCNC: 0.8 MG/DL
DIFFERENTIAL METHOD: ABNORMAL
EOSINOPHIL # BLD AUTO: 0.3 K/UL
EOSINOPHIL NFR BLD: 4.6 %
ERYTHROCYTE [DISTWIDTH] IN BLOOD BY AUTOMATED COUNT: 14.2 %
EST. GFR  (AFRICAN AMERICAN): >60 ML/MIN/1.73 M^2
EST. GFR  (NON AFRICAN AMERICAN): >60 ML/MIN/1.73 M^2
GLUCOSE SERPL-MCNC: 86 MG/DL
HCT VFR BLD AUTO: 40.1 %
HGB BLD-MCNC: 14.3 G/DL
LYMPHOCYTES # BLD AUTO: 2.6 K/UL
LYMPHOCYTES NFR BLD: 40.2 %
MCH RBC QN AUTO: 31.2 PG
MCHC RBC AUTO-ENTMCNC: 35.7 G/DL
MCV RBC AUTO: 87 FL
MONOCYTES # BLD AUTO: 0.7 K/UL
MONOCYTES NFR BLD: 11.1 %
NEUTROPHILS # BLD AUTO: 2.8 K/UL
NEUTROPHILS NFR BLD: 43.2 %
PLATELET # BLD AUTO: 237 K/UL
PMV BLD AUTO: 10 FL
POTASSIUM SERPL-SCNC: 3.6 MMOL/L
PROT SERPL-MCNC: 6 G/DL
RBC # BLD AUTO: 4.59 M/UL
SODIUM SERPL-SCNC: 141 MMOL/L
WBC # BLD AUTO: 6.56 K/UL

## 2018-06-24 PROCEDURE — 25000003 PHARM REV CODE 250: Performed by: INTERNAL MEDICINE

## 2018-06-24 PROCEDURE — 25000003 PHARM REV CODE 250: Performed by: SURGERY

## 2018-06-24 PROCEDURE — 80053 COMPREHEN METABOLIC PANEL: CPT

## 2018-06-24 PROCEDURE — 94640 AIRWAY INHALATION TREATMENT: CPT

## 2018-06-24 PROCEDURE — 94799 UNLISTED PULMONARY SVC/PX: CPT

## 2018-06-24 PROCEDURE — 36415 COLL VENOUS BLD VENIPUNCTURE: CPT

## 2018-06-24 PROCEDURE — 85025 COMPLETE CBC W/AUTO DIFF WBC: CPT

## 2018-06-24 PROCEDURE — 25000242 PHARM REV CODE 250 ALT 637 W/ HCPCS: Performed by: NURSE PRACTITIONER

## 2018-06-24 RX ORDER — HYDROCODONE BITARTRATE AND ACETAMINOPHEN 5; 325 MG/1; MG/1
1 TABLET ORAL EVERY 8 HOURS PRN
Qty: 10 TABLET | Refills: 0 | Status: SHIPPED | OUTPATIENT
Start: 2018-06-24 | End: 2021-07-26

## 2018-06-24 RX ORDER — HYDROCODONE BITARTRATE AND ACETAMINOPHEN 5; 325 MG/1; MG/1
1 TABLET ORAL EVERY 6 HOURS PRN
Qty: 10 TABLET | Refills: 0 | Status: SHIPPED | OUTPATIENT
Start: 2018-06-24 | End: 2018-06-24

## 2018-06-24 RX ORDER — HYDROCODONE BITARTRATE AND ACETAMINOPHEN 5; 325 MG/1; MG/1
1 TABLET ORAL EVERY 6 HOURS PRN
Status: DISCONTINUED | OUTPATIENT
Start: 2018-06-24 | End: 2018-06-24 | Stop reason: HOSPADM

## 2018-06-24 RX ADMIN — CHLORHEXIDINE GLUCONATE 10 ML: 1.2 RINSE ORAL at 08:06

## 2018-06-24 RX ADMIN — PANTOPRAZOLE SODIUM 40 MG: 40 TABLET, DELAYED RELEASE ORAL at 08:06

## 2018-06-24 RX ADMIN — HYDROCODONE BITARTRATE AND ACETAMINOPHEN 1 TABLET: 10; 325 TABLET ORAL at 05:06

## 2018-06-24 RX ADMIN — IPRATROPIUM BROMIDE AND ALBUTEROL SULFATE 3 ML: .5; 3 SOLUTION RESPIRATORY (INHALATION) at 09:06

## 2018-06-24 NOTE — DISCHARGE INSTRUCTIONS
Small Bowel Obstruction     Small bowel obstruction can lead to tissue damage and even tissue death.   A small bowel obstruction occurs when part or all of the small intestine (bowel) is blocked. As a result, digestive contents cant move through the bowel properly and out of the body. Treatment is needed right away to remove the blockage. This can ease painful symptoms. It can also prevent serious problems, such as tissue death or bursting (rupture) of the small bowel. Without treatment, a small bowel obstruction can be fatal.  Causes of small bowel obstruction  A small bowel obstruction can be caused by:  · Scar tissue (adhesions). These may form after belly (abdominal) surgery or an infection.  · Hernia. A hernia is when an organ pushes through a weak spot or tear in the abdomen wall. Part of the small bowel can push out and be seen as a bulge under the belly. Hernias can also occur internally.  · Certain health problems. These include when part of the bowel slides inside another part (intussusception). Other causes include irritable bowel disease such as Crohns disease, and inflammation and sores in the intestine (ulcerative colitis).  · Abnormal tissue growths (tumors). These can form on the inside or outside of the small bowel. They are usually due to cancer.  Symptoms of small bowel obstruction  Common symptoms include:  · Belly cramping and pain  · Belly swelling and bloating  · Upset stomach (nausea) and vomiting  · Can't  pass gas  · Can't pass stool (constipation)  · Diarrhea  Diagnosing small bowel obstruction  Your provider will ask about your symptoms and health history. Youll also have a physical exam. Tests may also be done to confirm the problem. These can include:  · Imaging tests. These provide pictures of the small bowel. Common tests include X-rays and a CT scan.  · Blood tests. These check for infection and other problems, such as excess fluid loss (dehydration).  · Upper GI  (gastrointestinal) series with a small bowel follow-through. This test takes X-rays of the upper digestive tract from the mouth through the small bowel. An X-ray dye (contrast fluid) is used. The dye coats the inside of your upper digestive tract so it will show up clearly on X-rays.  Treating small bowel obstruction  Treatment takes place in a hospital. As part of your care, the following may be done:  · No food or drink is given by mouth. This allows your bowels to rest.  · An IV (intravenous) line is placed in a vein in your arm or hand. The IV line is used to give fluids. It may also be used to give medicines. These may be needed to ease pain, nausea, and other symptoms. They may also be needed to treat or prevent infections.  · A soft, thin, flexible tube (nasogastric tube) is inserted through your nose and into your stomach. The tube is used to remove extra gas and fluid in your stomach and bowels. This helps to ease symptoms such as pain and swelling.  · In severe cases, surgery is done. This may be needed if the small bowel is almost or totally blocked, or there is a hole in the bowel (bowel perforation). During surgery, the blockage is removed. Parts of the bowel may also be removed if there is tissue death. Other repair may be done as well, depending on what caused the blockage. Your healthcare provider will give you more information about surgery, if needed.  · Youll be watched closely in the hospital until your symptoms improve. Your provider will tell you when you can go home.  Long-term concerns   After treatment, most people recover with no lasting effects. If a long part of the bowel is removed, there is a greater chance for lifelong digestive problems. Bowel movements may become irregular. Work with your provider to learn the best ways to manage any symptoms you may have, and to protect your health.  When to call your healthcare provider  Call your provider right away if you have any of the  following:  · Severe pain (Call 911)  · Belly swelling or cramping that wont go away  · Cant pass stool or gas  · Nausea or vomiting (especially if the vomit looks or smells like stool)   Date Last Reviewed: 7/1/2016  © 3394-4213 [employer]. [address] [place] [address], [place] [address]. All rights reserved. This information is not intended as a substitute for professional medical care. Always follow your healthcare professional's instructions.

## 2018-06-24 NOTE — ASSESSMENT & PLAN NOTE
Postop day 3.  Laparoscopy with no obvious mechanical reason for obstruction.  Umbilical hernia was repaired.    Advance diet as tolerated  P.o. meds  Ambulate  Okay to discharge if tolerating diet  Continue bowel regimen

## 2018-06-24 NOTE — PLAN OF CARE
Problem: Patient Care Overview  Goal: Plan of Care Review  Outcome: Ongoing (interventions implemented as appropriate)  Pt is maintaining adequate SpO2 levels on room air at this time, NC on SB. Pt requiring/requesting PRN respiratory treatments at this time. IS done with pt and encouraged.  Pt board in room updated with RT POC.

## 2018-06-24 NOTE — DISCHARGE SUMMARY
[facility] Hospital Medicine  Discharge Summary      Patient Name: Yung Mcconnell  MRN: [id number]  Admission Date: 6/18/2018  Hospital Length of Stay: 6 days  Discharge Date and Time:  06/24/2018 4:50 PM  Attending Physician: Dr. Leticia Woo  Discharging Provider: Sumaya Youssef NP  Primary Care Provider: LEVI GOVEA MD      HPI:   Yung Mcconnell is a 62 year old male with a PMHx of KEVEN on CPAP, OA, HLD, GERD, COPD, and Asthma who presented to the Emergency Department with c/o abdominal pain since Saturday afternoon. Associated symptoms include: N/V/D. Pt denies being around sick/ill contacts. Pt reports having gallbladder removed laparoscopically about 5-6 years ago. Pt denies any other abdominal surgeries. ED workup revealed: WBC 12.80K, Total bilirubin 1.8, UA with 1+ ketones and bilirubin. KUB showed dilated loops of bowel are seen measuring up to 4.3 cm with some transition within the lower abdomen, findings concerning for partial or early small bowel obstruction. CT abdomen/pelvis with contrast showed small bowel loops are dilated to the mid ileum consistent with partial or early complete bowel obstruction. ED consulted General surgery. Pt admitted to Med Surg for partial SBO.     Procedure(s) (LRB):  LAPAROSCOPY, DIAGNOSTIC (N/A)  REPAIR, HERNIA, UMBILICAL, AGE 5 YEARS OR OLDER (N/A)      Hospital Course:   Patient had Laparoscopy and umbilical hernia repair 6/[id number]. Following surgery, he ambulated 4 x day. Bowel sounds returned, he had BM's, and tolerated advanced diet to solid foods. He will follow-up with Dr. Martinez in 2 weeks. Patient seen and examined and deemed stable for d/c.        Consults:   Consults         Status Ordering Provider     Inpatient consult to General surgery  Once     Provider:  Casper Martinez MD    Completed MIKE DISLA          No new Assessment & Plan notes have been filed under this hospital service since the last note was  generated.  Service: Hospital Medicine    Final Active Diagnoses:    Diagnosis Date Noted POA    PRINCIPAL PROBLEM:  SBO (small bowel obstruction) [K56.609] 06/18/2018 Yes    Umbilical hernia without obstruction and without gangrene [K42.9] 06/18/2018 Yes    Asthma with COPD [J44.9] 01/03/2017 Yes    KEVEN (obstructive sleep apnea) [G47.33] 07/22/2013 Yes    GERD (gastroesophageal reflux disease) [K21.9] 07/09/2013 Yes      Problems Resolved During this Admission:    Diagnosis Date Noted Date Resolved POA    Leukocytosis [D72.829] 06/18/2018 06/20/2018 Yes       Discharged Condition: stable    Disposition: Home or Self Care    Follow Up:  Follow-up Information     Casper Martinez MD In 2 weeks.    Specialty:  General Surgery  Contact information:  [address]  [place] [address] [phone]             PCP. Schedule an appointment as soon as possible for a visit in 3 days.               Patient Instructions:     Diet Adult Regular     Activity as tolerated         Significant Diagnostic Studies: Labs:   CMP   Recent Labs  Lab 06/23/18  1141 06/24/18  0524    141   K 3.2* 3.6    103   CO2 26 28   GLU 93 86   BUN 9 5*   CREATININE 0.7 0.8   CALCIUM 8.9 8.9   PROT 6.1 6.0   ALBUMIN 3.2* 3.1*   BILITOT 0.9 1.0   ALKPHOS 62 68   AST 34 77*   ALT 51* 83*   ANIONGAP 10 10   ESTGFRAFRICA >60 >60   EGFRNONAA >60 >60    and CBC   Recent Labs  Lab 06/23/18  0548 06/24/18  0524   WBC 7.68 6.56   HGB 13.8* 14.3   HCT 38.2* 40.1    237       Pending Diagnostic Studies:     None         Medications:  Reconciled Home Medications:      Medication List      START taking these medications    HYDROcodone-acetaminophen 5-325 mg per tablet  Commonly known as:  NORCO  Take 1 tablet by mouth every 8 (eight) hours as needed.     nozaseptin nasal   Commonly known as:  NOZIN  1 each by Each Nare route 2 (two) times daily.        CHANGE how you take these medications    * albuterol 2.5 mg /3 mL  (0.083 %) nebulizer solution  Commonly known as:  PROVENTIL  Take 3 mLs (2.5 mg total) by nebulization every 6 (six) hours while awake.  What changed:  Another medication with the same name was changed. Make sure you understand how and when to take each.     * albuterol 90 mcg/actuation inhaler  Every 4-6 hours  What changed:  · when to take this  · reasons to take this  · additional instructions     fluticasone 50 mcg/actuation nasal spray  Commonly known as:  FLONASE  2 sprays by Each Nare route once daily.  What changed:  · when to take this  · reasons to take this        * This list has 2 medication(s) that are the same as other medications prescribed for you. Read the directions carefully, and ask your doctor or other care provider to review them with you.            CONTINUE taking these medications    cyclobenzaprine 5 MG tablet  Commonly known as:  FLEXERIL  1 Tablet Oral Three times a day as needed.     diclofenac 1.3 % Pt12  Apply 1 patch topically every 12 (twelve) hours as needed.     MULTIPLE VITAMIN per tablet  Generic drug:  multivitamin  1 Tablet Oral Every day     omeprazole 40 MG capsule  Commonly known as:  PRILOSEC  Take 1 capsule (40 mg total) by mouth once daily. Dispense Generic     OSTEO BI-FLEX ORAL  Take 1 tablet by mouth once daily.     SOOLANTRA 1 % Crea  Generic drug:  ivermectin  EDWARD EXT AA prn     tadalafil 20 MG Tab  Commonly known as:  CIALIS  Take 1 tablet (20 mg total) by mouth daily as needed.        STOP taking these medications    amoxicillin-clavulanate 875-125mg 875-125 mg per tablet  Commonly known as:  AUGMENTIN     pravastatin 10 MG tablet  Commonly known as:  PRAVACHOL            Indwelling Lines/Drains at time of discharge:   Lines/Drains/Airways          No matching active lines, drains, or airways          Time spent on the discharge of patient: 45 minutes  Patient was seen and examined on the date of discharge and determined to be suitable for discharge.    Sumaya CHILDS  LANDON Youssef  Department of Hospital Medicine  [facility]

## 2018-06-24 NOTE — PLAN OF CARE
Problem: Patient Care Overview  Goal: Plan of Care Review  Outcome: Ongoing (interventions implemented as appropriate)  Pt denies n/v during shift. Pt c/o moderate abd. Pain during shift. VSS.   Fall precautions in place. Call light and personal items within reach. Educated patient on side effects of medication administered. Pt verbalized understanding. 24 hour order check done.  Will continue to monitor.

## 2018-06-24 NOTE — SUBJECTIVE & OBJECTIVE
Interval History:  Tolerating liquids, continue to pass flatus    Medications:  Continuous Infusions:    Scheduled Meds:   chlorhexidine  10 mL Mouth/Throat BID    enoxaparin  40 mg Subcutaneous Daily    nozaseptin   Each Nare BID    pantoprazole  40 mg Oral Daily     PRN Meds:albuterol-ipratropium, diphenhydrAMINE, hydrALAZINE, HYDROcodone-acetaminophen, ondansetron, ondansetron, promethazine (PHENERGAN) IVPB     Review of patient's allergies indicates:   Allergen Reactions    Zithromax [azithromycin] Palpitations    Lipitor [atorvastatin] Other (See Comments)     Leg cramps/hand cramps     Objective:     Vital Signs (Most Recent):  Temp: 97.6 °F (36.4 °C) (06/24/18 0807)  Pulse: 62 (06/24/18 0912)  Resp: 18 (06/24/18 0912)  BP: 125/69 (06/24/18 0807)  SpO2: 95 % (06/24/18 0912) Vital Signs (24h Range):  Temp:  [97.5 °F (36.4 °C)-99 °F (37.2 °C)] 97.6 °F (36.4 °C)  Pulse:  [62-75] 62  Resp:  [16-19] 18  SpO2:  [94 %-97 %] 95 %  BP: (117-139)/(61-79) 125/69     Weight: 118.4 kg (261 lb)  Body mass index is 32.62 kg/m².    Intake/Output - Last 3 Shifts       06/22 0700 - 06/23 0659 06/23 0700 - 06/24 0659 06/24 0700 - 06/25 0659    P.O.  1040     I.V. (mL/kg) 1200 (10.1) 740 (6.3)     NG/GT 60      Total Intake(mL/kg) 1260 (10.6) 1780 (15)     Urine (mL/kg/hr) 850 (0.3)      Drains 250 (0.1)      Total Output 1100        Net +160 +1780             Urine Occurrence 4 x 5 x           Physical Exam   Constitutional: He is oriented to person, place, and time. He appears well-nourished. No distress.   HENT:   Head: Normocephalic and atraumatic.   Eyes: Pupils are equal, round, and reactive to light.   Neck: Normal range of motion. Neck supple.   Cardiovascular: Normal rate, regular rhythm and normal heart sounds.    Pulmonary/Chest: Effort normal and breath sounds normal.   Abdominal: Soft. Bowel sounds are normal. He exhibits no distension. There is tenderness.   Mild distention, mild incisional tenderness    Neurological: He is alert and oriented to person, place, and time.   Skin: Skin is warm.   Psychiatric: He has a normal mood and affect. His behavior is normal. Judgment and thought content normal.   Vitals reviewed.      Significant Labs:  CBC:     Recent Labs  Lab 06/24/18 0524   WBC 6.56   RBC 4.59*   HGB 14.3   HCT 40.1      MCV 87   MCH 31.2*   MCHC 35.7     BMP:     Recent Labs  Lab 06/22/18  0444  06/24/18 0524   GLU 84  84  < > 86     140  < > 141   K 3.3*  3.3*  < > 3.6     102  < > 103   CO2 23  23  < > 28   BUN 11  11  < > 5*   CREATININE 0.7  0.7  < > 0.8   CALCIUM 8.9  8.9  < > 8.9   MG 1.9  --   --    < > = values in this interval not displayed.  CMP:     Recent Labs  Lab 06/24/18 0524   GLU 86   CALCIUM 8.9   ALBUMIN 3.1*   PROT 6.0      K 3.6   CO2 28      BUN 5*   CREATININE 0.8   ALKPHOS 68   ALT 83*   AST 77*   BILITOT 1.0       Significant Diagnostics:  I have reviewed and interpreted all pertinent imaging results/findings within the past 24 hours.

## 2018-06-24 NOTE — PROGRESS NOTES
[facility]   General Surgery  Progress Note    Subjective:     History of Present Illness:  Patient reports a 3 day history of epigastric abdominal pain.  Pain is sharp, crampy, stopping, and achy in nature.  If it was associated with multiple episodes of nausea and vomiting starting on Saturday.  He also had several episodes of loose stool.    The patient stated that when he ever he drank some liquid several hours later he would vomit.  He has never had any abdominal pain like this before.  He denies any blood per rectum.    He states that his a bulge of his umbilicus that was not there before.    He reports having city water.  He has eaten no unusual foods recently nor eaten out recently    Post-Op Info:  Procedure(s) (LRB):  LAPAROSCOPY, DIAGNOSTIC (N/A)  REPAIR, HERNIA, UMBILICAL, AGE 5 YEARS OR OLDER (N/A)   3 Days Post-Op     Interval History:  Tolerating liquids, continue to pass flatus    Medications:  Continuous Infusions:    Scheduled Meds:   chlorhexidine  10 mL Mouth/Throat BID    enoxaparin  40 mg Subcutaneous Daily    nozaseptin   Each Nare BID    pantoprazole  40 mg Oral Daily     PRN Meds:albuterol-ipratropium, diphenhydrAMINE, hydrALAZINE, HYDROcodone-acetaminophen, ondansetron, ondansetron, promethazine (PHENERGAN) IVPB     Review of patient's allergies indicates:   Allergen Reactions    Zithromax [azithromycin] Palpitations    Lipitor [atorvastatin] Other (See Comments)     Leg cramps/hand cramps     Objective:     Vital Signs (Most Recent):  Temp: 97.6 °F (36.4 °C) (06/24/18 0807)  Pulse: 62 (06/24/18 0912)  Resp: 18 (06/24/18 0912)  BP: 125/69 (06/24/18 0807)  SpO2: 95 % (06/24/18 0912) Vital Signs (24h Range):  Temp:  [97.5 °F (36.4 °C)-99 °F (37.2 °C)] 97.6 °F (36.4 °C)  Pulse:  [62-75] 62  Resp:  [16-19] 18  SpO2:  [94 %-97 %] 95 %  BP: (117-139)/(61-79) 125/69     Weight: 118.4 kg (261 lb)  Body mass index is 32.62 kg/m².    Intake/Output - Last 3 Shifts       06/22  0700 - 06/23 0659 06/23 0700 - 06/24 0659 06/24 0700 - 06/25 0659    P.O.  1040     I.V. (mL/kg) 1200 (10.1) 740 (6.3)     NG/GT 60      Total Intake(mL/kg) 1260 (10.6) 1780 (15)     Urine (mL/kg/hr) 850 (0.3)      Drains 250 (0.1)      Total Output 1100        Net +160 +1780             Urine Occurrence 4 x 5 x           Physical Exam   Constitutional: He is oriented to person, place, and time. He appears well-nourished. No distress.   HENT:   Head: Normocephalic and atraumatic.   Eyes: Pupils are equal, round, and reactive to light.   Neck: Normal range of motion. Neck supple.   Cardiovascular: Normal rate, regular rhythm and normal heart sounds.    Pulmonary/Chest: Effort normal and breath sounds normal.   Abdominal: Soft. Bowel sounds are normal. He exhibits no distension. There is tenderness.   Mild distention, mild incisional tenderness   Neurological: He is alert and oriented to person, place, and time.   Skin: Skin is warm.   Psychiatric: He has a normal mood and affect. His behavior is normal. Judgment and thought content normal.   Vitals reviewed.      Significant Labs:  CBC:     Recent Labs  Lab 06/24/18  0524   WBC 6.56   RBC 4.59*   HGB 14.3   HCT 40.1      MCV 87   MCH 31.2*   MCHC 35.7     BMP:     Recent Labs  Lab 06/22/18  0444  06/24/18  0524   GLU 84  84  < > 86     140  < > 141   K 3.3*  3.3*  < > 3.6     102  < > 103   CO2 23  23  < > 28   BUN 11  11  < > 5*   CREATININE 0.7  0.7  < > 0.8   CALCIUM 8.9  8.9  < > 8.9   MG 1.9  --   --    < > = values in this interval not displayed.  CMP:     Recent Labs  Lab 06/24/18  0524   GLU 86   CALCIUM 8.9   ALBUMIN 3.1*   PROT 6.0      K 3.6   CO2 28      BUN 5*   CREATININE 0.8   ALKPHOS 68   ALT 83*   AST 77*   BILITOT 1.0       Significant Diagnostics:  I have reviewed and interpreted all pertinent imaging results/findings within the past 24 hours.         Assessment/Plan:     * SBO (small bowel obstruction)     Postop day 3.  Laparoscopy with no obvious mechanical reason for obstruction.  Umbilical hernia was repaired.    Advance diet as tolerated  P.o. meds  Ambulate  Okay to discharge if tolerating diet  Continue bowel regimen        Umbilical hernia without obstruction and without gangrene    Likely related to a trocar site from his cholecystectomy.    Repair with suture        KEVEN (obstructive sleep apnea)    Management per Medicine        GERD (gastroesophageal reflux disease)    IV PPI per Medicine            Juanito Dickerson MD  General Surgery  [facility]

## 2018-06-25 ENCOUNTER — TELEPHONE (OUTPATIENT)
Dept: SURGERY | Facility: CLINIC | Age: 63
End: 2018-06-25

## 2018-06-25 ENCOUNTER — PATIENT OUTREACH (OUTPATIENT)
Dept: ADMINISTRATIVE | Facility: HOSPITAL | Age: 63
End: 2018-06-25

## 2018-06-25 DIAGNOSIS — G47.33 OSA (OBSTRUCTIVE SLEEP APNEA): ICD-10-CM

## 2018-06-25 DIAGNOSIS — N52.9 ERECTILE DYSFUNCTION, UNSPECIFIED ERECTILE DYSFUNCTION TYPE: ICD-10-CM

## 2018-06-25 DIAGNOSIS — J44.89 ASTHMA WITH COPD: ICD-10-CM

## 2018-06-25 DIAGNOSIS — K42.9 UMBILICAL HERNIA WITHOUT OBSTRUCTION AND WITHOUT GANGRENE: ICD-10-CM

## 2018-06-25 DIAGNOSIS — K56.609 SBO (SMALL BOWEL OBSTRUCTION): Primary | ICD-10-CM

## 2018-06-25 DIAGNOSIS — M54.16 LUMBAR RADICULOPATHY: ICD-10-CM

## 2018-06-25 NOTE — TELEPHONE ENCOUNTER
----- Message from Joel Chou sent at 6/25/2018  2:44 PM CDT -----  Contact: pt   Pt would like to be worked in the first week of July so he can get letter and return to work if possible. .           ..[phone] (home) [phone] (work)

## 2018-06-25 NOTE — PLAN OF CARE
D/C home with self care     06/25/18 1726   Final Note   Assessment Type Final Discharge Note   Discharge Disposition Home   Discharge plans and expectations educations in teach back method with documentation complete? Yes   Right Care Referral Info   Post Acute Recommendation No Care

## 2018-06-26 ENCOUNTER — OFFICE VISIT (OUTPATIENT)
Dept: PULMONOLOGY | Facility: CLINIC | Age: 63
End: 2018-06-26
Payer: COMMERCIAL

## 2018-06-26 ENCOUNTER — HOSPITAL ENCOUNTER (OUTPATIENT)
Dept: RADIOLOGY | Facility: HOSPITAL | Age: 63
Discharge: HOME | End: 2018-06-26
Attending: INTERNAL MEDICINE
Payer: COMMERCIAL

## 2018-06-26 ENCOUNTER — PROCEDURE VISIT (OUTPATIENT)
Dept: PULMONOLOGY | Facility: CLINIC | Age: 63
End: 2018-06-26
Payer: COMMERCIAL

## 2018-06-26 VITALS
RESPIRATION RATE: 19 BRPM | HEART RATE: 72 BPM | OXYGEN SATURATION: 97 % | SYSTOLIC BLOOD PRESSURE: 104 MMHG | DIASTOLIC BLOOD PRESSURE: 74 MMHG | BODY MASS INDEX: 31.93 KG/M2 | WEIGHT: 256.81 LBS | HEIGHT: 75 IN

## 2018-06-26 DIAGNOSIS — J44.89 ASTHMA WITH COPD: ICD-10-CM

## 2018-06-26 DIAGNOSIS — J45.40 ASTHMA, CHRONIC, MODERATE PERSISTENT, UNCOMPLICATED: ICD-10-CM

## 2018-06-26 DIAGNOSIS — J30.1 CHRONIC SEASONAL ALLERGIC RHINITIS DUE TO POLLEN: ICD-10-CM

## 2018-06-26 DIAGNOSIS — G47.33 OSA (OBSTRUCTIVE SLEEP APNEA): Primary | ICD-10-CM

## 2018-06-26 DIAGNOSIS — J45.20 MILD INTERMITTENT ASTHMA, UNCOMPLICATED: ICD-10-CM

## 2018-06-26 PROCEDURE — 71046 X-RAY EXAM CHEST 2 VIEWS: CPT | Mod: 26,,, | Performed by: RADIOLOGY

## 2018-06-26 PROCEDURE — 99999 PR PBB SHADOW E&M-EST. PATIENT-LVL IV: CPT | Mod: PBBFAC,,, | Performed by: INTERNAL MEDICINE

## 2018-06-26 PROCEDURE — 71046 X-RAY EXAM CHEST 2 VIEWS: CPT | Mod: TC

## 2018-06-26 PROCEDURE — 99214 OFFICE O/P EST MOD 30 MIN: CPT | Mod: 25,S$GLB,, | Performed by: INTERNAL MEDICINE

## 2018-06-26 PROCEDURE — 3008F BODY MASS INDEX DOCD: CPT | Mod: CPTII,S$GLB,, | Performed by: INTERNAL MEDICINE

## 2018-06-26 PROCEDURE — 94060 EVALUATION OF WHEEZING: CPT | Mod: S$GLB,,, | Performed by: INTERNAL MEDICINE

## 2018-06-26 RX ORDER — CETIRIZINE HYDROCHLORIDE 10 MG/1
10 TABLET ORAL DAILY
COMMUNITY
End: 2024-03-31

## 2018-06-26 RX ORDER — FLUTICASONE PROPIONATE 50 MCG
2 SPRAY, SUSPENSION (ML) NASAL DAILY
Qty: 3 BOTTLE | Refills: 4 | Status: SHIPPED | OUTPATIENT
Start: 2018-06-26 | End: 2018-11-05 | Stop reason: SDUPTHER

## 2018-06-26 RX ORDER — BUDESONIDE AND FORMOTEROL FUMARATE DIHYDRATE 160; 4.5 UG/1; UG/1
2 AEROSOL RESPIRATORY (INHALATION) EVERY 12 HOURS
Qty: 1 INHALER | Refills: 12 | Status: SHIPPED | OUTPATIENT
Start: 2018-06-26 | End: 2018-11-05 | Stop reason: SDUPTHER

## 2018-06-26 RX ORDER — ALBUTEROL SULFATE 90 UG/1
2 AEROSOL, METERED RESPIRATORY (INHALATION) EVERY 4 HOURS PRN
Qty: 3 INHALER | Refills: 11 | Status: SHIPPED | OUTPATIENT
Start: 2018-06-26 | End: 2018-11-05 | Stop reason: SDUPTHER

## 2018-06-26 NOTE — PROGRESS NOTES
Subjective:       Patient ID: Yung Mcconnell is a 62 y.o. male.    Chief Complaint:   He   presents for evaluation and treatment of small bowel obstruction after being discharged from the hospital  3  days ago. Since discharge symptoms have gradually improving course since that time. Patient denies fever or chills. Symptoms are aggravated by activity. Symptoms improve with rest.  Respiratory: no cough or shortness of breath, no cough or shorness of breath; Cardiovascular: no chest pain or palpitations.  Patient currently is not on home oxygen therapy..  MEDICAL RECORDS FROM THE HOSPITAL REVIEWED:  [facility] Hospital Medicine  Discharge Summary        Patient Name: Yung Mcconnell  MRN: [id number]  Admission Date: 6/18/2018  Hospital Length of Stay: 6 days  Discharge Date and Time:  06/24/2018 4:50 PM  Attending Physician: Dr. Leticia Woo  Discharging Provider: Sumaya Youssef NP  Primary Care Provider: LEVI GOVEA MD        HPI:   Yung Mcconnell is a 62 year old male with a PMHx of KEVEN on CPAP, OA, HLD, GERD, COPD, and Asthma who presented to the Emergency Department with c/o abdominal pain since Saturday afternoon. Associated symptoms include: N/V/D. Pt denies being around sick/ill contacts. Pt reports having gallbladder removed laparoscopically about 5-6 years ago. Pt denies any other abdominal surgeries. ED workup revealed: WBC 12.80K, Total bilirubin 1.8, UA with 1+ ketones and bilirubin. KUB showed dilated loops of bowel are seen measuring up to 4.3 cm with some transition within the lower abdomen, findings concerning for partial or early small bowel obstruction. CT abdomen/pelvis with contrast showed small bowel loops are dilated to the mid ileum consistent with partial or early complete bowel obstruction. ED consulted General surgery. Pt admitted to Med Surg for partial SBO.      Procedure(s) (LRB):  LAPAROSCOPY, DIAGNOSTIC (N/A)  REPAIR, HERNIA, UMBILICAL, AGE 3 YEARS OR  OLDER (N/A)       Hospital Course:   Patient had Laparoscopy and umbilical hernia repair 6/212018. Following surgery, he ambulated 4 x day. Bowel sounds returned, he had BM's, and tolerated advanced diet to solid foods. He will follow-up with Dr. Martinez in 2 weeks. Patient seen and examined and deemed stable for d/c.        Asthma and COPD    HPI  Past Medical History:   Diagnosis Date    Allergy     Asthma, chronic 7/9/2013    Colon polyps     COPD (chronic obstructive pulmonary disease)     GERD (gastroesophageal reflux disease)     High cholesterol     Osteoarthritis of both knees 7/9/2013    Sleep apnea      Past Surgical History:   Procedure Laterality Date    CHOLECYSTECTOMY      DIAGNOSTIC LAPAROSCOPY N/A 6/21/2018    Procedure: LAPAROSCOPY, DIAGNOSTIC;  Surgeon: Casper Martinez MD;  Location: [facility] OR;  Service: General;  Laterality: N/A;    LUNG SURGERY Right     benign mass    UMBILICAL HERNIA REPAIR N/A 6/21/2018    Procedure: REPAIR, HERNIA, UMBILICAL, AGE 3 YEARS OR OLDER;  Surgeon: Casper Martinez MD;  Location: [facility] OR;  Service: General;  Laterality: N/A;     Social History     Social History    Marital status:      Spouse name: N/A    Number of children: N/A    Years of education: N/A     Occupational History     [employer]     Social History Main Topics    Smoking status: Never Smoker    Smokeless tobacco: Never Used    Alcohol use Yes      Comment: rare    Drug use: No    Sexual activity: Yes     Partners: Female     Birth control/ protection: None     Other Topics Concern    Not on file     Social History Narrative    No narrative on file     Review of Systems   Constitutional: Negative for fever and fatigue.   HENT: Negative for postnasal drip and rhinorrhea.    Eyes: Negative for redness and itching.   Respiratory: Positive for apnea and dyspnea on extertion. Negative for cough, shortness of breath, wheezing and Paroxysmal Nocturnal Dyspnea.     Cardiovascular: Negative for chest pain.   Genitourinary: Negative for difficulty urinating and hematuria.   Endocrine: Negative for polyphagia, cold intolerance and heat intolerance.    Musculoskeletal: Negative for arthralgias.   Skin: Negative for rash.   Gastrointestinal: Positive for abdominal distention. Negative for nausea, vomiting and abdominal pain.   Neurological: Negative for dizziness and headaches.   Hematological: Negative for adenopathy. Does not bruise/bleed easily and no excessive bruising.   Psychiatric/Behavioral: Positive for sleep disturbance. The patient is not nervous/anxious.        Objective:      Physical Exam   Constitutional: He is oriented to person, place, and time. He appears well-developed and well-nourished.   HENT:   Head: Normocephalic and atraumatic.   Eyes: Conjunctivae are normal. Pupils are equal, round, and reactive to light.   Neck: Neck supple. No JVD present. No tracheal deviation present. No thyromegaly present.   Cardiovascular: Normal rate, regular rhythm and normal heart sounds.    Pulmonary/Chest: Effort normal. He has decreased breath sounds in the right lower field and the left lower field.   Abdominal: Soft.   Musculoskeletal: Normal range of motion.   Lymphadenopathy:     He has no cervical adenopathy.   Neurological: He is alert and oriented to person, place, and time.   Skin: Skin is warm and dry.   Nursing note and vitals reviewed.    Personal Diagnostic Review  Chest x-ray: stable  Spirometry: slight decrease - obstruction  .[id number]  No flowsheet data found.        Assessment:       1. KEVEN (obstructive sleep apnea)    2. Asthma, chronic, moderate persistent, uncomplicated    3. Mild intermittent asthma, uncomplicated    4. Chronic seasonal allergic rhinitis due to pollen        Outpatient Encounter Prescriptions as of 6/26/2018   Medication Sig Dispense Refill    albuterol (PROVENTIL) 2.5 mg /3 mL (0.083 %) nebulizer solution Take 3 mLs (2.5 mg total) by  nebulization every 6 (six) hours while awake. 1080 mL 3    albuterol 90 mcg/actuation inhaler Inhale 2 puffs into the lungs every 4 (four) hours as needed. Every 4-6 hours 3 Inhaler 11    cetirizine (ZYRTEC) 10 MG tablet Take 10 mg by mouth once daily.      cyclobenzaprine (FLEXERIL) 5 MG tablet 1 Tablet Oral Three times a day as needed. 270 tablet 3    fluticasone (FLONASE) 50 mcg/actuation nasal spray 2 sprays (100 mcg total) by Each Nare route once daily. 3 Bottle 4    GLUCOSAMINE HCL/CHONDR ALFONSO A NA (OSTEO BI-FLEX ORAL) Take 1 tablet by mouth once daily.       HYDROcodone-acetaminophen (NORCO) 5-325 mg per tablet Take 1 tablet by mouth every 8 (eight) hours as needed. 10 tablet 0    multivitamin (MULTIPLE VITAMIN) per tablet 1 Tablet Oral Every day      nozaseptin (NOZIN) nasal  1 each by Each Nare route 2 (two) times daily. 1 applicator 0    omeprazole (PRILOSEC) 40 MG capsule Take 1 capsule (40 mg total) by mouth once daily. Dispense Generic 90 capsule 0    tadalafil (CIALIS) 20 MG Tab Take 1 tablet (20 mg total) by mouth daily as needed. 20 tablet 3    [DISCONTINUED] albuterol 90 mcg/actuation inhaler Every 4-6 hours (Patient taking differently: every 4 (four) hours as needed. Every 4-6 hours) 3 Inhaler 4    [DISCONTINUED] fluticasone (FLONASE) 50 mcg/actuation nasal spray 2 sprays by Each Nare route once daily. (Patient taking differently: 2 sprays by Each Nare route as needed. ) 3 Bottle 4    [DISCONTINUED] SOOLANTRA 1 % Crea EDWARD EXT AA prn  5    budesonide-formoterol 160-4.5 mcg (SYMBICORT) 160-4.5 mcg/actuation HFAA Inhale 2 puffs into the lungs every 12 (twelve) hours. Wash out mouth after using 1 Inhaler 12    diclofenac 1.3 % PT12 Apply 1 patch topically every 12 (twelve) hours as needed. 30 patch 0     No facility-administered encounter medications on file as of 6/26/2018.      Orders Placed This Encounter   Procedures    CPAP/BIPAP SUPPLIES     [facility] for  CPAP/Oxygen/Nebulizer supplies.  Customer Service: [phone]  Call: [phone]  Fax: [phone]  Billing Inquiries: [phone] or [phone]       Order Specific Question:   Type of mask:     Answer:   FFM     Comments:   patient prefernce     Order Specific Question:   Headgear?     Answer:   Yes     Order Specific Question:   Tubing?     Answer:   Yes     Order Specific Question:   Humidifier chamber?     Answer:   Yes     Order Specific Question:   Chin strap?     Answer:   Yes     Order Specific Question:   Filters?     Answer:   Yes     Order Specific Question:   Length of need (1-99 months):     Answer:   99    Spirometry with/without bronchodilator     Standing Status:   Future     Standing Expiration Date:   2019     Plan:       Requested Prescriptions     Signed Prescriptions Disp Refills    budesonide-formoterol 160-4.5 mcg (SYMBICORT) 160-4.5 mcg/actuation HFAA 1 Inhaler 12     Sig: Inhale 2 puffs into the lungs every 12 (twelve) hours. Wash out mouth after using    albuterol 90 mcg/actuation inhaler 3 Inhaler 11     Sig: Inhale 2 puffs into the lungs every 4 (four) hours as needed. Every 4-6 hours    fluticasone (FLONASE) 50 mcg/actuation nasal spray 3 Bottle 4     Si sprays (100 mcg total) by Each Nare route once daily.     KEVEN (obstructive sleep apnea)  -     CPAP/BIPAP SUPPLIES    Asthma, chronic, moderate persistent, uncomplicated  -     budesonide-formoterol 160-4.5 mcg (SYMBICORT) 160-4.5 mcg/actuation HFAA; Inhale 2 puffs into the lungs every 12 (twelve) hours. Wash out mouth after using  Dispense: 1 Inhaler; Refill: 12  -     albuterol 90 mcg/actuation inhaler; Inhale 2 puffs into the lungs every 4 (four) hours as needed. Every 4-6 hours  Dispense: 3 Inhaler; Refill: 11  -     Spirometry with/without bronchodilator; Future; Expected date: 2018    Mild intermittent asthma, uncomplicated  -     albuterol 90 mcg/actuation inhaler; Inhale 2 puffs into the lungs every  4 (four) hours as needed. Every 4-6 hours  Dispense: 3 Inhaler; Refill: 11    Chronic seasonal allergic rhinitis due to pollen  -     fluticasone (FLONASE) 50 mcg/actuation nasal spray; 2 sprays (100 mcg total) by Each Nare route once daily.  Dispense: 3 Bottle; Refill: 4           Follow-up in about 1 year (around 6/26/2019) for Review althea.    Review of medical records from hospital. Medical records from hospital were reviewed during office visit - these included but were not limited to review of radiographic studies and /or radiologists reports, laboratory studies, discharge summaries, procedure notes, consultations and other transcribed notes.    MEDICAL DECISION MAKING: Moderate to high complexity.  Overall, the multiple problems listed are of moderate to high severity that may impact quality of life and activities of daily living. Side effects of medications, treatment plan as well as options and alternatives reviewed and discussed with patient. There was counseling of patient concerning these issues.    Total time spent in face to face counseling and coordination of care - 40 minutes over 50% of time was used in discussion of prognosis, risks, benefits of treatment, instructions and compliance with regimen . Discussion with other physicians or health care providers (homehealth, durable medical equipment providers).

## 2018-06-27 ENCOUNTER — OUTPATIENT CASE MANAGEMENT (OUTPATIENT)
Dept: ADMINISTRATIVE | Facility: OTHER | Age: 63
End: 2018-06-27

## 2018-06-27 LAB
BRPFT: ABNORMAL
FEF 25 75 CHG: 75.1 %
FEF 25 75 LLN: 1.42
FEF 25 75 POST REF: 84.6 %
FEF 25 75 POST: 2.59 L/S (ref 1.42–4.7)
FEF 25 75 PRE REF: 48.3 %
FEF 25 75 PRE: 1.48 L/S (ref 1.42–4.7)
FEF 25 75 REF: 3.06
FET100 CHG: -22.4 %
FET100 POST: 12.4 SEC
FET100 PRE: 15.98 SEC
FEV1 CHG: 11.6 %
FEV1 FVC CHG: 10.6 %
FEV1 FVC LLN: 63
FEV1 FVC POST REF: 97.1 %
FEV1 FVC POST: 73.69 % (ref 63.35–88.39)
FEV1 FVC PRE REF: 87.8 %
FEV1 FVC PRE: 66.64 % (ref 63.35–88.39)
FEV1 FVC REF: 76
FEV1 LLN: 2.95
FEV1 POST REF: 82.5 %
FEV1 POST: 3.29 L (ref 2.95–5.03)
FEV1 PRE REF: 73.9 %
FEV1 PRE: 2.95 L (ref 2.95–5.03)
FEV1 REF: 3.99
FEV6 CHG: 5.2 %
FEV6 LLN: 4.25
FEV6 POST REF: 79.6 %
FEV6 POST: 4.21 L (ref 4.25–6.34)
FEV6 PRE REF: 75.6 %
FEV6 PRE: 4 L (ref 4.25–6.34)
FEV6 REF: 5.29
FVC CHG: 0.9 %
FVC LLN: 3.98
FVC POST REF: 84.5 %
FVC POST: 4.47 L (ref 3.98–6.6)
FVC PRE REF: 83.7 %
FVC PRE: 4.43 L (ref 3.98–6.6)
FVC REF: 5.29
MVV LLN: 131
MVV REF: 155
PEF CHG: 6.5 %
PEF LLN: 7.45
PEF POST REF: 75.3 %
PEF POST: 7.62 L/S (ref 7.45–12.8)
PEF PRE REF: 70.7 %
PEF PRE: 7.15 L/S (ref 7.45–12.8)
PEF REF: 10.12

## 2018-06-27 NOTE — PROGRESS NOTES
Please note the following patients information has been deferred to the [facility] Case Management/Navigator team.      Please contact Outpatient Complex Care Management at ext. [phone] with questions.      Thank you,    Lilliam iLra, [facility]  Outpatient Complex Care Mgmt  Ext [phone]

## 2018-07-02 ENCOUNTER — OFFICE VISIT (OUTPATIENT)
Dept: SURGERY | Facility: CLINIC | Age: 63
End: 2018-07-02
Payer: COMMERCIAL

## 2018-07-02 VITALS
WEIGHT: 255.75 LBS | BODY MASS INDEX: 31.79 KG/M2 | TEMPERATURE: 98 F | SYSTOLIC BLOOD PRESSURE: 120 MMHG | HEART RATE: 74 BPM | DIASTOLIC BLOOD PRESSURE: 79 MMHG

## 2018-07-02 DIAGNOSIS — Z98.890 POST-OPERATIVE STATE: Primary | ICD-10-CM

## 2018-07-02 PROBLEM — K56.609 SBO (SMALL BOWEL OBSTRUCTION): Status: RESOLVED | Noted: 2018-06-18 | Resolved: 2018-07-02

## 2018-07-02 PROBLEM — K42.9 UMBILICAL HERNIA WITHOUT OBSTRUCTION AND WITHOUT GANGRENE: Status: RESOLVED | Noted: 2018-06-18 | Resolved: 2018-07-02

## 2018-07-02 PROCEDURE — 99999 PR PBB SHADOW E&M-EST. PATIENT-LVL III: CPT | Mod: PBBFAC,,, | Performed by: SURGERY

## 2018-07-02 PROCEDURE — 99024 POSTOP FOLLOW-UP VISIT: CPT | Mod: S$GLB,,, | Performed by: SURGERY

## 2018-07-02 NOTE — PROGRESS NOTES
Subjective:       Patient ID: Yung Mcconnell is a 62 y.o. male.  Patient is status post with diagnostic laparoscopy for persistent dilated small bowel. No abnormalities were found.    He also had an umbilical repair    Chief Complaint: Post-op Evaluation      Patient states he is doing well. Is normal activities.  Incisional pain has resolved      Review of Systems   Gastrointestinal: Negative.        Objective:      Physical Exam   Constitutional: He appears well-developed and well-nourished.   Cardiovascular: Normal rate and regular rhythm.    Pulmonary/Chest: Effort normal and breath sounds normal.   Abdominal: Soft. Bowel sounds are normal. He exhibits no distension. There is no tenderness.   All incisions are healing well.  There is a slight bit of epithelial loss of the umbilicus   Vitals reviewed.      Assessment:      doing well after laparoscopy and umbilical hernia repair  Plan:       Activity as tolerated.  May return to work July 9.  Follow up with surgery as needed

## 2018-07-12 ENCOUNTER — LAB VISIT (OUTPATIENT)
Dept: LAB | Facility: HOSPITAL | Age: 63
End: 2018-07-12
Attending: FAMILY MEDICINE
Payer: COMMERCIAL

## 2018-07-12 ENCOUNTER — OFFICE VISIT (OUTPATIENT)
Dept: FAMILY MEDICINE | Facility: CLINIC | Age: 63
End: 2018-07-12
Payer: COMMERCIAL

## 2018-07-12 VITALS
WEIGHT: 253.88 LBS | BODY MASS INDEX: 34.39 KG/M2 | SYSTOLIC BLOOD PRESSURE: 112 MMHG | OXYGEN SATURATION: 97 % | HEART RATE: 80 BPM | DIASTOLIC BLOOD PRESSURE: 66 MMHG | TEMPERATURE: 98 F | HEIGHT: 72 IN

## 2018-07-12 DIAGNOSIS — R74.8 ABNORMAL TRANSAMINASES: ICD-10-CM

## 2018-07-12 DIAGNOSIS — K21.9 GASTROESOPHAGEAL REFLUX DISEASE, ESOPHAGITIS PRESENCE NOT SPECIFIED: ICD-10-CM

## 2018-07-12 DIAGNOSIS — J44.89 ASTHMA WITH COPD: ICD-10-CM

## 2018-07-12 DIAGNOSIS — M54.16 RIGHT LUMBAR RADICULOPATHY: ICD-10-CM

## 2018-07-12 DIAGNOSIS — G47.33 OSA (OBSTRUCTIVE SLEEP APNEA): ICD-10-CM

## 2018-07-12 DIAGNOSIS — Z00.00 ANNUAL PHYSICAL EXAM: Primary | ICD-10-CM

## 2018-07-12 LAB
ALBUMIN SERPL BCP-MCNC: 3.9 G/DL
ALP SERPL-CCNC: 84 U/L
ALT SERPL W/O P-5'-P-CCNC: 49 U/L
ANION GAP SERPL CALC-SCNC: 9 MMOL/L
AST SERPL-CCNC: 33 U/L
BILIRUB SERPL-MCNC: 1.2 MG/DL
BUN SERPL-MCNC: 13 MG/DL
CALCIUM SERPL-MCNC: 9.9 MG/DL
CHLORIDE SERPL-SCNC: 103 MMOL/L
CO2 SERPL-SCNC: 29 MMOL/L
CREAT SERPL-MCNC: 0.8 MG/DL
EST. GFR  (AFRICAN AMERICAN): >60 ML/MIN/1.73 M^2
EST. GFR  (NON AFRICAN AMERICAN): >60 ML/MIN/1.73 M^2
GLUCOSE SERPL-MCNC: 93 MG/DL
POTASSIUM SERPL-SCNC: 3.9 MMOL/L
PROT SERPL-MCNC: 7.2 G/DL
SODIUM SERPL-SCNC: 141 MMOL/L

## 2018-07-12 PROCEDURE — 36415 COLL VENOUS BLD VENIPUNCTURE: CPT | Mod: PO

## 2018-07-12 PROCEDURE — 99999 PR PBB SHADOW E&M-EST. PATIENT-LVL III: CPT | Mod: PBBFAC,,, | Performed by: FAMILY MEDICINE

## 2018-07-12 PROCEDURE — 99396 PREV VISIT EST AGE 40-64: CPT | Mod: S$GLB,,, | Performed by: FAMILY MEDICINE

## 2018-07-12 PROCEDURE — 80053 COMPREHEN METABOLIC PANEL: CPT

## 2018-07-12 RX ORDER — CYCLOBENZAPRINE HCL 5 MG
TABLET ORAL
Qty: 20 TABLET | Refills: 0 | Status: SHIPPED | OUTPATIENT
Start: 2018-07-12 | End: 2024-03-31

## 2018-07-12 NOTE — PROGRESS NOTES
Subjective:       Patient ID: Yung Mcconnell is a 62 y.o. male.    Chief Complaint: Annual Exam      HPI Comments:       Current Outpatient Prescriptions:     albuterol (PROVENTIL) 2.5 mg /3 mL (0.083 %) nebulizer solution, Take 3 mLs (2.5 mg total) by nebulization every 6 (six) hours while awake., Disp: 1080 mL, Rfl: 3    albuterol 90 mcg/actuation inhaler, Inhale 2 puffs into the lungs every 4 (four) hours as needed. Every 4-6 hours, Disp: 3 Inhaler, Rfl: 11    budesonide-formoterol 160-4.5 mcg (SYMBICORT) 160-4.5 mcg/actuation HFAA, Inhale 2 puffs into the lungs every 12 (twelve) hours. Wash out mouth after using, Disp: 1 Inhaler, Rfl: 12    cetirizine (ZYRTEC) 10 MG tablet, Take 10 mg by mouth once daily., Disp: , Rfl:     cyclobenzaprine (FLEXERIL) 5 MG tablet, 1 Tablet Oral Three times a day as needed., Disp: 20 tablet, Rfl: 0    diclofenac 1.3 % PT12, Apply 1 patch topically every 12 (twelve) hours as needed., Disp: 30 patch, Rfl: 0    fluticasone (FLONASE) 50 mcg/actuation nasal spray, 2 sprays (100 mcg total) by Each Nare route once daily. (Patient taking differently: 2 sprays by Each Nare route as needed. ), Disp: 3 Bottle, Rfl: 4    GLUCOSAMINE HCL/CHONDR ALFONSO A NA (OSTEO BI-FLEX ORAL), Take 1 tablet by mouth once daily. , Disp: , Rfl:     HYDROcodone-acetaminophen (NORCO) 5-325 mg per tablet, Take 1 tablet by mouth every 8 (eight) hours as needed., Disp: 10 tablet, Rfl: 0    multivitamin (MULTIPLE VITAMIN) per tablet, 1 Tablet Oral Every day, Disp: , Rfl:     omeprazole (PRILOSEC) 40 MG capsule, Take 1 capsule (40 mg total) by mouth once daily. Dispense Generic, Disp: 90 capsule, Rfl: 0    tadalafil (CIALIS) 20 MG Tab, Take 1 tablet (20 mg total) by mouth daily as needed., Disp: 20 tablet, Rfl: 3    nozaseptin (NOZIN) nasal , 1 each by Each Nare route 2 (two) times daily., Disp: 1 applicator, Rfl: 0 the      He is here for an annual physical.  Had a lot of blood work done 8 months ago  "that was normal.  Since I last saw him in April for a sore throat, he has had a small bowel obstruction that led to a hospitalization and a umbilical hernia repair.  He has had no problem since then, and his umbilical scar has healed well.    He also has obstructive sleep apnea, is followed by pulmonology for this as well as for some asthma/COPD.  He takes Symbicort, albuterol.    He also takes Cialisas needed, which is affective.    Followed at chronic pain management for his neck and low back problems.  In general he avoids Tylenol as well as NSAIDs, though has no particular contraindication to either.  Asking for refill on Flexeril which he takes only 2 or 3 times a year.    Colonoscopy is up-to-date, as never had a EGD despite having a chronic history of GERD.  However as long as he takes his Prilosec every day his symptoms are controlled.    I have noted that his transaminases were mildly elevated during his hospitalization for small-bowel obstruction.  Reason for this was most likely related to that problem.  He rarely takes Tylenol and does not drink much alcohol      Review of Systems   Constitutional: Negative for activity change, appetite change and fever.   HENT: Negative for sore throat.    Respiratory: Negative for cough and shortness of breath.    Cardiovascular: Negative for chest pain.   Gastrointestinal: Negative for abdominal pain, diarrhea and nausea.   Genitourinary: Negative for difficulty urinating.   Musculoskeletal: Positive for back pain and neck pain. Negative for arthralgias and myalgias.   Neurological: Negative for dizziness and headaches.       Objective:      Vitals:    07/12/18 1013   BP: 112/66   Pulse: 80   Temp: 97.8 °F (36.6 °C)   SpO2: 97%   Weight: 115.2 kg (253 lb 13.8 oz)   Height: 6' 0.25" (1.835 m)   PainSc: 0-No pain     Physical Exam   Constitutional: He is oriented to person, place, and time. He appears well-developed and well-nourished. No distress.   HENT:   Head: " Normocephalic.   Mouth/Throat: No oropharyngeal exudate.   Neck: Neck supple. No thyromegaly present.   Cardiovascular: Normal rate, regular rhythm and normal heart sounds.    No murmur heard.  Pulmonary/Chest: Effort normal and breath sounds normal. He has no wheezes. He has no rales.   Abdominal: Soft. He exhibits no distension and no mass. There is no hepatosplenomegaly. There is no tenderness.       Musculoskeletal: He exhibits no edema.   Lymphadenopathy:     He has no cervical adenopathy.   Neurological: He is alert and oriented to person, place, and time.   Skin: Skin is warm and dry. He is not diaphoretic.   Psychiatric: He has a normal mood and affect. His behavior is normal. Judgment and thought content normal.   Nursing note and vitals reviewed.      Assessment:       1. Annual physical exam    2. Gastroesophageal reflux disease, esophagitis presence not specified    3. Asthma with COPD    4. KEVEN (obstructive sleep apnea)    5. Right lumbar radiculopathy    6. Abnormal transaminases        Plan:   Annual physical exam  Comments:  Lipid profile up-to-date.  Colonoscopy up-to-date    Gastroesophageal reflux disease, esophagitis presence not specified  Comments:  Stable on PPI    Asthma with COPD  Comments:  Stable on medications    KEVEN (obstructive sleep apnea)  Comments:  Uses CPAP nightly    Right lumbar radiculopathy  Comments:  Followed by chronic pain management  Orders:  -     cyclobenzaprine (FLEXERIL) 5 MG tablet; 1 Tablet Oral Three times a day as needed.  Dispense: 20 tablet; Refill: 0    Abnormal transaminases  Comments:  Likely related to small bowel obstruction.  Will recheck these again to make sure they have returned to baseline  Orders:  -     Comprehensive metabolic panel; Future; Expected date: 07/12/2018

## 2018-09-10 ENCOUNTER — PATIENT MESSAGE (OUTPATIENT)
Dept: FAMILY MEDICINE | Facility: CLINIC | Age: 63
End: 2018-09-10

## 2018-09-10 DIAGNOSIS — N52.9 ERECTILE DYSFUNCTION, UNSPECIFIED ERECTILE DYSFUNCTION TYPE: ICD-10-CM

## 2018-09-10 RX ORDER — TADALAFIL 20 MG/1
20 TABLET ORAL DAILY PRN
Qty: 24 TABLET | Refills: 3 | Status: CANCELLED | OUTPATIENT
Start: 2018-09-10

## 2018-09-11 RX ORDER — TADALAFIL 20 MG/1
20 TABLET ORAL DAILY PRN
Qty: 24 TABLET | Refills: 3 | Status: SHIPPED | OUTPATIENT
Start: 2018-09-11 | End: 2019-12-13 | Stop reason: SDUPTHER

## 2018-10-21 ENCOUNTER — PATIENT MESSAGE (OUTPATIENT)
Dept: PULMONOLOGY | Facility: CLINIC | Age: 63
End: 2018-10-21

## 2018-10-22 ENCOUNTER — TELEPHONE (OUTPATIENT)
Dept: PULMONOLOGY | Facility: CLINIC | Age: 63
End: 2018-10-22

## 2018-10-26 ENCOUNTER — PATIENT MESSAGE (OUTPATIENT)
Dept: PULMONOLOGY | Facility: CLINIC | Age: 63
End: 2018-10-26

## 2018-10-26 ENCOUNTER — TELEPHONE (OUTPATIENT)
Dept: PULMONOLOGY | Facility: CLINIC | Age: 63
End: 2018-10-26

## 2018-10-26 NOTE — TELEPHONE ENCOUNTER
----- Message from Rene Gallagher sent at 10/26/2018  1:25 PM CDT -----  Contact: Jessica-Pt's Wife  She is calling in regards to getting confirmation for the pt's CPAP supplies and called on last week, please advise [phone]

## 2018-10-30 NOTE — TELEPHONE ENCOUNTER
Pt accepted [facility] appointment on 11/05/2018 at 1520 with Dr Lozano.   Pt verbalized understanding.

## 2018-11-05 ENCOUNTER — OFFICE VISIT (OUTPATIENT)
Dept: PULMONOLOGY | Facility: CLINIC | Age: 63
End: 2018-11-05
Payer: MEDICARE

## 2018-11-05 VITALS
RESPIRATION RATE: 18 BRPM | WEIGHT: 258.63 LBS | OXYGEN SATURATION: 95 % | HEIGHT: 72 IN | SYSTOLIC BLOOD PRESSURE: 126 MMHG | DIASTOLIC BLOOD PRESSURE: 80 MMHG | HEART RATE: 71 BPM | BODY MASS INDEX: 35.03 KG/M2

## 2018-11-05 DIAGNOSIS — J45.909 CHRONIC ASTHMA WITHOUT COMPLICATION, UNSPECIFIED ASTHMA SEVERITY, UNSPECIFIED WHETHER PERSISTENT: ICD-10-CM

## 2018-11-05 DIAGNOSIS — J45.40 ASTHMA, CHRONIC, MODERATE PERSISTENT, UNCOMPLICATED: ICD-10-CM

## 2018-11-05 DIAGNOSIS — J44.89 ASTHMA WITH COPD: ICD-10-CM

## 2018-11-05 DIAGNOSIS — J30.1 SEASONAL ALLERGIC RHINITIS DUE TO POLLEN: ICD-10-CM

## 2018-11-05 DIAGNOSIS — J45.20 MILD INTERMITTENT ASTHMA, UNCOMPLICATED: ICD-10-CM

## 2018-11-05 DIAGNOSIS — G47.33 OSA (OBSTRUCTIVE SLEEP APNEA): Primary | ICD-10-CM

## 2018-11-05 DIAGNOSIS — J30.1 CHRONIC SEASONAL ALLERGIC RHINITIS DUE TO POLLEN: ICD-10-CM

## 2018-11-05 PROCEDURE — 99999 PR PBB SHADOW E&M-EST. PATIENT-LVL III: CPT | Mod: PBBFAC,,, | Performed by: INTERNAL MEDICINE

## 2018-11-05 PROCEDURE — 99213 OFFICE O/P EST LOW 20 MIN: CPT | Mod: PBBFAC,PO | Performed by: INTERNAL MEDICINE

## 2018-11-05 PROCEDURE — 99214 OFFICE O/P EST MOD 30 MIN: CPT | Mod: S$PBB,,, | Performed by: INTERNAL MEDICINE

## 2018-11-05 RX ORDER — BUDESONIDE AND FORMOTEROL FUMARATE DIHYDRATE 160; 4.5 UG/1; UG/1
2 AEROSOL RESPIRATORY (INHALATION) EVERY 12 HOURS
Qty: 3 INHALER | Refills: 3 | Status: SHIPPED | OUTPATIENT
Start: 2018-11-05 | End: 2019-04-29 | Stop reason: SDUPTHER

## 2018-11-05 RX ORDER — ALBUTEROL SULFATE 90 UG/1
2 AEROSOL, METERED RESPIRATORY (INHALATION) EVERY 4 HOURS PRN
Qty: 3 INHALER | Refills: 11 | Status: SHIPPED | OUTPATIENT
Start: 2018-11-05 | End: 2019-04-29 | Stop reason: SDUPTHER

## 2018-11-05 RX ORDER — FLUTICASONE PROPIONATE 50 MCG
2 SPRAY, SUSPENSION (ML) NASAL DAILY
Qty: 3 BOTTLE | Refills: 4 | Status: SHIPPED | OUTPATIENT
Start: 2018-11-05 | End: 2019-12-13

## 2018-11-05 NOTE — PROGRESS NOTES
Subjective:       Patient ID: Yung Mcconnell is a 63 y.o. male.    Chief Complaint: He       Sleep Apnea    HPI   Obstructive Sleep Apnea:  Compliance Report  Usage 08/07/2018 - 11/04/2018  Usage days 90/90 days (100%)  >= 4 hours 90 days (100%)  < 4 hours 0 days (0%)  Usage hours 654 hours 45 minutes  Average usage (total days) 7 hours 17 minutes  Average usage (days used) 7 hours 17 minutes  Median usage (days used) 6 hours 52 minutes  AirSense 10 AutoSet  Serial number [id number]  Mode CPAP  Set pressure 11 cmH2O  EPR Ramp Only  EPR level 3  Therapy  Leaks - L/min Median: 0.0 95th percentile: 4.9 Maximum: 12.6  Events per hour AI: 1.5 HI: 0.7 AHI: 2.2  Apnea Index Central: 0.6 Obstructive: 0.9 Unknown: 0.0  Cheyne-Haley respiration (average duration per night) 0 minutes (0%)    Patient is using CPAP as prescribed and benefiting from therapy.    Patient has complaints of Needs supplies      Past Medical History:   Diagnosis Date    Allergy     Asthma, chronic 7/9/2013    Colon polyps     COPD (chronic obstructive pulmonary disease)     GERD (gastroesophageal reflux disease)     High cholesterol     Osteoarthritis of both knees 7/9/2013    Sleep apnea      Past Surgical History:   Procedure Laterality Date    CHOLECYSTECTOMY      COLONOSCOPY N/A 7/29/2015    Performed by Rocky Couch MD at [facility] ENDO    DIAGNOSTIC LAPAROSCOPY N/A 6/21/2018    Procedure: LAPAROSCOPY, DIAGNOSTIC;  Surgeon: Casper Martinez MD;  Location: [facility] OR;  Service: General;  Laterality: N/A;    LAPAROSCOPY, DIAGNOSTIC N/A 6/21/2018    Performed by Casper Martinez MD at [facility] OR    LUNG SURGERY Right     benign mass    REPAIR, HERNIA, UMBILICAL, AGE 5 YEARS OR OLDER N/A 6/21/2018    Performed by [facility]per [facility] MD at [facility] OR    UMBILICAL HERNIA REPAIR N/A 6/21/2018    Procedure: REPAIR, HERNIA, UMBILICAL, AGE 5 YEARS OR OLDER;  Surgeon: Casper Martinez MD;  Location: [facility] OR;  Service: General;  Laterality: N/A;     Social  History     Socioeconomic History    Marital status:      Spouse name: Not on file    Number of children: Not on file    Years of education: Not on file    Highest education level: Not on file   Social Needs    Financial resource strain: Not on file    Food insecurity - worry: Not on file    Food insecurity - inability: Not on file    Transportation needs - medical: Not on file    Transportation needs - non-medical: Not on file   Occupational History     Employer: [employer]   Tobacco Use    Smoking status: Never Smoker    Smokeless tobacco: Never Used   Substance and Sexual Activity    Alcohol use: Yes     Comment: rare    Drug use: No    Sexual activity: Yes     Partners: Female     Birth control/protection: None   Other Topics Concern    Not on file   Social History Narrative    Not on file     Review of Systems   Constitutional: Positive for fatigue.   HENT: Positive for postnasal drip and rhinorrhea.    Respiratory: Positive for apnea.    Cardiovascular: Negative.    Genitourinary: Negative.    Endocrine: endocrine negative   Musculoskeletal: Negative.    Skin: Negative.    Gastrointestinal: Negative.    Neurological: Negative.    Psychiatric/Behavioral: Positive for sleep disturbance.       Objective:      Physical Exam   Constitutional: He is oriented to person, place, and time. He appears well-developed and well-nourished.   HENT:   Head: Normocephalic and atraumatic.   Eyes: Conjunctivae are normal. Pupils are equal, round, and reactive to light.   Neck: Neck supple. No JVD present. No tracheal deviation present. No thyromegaly present.   Cardiovascular: Normal rate, regular rhythm and normal heart sounds.   Pulmonary/Chest: Effort normal and breath sounds normal.   Abdominal: Soft.   Musculoskeletal: Normal range of motion.   Lymphadenopathy:     He has no cervical adenopathy.   Neurological: He is alert and oriented to person, place, and time.   Skin: Skin is warm and  dry.   Nursing note and vitals reviewed.    Personal Diagnostic Review  none pertinent  Office Spirometry Results:     No flowsheet data found.  Pulmonary Studies Review 2018   SpO2 95   Height 72.250   Weight 4137.59   BMI (Calculated) 34.9   Predicted Distance 340.11   Predicted Distance Meters (Calculated) 547.35   [facility], [facility]  Sleep Disorder Evaluation  Patient Name: Yung Mcconnell Date of Report: 11 Date of PS2011  [facility] No.: [id number] : 1955 Time of PSG: 10:28:40 PM - 05:01:40 AM  Sex: M Age: 55 Weight: 246 lbs Height: 6 2 Type of PSG: Diagnostic  REASONS FOR REFERRAL: Mr. Mcconnell is a 55 year old male, referred for diagnostic polysomnography by Dr. Rafal Lozano, for evaluation of possible obstructive sleep  apnea / hypopnea syndrome (OSAHS), based on the patient s reported loud snoring and observed respiratory pauses in sleep, and daytime somnolence. His [place]  Sleepiness scale was abnormal ( > 11). Dr. Rubén Kauffman is the patient s primary care physician.  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  1. The diagnostic polysomnography revealed a mild obstructive sleep apnea / hypopnea syndrome (A + H Index = 13.2 events / hr asleep; 7.3 arousals / hr asleep), with a mean  SaO2 during events = 89.0 %, significant; lowest SaO2 during events = 80 %, waking baseline SaO2 = 98 %. Sporadic, loud snoring was noted. A CPAP titration  polysomnography is recommended.  2. As respiratory events had a strong supine positional tendency, a device to discourage sleep in the supine position is recommended to reduce respiratory events and snoring.  3. Weight  loss to the normal range is recommended as it can decrease respiratory events and snoring in overweight patients.  4. The following changes in sleep hygiene / sleep - related behavior are recommended after medical treatments are successful:  ? Set time for sleep to number of hours of sleep needed for adequate daytime functioning (7.5 to 9.0 hrs / night).  ? Regular bedtimes and wake times, including weekends: Total sleep time / night should not be more than one hour more  than usual, and bedtime or wake time should not be more than one hour earlier or later than usual.  ? Do not attempt to make up lost sleep by extending sleep periods.  ? Avoid naps; none longer than 20 min or later than mid - afternoon.  SECONDARY TREATMENT RECOMMENDATIONS Treat, or refer to SDC if problems are not satisfactorily resolved by the above.  1. A borderline PLM disorder was observed (PLMS Index = 7.3 / hr sleep), but the PLMS were not at all disruptive of sleep (only 0.7 arousals / hr asleep)? however, as there  was SDI evidence of restless legs syndrome (which can be treated with the same medications as PLMS), consider treatment of PLM disorder and restless legs syndrome if RLS is  confirmed. Note that the benzodiazepine medications sometimes used to treat PLM disorder (e.g., clonazepam) may exacerbate some sleep - related respiratory  disorders, and that dopaminergic medications such as Mirapex and Requip are used in such instances.  2. Follow - up inquiry regarding frequency, duration and nature of reported sleep loss (possible role of OSAHS; r/o voluntary sleep restriction).  See below for a complete interpretation of data from the polysomnography and Sleep Disorders Inventory.  Thank you for referring this patient to the [facility].  Marin Ruby, Ph.D., ABPP; Diplomate, American Board of Sleep Medicine  INTERPRETATION OF DATA FROM POLYSOMNOGRAPHY AND SLEEP DISORDERS INVENTORY  NOTE: The polysomnography  electrophysiological record for this patient has been reviewed in its entirety by Dr. Ruby.  SLEEP QUANTITY: Sufficient time asleep for diagnostic evaluation (5.6 hrs). Good sleep efficiency (85.4 %), with no difficulty falling asleep (29.5 min) or maintaining sleep  (awake 8.3 % of time after sleep onset).  ? There was no evidence of hypersomnolence (8.5 hrs sleep prior night).  ? The normal sleep onset and sleep maintenance are consistent with Mr. Mcconnell s Sleep Disorders Inventory (SDI), where he  reports having no significant difficulty falling asleep or staying asleep at home.  SLEEP QUALITY: Nonrestorative sleep due to poor sleep depth, but with good sleep continuity.  ? Sleep Continuity: Sleep Depth: High 13.5 % Stage 1 NREM sleep, normal 18.3 % slow wave sleep.  ? Normal 13.1 transient arousals / hr asleep, 29.0 sleep stage changes / hr asleep, 1.5 awakenings / hr  RAPID EYE MOVEMENT SLEEP: Normal REM sleep architecture.  ? Normal 21.4 % of the sleep period was REM sleep; normal REM sleep onset, 68.5 min after sleep onset.  SLEEP - RELATED RESPIRATORY DISORDERS: Mild obstructive sleep apnea / hypopnea syndrome (A + H Index = 13.2 events / hr  asleep; 7.3 arousals / hr asleep), with a mean SaO2 during events = 89.0 %, significant; lowest SaO2 during events = 80 %, waking  baseline SaO2 = 98 %. Sporadic, loud snoring was noted.  ? Mr. Mcconnell had 52 hypopneas, 9 obstructive sleep apneas, 8 central sleep apneas, and 5 mixed sleep apneas.  ? Respiratory events had a strong supine positional tendency (78.4 % of events were supine, but only 37.8 % of sleep was supine? supine sleep RDI / other position  RDI = 27.5 / 4.6).  ? Respiratory events had no REM sleep tendency (4.1 % of events were in REM sleep, 23.4 % of sleep was REM sleep; REM sleep RDI / NREM sleep RDI = 2.3 /  16.6).  EKG (Scoring Technician): Mr. Mcconnell had some mild sinus bradycardia (rate in the 50s).  CPAP: Not initiated because the number of  respiratory events counted by the polysomnography technician before 1:30 am (22) was below the 80 event laboratory criterion for a  CPAP titration trial.  Patient Name: MARNIE MCCONNELL  MRN: [id number]  : 1955 Sex: M  SLEEP - RELATED MOVEMENT DISORDERS: Mr. Mcconnell evidenced a borderline periodic limb movement disorder (PLMS Index = 7.3 PLMS / hr asleep), with only 0.7 arousals /  hr asleep.  ? He had 41 periodic limb movements of sleep in 5.6 hrs of sleep (5.5 % of arousals).  SPONTANEOUS TRANSIENT AROUSALS: Mr. Mcconnell had a normal rate of spontaneous transient arousals (5.0 STA / hr asleep, 38.4 % of arousals). Thus, STA did not impair  sleep quality / restorativeness.  EVIDENCE OF OTHER SLEEP DISORDERS: SLEEP 5 0, Sleep Disorders Inventory (SDI)  ? Sleep Deprivation: Mr. Mcconnell is sleep deprived; he reports getting 6 7 hrs sleep 2 - 3 nights / wk, and 7 8 hrs sleep 5 - 6 nights / wk.  ? Insomnia: SLEEP 50 Insomnia score < 19 (12)? no significant problem falling asleep initially at night or maintaining sleep.  ? Sleep Restriction: Rule out. Yes if allocates 7 hrs for sleep on 5 work nights / wk (reports allocating 7 8 hrs for sleep / night)  ? Hypersomnolence: SLEEP 50 Impact score < 15 (14).  ? Sleep Apnea / Hypopnea Syndrome: SLEEP 50 Sleep Apnea score > 14 (15).  ? Restless Legs Syndrome: Rule out. Sleep is delayed by restless / odd feelings in legs that compel him to move his legs in bed, 20 + nights / mo. Moving legs in bed or  getting up and moving helps him fall asleep. RLS typically causes insomnia and frequently accompanies PLM disorder; Mr. Mcconnell has PLMS, but denies insomnia.  There was no evidence of: Advanced or delayed sleep phase syndrome, insomnia, psychophysiological insomnia, hypersomnolence, narcolepsy, idiopathic hypersomnia,  periodic limb movement disorder, REM sleep behavior disorder, sleep bruxism, nightmares, sleepwalking, sleep disruption due to physical discomfort or pain, sleep -  related  gastroesophageal reflux, or shift work sleep disorder.  MALADAPTIVE SLEEP - RELATED BEHAVIOR: These behaviors may impair nocturnal sleep quantity and quality:  ? Sleeps more than 1 hr later on days off than on workdays.  ? Often goes to bed earlier and / or wakes up later to make up for lost or unrefreshing sleep.  ? Takes 1 2 hour naps during normal waking hours 2 days / wk.  STRESS: There are no indications that stress is a factor in this patient s sleep complaint.  Electronically signed by Marin Ruby, PH.D., [facility] 07/14/2011 12:37:50      Assessment:       1. Asthma, chronic, moderate persistent, uncomplicated        Outpatient Encounter Medications as of 11/5/2018   Medication Sig Dispense Refill    albuterol 90 mcg/actuation inhaler Inhale 2 puffs into the lungs every 4 (four) hours as needed. Every 4-6 hours 3 Inhaler 11    budesonide-formoterol 160-4.5 mcg (SYMBICORT) 160-4.5 mcg/actuation HFAA Inhale 2 puffs into the lungs every 12 (twelve) hours. Wash out mouth after using 1 Inhaler 12    cetirizine (ZYRTEC) 10 MG tablet Take 10 mg by mouth once daily.      cyclobenzaprine (FLEXERIL) 5 MG tablet 1 Tablet Oral Three times a day as needed. 20 tablet 0    diclofenac 1.3 % PT12 Apply 1 patch topically every 12 (twelve) hours as needed. 30 patch 0    fluticasone (FLONASE) 50 mcg/actuation nasal spray 2 sprays (100 mcg total) by Each Nare route once daily. (Patient taking differently: 2 sprays by Each Nare route as needed. ) 3 Bottle 4    GLUCOSAMINE HCL/CHONDR ALFONSO A NA (OSTEO BI-FLEX ORAL) Take 1 tablet by mouth once daily.       multivitamin (MULTIPLE VITAMIN) per tablet 1 Tablet Oral Every day      omeprazole (PRILOSEC) 40 MG capsule Take 1 capsule (40 mg total) by mouth once daily. Dispense Generic 90 capsule 0    tadalafil (CIALIS) 20 MG Tab Take 1 tablet (20 mg total) by mouth daily as needed. 24 tablet 3    HYDROcodone-acetaminophen (NORCO) 5-325 mg per tablet Take 1 tablet by  mouth every 8 (eight) hours as needed. 10 tablet 0    nozaseptin (NOZIN) nasal  1 each by Each Nare route 2 (two) times daily. 1 applicator 0     No facility-administered encounter medications on file as of 11/5/2018.      No orders of the defined types were placed in this encounter.    Plan:       Requested Prescriptions     Pending Prescriptions Disp Refills    budesonide-formoterol 160-4.5 mcg (SYMBICORT) 160-4.5 mcg/actuation HFAA 1 Inhaler 12     Sig: Inhale 2 puffs into the lungs every 12 (twelve) hours. Wash out mouth after using     Asthma, chronic, moderate persistent, uncomplicated  -     budesonide-formoterol 160-4.5 mcg (SYMBICORT) 160-4.5 mcg/actuation HFAA; Inhale 2 puffs into the lungs every 12 (twelve) hours. Wash out mouth after using  Dispense: 1 Inhaler; Refill: 12           No Follow-up on file.    MEDICAL DECISION MAKING: Moderate to high complexity.  Overall, the multiple problems listed are of moderate to high severity that may impact quality of life and activities of daily living. Side effects of medications, treatment plan as well as options and alternatives reviewed and discussed with patient. There was counseling of patient concerning these issues.    Total time spent in face to face counseling and coordination of care - 25  minutes over 50% of time was used in discussion of prognosis, risks, benefits of treatment, instructions and compliance with regimen . Discussion with other physicians or health care providers (DME, NP, pharmacy, respiratory therapy) occurred.

## 2018-11-05 NOTE — LETTER
November 5, 2018      [facility] - Pulmonary Services  [address] [place] [address]  Phone: [phone]  Fax: [phone]          Patient: Yung Mcconnell   MR Number: [id number]   YOB: 1955   Date of Visit: 11/5/2018       Dear No ref. provider found:    Thank you for referring Yung Mcconnell to me for evaluation. Attached you will find relevant portions of my assessment and plan of care.    If you have questions, please do not hesitate to call me. I look forward to following Yung Mcconnell along with you.    Sincerely,    Rafal Lozano MD    Enclosure  CC:  No Recipients    IIf you would like to receive this communication electronically, please contact [email or web address] or [phone] to request [employer] access.    [employer] is a tool which provides read-only access to select patient information with whom you have a relationship. Its easy to use and provides real time access to review your patients record including encounter summaries, notes, results, and demographic information.    If you feel you have received this communication in error or would no longer like to receive these types of communications, please e-mail [email or web address]

## 2018-12-30 ENCOUNTER — PATIENT MESSAGE (OUTPATIENT)
Dept: PULMONOLOGY | Facility: CLINIC | Age: 63
End: 2018-12-30

## 2018-12-30 DIAGNOSIS — J01.90 ACUTE NON-RECURRENT SINUSITIS, UNSPECIFIED LOCATION: Primary | ICD-10-CM

## 2018-12-31 ENCOUNTER — TELEPHONE (OUTPATIENT)
Dept: FAMILY MEDICINE | Facility: CLINIC | Age: 63
End: 2018-12-31

## 2018-12-31 RX ORDER — METHYLPREDNISOLONE 4 MG/1
TABLET ORAL
Qty: 1 PACKAGE | Refills: 1 | Status: SHIPPED | OUTPATIENT
Start: 2018-12-31 | End: 2019-04-12 | Stop reason: SDUPTHER

## 2018-12-31 RX ORDER — FLUTICASONE PROPIONATE 50 MCG
2 SPRAY, SUSPENSION (ML) NASAL DAILY
Qty: 1 BOTTLE | Refills: 11 | Status: SHIPPED | OUTPATIENT
Start: 2018-12-31 | End: 2019-12-13 | Stop reason: SDUPTHER

## 2018-12-31 RX ORDER — AMOXICILLIN AND CLAVULANATE POTASSIUM 875; 125 MG/1; MG/1
1 TABLET, FILM COATED ORAL EVERY 12 HOURS
Qty: 20 TABLET | Refills: 0 | Status: SHIPPED | OUTPATIENT
Start: 2018-12-31 | End: 2019-04-12 | Stop reason: SDUPTHER

## 2018-12-31 NOTE — TELEPHONE ENCOUNTER
----- Message from Fina Chowdhury sent at 12/31/2018  8:30 AM CST -----  Contact: self  Pt is calling for same day access to see Dr. Gaxiola for congestion. Please call pt back at [phone].      Thanks,   erythromycin

## 2018-12-31 NOTE — TELEPHONE ENCOUNTER
Spoke to pt and advised pt dr cloud is filled up for the day and that he could see urgent care if he liked. Pt verbalized understanding

## 2019-03-17 ENCOUNTER — PATIENT MESSAGE (OUTPATIENT)
Dept: FAMILY MEDICINE | Facility: CLINIC | Age: 64
End: 2019-03-17

## 2019-03-18 RX ORDER — METRONIDAZOLE 7.5 MG/G
GEL TOPICAL 2 TIMES DAILY
Qty: 45 G | Refills: 2 | Status: SHIPPED | OUTPATIENT
Start: 2019-03-18 | End: 2024-03-31

## 2019-04-12 ENCOUNTER — PATIENT MESSAGE (OUTPATIENT)
Dept: PULMONOLOGY | Facility: CLINIC | Age: 64
End: 2019-04-12

## 2019-04-12 DIAGNOSIS — J01.90 ACUTE NON-RECURRENT SINUSITIS, UNSPECIFIED LOCATION: ICD-10-CM

## 2019-04-12 RX ORDER — AMOXICILLIN AND CLAVULANATE POTASSIUM 875; 125 MG/1; MG/1
1 TABLET, FILM COATED ORAL EVERY 12 HOURS
Qty: 20 TABLET | Refills: 0 | Status: SHIPPED | OUTPATIENT
Start: 2019-04-12 | End: 2019-04-22

## 2019-04-12 RX ORDER — METHYLPREDNISOLONE 4 MG/1
TABLET ORAL
Qty: 1 PACKAGE | Refills: 1 | Status: SHIPPED | OUTPATIENT
Start: 2019-04-12 | End: 2020-01-22 | Stop reason: SDUPTHER

## 2019-04-29 ENCOUNTER — HOSPITAL ENCOUNTER (OUTPATIENT)
Dept: RADIOLOGY | Facility: HOSPITAL | Age: 64
Discharge: HOME | End: 2019-04-29
Attending: INTERNAL MEDICINE
Payer: MEDICARE

## 2019-04-29 ENCOUNTER — OFFICE VISIT (OUTPATIENT)
Dept: PULMONOLOGY | Facility: CLINIC | Age: 64
End: 2019-04-29
Payer: MEDICARE

## 2019-04-29 ENCOUNTER — TELEPHONE (OUTPATIENT)
Dept: PULMONOLOGY | Facility: CLINIC | Age: 64
End: 2019-04-29

## 2019-04-29 VITALS
BODY MASS INDEX: 36.66 KG/M2 | SYSTOLIC BLOOD PRESSURE: 126 MMHG | OXYGEN SATURATION: 97 % | HEART RATE: 72 BPM | RESPIRATION RATE: 20 BRPM | HEIGHT: 72 IN | DIASTOLIC BLOOD PRESSURE: 70 MMHG | WEIGHT: 270.63 LBS

## 2019-04-29 DIAGNOSIS — G47.33 OSA ON CPAP: Primary | ICD-10-CM

## 2019-04-29 DIAGNOSIS — R05.9 COUGH: ICD-10-CM

## 2019-04-29 DIAGNOSIS — J45.41 MODERATE PERSISTENT ASTHMA WITH EXACERBATION: ICD-10-CM

## 2019-04-29 DIAGNOSIS — J45.40 ASTHMA, CHRONIC, MODERATE PERSISTENT, UNCOMPLICATED: ICD-10-CM

## 2019-04-29 DIAGNOSIS — R05.9 COUGH: Primary | ICD-10-CM

## 2019-04-29 DIAGNOSIS — J45.20 MILD INTERMITTENT ASTHMA, UNCOMPLICATED: ICD-10-CM

## 2019-04-29 DIAGNOSIS — G47.33 OSA (OBSTRUCTIVE SLEEP APNEA): ICD-10-CM

## 2019-04-29 DIAGNOSIS — J45.909 CHRONIC ASTHMA WITHOUT COMPLICATION, UNSPECIFIED ASTHMA SEVERITY, UNSPECIFIED WHETHER PERSISTENT: ICD-10-CM

## 2019-04-29 DIAGNOSIS — J30.1 SEASONAL ALLERGIC RHINITIS DUE TO POLLEN: ICD-10-CM

## 2019-04-29 PROCEDURE — 71046 X-RAY EXAM CHEST 2 VIEWS: CPT | Mod: TC

## 2019-04-29 PROCEDURE — 99214 OFFICE O/P EST MOD 30 MIN: CPT | Mod: PBBFAC,25 | Performed by: INTERNAL MEDICINE

## 2019-04-29 PROCEDURE — 99999 PR PBB SHADOW E&M-EST. PATIENT-LVL IV: CPT | Mod: PBBFAC,,, | Performed by: INTERNAL MEDICINE

## 2019-04-29 PROCEDURE — 99215 OFFICE O/P EST HI 40 MIN: CPT | Mod: 25,S$PBB,, | Performed by: INTERNAL MEDICINE

## 2019-04-29 PROCEDURE — 99999 PR PBB SHADOW E&M-EST. PATIENT-LVL IV: ICD-10-PCS | Mod: PBBFAC,,, | Performed by: INTERNAL MEDICINE

## 2019-04-29 PROCEDURE — 96372 THER/PROPH/DIAG INJ SC/IM: CPT | Mod: PBBFAC

## 2019-04-29 PROCEDURE — 99215 PR OFFICE/OUTPT VISIT, EST, LEVL V, 40-54 MIN: ICD-10-PCS | Mod: 25,S$PBB,, | Performed by: INTERNAL MEDICINE

## 2019-04-29 PROCEDURE — 71046 X-RAY EXAM CHEST 2 VIEWS: CPT | Mod: 26,,, | Performed by: RADIOLOGY

## 2019-04-29 PROCEDURE — 71046 XR CHEST PA AND LATERAL: ICD-10-PCS | Mod: 26,,, | Performed by: RADIOLOGY

## 2019-04-29 RX ORDER — PROMETHAZINE HYDROCHLORIDE AND DEXTROMETHORPHAN HYDROBROMIDE 6.25; 15 MG/5ML; MG/5ML
5 SYRUP ORAL 4 TIMES DAILY PRN
Qty: 240 ML | Refills: 1 | Status: SHIPPED | OUTPATIENT
Start: 2019-04-29 | End: 2019-05-09

## 2019-04-29 RX ORDER — LEVOFLOXACIN 500 MG/1
500 TABLET, FILM COATED ORAL DAILY
Qty: 10 TABLET | Refills: 1 | Status: SHIPPED | OUTPATIENT
Start: 2019-04-29 | End: 2019-12-13

## 2019-04-29 RX ORDER — ALBUTEROL SULFATE 90 UG/1
2 AEROSOL, METERED RESPIRATORY (INHALATION) EVERY 4 HOURS PRN
Qty: 3 INHALER | Refills: 11 | Status: SHIPPED | OUTPATIENT
Start: 2019-04-29 | End: 2019-04-29 | Stop reason: SDUPTHER

## 2019-04-29 RX ORDER — PREDNISONE 20 MG/1
TABLET ORAL
Qty: 20 TABLET | Refills: 1 | Status: SHIPPED | OUTPATIENT
Start: 2019-04-29 | End: 2020-01-22

## 2019-04-29 RX ORDER — TRIAMCINOLONE ACETONIDE 40 MG/ML
80 INJECTION, SUSPENSION INTRA-ARTICULAR; INTRAMUSCULAR
Status: COMPLETED | OUTPATIENT
Start: 2019-04-29 | End: 2019-04-29

## 2019-04-29 RX ORDER — BUDESONIDE AND FORMOTEROL FUMARATE DIHYDRATE 160; 4.5 UG/1; UG/1
2 AEROSOL RESPIRATORY (INHALATION) EVERY 12 HOURS
Qty: 3 INHALER | Refills: 3 | Status: SHIPPED | OUTPATIENT
Start: 2019-04-29 | End: 2019-04-29 | Stop reason: SDUPTHER

## 2019-04-29 RX ADMIN — TRIAMCINOLONE ACETONIDE 80 MG: 40 INJECTION, SUSPENSION INTRA-ARTICULAR; INTRAMUSCULAR at 02:04

## 2019-04-29 NOTE — PATIENT INSTRUCTIONS
[employer] True HEPA Air Purifier with Allergen Remover - Black, [id number]    [employer] Air Purifiers are the #1 brand recommended by allergists.  Captures up to 99.97% of microscopic allergens, 0.3 microns or larger from the air that passes through the filter.  Helps reduce odors, VOCs, and certain germs.  Activated carbon pre-filter helps reduce unpleasant odors.  Easy tap controls, with 4 air cleaning levels, including a Turbo Power Cleaning Level.  [employer] qualified.  2, 4, or 8 hour automatic shut-off timer.  Quiet operation.  Control panel light dimmer.      Levofloxacin tablets  What is this medicine?  LEVOFLOXACIN (mati tere FLOX a sin) is a quinolone antibiotic. It is used to treat certain kinds of bacterial infections. It will not work for colds, flu, or other viral infections.  How should I use this medicine?  Take this medicine by mouth with a full glass of water. Follow the directions on the prescription label. This medicine can be taken with or without food. Take your medicine at regular intervals. Do not take your medicine more often than directed. Do not skip doses or stop your medicine early even if you feel better. Do not stop taking except on your doctor's advice.  A special MedGuide will be given to you by the pharmacist with each prescription and refill. Be sure to read this information carefully each time.  Talk to your pediatrician regarding the use of this medicine in children. While this drug may be prescribed for children as young as 6 months for selected conditions, precautions do apply.  What side effects may I notice from receiving this medicine?  Side effects that you should report to your doctor or health care professional as soon as possible:  · allergic reactions like skin rash or hives, swelling of the face, lips, or tongue  · anxious  · confusion  · depressed mood  · diarrhea  · fast, irregular heartbeat  · hallucination, loss of contact with reality  · joint, muscle, or tendon pain  or swelling  · pain, tingling, numbness in the hands or feet  · suicidal thoughts or other mood changes  · sunburn  · unusually weak or tired  Side effects that usually do not require medical attention (report to your doctor or health care professional if they continue or are bothersome):  · dry mouth  · headache  · nausea  · trouble sleeping  What may interact with this medicine?  Do not take this medicine with any of the following medications:  · arsenic trioxide  · chloroquine  · droperidol  · medicines for irregular heart rhythm like amiodarone, disopyramide, dofetilide, flecainide, quinidine, procainamide, sotalol  · some medicines for depression or mental problems like phenothiazines, pimozide, and ziprasidone  This medicine may also interact with the following medications:  · amoxapine  · antacids  · birth control pills  · cisapride  · dairy products  · didanosine (ddI) buffered tablets or powder  · haloperidol  · multivitamins  · NSAIDS, medicines for pain and inflammation, like ibuprofen or naproxen  · retinoid products like tretinoin or isotretinoin  · risperidone  · some other antibiotics like clarithromycin or erythromycin  · sucralfate  · theophylline  · warfarin  What if I miss a dose?  If you miss a dose, take it as soon as you remember. If it is almost time for your next dose, take only that dose. Do not take double or extra doses.  Where should I keep my medicine?  Keep out of the reach of children.  Store at room temperature between 15 and 30 degrees C (59 and 86 degrees F). Keep in a tightly closed container. Throw away any unused medicine after the expiration date.  What should I tell my health care provider before I take this medicine?  They need to know if you have any of these conditions:  · bone problems  · cerebral disease  · history of low levels of potassium in the blood  · irregular heartbeat  · joint problems  · kidney disease  · myasthenia gravis  · seizures  · tendon  problems  · tingling of the fingers or toes, or other nerve disorder  · an unusual or allergic reaction to levofloxacin, other quinolone antibiotics, foods, dyes, or preservatives  · pregnant or trying to get pregnant  · breast-feeding  What should I watch for while using this medicine?  Tell your doctor or health care professional if your symptoms do not improve or if they get worse. Drink several glasses of water a day and cut down on drinks that contain caffeine. You must not get dehydrated while taking this medicine.  You may get drowsy or dizzy. Do not drive, use machinery, or do anything that needs mental alertness until you know how this medicine affects you. Do not sit or stand up quickly, especially if you are an older patient. This reduces the risk of dizzy or fainting spells.  This medicine can make you more sensitive to the sun. Keep out of the sun. If you cannot avoid being in the sun, wear protective clothing and use a sunscreen. Do not use sun lamps or tanning beds/booths. Contact your doctor if you get a sunburn.  If you are a diabetic monitor your blood glucose carefully. If you get an unusual reading stop taking this medicine and call your doctor right away.  Do not treat diarrhea with over-the-counter products. Contact your doctor if you have diarrhea that lasts more than 2 days or if the diarrhea is severe and watery.  Avoid antacids, calcium, iron, and zinc products for 2 hours before and 2 hours after taking a dose of this medicine.  NOTE:This sheet is a summary. It may not cover all possible information. If you have questions about this medicine, talk to your doctor, pharmacist, or health care provider. Copyright© 2017 [employer]        Triamcinolone injection  What is this medicine?  TRIAMCINOLONE (trye am SIN oh lone) is a corticosteroid. It helps to reduce swelling, redness, itching, and allergic reactions. This medicine is used to treat allergies, arthritis, asthma, skin problems, and  many other conditions.  How should I use this medicine?  This medicine is injected by a health care professional. After your dose follow your doctor's instructions for your care.  Contact your pediatrician regarding the use of this medicine in children. Special care may be needed.  What side effects may I notice from receiving this medicine?  Side effects that you should report to your doctor or health care professional as soon as possible:  · allergic reactions like skin rash, itching or hives, swelling of the face, lips, or tongue  · black, tarry stools  · breathing problems  · changes in vision  · confusion, depression, excitement, mood swings  · dizziness  · fever, infection, sores that do not heal  · frequent passing of urine  · high blood pressure  · increased thirst  · lumpy, thin skin at site where injected  · menstrual problems  · pain in back, hips, shoulders, ribs  · rounding of face  · seizures  · stomach pain  · swelling of feet, hands  · unusual bruising or red pinpoint spots on the skin  · unusually weak or tired  Side effects that usually do not require medical attention (report to your doctor or health care professional if they continue or are bothersome):  · headache  · increased sweating  · trouble sleeping  · unusual increased growth of hair on the face or body  · upset stomach, nausea  What may interact with this medicine?  Do not take this medicine with any of the following medications:  · mifepristone  This medicine may also interact with the following medications:  · aspirin  · other steroid medicines  · vaccines and other immunization products  What if I miss a dose?  This does not apply.  Where should I keep my medicine?  Keep out of the reach of children.  Store at room temperature between 15 and 30 degrees C (59 and 86 degrees F). Do not freeze. Protect from light. Throw away any unused medicine after the expiration date.  What should I tell my health care provider before I take this  medicine?  They need to know if you have any of these conditions:  · depression, anxiety, or psychotic disturbances  · diabetes  · infection, like tuberculosis, herpes, or fungal infection  · liver disease  · osteoporosis  · previous heart attack  · seizures  · stomach or intestine disease  · thyroid disease  · an unusual or allergic reaction to triamcinolone, corticosteroids, benzyl alcohol, other medicines, foods, dyes, or preservatives  · pregnant or trying to get pregnant  · breast-feeding  What should I watch for while using this medicine?  Visit your doctor or health care professional for regular checks on your progress. If you are diabetic, check your blood sugar as directed. If you are taking this medicine for a long time, carry an identification card with your name, the type and dose of medicine, and your doctor's name and address.  You may need to be on a special diet while taking this medicine. Talk to your doctor.  Do not come in contact with people who have chickenpox or the measles while you are taking this medicine. If you do, call your doctor right away.  NOTE:This sheet is a summary. It may not cover all possible information. If you have questions about this medicine, talk to your doctor, pharmacist, or health care provider. Copyright© 2017 [employer]

## 2019-04-29 NOTE — PROGRESS NOTES
Subjective:       Patient ID: Yung Mcconnell is a 63 y.o. male.    Chief Complaint: He       Sleep Apnea and Cough    HPI     Asthma Follow-up  The patient has previously been evaluated here for asthma and presents for an asthma follow-up. The patient is currently having symptoms / an exacerbation. Current symptoms include dyspnea, non-productive cough and wheezing. Symptoms have been present since several weeks ago and have been gradually worsening. He denies chest pain and chest tightness. Associated symptoms include poor exercise tolerance and shortness of breath.  This episode appears to have been triggered by pollens. Treatments tried for the current exacerbation include inhaled corticosteroids and short-acting inhaled beta-adrenergic agonists, which have provided some relief of symptoms. The patient has been having similar episodes for approximately 10 years.    Current Disease Severity  The patient is having daytime symptoms throughout the day. The patient is having daytime symptoms more than once per week, but not nightly. The patient is using short-acting beta agonists for symptom control daily. He has exacerbations requiring oral systemic corticosteroids 2 times per year. Current limitations in activity from asthma: none. Number of days of school or work missed in the last month: not applicable. Number of urgent/emergent visit in last year: 1.  The patient is not using a spacer with MDIs. His best peak flow rate is na. He is not monitoring peak flow rates at home.    Obstructive Sleep Apnea:    Obstructive Sleep Apnea: Continuous Positive Airway Pressure complianceContinuous Positive Airway Pressure Usage 08/07/2018 - 11/04/2018  Usage days 90/90 days (100%)  >= 4 hours 90 days (100%)  < 4 hours 0 days (0%)    Average usage (total days) 7 hours 17 minutes  Average usage (days used) 7 hours 17 minutes  Median usage (days used) 6 hours 52 minutes  AirSense 10 AutoSet  Serial number [id number]  Mode  CPAP  Set pressure 11 cmH2O  EPR Ramp Only  EPR level 3  Therapy  Leaks - L/min Median: 0.0 95th percentile: 4.9 Maximum: 12.6  Events per hour AI: 1.5 HI: 0.7 AHI: 2.2  Apnea Index Central: 0.6 Obstructive: 0.9 Unknown: 0.0  Cheyne-Haley respiration (average duration per night) 0 minutes (0%)     Patient is using CPAP as prescribed and benefiting from therapy.     Patient has complaints of Needs supplies    Past Medical History:   Diagnosis Date    Allergy     Asthma, chronic 7/9/2013    Colon polyps     COPD (chronic obstructive pulmonary disease)     GERD (gastroesophageal reflux disease)     High cholesterol     Osteoarthritis of both knees 7/9/2013    Sleep apnea      Past Surgical History:   Procedure Laterality Date    CHOLECYSTECTOMY      COLONOSCOPY N/A 7/29/2015    Performed by Rocky Couch [facility] at [facility] ENDO    LAPAROSCOPY, DIAGNOSTIC N/A 6/21/2018    Performed by Casper Martinez MD at [facility] OR    LUNG SURGERY Right     benign mass    REPAIR, HERNIA, UMBILICAL, AGE 5 YEARS OR OLDER N/A 6/21/2018    Performed by Casper Martinez MD at [facility] OR     Social History     Socioeconomic History    Marital status:      Spouse name: Not on file    Number of children: Not on file    Years of education: Not on file    Highest education level: Not on file   Occupational History     Employer: [employer]   Social Needs    Financial resource strain: Not on file    Food insecurity:     Worry: Not on file     Inability: Not on file    Transportation needs:     Medical: Not on file     Non-medical: Not on file   Tobacco Use    Smoking status: Never Smoker    Smokeless tobacco: Never Used   Substance and Sexual Activity    Alcohol use: Yes     Comment: rare    Drug use: No    Sexual activity: Yes     Partners: Female     Birth control/protection: None   Lifestyle    Physical activity:     Days per week: Not on file     Minutes per session: Not on file    Stress: Not on file  "  Relationships    Social connections:     Talks on phone: Not on file     Gets together: Not on file     Attends Jainism service: Not on file     Active member of club or organization: Not on file     Attends meetings of clubs or organizations: Not on file     Relationship status: Not on file   Other Topics Concern    Not on file   Social History Narrative    Not on file     Review of Systems   Constitutional: Positive for fatigue. Negative for fever.   HENT: Positive for postnasal drip, rhinorrhea and congestion.    Eyes: Negative for redness and itching.   Respiratory: Positive for apnea, cough, sputum production, shortness of breath, dyspnea on extertion, use of rescue inhaler and Paroxysmal Nocturnal Dyspnea.    Cardiovascular: Negative for chest pain, palpitations and leg swelling.   Genitourinary: Negative for difficulty urinating and hematuria.   Endocrine: Negative for cold intolerance and heat intolerance.    Skin: Negative for rash.   Gastrointestinal: Negative for nausea and abdominal pain.   Neurological: Negative for dizziness, syncope, weakness and light-headedness.   Hematological: Negative for adenopathy. Does not bruise/bleed easily.   Psychiatric/Behavioral: Positive for sleep disturbance. The patient is not nervous/anxious.        Objective:      /70   Pulse 72   Resp 20   Ht 6' 0.25" (1.835 m)   Wt 122.7 kg (270 lb 9.8 oz)   SpO2 97%   BMI 36.45 kg/m²   Physical Exam   Constitutional: He is oriented to person, place, and time. He appears well-developed and well-nourished.   HENT:   Head: Normocephalic and atraumatic.   Mouth/Throat: Oropharyngeal exudate present.   Eyes: Pupils are equal, round, and reactive to light. Conjunctivae are normal.   Neck: Neck supple. No JVD present. No tracheal deviation present. No thyromegaly present.   Cardiovascular: Normal rate, regular rhythm and normal heart sounds.   No murmur heard.  Pulmonary/Chest: Effort normal. He has decreased breath " sounds. He has wheezes in the right lower field and the left lower field. He has no rhonchi. He has no rales.   Abdominal: Soft. Bowel sounds are normal.   Musculoskeletal: Normal range of motion. He exhibits no edema or tenderness.   Lymphadenopathy:     He has no cervical adenopathy.   Neurological: He is alert and oriented to person, place, and time.   Skin: Skin is warm and dry.   Nursing note and vitals reviewed.    Personal Diagnostic Review  Chest x-ray: hyperinflation    X-Ray Chest PA And Lateral  Narrative: EXAMINATION:  XR CHEST PA AND LATERAL    CLINICAL HISTORY:  Cough    TECHNIQUE:  PA and lateral views of the chest were performed.    COMPARISON:  2018    FINDINGS:  Cardiac silhouette remains at the upper limits of normal in size.    Scarring noted the bilateral lung bases and right apex.  No definite parenchymal consolidation or pleural effusion visualized.  No appreciable change from priors.  No acute osseous findings demonstrated.  Impression: Unchanged appearance of the chest as above    Electronically signed by: Sae Woodward MD  Date:    2019  Time:    14:07      Office Spirometry Results:     No flowsheet data found.  Pulmonary Studies Review 2019   SpO2 97   Height 72.250   Weight 4329.83   BMI (Calculated) 36.5   Predicted Distance 331.14   Predicted Distance Meters (Calculated) 537.76   [facility], [facility]  Sleep Disorder Evaluation  Patient Name: Yung Mcconnell Date of Report: 11 Date of PS2011  [facility] No.: [id number] : 1955 Time of PSG: 10:28:40 PM - 05:01:40 AM  Sex: M Age: 55 Weight: 246 lbs Height: 6 2 Type of PSG: Diagnostic  REASONS FOR REFERRAL: Mr. Mcconnell is a 55 year old male, referred for diagnostic polysomnography by Dr. Rafal Lozano, for evaluation of possible obstructive sleep  apnea / hypopnea syndrome (OSAHS), based on the patient s reported loud snoring and observed respiratory pauses in sleep, and  daytime somnolence. His [place]  Sleepiness scale was abnormal ( > 11). Dr. Rubén Kauffman is the patient s primary care physician.  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  1. The diagnostic polysomnography revealed a mild obstructive sleep apnea / hypopnea syndrome (A + H Index = 13.2 events / hr asleep; 7.3 arousals / hr asleep), with a mean  SaO2 during events = 89.0 %, significant; lowest SaO2 during events = 80 %, waking baseline SaO2 = 98 %. Sporadic, loud snoring was noted. A CPAP titration  polysomnography is recommended.  2. As respiratory events had a strong supine positional tendency, a device to discourage sleep in the supine position is recommended to reduce respiratory events and snoring.  3. Weight loss to the normal range is recommended as it can decrease respiratory events and snoring in overweight patients.  4. The following changes in sleep hygiene / sleep - related behavior are recommended after medical treatments are successful:  ? Set time for sleep to number of hours of sleep needed for adequate daytime functioning (7.5 to 9.0 hrs / night).  ? Regular bedtimes and wake times, including weekends: Total sleep time / night should not be more than one hour more  than usual, and bedtime or wake time should not be more than one hour earlier or later than usual.  ? Do not attempt to make up lost sleep by extending sleep periods.  ? Avoid naps; none longer than 20 min or later than mid - afternoon.  SECONDARY TREATMENT RECOMMENDATIONS Treat, or refer to SDC if problems are not satisfactorily resolved by the above.  1. A borderline PLM disorder was observed (PLMS Index = 7.3 / hr sleep), but the PLMS were  not at all disruptive of sleep (only 0.7 arousals / hr asleep)? however, as there  was SDI evidence of restless legs syndrome (which can be treated with the same medications as PLMS), consider treatment of PLM disorder and restless legs syndrome if RLS is  confirmed. Note that the benzodiazepine medications sometimes used to treat PLM disorder (e.g., clonazepam) may exacerbate some sleep - related respiratory  disorders, and that dopaminergic medications such as Mirapex and Requip are used in such instances.  2. Follow - up inquiry regarding frequency, duration and nature of reported sleep loss (possible role of OSAHS; r/o voluntary sleep restriction).  See below for a complete interpretation of data from the polysomnography and Sleep Disorders Inventory.  Thank you for referring this patient to the [facility].  Marin Ruby, Ph.D., ABPP; Diplomate, American Board of Sleep Medicine  INTERPRETATION OF DATA FROM POLYSOMNOGRAPHY AND SLEEP DISORDERS INVENTORY  NOTE: The polysomnography electrophysiological record for this patient has been reviewed in its entirety by Dr. Ruby.  SLEEP QUANTITY: Sufficient time asleep for diagnostic evaluation (5.6 hrs). Good sleep efficiency (85.4 %), with no difficulty falling asleep (29.5 min) or maintaining sleep  (awake 8.3 % of time after sleep onset).  ? There was no evidence of hypersomnolence (8.5 hrs sleep prior night).  ? The normal sleep onset and sleep maintenance are consistent with Mr. Mcconnell s Sleep Disorders Inventory (SDI), where he  reports having no significant difficulty falling asleep or staying asleep at home.  SLEEP QUALITY: Nonrestorative sleep due to poor sleep depth, but with good sleep continuity.  ? Sleep Continuity: Sleep Depth: High 13.5 % Stage 1 NREM sleep, normal 18.3 % slow wave sleep.  ? Normal 13.1 transient arousals / hr asleep, 29.0 sleep stage changes / hr asleep, 1.5 awakenings / hr  RAPID EYE MOVEMENT SLEEP: Normal REM  sleep architecture.  ? Normal 21.4 % of the sleep period was REM sleep; normal REM sleep onset, 68.5 min after sleep onset.  SLEEP - RELATED RESPIRATORY DISORDERS: Mild obstructive sleep apnea / hypopnea syndrome (A + H Index = 13.2 events / hr  asleep; 7.3 arousals / hr asleep), with a mean SaO2 during events = 89.0 %, significant; lowest SaO2 during events = 80 %, waking  baseline SaO2 = 98 %. Sporadic, loud snoring was noted.  ? Mr. Mcconnell had 52 hypopneas, 9 obstructive sleep apneas, 8 central sleep apneas, and 5 mixed sleep apneas.  ? Respiratory events had a strong supine positional tendency (78.4 % of events were supine, but only 37.8 % of sleep was supine? supine sleep RDI / other position  RDI = 27.5 / 4.6).  ? Respiratory events had no REM sleep tendency (4.1 % of events were in REM sleep, 23.4 % of sleep was REM sleep; REM sleep RDI / NREM sleep RDI = 2.3 /  16.6).  EKG (Scoring Technician): Mr. Mcconnell had some mild sinus bradycardia (rate in the 50s).  CPAP: Not initiated because the number of respiratory events counted by the polysomnography technician before 1:30 am (22) was below the 80 event laboratory criterion for a  CPAP titration trial.  Patient Name: MARNIE MCCONNELL  MRN: [id number]  : 1955 Sex: M  SLEEP - RELATED MOVEMENT DISORDERS: Mr. Mcconnell evidenced a borderline periodic limb movement disorder (PLMS Index = 7.3 PLMS / hr asleep), with only 0.7 arousals /  hr asleep.  ? He had 41 periodic limb movements of sleep in 5.6 hrs of sleep (5.5 % of arousals).  SPONTANEOUS TRANSIENT AROUSALS: Mr. Mcconnell had a normal rate of spontaneous transient arousals (5.0 STA / hr asleep, 38.4 % of arousals). Thus, STA did not impair  sleep quality / restorativeness.  EVIDENCE OF OTHER SLEEP DISORDERS: SLEEP 5 0, Sleep Disorders Inventory (SDI)  ? Sleep Deprivation: Mr. Mcconnell is sleep deprived; he reports getting 6 7 hrs sleep 2 - 3 nights / wk, and 7 8 hrs sleep 5 - 6 nights / wk.  ? Insomnia: SLEEP 50  Insomnia score < 19 (12)? no significant problem falling asleep initially at night or maintaining sleep.  ? Sleep Restriction: Rule out. Yes if allocates 7 hrs for sleep on 5 work nights / wk (reports allocating 7 8 hrs for sleep / night)  ? Hypersomnolence: SLEEP 50 Impact score < 15 (14).  ? Sleep Apnea / Hypopnea Syndrome: SLEEP 50 Sleep Apnea score > 14 (15).  ? Restless Legs Syndrome: Rule out. Sleep is delayed by restless / odd feelings in legs that compel him to move his legs in bed, 20 + nights / mo. Moving legs in bed or  getting up and moving helps him fall asleep. RLS typically causes insomnia and frequently accompanies PLM disorder; Mr. Mcconnell has PLMS, but denies insomnia.  There was no evidence of: Advanced or delayed sleep phase syndrome, insomnia, psychophysiological insomnia, hypersomnolence, narcolepsy, idiopathic hypersomnia,  periodic limb movement disorder, REM sleep behavior disorder, sleep bruxism, nightmares, sleepwalking, sleep disruption due to physical discomfort or pain, sleep - related  gastroesophageal reflux, or shift work sleep disorder.  MALADAPTIVE SLEEP - RELATED BEHAVIOR: These behaviors may impair nocturnal sleep quantity and quality:  ? Sleeps more than 1 hr later on days off than on workdays.  ? Often goes to bed earlier and / or wakes up later to make up for lost or unrefreshing sleep.  ? Takes 1 2 hour naps during normal waking hours 2 days / wk.  STRESS: There are no indications that stress is a factor in this patient s sleep complaint.  Electronically signed by Marin Ruby, PH.D., [facility] 07/14/2011 12:37:50 PM      Assessment:       KEVEN on CPAP  -     CPAP/BIPAP SUPPLIES    KEVEN (obstructive sleep apnea)    Seasonal allergic rhinitis due to pollen    Chronic asthma without complication, unspecified asthma severity, unspecified whether persistent    Moderate persistent asthma with exacerbation  -     triamcinolone acetonide injection 80 mg  -     predniSONE (DELTASONE) 20  MG tablet; Prednisone 60 mg/ day for 3 days, 40 mg/day for 3 days,20 mg/ day for 3 days, (1/2 tablet )10 mg a day for 3 days.  Dispense: 20 tablet; Refill: 1  -     levoFLOXacin (LEVAQUIN) 500 MG tablet; Take 1 tablet (500 mg total) by mouth once daily.  Dispense: 10 tablet; Refill: 1  -     promethazine-dextromethorphan (PROMETHAZINE-DM) 6.25-15 mg/5 mL Syrp; Take 5 mLs by mouth 4 (four) times daily as needed (cough).  Dispense: 240 mL; Refill: 1    Asthma, chronic, moderate persistent, uncomplicated  -     Discontinue: albuterol (PROVENTIL/VENTOLIN HFA) 90 mcg/actuation inhaler; Inhale 2 puffs into the lungs every 4 (four) hours as needed. Every 4-6 hours  Dispense: 3 Inhaler; Refill: 11  -     Discontinue: budesonide-formoterol 160-4.5 mcg (SYMBICORT) 160-4.5 mcg/actuation HFAA; Inhale 2 puffs into the lungs every 12 (twelve) hours. Wash out mouth after using  Dispense: 3 Inhaler; Refill: 3    Mild intermittent asthma, uncomplicated  -     Discontinue: albuterol (PROVENTIL/VENTOLIN HFA) 90 mcg/actuation inhaler; Inhale 2 puffs into the lungs every 4 (four) hours as needed. Every 4-6 hours  Dispense: 3 Inhaler; Refill: 11          Outpatient Encounter Medications as of 4/29/2019   Medication Sig Dispense Refill    cetirizine (ZYRTEC) 10 MG tablet Take 10 mg by mouth once daily.      cyclobenzaprine (FLEXERIL) 5 MG tablet 1 Tablet Oral Three times a day as needed. 20 tablet 0    fluticasone (FLONASE) 50 mcg/actuation nasal spray 2 sprays (100 mcg total) by Each Nare route once daily. 3 Bottle 4    fluticasone (FLONASE) 50 mcg/actuation nasal spray 2 sprays (100 mcg total) by Each Nare route once daily. 1 Bottle 11    GLUCOSAMINE HCL/CHONDR ALFONSO A NA (OSTEO BI-FLEX ORAL) Take 1 tablet by mouth once daily.       metroNIDAZOLE (METROGEL) 0.75 % gel Apply topically 2 (two) times daily. 45 g 2    multivitamin (MULTIPLE VITAMIN) per tablet 1 Tablet Oral Every day      omeprazole (PRILOSEC) 40 MG capsule Take 1  capsule (40 mg total) by mouth once daily. Dispense Generic 90 capsule 0    tadalafil (CIALIS) 20 MG Tab Take 1 tablet (20 mg total) by mouth daily as needed. 24 tablet 3    [DISCONTINUED] albuterol (PROVENTIL/VENTOLIN HFA) 90 mcg/actuation inhaler Inhale 2 puffs into the lungs every 4 (four) hours as needed. Every 4-6 hours 3 Inhaler 11    [DISCONTINUED] albuterol (PROVENTIL/VENTOLIN HFA) 90 mcg/actuation inhaler Inhale 2 puffs into the lungs every 4 (four) hours as needed. Every 4-6 hours 3 Inhaler 11    [DISCONTINUED] budesonide-formoterol 160-4.5 mcg (SYMBICORT) 160-4.5 mcg/actuation HFAA Inhale 2 puffs into the lungs every 12 (twelve) hours. Wash out mouth after using 3 Inhaler 3    [DISCONTINUED] budesonide-formoterol 160-4.5 mcg (SYMBICORT) 160-4.5 mcg/actuation HFAA Inhale 2 puffs into the lungs every 12 (twelve) hours. Wash out mouth after using 3 Inhaler 3    diclofenac 1.3 % PT12 Apply 1 patch topically every 12 (twelve) hours as needed. 30 patch 0    HYDROcodone-acetaminophen (NORCO) 5-325 mg per tablet Take 1 tablet by mouth every 8 (eight) hours as needed. 10 tablet 0    levoFLOXacin (LEVAQUIN) 500 MG tablet Take 1 tablet (500 mg total) by mouth once daily. 10 tablet 1    methylPREDNISolone (MEDROL DOSEPACK) 4 mg tablet use as directed 1 Package 1    nozaseptin (NOZIN) nasal  1 each by Each Nare route 2 (two) times daily. 1 applicator 0    predniSONE (DELTASONE) 20 MG tablet Prednisone 60 mg/ day for 3 days, 40 mg/day for 3 days,20 mg/ day for 3 days, (1/2 tablet )10 mg a day for 3 days. 20 tablet 1    promethazine-dextromethorphan (PROMETHAZINE-DM) 6.25-15 mg/5 mL Syrp Take 5 mLs by mouth 4 (four) times daily as needed (cough). 240 mL 1    [] triamcinolone acetonide injection 80 mg        No facility-administered encounter medications on file as of 2019.      Plan:       Requested Prescriptions     Signed Prescriptions Disp Refills    predniSONE (DELTASONE) 20 MG  tablet 20 tablet 1     Sig: Prednisone 60 mg/ day for 3 days, 40 mg/day for 3 days,20 mg/ day for 3 days, (1/2 tablet )10 mg a day for 3 days.    levoFLOXacin (LEVAQUIN) 500 MG tablet 10 tablet 1     Sig: Take 1 tablet (500 mg total) by mouth once daily.    promethazine-dextromethorphan (PROMETHAZINE-DM) 6.25-15 mg/5 mL Syrp 240 mL 1     Sig: Take 5 mLs by mouth 4 (four) times daily as needed (cough).     Problem List Items Addressed This Visit     Asthma, chronic    KEVEN (obstructive sleep apnea)    Seasonal allergic rhinitis due to pollen      Other Visit Diagnoses     KEVEN on CPAP    -  Primary    Relevant Orders    CPAP/BIPAP SUPPLIES    Moderate persistent asthma with exacerbation        Relevant Medications    triamcinolone acetonide injection 80 mg (Completed)    predniSONE (DELTASONE) 20 MG tablet    levoFLOXacin (LEVAQUIN) 500 MG tablet    promethazine-dextromethorphan (PROMETHAZINE-DM) 6.25-15 mg/5 mL Syrp    Asthma, chronic, moderate persistent, uncomplicated        Mild intermittent asthma, uncomplicated                 Follow up in about 6 months (around 11/5/2019) for Kenalog IM today.    MEDICAL DECISION MAKING: Moderate to high complexity.  Overall, the multiple problems listed are of moderate to high severity that may impact quality of life and activities of daily living. Side effects of medications, treatment plan as well as options and alternatives reviewed and discussed with patient. There was counseling of patient concerning these issues.    Total time spent in face to face counseling and coordination of care - 45  minutes over 50% of time was used in discussion of prognosis, risks, benefits of treatment, instructions and compliance with regimen . Discussion with other physicians or health care providers (DME, NP, pharmacy, respiratory therapy) occurred.

## 2019-04-30 RX ORDER — BUDESONIDE AND FORMOTEROL FUMARATE DIHYDRATE 160; 4.5 UG/1; UG/1
2 AEROSOL RESPIRATORY (INHALATION) EVERY 12 HOURS
Qty: 3 INHALER | Refills: 3 | Status: SHIPPED | OUTPATIENT
Start: 2019-04-30 | End: 2019-12-13 | Stop reason: SDUPTHER

## 2019-04-30 RX ORDER — ALBUTEROL SULFATE 90 UG/1
2 AEROSOL, METERED RESPIRATORY (INHALATION) EVERY 4 HOURS PRN
Qty: 3 INHALER | Refills: 11 | Status: SHIPPED | OUTPATIENT
Start: 2019-04-30 | End: 2020-09-30

## 2019-12-13 ENCOUNTER — LAB VISIT (OUTPATIENT)
Dept: LAB | Facility: HOSPITAL | Age: 64
End: 2019-12-13
Attending: FAMILY MEDICINE
Payer: MEDICARE

## 2019-12-13 ENCOUNTER — OFFICE VISIT (OUTPATIENT)
Dept: FAMILY MEDICINE | Facility: CLINIC | Age: 64
End: 2019-12-13
Payer: MEDICARE

## 2019-12-13 VITALS
OXYGEN SATURATION: 94 % | TEMPERATURE: 98 F | HEART RATE: 74 BPM | DIASTOLIC BLOOD PRESSURE: 64 MMHG | BODY MASS INDEX: 37.58 KG/M2 | HEIGHT: 72 IN | SYSTOLIC BLOOD PRESSURE: 136 MMHG | WEIGHT: 277.44 LBS

## 2019-12-13 DIAGNOSIS — Z00.00 ANNUAL PHYSICAL EXAM: Primary | ICD-10-CM

## 2019-12-13 DIAGNOSIS — N52.9 ERECTILE DYSFUNCTION, UNSPECIFIED ERECTILE DYSFUNCTION TYPE: ICD-10-CM

## 2019-12-13 DIAGNOSIS — R63.5 WEIGHT GAIN: ICD-10-CM

## 2019-12-13 DIAGNOSIS — G47.33 OSA (OBSTRUCTIVE SLEEP APNEA): ICD-10-CM

## 2019-12-13 DIAGNOSIS — E78.5 HYPERLIPIDEMIA, UNSPECIFIED HYPERLIPIDEMIA TYPE: ICD-10-CM

## 2019-12-13 DIAGNOSIS — Z23 NEED FOR VACCINATION: ICD-10-CM

## 2019-12-13 DIAGNOSIS — J01.90 ACUTE NON-RECURRENT SINUSITIS, UNSPECIFIED LOCATION: ICD-10-CM

## 2019-12-13 DIAGNOSIS — Z00.00 ANNUAL PHYSICAL EXAM: ICD-10-CM

## 2019-12-13 DIAGNOSIS — J45.40 ASTHMA, CHRONIC, MODERATE PERSISTENT, UNCOMPLICATED: ICD-10-CM

## 2019-12-13 DIAGNOSIS — K21.9 GASTROESOPHAGEAL REFLUX DISEASE, ESOPHAGITIS PRESENCE NOT SPECIFIED: ICD-10-CM

## 2019-12-13 LAB
ALBUMIN SERPL BCP-MCNC: 4 G/DL (ref 3.5–5.2)
ALP SERPL-CCNC: 69 U/L (ref 55–135)
ALT SERPL W/O P-5'-P-CCNC: 37 U/L (ref 10–44)
ANION GAP SERPL CALC-SCNC: 9 MMOL/L (ref 8–16)
AST SERPL-CCNC: 25 U/L (ref 10–40)
BASOPHILS # BLD AUTO: 0.07 K/UL (ref 0–0.2)
BASOPHILS NFR BLD: 1.1 % (ref 0–1.9)
BILIRUB SERPL-MCNC: 0.7 MG/DL (ref 0.1–1)
BUN SERPL-MCNC: 15 MG/DL (ref 8–23)
CALCIUM SERPL-MCNC: 9.5 MG/DL (ref 8.7–10.5)
CHLORIDE SERPL-SCNC: 104 MMOL/L (ref 95–110)
CHOLEST SERPL-MCNC: 183 MG/DL (ref 120–199)
CHOLEST/HDLC SERPL: 3.7 {RATIO} (ref 2–5)
CO2 SERPL-SCNC: 28 MMOL/L (ref 23–29)
CREAT SERPL-MCNC: 0.8 MG/DL (ref 0.5–1.4)
DIFFERENTIAL METHOD: ABNORMAL
EOSINOPHIL # BLD AUTO: 0.2 K/UL (ref 0–0.5)
EOSINOPHIL NFR BLD: 2.8 % (ref 0–8)
ERYTHROCYTE [DISTWIDTH] IN BLOOD BY AUTOMATED COUNT: 14.3 % (ref 11.5–14.5)
EST. GFR  (AFRICAN AMERICAN): >60 ML/MIN/1.73 M^2
EST. GFR  (NON AFRICAN AMERICAN): >60 ML/MIN/1.73 M^2
GLUCOSE SERPL-MCNC: 101 MG/DL (ref 70–110)
HCT VFR BLD AUTO: 45.1 % (ref 40–54)
HDLC SERPL-MCNC: 49 MG/DL (ref 40–75)
HDLC SERPL: 26.8 % (ref 20–50)
HGB BLD-MCNC: 15.1 G/DL (ref 14–18)
IMM GRANULOCYTES # BLD AUTO: 0.03 K/UL (ref 0–0.04)
IMM GRANULOCYTES NFR BLD AUTO: 0.5 % (ref 0–0.5)
LDLC SERPL CALC-MCNC: 118.2 MG/DL (ref 63–159)
LYMPHOCYTES # BLD AUTO: 3 K/UL (ref 1–4.8)
LYMPHOCYTES NFR BLD: 46.6 % (ref 18–48)
MCH RBC QN AUTO: 31.3 PG (ref 27–31)
MCHC RBC AUTO-ENTMCNC: 33.5 G/DL (ref 32–36)
MCV RBC AUTO: 94 FL (ref 82–98)
MONOCYTES # BLD AUTO: 0.5 K/UL (ref 0.3–1)
MONOCYTES NFR BLD: 7.7 % (ref 4–15)
NEUTROPHILS # BLD AUTO: 2.6 K/UL (ref 1.8–7.7)
NEUTROPHILS NFR BLD: 41.3 % (ref 38–73)
NONHDLC SERPL-MCNC: 134 MG/DL
NRBC BLD-RTO: 0 /100 WBC
PLATELET # BLD AUTO: 227 K/UL (ref 150–350)
PMV BLD AUTO: 10.9 FL (ref 9.2–12.9)
POTASSIUM SERPL-SCNC: 4 MMOL/L (ref 3.5–5.1)
PROT SERPL-MCNC: 7.3 G/DL (ref 6–8.4)
RBC # BLD AUTO: 4.82 M/UL (ref 4.6–6.2)
SODIUM SERPL-SCNC: 141 MMOL/L (ref 136–145)
TRIGL SERPL-MCNC: 79 MG/DL (ref 30–150)
TSH SERPL DL<=0.005 MIU/L-ACNC: 0.86 UIU/ML (ref 0.4–4)
WBC # BLD AUTO: 6.38 K/UL (ref 3.9–12.7)

## 2019-12-13 PROCEDURE — 99213 OFFICE O/P EST LOW 20 MIN: CPT | Mod: PBBFAC,PO,25 | Performed by: FAMILY MEDICINE

## 2019-12-13 PROCEDURE — 90670 PCV13 VACCINE IM: CPT | Mod: PBBFAC,PO

## 2019-12-13 PROCEDURE — 99214 OFFICE O/P EST MOD 30 MIN: CPT | Mod: S$PBB,,, | Performed by: FAMILY MEDICINE

## 2019-12-13 PROCEDURE — 1159F PR MEDICATION LIST DOCUMENTED IN MEDICAL RECORD: ICD-10-PCS | Mod: ,,, | Performed by: FAMILY MEDICINE

## 2019-12-13 PROCEDURE — 99999 PR PBB SHADOW E&M-EST. PATIENT-LVL III: ICD-10-PCS | Mod: PBBFAC,,, | Performed by: FAMILY MEDICINE

## 2019-12-13 PROCEDURE — 84443 ASSAY THYROID STIM HORMONE: CPT

## 2019-12-13 PROCEDURE — 80053 COMPREHEN METABOLIC PANEL: CPT

## 2019-12-13 PROCEDURE — 1126F AMNT PAIN NOTED NONE PRSNT: CPT | Mod: ,,, | Performed by: FAMILY MEDICINE

## 2019-12-13 PROCEDURE — 1126F PR PAIN SEVERITY QUANTIFIED, NO PAIN PRESENT: ICD-10-PCS | Mod: ,,, | Performed by: FAMILY MEDICINE

## 2019-12-13 PROCEDURE — 36415 COLL VENOUS BLD VENIPUNCTURE: CPT | Mod: PO

## 2019-12-13 PROCEDURE — 99999 PR PBB SHADOW E&M-EST. PATIENT-LVL III: CPT | Mod: PBBFAC,,, | Performed by: FAMILY MEDICINE

## 2019-12-13 PROCEDURE — 1159F MED LIST DOCD IN RCRD: CPT | Mod: ,,, | Performed by: FAMILY MEDICINE

## 2019-12-13 PROCEDURE — 80061 LIPID PANEL: CPT

## 2019-12-13 PROCEDURE — 85025 COMPLETE CBC W/AUTO DIFF WBC: CPT

## 2019-12-13 PROCEDURE — 99214 PR OFFICE/OUTPT VISIT, EST, LEVL IV, 30-39 MIN: ICD-10-PCS | Mod: S$PBB,,, | Performed by: FAMILY MEDICINE

## 2019-12-13 RX ORDER — TADALAFIL 20 MG/1
20 TABLET ORAL DAILY PRN
Qty: 24 TABLET | Refills: 3 | Status: SHIPPED | OUTPATIENT
Start: 2019-12-13 | End: 2020-09-02

## 2019-12-13 RX ORDER — FLUTICASONE PROPIONATE 50 MCG
2 SPRAY, SUSPENSION (ML) NASAL DAILY
Qty: 1 BOTTLE | Refills: 11 | Status: SHIPPED | OUTPATIENT
Start: 2019-12-13 | End: 2020-09-30

## 2019-12-13 RX ORDER — BUDESONIDE AND FORMOTEROL FUMARATE DIHYDRATE 160; 4.5 UG/1; UG/1
2 AEROSOL RESPIRATORY (INHALATION) EVERY 12 HOURS
Qty: 3 INHALER | Refills: 3 | Status: SHIPPED | OUTPATIENT
Start: 2019-12-13 | End: 2020-09-30

## 2019-12-13 NOTE — PROGRESS NOTES
Subjective:       Patient ID: Yung Mcconnell is a 64 y.o. male.    Chief Complaint: Annual Exam      HPI Comments:       Current Outpatient Medications:     albuterol (PROVENTIL/VENTOLIN HFA) 90 mcg/actuation inhaler, Inhale 2 puffs into the lungs every 4 (four) hours as needed. Every 4-6 hours, Disp: 3 Inhaler, Rfl: 11    budesonide-formoterol 160-4.5 mcg (SYMBICORT) 160-4.5 mcg/actuation HFAA, Inhale 2 puffs into the lungs every 12 (twelve) hours. Wash out mouth after using, Disp: 3 Inhaler, Rfl: 3    cetirizine (ZYRTEC) 10 MG tablet, Take 10 mg by mouth once daily., Disp: , Rfl:     cyclobenzaprine (FLEXERIL) 5 MG tablet, 1 Tablet Oral Three times a day as needed., Disp: 20 tablet, Rfl: 0    diclofenac 1.3 % PT12, Apply 1 patch topically every 12 (twelve) hours as needed., Disp: 30 patch, Rfl: 0    fluticasone propionate (FLONASE) 50 mcg/actuation nasal spray, 2 sprays (100 mcg total) by Each Nostril route once daily., Disp: 1 Bottle, Rfl: 11    GLUCOSAMINE HCL/CHONDR ALFONSO A NA (OSTEO BI-FLEX ORAL), Take 1 tablet by mouth once daily. , Disp: , Rfl:     HYDROcodone-acetaminophen (NORCO) 5-325 mg per tablet, Take 1 tablet by mouth every 8 (eight) hours as needed., Disp: 10 tablet, Rfl: 0    methylPREDNISolone (MEDROL DOSEPACK) 4 mg tablet, use as directed, Disp: 1 Package, Rfl: 1    metroNIDAZOLE (METROGEL) 0.75 % gel, Apply topically 2 (two) times daily., Disp: 45 g, Rfl: 2    multivitamin (MULTIPLE VITAMIN) per tablet, 1 Tablet Oral Every day, Disp: , Rfl:     nozaseptin (NOZIN) nasal , 1 each by Each Nare route 2 (two) times daily., Disp: 1 applicator, Rfl: 0    predniSONE (DELTASONE) 20 MG tablet, Prednisone 60 mg/ day for 3 days, 40 mg/day for 3 days,20 mg/ day for 3 days, (1/2 tablet )10 mg a day for 3 days., Disp: 20 tablet, Rfl: 1    tadalafil (CIALIS) 20 MG Tab, Take 1 tablet (20 mg total) by mouth daily as needed., Disp: 24 tablet, Rfl: 3      Doing well.  No complaints.  Has gained a  lot of weight over the last year so.  He is not sure why.    Today we talked about his ASCVD risk of 16%.  Has been on multiple (more than 3) statins over the years in always had the discontinue because of leg pain.    Review of Systems   Constitutional: Negative for activity change, appetite change and fever.   HENT: Negative for sore throat.    Respiratory: Negative for cough and shortness of breath.    Cardiovascular: Negative for chest pain.   Gastrointestinal: Negative for abdominal pain, diarrhea and nausea.   Genitourinary: Negative for difficulty urinating.   Musculoskeletal: Negative for arthralgias and myalgias.   Neurological: Negative for dizziness and headaches.       Objective:      Vitals:    12/13/19 0814   BP: 136/64   Pulse: 74   Temp: 97.9 °F (36.6 °C)   TempSrc: Tympanic   SpO2: (!) 94%   Weight: 125.8 kg (277 lb 7.2 oz)   Height: 6' (1.829 m)   PainSc: 0-No pain     Physical Exam   Constitutional: He is oriented to person, place, and time. He appears well-developed and well-nourished. No distress.   HENT:   Head: Normocephalic.   Mouth/Throat: No oropharyngeal exudate.   Neck: Neck supple. No thyromegaly present.   Cardiovascular: Normal rate, regular rhythm and normal heart sounds.   No murmur heard.  Pulmonary/Chest: Effort normal and breath sounds normal. He has no wheezes. He has no rales.   Abdominal: Soft. He exhibits no distension and no mass. There is no hepatosplenomegaly. There is no tenderness.   Musculoskeletal: He exhibits no edema.   Lymphadenopathy:     He has no cervical adenopathy.   Neurological: He is alert and oriented to person, place, and time.   Skin: Skin is warm and dry. He is not diaphoretic.   Psychiatric: He has a normal mood and affect. His behavior is normal. Judgment and thought content normal.   Nursing note and vitals reviewed.      Assessment:       1. Annual physical exam    2. KEVEN (obstructive sleep apnea)    3. Gastroesophageal reflux disease, esophagitis  presence not specified    4. Hyperlipidemia, unspecified hyperlipidemia type    5. Need for vaccination    6. Weight gain    7. Erectile dysfunction, unspecified erectile dysfunction type    8. Acute non-recurrent sinusitis, unspecified location    9. Asthma, chronic, moderate persistent, uncomplicated        Plan:   Annual physical exam  Comments:  Some weight gain.  Encouraged adherence to low-fat diet.  Regular physical activity.  Will monitor  Orders:  -     Comprehensive metabolic panel; Future; Expected date: 12/13/2019    KEVEN (obstructive sleep apnea)  Comments:  Using CPAP    Gastroesophageal reflux disease, esophagitis presence not specified    Hyperlipidemia, unspecified hyperlipidemia type  Comments:  Fasting lipid profile to today.  Discussed ASCVD risk of 16%.  Has tried multiple statins in the past and not been able to tolerate  Orders:  -     Lipid panel; Future; Expected date: 12/13/2019    Need for vaccination  Comments:  Shingles recommended at pharmacy  Orders:  -     Pneumococcal Conjugate Vaccine (13 Valent) (IM)    Weight gain  Comments:  CBC, TSH today  Orders:  -     TSH; Future; Expected date: 12/13/2019  -     CBC auto differential; Future; Expected date: 12/13/2019    Erectile dysfunction, unspecified erectile dysfunction type  Comments:  Doing well on Cialis.  Uses about once a week.  Refills given  Orders:  -     tadalafil (CIALIS) 20 MG Tab; Take 1 tablet (20 mg total) by mouth daily as needed.  Dispense: 24 tablet; Refill: 3    Acute non-recurrent sinusitis, unspecified location  Comments:  old diagnosis.  Needed to refill Flonase today  Orders:  -     fluticasone propionate (FLONASE) 50 mcg/actuation nasal spray; 2 sprays (100 mcg total) by Each Nostril route once daily.  Dispense: 1 Bottle; Refill: 11    Asthma, chronic, moderate persistent, uncomplicated  Comments:  Well controlled on current medications.  Refill Symbicort.  Orders:  -     budesonide-formoterol 160-4.5 mcg  (SYMBICORT) 160-4.5 mcg/actuation HFAA; Inhale 2 puffs into the lungs every 12 (twelve) hours. Wash out mouth after using  Dispense: 3 Inhaler; Refill: 3

## 2019-12-16 DIAGNOSIS — Z78.9 STATIN INTOLERANCE: Primary | ICD-10-CM

## 2020-01-22 ENCOUNTER — PATIENT MESSAGE (OUTPATIENT)
Dept: PULMONOLOGY | Facility: CLINIC | Age: 65
End: 2020-01-22

## 2020-01-22 DIAGNOSIS — J01.90 ACUTE NON-RECURRENT SINUSITIS, UNSPECIFIED LOCATION: ICD-10-CM

## 2020-01-22 DIAGNOSIS — J45.41 MODERATE PERSISTENT ASTHMA WITH EXACERBATION: Primary | ICD-10-CM

## 2020-01-22 RX ORDER — GUAIFENESIN 600 MG/1
1200 TABLET, EXTENDED RELEASE ORAL 2 TIMES DAILY
Qty: 60 TABLET | Refills: 11 | Status: SHIPPED | OUTPATIENT
Start: 2020-01-22 | End: 2020-02-21

## 2020-01-22 RX ORDER — DOXYCYCLINE 100 MG/1
100 CAPSULE ORAL EVERY 12 HOURS
Qty: 20 CAPSULE | Refills: 1 | Status: SHIPPED | OUTPATIENT
Start: 2020-01-22 | End: 2020-02-26 | Stop reason: ALTCHOICE

## 2020-01-22 RX ORDER — METHYLPREDNISOLONE 4 MG/1
TABLET ORAL
Qty: 1 PACKAGE | Refills: 1 | Status: SHIPPED | OUTPATIENT
Start: 2020-01-22 | End: 2020-02-26 | Stop reason: ALTCHOICE

## 2020-02-26 ENCOUNTER — HOSPITAL ENCOUNTER (OUTPATIENT)
Dept: RADIOLOGY | Facility: HOSPITAL | Age: 65
Discharge: HOME | End: 2020-02-26
Attending: INTERNAL MEDICINE
Payer: MEDICARE

## 2020-02-26 ENCOUNTER — OFFICE VISIT (OUTPATIENT)
Dept: PULMONOLOGY | Facility: CLINIC | Age: 65
End: 2020-02-26
Payer: MEDICARE

## 2020-02-26 VITALS
DIASTOLIC BLOOD PRESSURE: 70 MMHG | SYSTOLIC BLOOD PRESSURE: 124 MMHG | RESPIRATION RATE: 17 BRPM | HEART RATE: 65 BPM | OXYGEN SATURATION: 95 %

## 2020-02-26 DIAGNOSIS — J10.1 INFLUENZA A: Primary | ICD-10-CM

## 2020-02-26 DIAGNOSIS — R06.02 SOB (SHORTNESS OF BREATH): ICD-10-CM

## 2020-02-26 DIAGNOSIS — R06.02 SOB (SHORTNESS OF BREATH): Primary | ICD-10-CM

## 2020-02-26 DIAGNOSIS — R68.89 FLU-LIKE SYMPTOMS: ICD-10-CM

## 2020-02-26 DIAGNOSIS — G47.33 OSA ON CPAP: ICD-10-CM

## 2020-02-26 DIAGNOSIS — J45.41 MODERATE PERSISTENT ASTHMA WITH EXACERBATION: ICD-10-CM

## 2020-02-26 DIAGNOSIS — J45.909 CHRONIC ASTHMA WITHOUT COMPLICATION, UNSPECIFIED ASTHMA SEVERITY, UNSPECIFIED WHETHER PERSISTENT: ICD-10-CM

## 2020-02-26 LAB
INFLUENZA A, MOLECULAR: POSITIVE
INFLUENZA B, MOLECULAR: NEGATIVE
SPECIMEN SOURCE: ABNORMAL

## 2020-02-26 PROCEDURE — 71046 X-RAY EXAM CHEST 2 VIEWS: CPT | Mod: TC

## 2020-02-26 PROCEDURE — 99215 PR OFFICE/OUTPT VISIT, EST, LEVL V, 40-54 MIN: ICD-10-PCS | Mod: 25,S$PBB,, | Performed by: INTERNAL MEDICINE

## 2020-02-26 PROCEDURE — 99215 OFFICE O/P EST HI 40 MIN: CPT | Mod: 25,S$PBB,, | Performed by: INTERNAL MEDICINE

## 2020-02-26 PROCEDURE — 87502 INFLUENZA DNA AMP PROBE: CPT

## 2020-02-26 PROCEDURE — 99999 PR PBB SHADOW E&M-EST. PATIENT-LVL IV: ICD-10-PCS | Mod: PBBFAC,,, | Performed by: INTERNAL MEDICINE

## 2020-02-26 PROCEDURE — 71046 XR CHEST PA AND LATERAL: ICD-10-PCS | Mod: 26,,, | Performed by: RADIOLOGY

## 2020-02-26 PROCEDURE — 99999 PR PBB SHADOW E&M-EST. PATIENT-LVL IV: CPT | Mod: PBBFAC,,, | Performed by: INTERNAL MEDICINE

## 2020-02-26 PROCEDURE — 99214 OFFICE O/P EST MOD 30 MIN: CPT | Mod: PBBFAC,25 | Performed by: INTERNAL MEDICINE

## 2020-02-26 PROCEDURE — 71046 X-RAY EXAM CHEST 2 VIEWS: CPT | Mod: 26,,, | Performed by: RADIOLOGY

## 2020-02-26 RX ORDER — OSELTAMIVIR PHOSPHATE 75 MG/1
75 CAPSULE ORAL 2 TIMES DAILY
Qty: 10 CAPSULE | Refills: 0 | Status: SHIPPED | OUTPATIENT
Start: 2020-02-26 | End: 2020-03-02

## 2020-02-26 RX ORDER — LEVOFLOXACIN 500 MG/1
500 TABLET, FILM COATED ORAL DAILY
Qty: 10 TABLET | Refills: 1 | Status: SHIPPED | OUTPATIENT
Start: 2020-02-26 | End: 2020-02-26 | Stop reason: ALTCHOICE

## 2020-02-26 RX ORDER — PREDNISONE 20 MG/1
TABLET ORAL
Qty: 20 TABLET | Refills: 1 | Status: SHIPPED | OUTPATIENT
Start: 2020-02-26 | End: 2021-07-26 | Stop reason: ALTCHOICE

## 2020-02-26 NOTE — PATIENT INSTRUCTIONS
Please read Warning before using.    USE ONLY AS DIRECTED, IF SYMPTOMS PERSIST SEE YOUR DOCTOR/HEALTHCARE PROFESSIONAL. ALWAYS READ THE LABEL. IMPORTANT: NeilMed® SINUS RINSE Mixture Packets should be used with NeilMed® 240 mL (8 fl oz) NASAFLO® to achieve the best results. You may use NeilMed® packets with other irrigation devices, as long as you mix with the correct volume of water. Our recommendation is to replace Neti Pot every three months.  Step 1  Please wash your hands and rinse the device. Fill the NASAFLO® with 240 mL (8 fl oz) of lukewarm distilled, filtered or previously boiled water. Please do not use tap or faucet water to dissolve the mixture unless it has been previously boiled and cooled down. You may warm the water in a microwave, but we recommend that you warm it in increments of 5 to 10 seconds to avoid overheating, damaging the device or scalding your nasal passage. Step 2  Cut the SINUS RINSE mixture packet at the corner and pour contents into the pot. Tighten the lid on the device securely. Place one finger over the hole of the cap and shake the device gently to dissolve the mixture. Step 3  Standing in front of a sink, bend forward to your comfort level and tilt your head to one side. Keeping your mouth open, and without holding your breath, apply the tip of the device snugly against your nasal passage and ALLOW THE SOLUTION TO GENTLY FLOW until the solution starts draining from the opposite nasal passage. Use roughly half the solution in the NASAFLO® (120 mL / 4 fl oz).  It should not enter your mouth unless you are tilting your head backwards. To adjust or stop the flow, you may place your finger over the hole of the cap, and depending on the seal, you may be able to control the flow. Step 4  Blow your nose very gently, without pinching nose completely to avoid pressure on eardrums. If tolerable, sniff in gently any residual solution remaining in the nasal passage once or twice, because  this may clean out the posterior nasopharyngeal area, which is the area at the back of your nasal passage. At times, some solution will reach the back of your throat, so please spit it out.  For NASAFLO® users, to help drain any residual solution, blow your nose gently while tilting your head forward and to the same side of the nasal passage you just rinsed. Step 5  Now repeat steps 3 & 4 on your other nasal passage.  If there is any solution left over, please discard it. We recommend you make a fresh solution each time you rinse. Rinse once or twice daily OR as directed by your physician. Saline is considered safe.  The U.[place] FDA is recommending that common cough and cold over the counter medicines not be given to babies and toddlers, and that antihistamines should not be given to children under age 6. NeilMed® NASAFLO®: Please clean with soap & water and let air dry Please read Warning before using.    Our recommendation is to replace Neti Pot every three months.    Home    Sas Safety N95 Valved Particulate Respirator (10 masks per box/priced per box) (sold by each)  No Reviews  30 DAY REPLACEMENT IF DEFECTIVE  Part # 8611    N95 Valved Particulate Respirator (10 masks per box/priced per box) (sold by each)    N95 Valved Particulate Respirator (10 masks per box/priced per box) (sold by each)    N95 Valved Particulate Respirator (10 masks per box/priced per box) (sold by each)    N95 Valved Particulate Respirator (10 masks per box/priced per box) (sold by each)  Price $1.79  Qty:     Store Pickup  [facility] - [id number]  [place], [place] - [address]  [phone]

## 2020-02-26 NOTE — PROGRESS NOTES
Subjective:       Patient ID: Yung Mcconnell is a 64 y.o. male.    Chief Complaint: He       Sleep Apnea; Asthma; and COPD    HPI     Asthma Follow-up  The patient has previously been evaluated here for asthma and presents for an asthma follow-up. The patient is currently having symptoms / an exacerbation. Current symptoms include dyspnea, productive cough, wheezing and fever. Symptoms have been present since yesterday and have been gradually worsening. He denies chest pain and chest tightness. Associated symptoms include fatigue, fevers, myalgias, poor exercise tolerance, shortness of breath and weakness.  This episode appears to have been triggered by infection and upper respiratory infection. Treatments tried for the current exacerbation include inhaled corticosteroids and short-acting inhaled beta-adrenergic agonists, which have provided no relief of symptoms. The patient has been having similar episodes for approximately many years.    Current Disease Severity  The patient is having daytime symptoms daily. The patient is having daytime symptoms more than once per week, but not nightly. The patient is using short-acting beta agonists for symptom control daily. He has exacerbations requiring oral systemic corticosteroids 1 times per year. Current limitations in activity from asthma: none. Number of days of school or work missed in the last month: not applicable. Number of urgent/emergent visit in last year: 1.  The patient is not using a spacer with MDIs. His best peak flow rate is na. He is not monitoring peak flow rates at home.    Obstructive Sleep Apnea on Continuous Positive Airway Pressure:  Patient is using CPAP as prescribed and benefiting from therapy. Patient has complaints of none            Past Medical History:   Diagnosis Date    Allergy     Asthma, chronic 7/9/2013    Colon polyps     COPD (chronic obstructive pulmonary disease)     GERD (gastroesophageal reflux disease)     High cholesterol      Osteoarthritis of both knees 7/9/2013    Sleep apnea      Past Surgical History:   Procedure Laterality Date    CHOLECYSTECTOMY      DIAGNOSTIC LAPAROSCOPY N/A 6/21/2018    Procedure: LAPAROSCOPY, DIAGNOSTIC;  Surgeon: Casper Martinez MD;  Location: [facility];  Service: General;  Laterality: N/A;    LUNG SURGERY Right     benign mass    UMBILICAL HERNIA REPAIR N/A 6/21/2018    Procedure: REPAIR, HERNIA, UMBILICAL, AGE 5 YEARS OR OLDER;  Surgeon: Casper Martinez MD;  [facility];  Service: General;  Laterality: N/A;     Social History     Socioeconomic History    Marital status:      Spouse name: Not on file    Number of children: Not on file    Years of education: Not on file    Highest education level: Not on file   Occupational History     Employer: [employer]   Social Needs    Financial resource strain: Not on file    Food insecurity:     Worry: Not on file     Inability: Not on file    Transportation needs:     Medical: Not on file     Non-medical: Not on file   Tobacco Use    Smoking status: Never Smoker    Smokeless tobacco: Never Used   Substance and Sexual Activity    Alcohol use: Yes     Comment: rare    Drug use: No    Sexual activity: Yes     Partners: Female     Birth control/protection: None   Lifestyle    Physical activity:     Days per week: Not on file     Minutes per session: Not on file    Stress: Not on file   Relationships    Social connections:     Talks on phone: Not on file     Gets together: Not on file     Attends Taoism service: Not on file     Active member of club or organization: Not on file     Attends meetings of clubs or organizations: Not on file     Relationship status: Not on file   Other Topics Concern    Not on file   Social History Narrative    Not on file     Review of Systems   Constitutional: Positive for fatigue. Negative for fever.   HENT: Positive for postnasal drip, rhinorrhea and congestion.    Eyes: Negative for  redness and itching.   Respiratory: Positive for cough, sputum production, shortness of breath, dyspnea on extertion, use of rescue inhaler and Paroxysmal Nocturnal Dyspnea.    Cardiovascular: Negative for chest pain, palpitations and leg swelling.   Genitourinary: Negative for difficulty urinating and hematuria.   Endocrine: Negative for cold intolerance and heat intolerance.    Skin: Negative for rash.   Gastrointestinal: Negative for nausea and abdominal pain.   Neurological: Negative for dizziness, syncope, weakness and light-headedness.   Hematological: Negative for adenopathy. Does not bruise/bleed easily.   Psychiatric/Behavioral: Negative for sleep disturbance. The patient is not nervous/anxious.        Objective:      /70   Pulse 65   Resp 17   Ht (P) 6' (1.829 m)   Wt (P) 126.7 kg (279 lb 5.2 oz)   SpO2 95%   BMI (P) 37.88 kg/m²   Physical Exam   Constitutional: He is oriented to person, place, and time. He appears well-developed and well-nourished.   HENT:   Head: Normocephalic and atraumatic.   Eyes: Pupils are equal, round, and reactive to light. Conjunctivae are normal.   Neck: Neck supple. No JVD present. No tracheal deviation present. No thyromegaly present.   Cardiovascular: Normal rate and regular rhythm.   No murmur heard.  Pulmonary/Chest: He has decreased breath sounds. He has wheezes in the right lower field and the left lower field. He has no rhonchi. He has no rales.   Abdominal: Soft. Bowel sounds are normal.   Musculoskeletal: Normal range of motion. He exhibits no edema or tenderness.   Lymphadenopathy:     He has no cervical adenopathy.   Neurological: He is alert and oriented to person, place, and time.   Skin: Skin is warm and dry.   Nursing note and vitals reviewed.    Personal Diagnostic Review  Chest x-ray: stable     X-Ray Chest PA And Lateral  Narrative: EXAMINATION:  XR CHEST PA AND LATERAL    CLINICAL HISTORY:  Cough    TECHNIQUE:  PA and lateral views of the chest  were performed.    COMPARISON:  06/26/2018    FINDINGS:  Cardiac silhouette remains at the upper limits of normal in size.    Scarring noted the bilateral lung bases and right apex.  No definite parenchymal consolidation or pleural effusion visualized.  No appreciable change from priors.  No acute osseous findings demonstrated.  Impression: Unchanged appearance of the chest as above    Electronically signed by: Sae Woodward MD  Date:    04/29/2019  Time:    14:07      Office Spirometry Results:     No flowsheet data found.  Pulmonary Studies Review 2/26/2020   SpO2 95   Height 72.000   Weight 4469.17   BMI (Calculated) 37.9   Predicted Distance 316.34   Predicted Distance Meters (Calculated) 521.24           Nasal swab positive for Influenza A    Assessment:            Influenza A  -     oseltamivir (TAMIFLU) 75 MG capsule; Take 1 capsule (75 mg total) by mouth 2 (two) times daily. for 5 days  Dispense: 10 capsule; Refill: 0    Moderate persistent asthma with exacerbation  -     CBC auto differential; Future; Expected date: 02/26/2020  -     X-Ray Chest PA And Lateral; Future; Expected date: 02/26/2020  -     Discontinue: levoFLOXacin (LEVAQUIN) 500 MG tablet; Take 1 tablet (500 mg total) by mouth once daily.  Dispense: 10 tablet; Refill: 1  -     predniSONE (DELTASONE) 20 MG tablet; Prednisone 60 mg/ day for 3 days, 40 mg/day for 3 days,20 mg/ day for 3 days, (1/2 tablet )10 mg a day for 3 days.  Dispense: 20 tablet; Refill: 1    Flu-like symptoms  -     CBC auto differential; Future; Expected date: 02/26/2020  -     X-Ray Chest PA And Lateral; Future; Expected date: 02/26/2020  -     Cancel: POCT Influenza A/B  -     Influenza A & B by Molecular  -     oseltamivir (TAMIFLU) 75 MG capsule; Take 1 capsule (75 mg total) by mouth 2 (two) times daily. for 5 days  Dispense: 10 capsule; Refill: 0    Chronic asthma without complication, unspecified asthma severity, unspecified whether persistent  -     Spirometry  with/without bronchodilator; Future; Expected date: 08/28/2020    KEVEN on CPAP  -     CPAP/BIPAP SUPPLIES          Outpatient Encounter Medications as of 2/26/2020   Medication Sig Dispense Refill    albuterol (PROVENTIL/VENTOLIN HFA) 90 mcg/actuation inhaler Inhale 2 puffs into the lungs every 4 (four) hours as needed. Every 4-6 hours 3 Inhaler 11    budesonide-formoterol 160-4.5 mcg (SYMBICORT) 160-4.5 mcg/actuation HFAA Inhale 2 puffs into the lungs every 12 (twelve) hours. Wash out mouth after using 3 Inhaler 3    cetirizine (ZYRTEC) 10 MG tablet Take 10 mg by mouth once daily.      cyclobenzaprine (FLEXERIL) 5 MG tablet 1 Tablet Oral Three times a day as needed. 20 tablet 0    fluticasone propionate (FLONASE) 50 mcg/actuation nasal spray 2 sprays (100 mcg total) by Each Nostril route once daily. 1 Bottle 11    GLUCOSAMINE HCL/CHONDR ALFONSO A NA (OSTEO BI-FLEX ORAL) Take 1 tablet by mouth once daily.       HYDROcodone-acetaminophen (NORCO) 5-325 mg per tablet Take 1 tablet by mouth every 8 (eight) hours as needed. 10 tablet 0    metroNIDAZOLE (METROGEL) 0.75 % gel Apply topically 2 (two) times daily. 45 g 2    multivitamin (MULTIPLE VITAMIN) per tablet 1 Tablet Oral Every day      nozaseptin (NOZIN) nasal  1 each by Each Nare route 2 (two) times daily. 1 applicator 0    tadalafil (CIALIS) 20 MG Tab Take 1 tablet (20 mg total) by mouth daily as needed. 24 tablet 3    [DISCONTINUED] doxycycline (MONODOX) 100 MG capsule Take 1 capsule (100 mg total) by mouth every 12 (twelve) hours. 20 capsule 1    [DISCONTINUED] methylPREDNISolone (MEDROL DOSEPACK) 4 mg tablet use as directed 1 Package 1    diclofenac 1.3 % PT12 Apply 1 patch topically every 12 (twelve) hours as needed. 30 patch 0    oseltamivir (TAMIFLU) 75 MG capsule Take 1 capsule (75 mg total) by mouth 2 (two) times daily. for 5 days 10 capsule 0    predniSONE (DELTASONE) 20 MG tablet Prednisone 60 mg/ day for 3 days, 40 mg/day for 3  days,20 mg/ day for 3 days, (1/2 tablet )10 mg a day for 3 days. 20 tablet 1    [DISCONTINUED] levoFLOXacin (LEVAQUIN) 500 MG tablet Take 1 tablet (500 mg total) by mouth once daily. 10 tablet 1     No facility-administered encounter medications on file as of 2/26/2020.      Plan:       Requested Prescriptions     Signed Prescriptions Disp Refills    predniSONE (DELTASONE) 20 MG tablet 20 tablet 1     Sig: Prednisone 60 mg/ day for 3 days, 40 mg/day for 3 days,20 mg/ day for 3 days, (1/2 tablet )10 mg a day for 3 days.    oseltamivir (TAMIFLU) 75 MG capsule 10 capsule 0     Sig: Take 1 capsule (75 mg total) by mouth 2 (two) times daily. for 5 days     Problem List Items Addressed This Visit     Asthma, chronic    Relevant Orders    Spirometry with/without bronchodilator      Other Visit Diagnoses     Influenza A    -  Primary    Relevant Medications    oseltamivir (TAMIFLU) 75 MG capsule    Moderate persistent asthma with exacerbation        Relevant Medications    predniSONE (DELTASONE) 20 MG tablet    Other Relevant Orders    CBC auto differential    X-Ray Chest PA And Lateral    Flu-like symptoms        Relevant Medications    oseltamivir (TAMIFLU) 75 MG capsule    Other Relevant Orders    CBC auto differential    X-Ray Chest PA And Lateral    Influenza A & B by Molecular (Completed)    KEVEN on CPAP        Relevant Orders    CPAP/BIPAP SUPPLIES             Follow up in about 6 months (around 9/7/2020) for Review althea.    MEDICAL DECISION MAKING: Moderate to high complexity.  Overall, the multiple problems listed are of moderate to high severity that may impact quality of life and activities of daily living. Side effects of medications, treatment plan as well as options and alternatives reviewed and discussed with patient. There was counseling of patient concerning these issues.    Total time spent in face to face counseling and coordination of care - 45  minutes over 50% of time was used in discussion of  prognosis, risks, benefits of treatment, instructions and compliance with regimen . Discussion with other physicians or health care providers (DME, NP, pharmacy, respiratory therapy) occurred.

## 2020-03-06 ENCOUNTER — TELEPHONE (OUTPATIENT)
Dept: PULMONOLOGY | Facility: CLINIC | Age: 65
End: 2020-03-06

## 2020-03-06 DIAGNOSIS — J45.41 MODERATE PERSISTENT ASTHMA WITH EXACERBATION: ICD-10-CM

## 2020-03-06 DIAGNOSIS — J45.41 MODERATE PERSISTENT ASTHMA WITH EXACERBATION: Primary | ICD-10-CM

## 2020-03-06 RX ORDER — LEVOFLOXACIN 500 MG/1
500 TABLET, FILM COATED ORAL DAILY
Qty: 7 TABLET | Refills: 0 | Status: SHIPPED | OUTPATIENT
Start: 2020-03-06 | End: 2020-03-06 | Stop reason: SDUPTHER

## 2020-03-06 RX ORDER — LEVOFLOXACIN 500 MG/1
500 TABLET, FILM COATED ORAL DAILY
Qty: 7 TABLET | Refills: 0 | Status: ON HOLD | OUTPATIENT
Start: 2020-03-06 | End: 2020-10-23 | Stop reason: HOSPADM

## 2020-03-06 NOTE — TELEPHONE ENCOUNTER
----- Message from Kayla Becerra sent at 3/6/2020 10:42 AM CST -----  Contact: self/[phone]  Would like to consult with nurse regarding being congested, patient states she might need some medication, please call back at [phone]. Thanks/ar

## 2020-03-06 NOTE — TELEPHONE ENCOUNTER
Pt states he feels like he's got a bacterial infection since having the flu.  He is requesting an rx for abx sent.

## 2020-03-17 ENCOUNTER — PATIENT MESSAGE (OUTPATIENT)
Dept: PULMONOLOGY | Facility: CLINIC | Age: 65
End: 2020-03-17

## 2020-03-17 NOTE — LETTER
March 19, 2020    Yung Mcconnell  [address] [address] [id number]     [facility]  [address]  [place] [address]  Phone: [phone]  Fax: [phone] Dear Mr. Mcconnell:    TO WHOM IT MAY CONCERN:  The above referenced individual has a medical condition that places him at risk of developing complications if he would contract the flu.  For that reason I would recommend that he work at home until the present flu outbreak has pasted.    If you have any questions or concerns, please don't hesitate to call.    Sincerely,

## 2020-03-18 ENCOUNTER — PATIENT MESSAGE (OUTPATIENT)
Dept: FAMILY MEDICINE | Facility: CLINIC | Age: 65
End: 2020-03-18

## 2020-06-12 ENCOUNTER — LAB VISIT (OUTPATIENT)
Dept: LAB | Facility: HOSPITAL | Age: 65
End: 2020-06-12
Attending: FAMILY MEDICINE
Payer: MEDICARE

## 2020-06-12 ENCOUNTER — OFFICE VISIT (OUTPATIENT)
Dept: FAMILY MEDICINE | Facility: CLINIC | Age: 65
End: 2020-06-12
Payer: MEDICARE

## 2020-06-12 VITALS
SYSTOLIC BLOOD PRESSURE: 122 MMHG | DIASTOLIC BLOOD PRESSURE: 68 MMHG | OXYGEN SATURATION: 96 % | HEART RATE: 83 BPM | WEIGHT: 266.19 LBS | BODY MASS INDEX: 36.05 KG/M2 | HEIGHT: 72 IN | TEMPERATURE: 98 F

## 2020-06-12 DIAGNOSIS — E78.5 HYPERLIPIDEMIA, UNSPECIFIED HYPERLIPIDEMIA TYPE: ICD-10-CM

## 2020-06-12 DIAGNOSIS — J44.89 ASTHMA WITH COPD: ICD-10-CM

## 2020-06-12 DIAGNOSIS — G47.33 OSA (OBSTRUCTIVE SLEEP APNEA): ICD-10-CM

## 2020-06-12 DIAGNOSIS — Z12.5 SCREENING FOR PROSTATE CANCER: ICD-10-CM

## 2020-06-12 DIAGNOSIS — Z78.9 STATIN INTOLERANCE: Primary | ICD-10-CM

## 2020-06-12 PROCEDURE — 99214 PR OFFICE/OUTPT VISIT, EST, LEVL IV, 30-39 MIN: ICD-10-PCS | Mod: S$PBB,,, | Performed by: FAMILY MEDICINE

## 2020-06-12 PROCEDURE — 99999 PR PBB SHADOW E&M-EST. PATIENT-LVL III: ICD-10-PCS | Mod: PBBFAC,,, | Performed by: FAMILY MEDICINE

## 2020-06-12 PROCEDURE — 84153 ASSAY OF PSA TOTAL: CPT

## 2020-06-12 PROCEDURE — 36415 COLL VENOUS BLD VENIPUNCTURE: CPT | Mod: PO

## 2020-06-12 PROCEDURE — 99214 OFFICE O/P EST MOD 30 MIN: CPT | Mod: S$PBB,,, | Performed by: FAMILY MEDICINE

## 2020-06-12 PROCEDURE — 99999 PR PBB SHADOW E&M-EST. PATIENT-LVL III: CPT | Mod: PBBFAC,,, | Performed by: FAMILY MEDICINE

## 2020-06-12 PROCEDURE — 99213 OFFICE O/P EST LOW 20 MIN: CPT | Mod: PBBFAC,PO | Performed by: FAMILY MEDICINE

## 2020-06-12 NOTE — PROGRESS NOTES
Subjective:       Patient ID: Yung Mcconnell is a 64 y.o. male.    Chief Complaint: Follow-up      HPI Comments:       Current Outpatient Medications:     albuterol (PROVENTIL/VENTOLIN HFA) 90 mcg/actuation inhaler, Inhale 2 puffs into the lungs every 4 (four) hours as needed. Every 4-6 hours, Disp: 3 Inhaler, Rfl: 11    budesonide-formoterol 160-4.5 mcg (SYMBICORT) 160-4.5 mcg/actuation HFAA, Inhale 2 puffs into the lungs every 12 (twelve) hours. Wash out mouth after using, Disp: 3 Inhaler, Rfl: 3    cetirizine (ZYRTEC) 10 MG tablet, Take 10 mg by mouth once daily., Disp: , Rfl:     cyclobenzaprine (FLEXERIL) 5 MG tablet, 1 Tablet Oral Three times a day as needed., Disp: 20 tablet, Rfl: 0    fluticasone propionate (FLONASE) 50 mcg/actuation nasal spray, 2 sprays (100 mcg total) by Each Nostril route once daily., Disp: 1 Bottle, Rfl: 11    GLUCOSAMINE HCL/CHONDR ALFONSO A NA (OSTEO BI-FLEX ORAL), Take 1 tablet by mouth once daily. , Disp: , Rfl:     multivitamin (MULTIPLE VITAMIN) per tablet, 1 Tablet Oral Every day, Disp: , Rfl:     tadalafil (CIALIS) 20 MG Tab, Take 1 tablet (20 mg total) by mouth daily as needed., Disp: 24 tablet, Rfl: 3    diclofenac 1.3 % PT12, Apply 1 patch topically every 12 (twelve) hours as needed., Disp: 30 patch, Rfl: 0    HYDROcodone-acetaminophen (NORCO) 5-325 mg per tablet, Take 1 tablet by mouth every 8 (eight) hours as needed. (Patient not taking: Reported on 6/12/2020), Disp: 10 tablet, Rfl: 0    levoFLOXacin (LEVAQUIN) 500 MG tablet, Take 1 tablet (500 mg total) by mouth once daily., Disp: 7 tablet, Rfl: 0    metroNIDAZOLE (METROGEL) 0.75 % gel, Apply topically 2 (two) times daily., Disp: 45 g, Rfl: 2    nozaseptin (NOZIN) nasal , 1 each by Each Nare route 2 (two) times daily. (Patient not taking: Reported on 6/12/2020), Disp: 1 applicator, Rfl: 0    predniSONE (DELTASONE) 20 MG tablet, Prednisone 60 mg/ day for 3 days, 40 mg/day for 3 days,20 mg/ day for 3 days,  (1/2 tablet )10 mg a day for 3 days., Disp: 20 tablet, Rfl: 1      Six month follow-up.  Has been on sabbatical from teaching (chemistry, [employer]) and has stayed well throughout.  Had flu in February.  Needs a note declaring that he has healthy to go back to teaching.  Due to have a PSA today.    Since I saw him last he saw pulmonology for his KEVEN and asthma.  Doing well.  Using his CPAP every day    Review of Systems   Constitutional: Negative for activity change, appetite change and fever.   HENT: Negative for sore throat.    Respiratory: Negative for cough and shortness of breath.    Cardiovascular: Negative for chest pain.   Gastrointestinal: Negative for abdominal pain, diarrhea and nausea.   Genitourinary: Negative for difficulty urinating and frequency.   Musculoskeletal: Negative for arthralgias and myalgias.   Neurological: Negative for dizziness and headaches.       Objective:      Vitals:    06/12/20 0858   BP: 122/68   Pulse: 83   Temp: 98.4 °F (36.9 °C)   TempSrc: Tympanic   SpO2: 96%   Weight: 120.8 kg (266 lb 3.3 oz)   Height: 6' (1.829 m)   PainSc: 0-No pain     Physical Exam   Constitutional: He is oriented to person, place, and time. He appears well-developed and well-nourished. No distress.   HENT:   Head: Normocephalic.   Neck: Neck supple. No thyromegaly present.   Cardiovascular: Normal rate, regular rhythm and normal heart sounds.   No murmur heard.  Pulmonary/Chest: Effort normal and breath sounds normal. He has no wheezes. He has no rales.   Abdominal: Soft. He exhibits no distension.   Musculoskeletal: He exhibits no edema.   Lymphadenopathy:     He has no cervical adenopathy.   Neurological: He is alert and oriented to person, place, and time.   Skin: Skin is warm and dry. He is not diaphoretic.   Psychiatric: He has a normal mood and affect. His behavior is normal. Judgment and thought content normal.   Nursing note and vitals reviewed.      Assessment:       1. Statin intolerance    2.  Hyperlipidemia, unspecified hyperlipidemia type    3. KEVEN (obstructive sleep apnea)    4. Asthma with COPD    5. Screening for prostate cancer        Plan:   Statin intolerance  Comments:  ASCVD risk of 12%.  Total cholesterol 183.  No treatment at this time    Hyperlipidemia, unspecified hyperlipidemia type  Comments:  As above.  Follow-up 6 months     KEVEN (obstructive sleep apnea)  Comments:  Uses CPAP nightly    Asthma with COPD  Comments:  Stable on Symbicort, and albuterol as needed    Screening for prostate cancer  Comments:  PSA today  Orders:  -     PSA, Screening; Future; Expected date: 06/12/2020

## 2020-06-13 LAB — COMPLEXED PSA SERPL-MCNC: 0.75 NG/ML (ref 0–4)

## 2020-07-15 DIAGNOSIS — Z71.89 COMPLEX CARE COORDINATION: ICD-10-CM

## 2020-08-13 DIAGNOSIS — J45.909 CHRONIC ASTHMA WITHOUT COMPLICATION, UNSPECIFIED ASTHMA SEVERITY, UNSPECIFIED WHETHER PERSISTENT: Primary | ICD-10-CM

## 2020-08-18 ENCOUNTER — TELEPHONE (OUTPATIENT)
Dept: FAMILY MEDICINE | Facility: CLINIC | Age: 65
End: 2020-08-18

## 2020-08-18 ENCOUNTER — OFFICE VISIT (OUTPATIENT)
Dept: FAMILY MEDICINE | Facility: CLINIC | Age: 65
End: 2020-08-18
Payer: MEDICARE

## 2020-08-18 ENCOUNTER — PATIENT MESSAGE (OUTPATIENT)
Dept: FAMILY MEDICINE | Facility: CLINIC | Age: 65
End: 2020-08-18

## 2020-08-18 DIAGNOSIS — J44.89 ASTHMA WITH COPD: ICD-10-CM

## 2020-08-18 DIAGNOSIS — Z12.11 SCREENING FOR COLON CANCER: ICD-10-CM

## 2020-08-18 DIAGNOSIS — G47.33 OSA (OBSTRUCTIVE SLEEP APNEA): ICD-10-CM

## 2020-08-18 DIAGNOSIS — U07.1 COVID-19 VIRUS INFECTION: Primary | ICD-10-CM

## 2020-08-18 PROCEDURE — 99214 OFFICE O/P EST MOD 30 MIN: CPT | Mod: 95,,, | Performed by: FAMILY MEDICINE

## 2020-08-18 PROCEDURE — 99214 PR OFFICE/OUTPT VISIT, EST, LEVL IV, 30-39 MIN: ICD-10-PCS | Mod: 95,,, | Performed by: FAMILY MEDICINE

## 2020-08-18 RX ORDER — ALBUTEROL SULFATE 0.83 MG/ML
2.5 SOLUTION RESPIRATORY (INHALATION) EVERY 6 HOURS PRN
Qty: 1 BOX | Refills: 2 | Status: SHIPPED | OUTPATIENT
Start: 2020-08-18 | End: 2020-09-30

## 2020-08-18 RX ORDER — PROMETHAZINE HYDROCHLORIDE AND DEXTROMETHORPHAN HYDROBROMIDE 6.25; 15 MG/5ML; MG/5ML
5 SYRUP ORAL 3 TIMES DAILY PRN
Qty: 240 ML | Refills: 0 | Status: SHIPPED | OUTPATIENT
Start: 2020-08-18 | End: 2020-08-28

## 2020-08-18 NOTE — PROGRESS NOTES
Subjective:       Patient ID: Yung Mcconnell is a 65 y.o. male.    Chief Complaint: No chief complaint on file.      HPI Comments:       Current Outpatient Medications:     albuterol (PROVENTIL) 2.5 mg /3 mL (0.083 %) nebulizer solution, Take 3 mLs (2.5 mg total) by nebulization every 6 (six) hours as needed for Wheezing. Rescue, Disp: 1 Box, Rfl: 2    albuterol (PROVENTIL/VENTOLIN HFA) 90 mcg/actuation inhaler, Inhale 2 puffs into the lungs every 4 (four) hours as needed. Every 4-6 hours, Disp: 3 Inhaler, Rfl: 11    budesonide-formoterol 160-4.5 mcg (SYMBICORT) 160-4.5 mcg/actuation HFAA, Inhale 2 puffs into the lungs every 12 (twelve) hours. Wash out mouth after using, Disp: 3 Inhaler, Rfl: 3    cetirizine (ZYRTEC) 10 MG tablet, Take 10 mg by mouth once daily., Disp: , Rfl:     cyclobenzaprine (FLEXERIL) 5 MG tablet, 1 Tablet Oral Three times a day as needed., Disp: 20 tablet, Rfl: 0    diclofenac 1.3 % PT12, Apply 1 patch topically every 12 (twelve) hours as needed., Disp: 30 patch, Rfl: 0    fluticasone propionate (FLONASE) 50 mcg/actuation nasal spray, 2 sprays (100 mcg total) by Each Nostril route once daily., Disp: 1 Bottle, Rfl: 11    GLUCOSAMINE HCL/CHONDR ALFONSO A NA (OSTEO BI-FLEX ORAL), Take 1 tablet by mouth once daily. , Disp: , Rfl:     HYDROcodone-acetaminophen (NORCO) 5-325 mg per tablet, Take 1 tablet by mouth every 8 (eight) hours as needed. (Patient not taking: Reported on 6/12/2020), Disp: 10 tablet, Rfl: 0    levoFLOXacin (LEVAQUIN) 500 MG tablet, Take 1 tablet (500 mg total) by mouth once daily., Disp: 7 tablet, Rfl: 0    metroNIDAZOLE (METROGEL) 0.75 % gel, Apply topically 2 (two) times daily., Disp: 45 g, Rfl: 2    multivitamin (MULTIPLE VITAMIN) per tablet, 1 Tablet Oral Every day, Disp: , Rfl:     nozaseptin (NOZIN) nasal , 1 each by Each Nare route 2 (two) times daily. (Patient not taking: Reported on 6/12/2020), Disp: 1 applicator, Rfl: 0    predniSONE (DELTASONE) 20  MG tablet, Prednisone 60 mg/ day for 3 days, 40 mg/day for 3 days,20 mg/ day for 3 days, (1/2 tablet )10 mg a day for 3 days., Disp: 20 tablet, Rfl: 1    promethazine-dextromethorphan (PROMETHAZINE-DM) 6.25-15 mg/5 mL Syrp, Take 5 mLs by mouth 3 (three) times daily as needed., Disp: 240 mL, Rfl: 0    tadalafil (CIALIS) 20 MG Tab, Take 1 tablet (20 mg total) by mouth daily as needed., Disp: 24 tablet, Rfl: 3      The patient location is:  Home  The chief complaint leading to consultation is:  COVID positive    Visit type:  Tele audiovisual    Face to Face time with patient:  Approximately 25 min        Each patient to whom he or she provides medical services by telemedicine is:  (1) informed of the relationship between the physician and patient and the respective role of any other health care provider with respect to management of the patient; and (2) notified that he or she may decline to receive medical services by telemedicine and may withdraw from such care at any time.    Notes:     Started developing cough chest tightness over the last 5 days.  Went to the [facility] after hours 2 days ago and was found to be COVID positive.  Bought a pulse oximeter and is measuring 96-97 % consistently.  Not much shortness of breath or wheezing.  Using nebulizer t.i.d., plus his Symbicort.  Also Tylenol for low-grade fever.  T-max 99.5° sputum is clear to yellow.  Some flu-like symptoms.  Does not tolerate the Zithromax    Wife is also positive    Review of Systems   Constitutional: Negative for activity change and unexpected weight change.   HENT: Positive for rhinorrhea. Negative for hearing loss and trouble swallowing.    Eyes: Negative for discharge and visual disturbance.   Respiratory: Positive for chest tightness and wheezing.    Cardiovascular: Positive for chest pain. Negative for palpitations.   Gastrointestinal: Positive for diarrhea. Negative for constipation and vomiting.   Endocrine: Positive for polydipsia. Negative  for polyuria.   Genitourinary: Negative for difficulty urinating, hematuria and urgency.   Musculoskeletal: Positive for neck pain. Negative for arthralgias and joint swelling.   Neurological: Positive for weakness and headaches.   Psychiatric/Behavioral: Negative for confusion and dysphoric mood.       Objective:      There were no vitals filed for this visit.  Physical Exam  Constitutional:       Appearance: Normal appearance. He is not ill-appearing.   HENT:      Head: Normocephalic.   Neck:      Musculoskeletal: Neck supple.   Pulmonary:      Effort: Pulmonary effort is normal. No respiratory distress.   Neurological:      Mental Status: He is alert and oriented to person, place, and time.   Psychiatric:         Mood and Affect: Mood normal.         Behavior: Behavior normal.         Thought Content: Thought content normal.         Judgment: Judgment normal.         Assessment:       1. COVID-19 virus infection    2. Asthma with COPD    3. KEVEN (obstructive sleep apnea)    4. Screening for colon cancer        Plan:   COVID-19 virus infection  Comments:  Day 5 of symptoms.  Well oxygenated.  No significant shortness of breath.  Follow-up precautions reviewed in detail:  Hypoxia, shortness of breath    Asthma with COPD  Comments:  Stable with albuterol used as needed    KEVEN (obstructive sleep apnea)  Comments:  Uses CPAP regularly    Screening for colon cancer  Comments:  Five year colonoscopy due.  Order placed  Orders:  -     Case request GI: COLONOSCOPY    Other orders  -     albuterol (PROVENTIL) 2.5 mg /3 mL (0.083 %) nebulizer solution; Take 3 mLs (2.5 mg total) by nebulization every 6 (six) hours as needed for Wheezing. Rescue  Dispense: 1 Box; Refill: 2  -     promethazine-dextromethorphan (PROMETHAZINE-DM) 6.25-15 mg/5 mL Syrp; Take 5 mLs by mouth 3 (three) times daily as needed.  Dispense: 240 mL; Refill: 0

## 2020-08-18 NOTE — TELEPHONE ENCOUNTER
----- Message from Prasanna Gaxiola MD sent at 8/18/2020 11:41 AM CDT -----  Regarding: RE: DX code  Contact: [facility]  J44.1 COPD with exacerbation  ----- Message -----  From: Lisa Vo MA  Sent: 8/18/2020  11:36 AM CDT  To: Prasanna Gaxiola MD  Subject: FW: DX code                                      Do you know the dx code  ----- Message -----  From: Tanna Lynn  Sent: 8/18/2020  11:33 AM CDT  To: Khalida Mims Staff  Subject: DX code                                          Requesting a call back to discuss dx code for rx  albuterol 2.5 mg .please call back at [phone]  .      [id number] - [place], [place] - [address] [facility] [address] [place] [id number]  [facility] [address]  Phone: [phone] Fax: [phone]

## 2020-08-22 ENCOUNTER — NURSE TRIAGE (OUTPATIENT)
Dept: ADMINISTRATIVE | Facility: CLINIC | Age: 65
End: 2020-08-22

## 2020-08-22 NOTE — TELEPHONE ENCOUNTER
Pt reports being dx w/ Covid 19. Reports fever x 10 days. Reports constant pain in chest. Advised, per protocol to be seen in the ED for further evaluation.      Reason for Disposition   SEVERE or constant chest pain or pressure (Exception: mild central chest pain, present only when coughing)    Additional Information   Negative: SEVERE difficulty breathing (e.g., struggling for each breath, speaks in single words)   Negative: Difficult to awaken or acting confused (e.g., disoriented, slurred speech)   Negative: Bluish (or gray) lips or face now   Negative: Shock suspected (e.g., cold/pale/clammy skin, too weak to stand, low BP, rapid pulse)   Negative: Sounds like a life-threatening emergency to the triager    Protocols used: CORONAVIRUS (COVID-19) DIAGNOSED OR [id number]-R-CB

## 2020-08-23 ENCOUNTER — HOSPITAL ENCOUNTER (EMERGENCY)
Facility: HOSPITAL | Age: 65
Discharge: HOME | End: 2020-08-23
Attending: EMERGENCY MEDICINE
Payer: MEDICARE

## 2020-08-23 VITALS
WEIGHT: 246.94 LBS | DIASTOLIC BLOOD PRESSURE: 60 MMHG | SYSTOLIC BLOOD PRESSURE: 124 MMHG | RESPIRATION RATE: 22 BRPM | OXYGEN SATURATION: 95 % | HEART RATE: 77 BPM | BODY MASS INDEX: 33.49 KG/M2 | TEMPERATURE: 100 F

## 2020-08-23 DIAGNOSIS — R07.9 CHEST PAIN: ICD-10-CM

## 2020-08-23 DIAGNOSIS — U07.1 COVID-19 VIRUS INFECTION: Primary | ICD-10-CM

## 2020-08-23 DIAGNOSIS — E87.6 HYPOKALEMIA: ICD-10-CM

## 2020-08-23 DIAGNOSIS — R06.02 SOB (SHORTNESS OF BREATH): ICD-10-CM

## 2020-08-23 LAB
ALBUMIN SERPL BCP-MCNC: 3.3 G/DL (ref 3.5–5.2)
ALP SERPL-CCNC: 66 U/L (ref 55–135)
ALT SERPL W/O P-5'-P-CCNC: 50 U/L (ref 10–44)
ANION GAP SERPL CALC-SCNC: 12 MMOL/L (ref 8–16)
AST SERPL-CCNC: 58 U/L (ref 10–40)
BASOPHILS # BLD AUTO: 0.02 K/UL (ref 0–0.2)
BASOPHILS NFR BLD: 0.4 % (ref 0–1.9)
BILIRUB SERPL-MCNC: 0.7 MG/DL (ref 0.1–1)
BNP SERPL-MCNC: 57 PG/ML (ref 0–99)
BUN SERPL-MCNC: 8 MG/DL (ref 8–23)
CALCIUM SERPL-MCNC: 8.8 MG/DL (ref 8.7–10.5)
CHLORIDE SERPL-SCNC: 104 MMOL/L (ref 95–110)
CO2 SERPL-SCNC: 21 MMOL/L (ref 23–29)
CREAT SERPL-MCNC: 0.8 MG/DL (ref 0.5–1.4)
DIFFERENTIAL METHOD: ABNORMAL
EOSINOPHIL # BLD AUTO: 0 K/UL (ref 0–0.5)
EOSINOPHIL NFR BLD: 0 % (ref 0–8)
ERYTHROCYTE [DISTWIDTH] IN BLOOD BY AUTOMATED COUNT: 14.1 % (ref 11.5–14.5)
EST. GFR  (AFRICAN AMERICAN): >60 ML/MIN/1.73 M^2
EST. GFR  (NON AFRICAN AMERICAN): >60 ML/MIN/1.73 M^2
GLUCOSE SERPL-MCNC: 98 MG/DL (ref 70–110)
HCT VFR BLD AUTO: 40.1 % (ref 40–54)
HCV AB SERPL QL IA: NEGATIVE
HGB BLD-MCNC: 14.2 G/DL (ref 14–18)
IMM GRANULOCYTES # BLD AUTO: 0.03 K/UL (ref 0–0.04)
IMM GRANULOCYTES NFR BLD AUTO: 0.6 % (ref 0–0.5)
LYMPHOCYTES # BLD AUTO: 1.3 K/UL (ref 1–4.8)
LYMPHOCYTES NFR BLD: 27.6 % (ref 18–48)
MAGNESIUM SERPL-MCNC: 1.8 MG/DL (ref 1.6–2.6)
MCH RBC QN AUTO: 30.8 PG (ref 27–31)
MCHC RBC AUTO-ENTMCNC: 35.4 G/DL (ref 32–36)
MCV RBC AUTO: 87 FL (ref 82–98)
MONOCYTES # BLD AUTO: 0.3 K/UL (ref 0.3–1)
MONOCYTES NFR BLD: 5.5 % (ref 4–15)
NEUTROPHILS # BLD AUTO: 3.1 K/UL (ref 1.8–7.7)
NEUTROPHILS NFR BLD: 65.9 % (ref 38–73)
NRBC BLD-RTO: 0 /100 WBC
PLATELET # BLD AUTO: 117 K/UL (ref 150–350)
PMV BLD AUTO: 11.3 FL (ref 9.2–12.9)
POTASSIUM SERPL-SCNC: 3.1 MMOL/L (ref 3.5–5.1)
PROT SERPL-MCNC: 6.8 G/DL (ref 6–8.4)
RBC # BLD AUTO: 4.61 M/UL (ref 4.6–6.2)
SODIUM SERPL-SCNC: 137 MMOL/L (ref 136–145)
TROPONIN I SERPL DL<=0.01 NG/ML-MCNC: 0.01 NG/ML (ref 0–0.03)
WBC # BLD AUTO: 4.71 K/UL (ref 3.9–12.7)

## 2020-08-23 PROCEDURE — 36415 COLL VENOUS BLD VENIPUNCTURE: CPT

## 2020-08-23 PROCEDURE — 84484 ASSAY OF TROPONIN QUANT: CPT

## 2020-08-23 PROCEDURE — 86803 HEPATITIS C AB TEST: CPT

## 2020-08-23 PROCEDURE — 85025 COMPLETE CBC W/AUTO DIFF WBC: CPT

## 2020-08-23 PROCEDURE — 25000003 PHARM REV CODE 250: Performed by: EMERGENCY MEDICINE

## 2020-08-23 PROCEDURE — 99283 EMERGENCY DEPT VISIT LOW MDM: CPT | Mod: 25

## 2020-08-23 PROCEDURE — 80053 COMPREHEN METABOLIC PANEL: CPT

## 2020-08-23 PROCEDURE — 83880 ASSAY OF NATRIURETIC PEPTIDE: CPT

## 2020-08-23 PROCEDURE — 93010 ELECTROCARDIOGRAM REPORT: CPT | Mod: ,,, | Performed by: INTERNAL MEDICINE

## 2020-08-23 PROCEDURE — 93010 EKG 12-LEAD: ICD-10-PCS | Mod: ,,, | Performed by: INTERNAL MEDICINE

## 2020-08-23 PROCEDURE — 83735 ASSAY OF MAGNESIUM: CPT

## 2020-08-23 PROCEDURE — 93005 ELECTROCARDIOGRAM TRACING: CPT

## 2020-08-23 RX ORDER — POTASSIUM CHLORIDE 20 MEQ/1
40 TABLET, EXTENDED RELEASE ORAL
Status: COMPLETED | OUTPATIENT
Start: 2020-08-23 | End: 2020-08-23

## 2020-08-23 RX ORDER — LANOLIN ALCOHOL/MO/W.PET/CERES
400 CREAM (GRAM) TOPICAL ONCE
Status: COMPLETED | OUTPATIENT
Start: 2020-08-23 | End: 2020-08-23

## 2020-08-23 RX ADMIN — MAGNESIUM OXIDE 400 MG (241.3 MG MAGNESIUM) TABLET 400 MG: TABLET at 03:08

## 2020-08-23 RX ADMIN — POTASSIUM CHLORIDE 40 MEQ: 1500 TABLET, EXTENDED RELEASE ORAL at 03:08

## 2020-08-23 NOTE — ED NOTES
"Spoke with Darin Hampton from lab and states "never received requisition". Re-ordered and Soke with Dr Redmond and ok to discharge patient  After oral medication given.   "

## 2020-08-23 NOTE — ED NOTES
Per laboratory their system was broken and add-on Mg was not completed. Requisition for add-on mag resent to lab. Wife updated on pending lab results and discharge plan.

## 2020-08-23 NOTE — ED PROVIDER NOTES
"SCRIBE #1 NOTE: I, Kristen Esteves, am scribing for, and in the presence of, Haroldo Redmond MD. I have scribed the entire note.      History      Chief Complaint   Patient presents with    COVID-19 Concerns     positive covid test one week ago; c/o fever, chest pain; SOB but states no more than usual       Review of patient's allergies indicates:   Allergen Reactions    Zithromax [azithromycin] Palpitations    Aspirin Other (See Comments)     Other reaction(s): Difficulty breathing    Lipitor [atorvastatin] Other (See Comments)     Leg cramps/hand cramps        HPI   HPI    8/23/2020, 10:10 AM   History obtained from the patient      History of Present Illness: Yung Mcconnell is a 65 y.o. male patient with a PMHx of Asthma, COPD, and Sleep apnea who presents to the Emergency Department with COVID-19 concerns. Patient tested COVID positive one week prior to today. He has complaints of fever, cough, "burning" chest pain (non-radiating), and SOB. Patient states there has been no significant improvement in his symptoms since testing positive. Symptoms are constant and moderate in severity.  No mitigating or exacerbating factors reported. Patient denies any nausea, vomiting, diarrhea, abdominal pain, palpitations, leg swelling, and all other sxs at this time. Prior Tx includes albuterol, with improvement in SOB. Additionally, patient reports his last dose of Tylenol x2 hours PTA, which has helped decrease his fever. No further complaints or concerns at this time.        Arrival mode: Personal vehicle     PCP: Prasanna Gaxiola MD       Past Medical History:  Past Medical History:   Diagnosis Date    Allergy     Asthma, chronic 7/9/2013    Colon polyps     COPD (chronic obstructive pulmonary disease)     GERD (gastroesophageal reflux disease)     High cholesterol     Osteoarthritis of both knees 7/9/2013    Sleep apnea        Past Surgical History:  Past Surgical History:   Procedure Laterality Date    " CHOLECYSTECTOMY      DIAGNOSTIC LAPAROSCOPY N/A 6/21/2018    Procedure: LAPAROSCOPY, DIAGNOSTIC;  Surgeon: Casper Martinez MD;  Location: [facility];  Service: General;  Laterality: N/A;    LUNG SURGERY Right     benign mass    UMBILICAL HERNIA REPAIR N/A 6/21/2018    Procedure: REPAIR, HERNIA, UMBILICAL, AGE 5 YEARS OR OLDER;  Surgeon: Casper Martinez MD;  Location: [facility] OR;  Service: General;  Laterality: N/A;         Family History:  Family History   Problem Relation Age of Onset    Hypertension Mother     Diabetes Mother     Cancer Mother         Breast Ca     Heart disease Father         CAD , CHF     Aneurysm Father     Colon polyps Father     Melanoma Neg Hx     Eczema Neg Hx     Lupus Neg Hx     Psoriasis Neg Hx        Social History:  Social History     Tobacco Use    Smoking status: Never Smoker    Smokeless tobacco: Never Used   Substance and Sexual Activity    Alcohol use: Yes     Comment: rare    Drug use: No    Sexual activity: Yes     Partners: Female     Birth control/protection: None       ROS   Review of Systems   Constitutional: Positive for fever.   HENT: Negative for sore throat.    Respiratory: Positive for cough and shortness of breath.    Cardiovascular: Positive for chest pain. Negative for palpitations and leg swelling.   Gastrointestinal: Negative for abdominal pain, diarrhea, nausea and vomiting.   Genitourinary: Negative for dysuria.   Musculoskeletal: Negative for back pain.   Skin: Negative for rash.   Neurological: Negative for weakness.   Hematological: Does not bruise/bleed easily.   All other systems reviewed and are negative.    Physical Exam      Initial Vitals [08/23/20 1004]   BP Pulse Resp Temp SpO2   127/62 77 (!) 22 99.7 °F (37.6 °C) (!) 94 %      MAP       --          Physical Exam  Nursing Notes and Vital Signs Reviewed.  Constitutional: Patient is in no acute distress. Well-developed and well-nourished.  Head: Atraumatic. Normocephalic.  Eyes: PERRL.  EOM intact. Conjunctivae are not pale. No scleral icterus.  ENT: Mucous membranes are moist.   Neck: Supple. Full ROM. No lymphadenopathy.  Cardiovascular: Regular rate. Regular rhythm. No murmurs, rubs, or gallops. Distal pulses are 2+ and symmetric.  Pulmonary/Chest: No respiratory distress. Clear to auscultation bilaterally. No wheezing or rales.  Abdominal: Soft and non-distended.  There is no tenderness.  Musculoskeletal: Moves all extremities. No obvious deformities. No edema.  Skin: Warm and dry.  Neurological:  Alert, awake, and appropriate.  Normal speech.  No acute focal neurological deficits are appreciated.  Psychiatric: Normal affect. Good eye contact. Appropriate in content.    ED Course    Procedures  ED Vital Signs:  Vitals:    08/23/20 1004 08/23/20 1021 08/23/20 1150 08/23/20 1202   BP: 127/62  121/63 120/63   Pulse: 77 77  61   Resp: (!) 22   20   Temp: 99.7 °F (37.6 °C)      TempSrc: Oral      SpO2: (!) 94%   97%   Weight: 112 kg (246 lb 14.6 oz)       08/23/20 1221 08/23/20 1242 08/23/20 1302   BP: 114/62 134/75 128/70   Pulse: (!) 58 (!) 57 (!) 59   Resp: (!) 26 (!) 26 (!) 22   Temp:      TempSrc:      SpO2: 98% 98% 97%   Weight:          Abnormal Lab Results:  Labs Reviewed   CBC W/ AUTO DIFFERENTIAL - Abnormal; Notable for the following components:       Result Value    Platelets 117 (*)     Immature Granulocytes 0.6 (*)     All other components within normal limits   COMPREHENSIVE METABOLIC PANEL - Abnormal; Notable for the following components:    Potassium 3.1 (*)     CO2 21 (*)     Albumin 3.3 (*)     AST 58 (*)     ALT 50 (*)     All other components within normal limits   HEPATITIS C ANTIBODY   TROPONIN I   B-TYPE NATRIURETIC PEPTIDE   MAGNESIUM        All Lab Results:  Results for orders placed or performed during the hospital encounter of 08/23/20   Hepatitis C antibody   Result Value Ref Range    Hepatitis C Ab Negative Negative   CBC auto differential   Result Value Ref Range    WBC  4.71 3.90 - 12.70 K/uL    RBC 4.61 4.60 - 6.20 M/uL    Hemoglobin 14.2 14.0 - 18.0 g/dL    Hematocrit 40.1 40.0 - 54.0 %    Mean Corpuscular Volume 87 82 - 98 fL    Mean Corpuscular Hemoglobin 30.8 27.0 - 31.0 pg    Mean Corpuscular Hemoglobin Conc 35.4 32.0 - 36.0 g/dL    RDW 14.1 11.5 - 14.5 %    Platelets 117 (L) 150 - 350 K/uL    MPV 11.3 9.2 - 12.9 fL    Immature Granulocytes 0.6 (H) 0.0 - 0.5 %    Gran # (ANC) 3.1 1.8 - 7.7 K/uL    Immature Grans (Abs) 0.03 0.00 - 0.04 K/uL    Lymph # 1.3 1.0 - 4.8 K/uL    Mono # 0.3 0.3 - 1.0 K/uL    Eos # 0.0 0.0 - 0.5 K/uL    Baso # 0.02 0.00 - 0.20 K/uL    nRBC 0 0 /100 WBC    Gran% 65.9 38.0 - 73.0 %    Lymph% 27.6 18.0 - 48.0 %    Mono% 5.5 4.0 - 15.0 %    Eosinophil% 0.0 0.0 - 8.0 %    Basophil% 0.4 0.0 - 1.9 %    Differential Method Automated    Comprehensive metabolic panel   Result Value Ref Range    Sodium 137 136 - 145 mmol/L    Potassium 3.1 (L) 3.5 - 5.1 mmol/L    Chloride 104 95 - 110 mmol/L    CO2 21 (L) 23 - 29 mmol/L    Glucose 98 70 - 110 mg/dL    BUN, Bld 8 8 - 23 mg/dL    Creatinine 0.8 0.5 - 1.4 mg/dL    Calcium 8.8 8.7 - 10.5 mg/dL    Total Protein 6.8 6.0 - 8.4 g/dL    Albumin 3.3 (L) 3.5 - 5.2 g/dL    Total Bilirubin 0.7 0.1 - 1.0 mg/dL    Alkaline Phosphatase 66 55 - 135 U/L    AST 58 (H) 10 - 40 U/L    ALT 50 (H) 10 - 44 U/L    Anion Gap 12 8 - 16 mmol/L    eGFR if African American >60 >60 mL/min/1.73 m^2    eGFR if non African American >60 >60 mL/min/1.73 m^2   Troponin I   Result Value Ref Range    Troponin I 0.007 0.000 - 0.026 ng/mL   Brain natriuretic peptide   Result Value Ref Range    BNP 57 0 - 99 pg/mL         Imaging Results:  Imaging Results          X-Ray Chest 1 View (Final result)  Result time 08/23/20 10:55:21    Final result by Hussain Whitfield MD (08/23/20 10:55:21)                 Impression:      No acute process seen.      Electronically signed by: Hussain Whitfield MD  Date:    08/23/2020  Time:    10:55             Narrative:     EXAMINATION:  XR CHEST 1 VIEW    CLINICAL HISTORY:  Chest pain, unspecified    FINDINGS:  Single view of the chest.  Comparison 02/26/2020.    Cardiac silhouette is enlarged but stable.  The lungs demonstrate no evidence of active disease.  Scarring or atelectasis left lower lobe.  No evidence of pleural effusion or pneumothorax.  Right apical pleural thickening noted.  Bones appear intact.                                        The Emergency Provider reviewed the vital signs and test results, which are outlined above.    ED Discussion     12:27 PM: Reassessed pt at this time.  Pt states his condition has improved at this time. Discussed with pt all pertinent ED information and results. Discussed pt dx and plan of tx. Gave pt all f/u and return to the ED instructions. All questions and concerns were addressed at this time. Pt expresses understanding of information and instructions, and is comfortable with plan to discharge. Pt is stable for discharge.    I discussed with patient and/or family/caretaker that evaluation in the ED does not suggest any emergent or life threatening medical conditions requiring immediate intervention beyond what was provided in the ED, and I believe patient is safe for discharge.  Regardless, an unremarkable evaluation in the ED does not preclude the development or presence of a serious of life threatening condition. As such, patient was instructed to return immediately for any worsening or change in current symptoms.      ED Course as of Aug 23 1515   Sun Aug 23, 2020   1008 EKG interpretation:  Normal sinus rhythm with a rate of 76 beats per minute.  Normal axis and intervals.  No ST segment or T-wave abnormalities.    [MF]      ED Course User Index  [MF] Haroldo Redmond MD       ED Medication(s):  Medications   potassium chloride SA CR tablet 40 mEq (has no administration in time range)   magnesium oxide tablet 400 mg (has no administration in time range)     Current Discharge  Medication List   None         Follow-up Information     Prasanna Gaxiola MD. Schedule an appointment as soon as possible for a visit in 1 week.    Specialty: Family Medicine  Contact information:  [address]  [facility] [id number] [phone]             Go to  [facility] - .    Specialty: Emergency Medicine  Why: If symptoms worsen  Contact information:  [address]  [facility] [address] [phone]                   Medical Decision Making    Medical Decision Making:   Clinical Tests:   Lab Tests: Reviewed and Ordered  Radiological Study: Ordered and Reviewed  Medical Tests: Reviewed and Ordered           Scribe Attestation:   Scribe #1: I performed the above scribed service and the documentation accurately describes the services I performed. I attest to the accuracy of the note.    Attending:   Physician Attestation Statement for Scribe #1: I, Haroldo Redmond MD, personally performed the services described in this documentation, as scribed by Kristen Esteves, in my presence, and it is both accurate and complete.           Clinical Impression       ICD-10-CM ICD-9-CM   1. COVID-19 virus infection  U07.1    2. SOB (shortness of breath)  R06.02 786.05   3. Chest pain  R07.9 786.50   4. Hypokalemia  E87.6 276.8       Disposition:   Disposition: Discharged  Condition: Stable         Haroldo Redmond MD  08/23/20 2661

## 2020-08-23 NOTE — ED NOTES
"Pt denies worsening symptoms since his dx of COVID. He states he mainly just came in because "his wife was worried about him". He states he has been sick for 11 days. Pt educated on varying time lengths of illness associated with COVID.   "

## 2020-08-24 ENCOUNTER — TELEPHONE (OUTPATIENT)
Dept: FAMILY MEDICINE | Facility: CLINIC | Age: 65
End: 2020-08-24

## 2020-08-24 ENCOUNTER — TELEPHONE (OUTPATIENT)
Dept: PULMONOLOGY | Facility: CLINIC | Age: 65
End: 2020-08-24

## 2020-08-31 ENCOUNTER — OFFICE VISIT (OUTPATIENT)
Dept: FAMILY MEDICINE | Facility: CLINIC | Age: 65
End: 2020-08-31
Payer: MEDICARE

## 2020-08-31 DIAGNOSIS — U07.1 COVID-19 VIRUS INFECTION: Primary | ICD-10-CM

## 2020-08-31 DIAGNOSIS — J44.89 ASTHMA WITH COPD: ICD-10-CM

## 2020-08-31 DIAGNOSIS — G47.33 OSA (OBSTRUCTIVE SLEEP APNEA): ICD-10-CM

## 2020-08-31 PROCEDURE — 99213 OFFICE O/P EST LOW 20 MIN: CPT | Mod: 95,,, | Performed by: FAMILY MEDICINE

## 2020-08-31 PROCEDURE — 99213 PR OFFICE/OUTPT VISIT, EST, LEVL III, 20-29 MIN: ICD-10-PCS | Mod: 95,,, | Performed by: FAMILY MEDICINE

## 2020-08-31 NOTE — PROGRESS NOTES
Subjective:       Patient ID: Yung Mcconnell is a 65 y.o. male.    Chief Complaint: No chief complaint on file.      HPI Comments:       Current Outpatient Medications:     albuterol (PROVENTIL) 2.5 mg /3 mL (0.083 %) nebulizer solution, Take 3 mLs (2.5 mg total) by nebulization every 6 (six) hours as needed for Wheezing. Rescue, Disp: 1 Box, Rfl: 2    albuterol (PROVENTIL/VENTOLIN HFA) 90 mcg/actuation inhaler, Inhale 2 puffs into the lungs every 4 (four) hours as needed. Every 4-6 hours, Disp: 3 Inhaler, Rfl: 11    budesonide-formoterol 160-4.5 mcg (SYMBICORT) 160-4.5 mcg/actuation HFAA, Inhale 2 puffs into the lungs every 12 (twelve) hours. Wash out mouth after using, Disp: 3 Inhaler, Rfl: 3    cetirizine (ZYRTEC) 10 MG tablet, Take 10 mg by mouth once daily., Disp: , Rfl:     cyclobenzaprine (FLEXERIL) 5 MG tablet, 1 Tablet Oral Three times a day as needed., Disp: 20 tablet, Rfl: 0    diclofenac 1.3 % PT12, Apply 1 patch topically every 12 (twelve) hours as needed., Disp: 30 patch, Rfl: 0    fluticasone propionate (FLONASE) 50 mcg/actuation nasal spray, 2 sprays (100 mcg total) by Each Nostril route once daily., Disp: 1 Bottle, Rfl: 11    GLUCOSAMINE HCL/CHONDR ALFONSO A NA (OSTEO BI-FLEX ORAL), Take 1 tablet by mouth once daily. , Disp: , Rfl:     HYDROcodone-acetaminophen (NORCO) 5-325 mg per tablet, Take 1 tablet by mouth every 8 (eight) hours as needed. (Patient not taking: Reported on 6/12/2020), Disp: 10 tablet, Rfl: 0    levoFLOXacin (LEVAQUIN) 500 MG tablet, Take 1 tablet (500 mg total) by mouth once daily., Disp: 7 tablet, Rfl: 0    metroNIDAZOLE (METROGEL) 0.75 % gel, Apply topically 2 (two) times daily., Disp: 45 g, Rfl: 2    multivitamin (MULTIPLE VITAMIN) per tablet, 1 Tablet Oral Every day, Disp: , Rfl:     nozaseptin (NOZIN) nasal , 1 each by Each Nare route 2 (two) times daily. (Patient not taking: Reported on 6/12/2020), Disp: 1 applicator, Rfl: 0    predniSONE (DELTASONE) 20  MG tablet, Prednisone 60 mg/ day for 3 days, 40 mg/day for 3 days,20 mg/ day for 3 days, (1/2 tablet )10 mg a day for 3 days., Disp: 20 tablet, Rfl: 1    tadalafil (CIALIS) 20 MG Tab, Take 1 tablet (20 mg total) by mouth daily as needed., Disp: 24 tablet, Rfl: 3        The patient location is:  Home  The chief complaint leading to consultation is:  Back to work after COVID    Visit type:  Tele audiovisual    Face to Face time with patient:  Approximately 15 min        Each patient to whom he or she provides medical services by telemedicine is:  (1) informed of the relationship between the physician and patient and the respective role of any other health care provider with respect to management of the patient; and (2) notified that he or she may decline to receive medical services by telemedicine and may withdraw from such care at any time.    Notes:     Positive COVID test on 8/16 at [facility].  Came moderately symptomatic throughout this.  And has been using albuterol.  Went to the ER 5 days after (8/23) our last virtual visit on 08/18.  Had shortness of breath and chest pain.  Workup was not negative except for hypokalemia.  He was discharged in stable condition.  Since that time he has gradually improved and has now been asymptomatic for 5 days.  He is back to his baseline use of albuterol which is twice a day for asthma.  Also back on his Symbicort.  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 Feels very well    Review of Systems   Constitutional: Positive for activity change. Negative for unexpected weight change.   HENT: Negative for hearing loss, rhinorrhea and trouble swallowing.    Eyes: Negative for discharge and visual disturbance.   Respiratory: Negative for chest tightness and wheezing.    Cardiovascular: Negative for chest pain and palpitations.   Gastrointestinal: Negative for blood in stool, constipation, diarrhea and vomiting.    Endocrine: Negative for polydipsia and polyuria.   Genitourinary: Negative for difficulty urinating, hematuria and urgency.   Musculoskeletal: Negative for arthralgias, joint swelling and neck pain.   Neurological: Negative for weakness and headaches.   Psychiatric/Behavioral: Negative for confusion and dysphoric mood.       Objective:      There were no vitals filed for this visit.  Physical Exam  Constitutional:       Appearance: Normal appearance. He is not ill-appearing.   HENT:      Head: Normocephalic.   Neck:      Musculoskeletal: Neck supple.   Pulmonary:      Effort: Pulmonary effort is normal. No respiratory distress.   Neurological:      Mental Status: He is oriented to person, place, and time.   Psychiatric:         Mood and Affect: Mood normal.         Behavior: Behavior normal.         Thought Content: Thought content normal.         Judgment: Judgment normal.         Assessment:       1. COVID-19 virus infection    2. Asthma with COPD    3. KEVEN (obstructive sleep apnea)        Plan:   COVID-19 virus infection  Comments:  Appears to have a full recovery. Back to work okay    Asthma with COPD  Comments:  Continue Symbicort and albuterol p.r.n.    KEVEN (obstructive sleep apnea)  Comments:  Regular user

## 2020-09-08 ENCOUNTER — TELEPHONE (OUTPATIENT)
Dept: ENDOSCOPY | Facility: HOSPITAL | Age: 65
End: 2020-09-08

## 2020-09-08 DIAGNOSIS — Z13.9 SCREENING PROCEDURE: ICD-10-CM

## 2020-09-08 RX ORDER — SODIUM, POTASSIUM,MAG SULFATES 17.5-3.13G
1 SOLUTION, RECONSTITUTED, ORAL ORAL DAILY
Qty: 1 KIT | Refills: 0 | Status: SHIPPED | OUTPATIENT
Start: 2020-09-08 | End: 2020-09-10

## 2020-09-08 NOTE — TELEPHONE ENCOUNTER
Pt's wife tested positive on 08-11-20. Pt tested postive on 08- after OV with MARTINE Gaxiola.Pt has been quarantined for 21 days.  Pt has been release to go back to work. Pt aware of testing prior to procedure. dw

## 2020-09-08 NOTE — TELEPHONE ENCOUNTER
Location Screening:    If answers yes to any of the following, schedule at [place] ONLY. If No, OK for either location.    1. Is there a diagnosis of heart failure, severe heart valve disease (aortic stenosis) or mechanical valve? no  a. Is the Left Ventricle Ejection Fraction <30% ? no    2. Does the pt have pulmonary hypertension? no   a. Is pulmonary arterial pressure gradient >50mmHg? no   b. Is there evidence of right ventricular dysfunction? no    3. Does the pt have achalasia? no    4. Any history of negative reaction to anesthesia? no   a. Myasthenia gravis? no   b. Malignant hyperthermia? no   c. Other? no    5. Is procedure for esophageal banding? no      COVID Screening    1. Have you had a fever in the last 7 days or have you used fever reducing medicines for a fever in the last 7 days?  no    2. Are you experiencing shortness of breath, cough, muscle aches, loss of taste or loss of smell?  Yes  If answered yes to questions 1 and 2, the patient must seek medical attention with their PCP.  Do not schedule their procedure.     3. Are you residing with anyone who has tested positive for Covid?  yes    If answered yes to question 3, recommend 14 day self-quarantine from the date of relative's positive test and place special needs note in the depot.  Wait to schedule.     ENDO screening    1. Have you been admitted for cardiac, kidney or pulmonary causes to the hospital in the past 3 months? no   If yes, schedule an appointment with PCP before scheduling endoscopic procedure.     2. Have you had a stent placed in the last 12 months? no   If yes, for a screening visit, cancel and message the ordering provider.  The patient will need a new order when the time is appropriate.     3. Have you had a stroke or heart attack in the past 6 months? no   If yes, cancel and refer patient to ordering provider for clearance, also message ordering provider to inform.     4. Have you had any chest pain in the past 3 months?  "no   If yes, Have you been evaluated by your PCP and/or cardiologist and it was determined to not be heart related? no   If No, Pt needs to be seen by PCP or Cardiologist .  Pt can be scheduled once clearance obtained by either of those providers.     5. Do you take prescription weight loss medications?  no   If yes, must stop for 2 weeks prior to procedure.     6. Have you been diagnosed with diverticulitis within the past 3 months? no   If yes, must have been seen by GI within the last 3 months, if not schedule with [facility].    If pt has been seen by GI, schedule procedure 8-12 weeks post antibiotic treatment.     7. Are you on Dialysis? no  If yes, schedule procedure for the day AFTER dialysis.  Appt time should be 9am or later, patient arrival time is 2 hours prior.  Nulytely or miralax prep for all patients with kidney disease.     8. Are you diabetic?  no   If yes, schedule morning appt. Advise pt to hold all diabetic meds day of procedure.     9. If pt is older than 80 years of age and HAS NOT been seen by GI or PCP within the last 6 months, needs appt with [facility].   If pt has been seen by the GI provider or PCP within the past 6  months AND meets criteria, schedule procedure AND send message to the endoscopist.     10. Is patient on a "high risk" medication (blood thinner/antiplatelet agent)?  no   If yes, has cardiac clearance been obtained within the last 60 days? N/A   If no, a new clearance needs to be obtained.     Final Questions:    1.I have reviewed the last colonoscopy for recommendations regarding next procedure bowel prep.  yes  2. I have reviewed medications and allergies.  yes  3. I have verified the pharmacy information and appropriate prep sent if needed. yes  4. Prep instructions have been mailed or sent to portal per patient request. yes        All schedulers will have ability to reach out to the ordering GI provider to clarify any issues.       "

## 2020-09-27 ENCOUNTER — LAB VISIT (OUTPATIENT)
Dept: OTOLARYNGOLOGY | Facility: CLINIC | Age: 65
End: 2020-09-27
Payer: MEDICARE

## 2020-09-27 DIAGNOSIS — J45.909 CHRONIC ASTHMA WITHOUT COMPLICATION, UNSPECIFIED ASTHMA SEVERITY, UNSPECIFIED WHETHER PERSISTENT: ICD-10-CM

## 2020-09-27 LAB — SARS-COV-2 RNA RESP QL NAA+PROBE: NOT DETECTED

## 2020-09-27 PROCEDURE — U0003 INFECTIOUS AGENT DETECTION BY NUCLEIC ACID (DNA OR RNA); SEVERE ACUTE RESPIRATORY SYNDROME CORONAVIRUS 2 (SARS-COV-2) (CORONAVIRUS DISEASE [COVID-19]), AMPLIFIED PROBE TECHNIQUE, MAKING USE OF HIGH THROUGHPUT TECHNOLOGIES AS DESCRIBED BY CMS-2020-01-R: HCPCS

## 2020-09-30 ENCOUNTER — OFFICE VISIT (OUTPATIENT)
Dept: PULMONOLOGY | Facility: CLINIC | Age: 65
End: 2020-09-30
Payer: MEDICARE

## 2020-09-30 ENCOUNTER — CLINICAL SUPPORT (OUTPATIENT)
Dept: PULMONOLOGY | Facility: CLINIC | Age: 65
End: 2020-09-30
Payer: MEDICARE

## 2020-09-30 ENCOUNTER — HOSPITAL ENCOUNTER (OUTPATIENT)
Dept: RADIOLOGY | Facility: HOSPITAL | Age: 65
Discharge: HOME | End: 2020-09-30
Attending: INTERNAL MEDICINE
Payer: MEDICARE

## 2020-09-30 VITALS
OXYGEN SATURATION: 96 % | HEIGHT: 72 IN | HEART RATE: 64 BPM | SYSTOLIC BLOOD PRESSURE: 116 MMHG | WEIGHT: 244.69 LBS | DIASTOLIC BLOOD PRESSURE: 74 MMHG | BODY MASS INDEX: 33.14 KG/M2 | RESPIRATION RATE: 17 BRPM

## 2020-09-30 DIAGNOSIS — J01.90 ACUTE NON-RECURRENT SINUSITIS, UNSPECIFIED LOCATION: ICD-10-CM

## 2020-09-30 DIAGNOSIS — J45.20 MILD INTERMITTENT ASTHMA, UNCOMPLICATED: ICD-10-CM

## 2020-09-30 DIAGNOSIS — J44.89 ASTHMA WITH COPD: Primary | ICD-10-CM

## 2020-09-30 DIAGNOSIS — G47.33 OSA ON CPAP: ICD-10-CM

## 2020-09-30 DIAGNOSIS — J45.40 ASTHMA, CHRONIC, MODERATE PERSISTENT, UNCOMPLICATED: ICD-10-CM

## 2020-09-30 DIAGNOSIS — J45.909 CHRONIC ASTHMA WITHOUT COMPLICATION, UNSPECIFIED ASTHMA SEVERITY, UNSPECIFIED WHETHER PERSISTENT: ICD-10-CM

## 2020-09-30 DIAGNOSIS — J45.41 MODERATE PERSISTENT ASTHMA WITH EXACERBATION: ICD-10-CM

## 2020-09-30 DIAGNOSIS — G47.33 OSA (OBSTRUCTIVE SLEEP APNEA): ICD-10-CM

## 2020-09-30 DIAGNOSIS — R68.89 FLU-LIKE SYMPTOMS: ICD-10-CM

## 2020-09-30 LAB
BRPFT: NORMAL
FEF 25 75 LLN: 1.23
FEF 25 75 PRE REF: 73.8 %
FEF 25 75 REF: 2.72
FEV1 FVC LLN: 63
FEV1 FVC PRE REF: 90.2 %
FEV1 FVC REF: 76
FEV1 LLN: 2.59
FEV1 PRE REF: 89.9 %
FEV1 REF: 3.54
FVC LLN: 3.5
FVC PRE REF: 99.2 %
FVC REF: 4.68
PEF LLN: 6.66
PEF PRE REF: 76.7 %
PEF REF: 9.11
PRE FEF 25 75: 2.01 L/S (ref 1.23–4.21)
PRE FET 100: 7.72 SEC
PRE FEV1 FVC: 68.58 % (ref 63.13–88.97)
PRE FEV1: 3.18 L (ref 2.59–4.49)
PRE FVC: 4.64 L (ref 3.5–5.85)
PRE PEF: 6.99 L/S (ref 6.66–11.57)

## 2020-09-30 PROCEDURE — 99999 PR PBB SHADOW E&M-EST. PATIENT-LVL IV: CPT | Mod: PBBFAC,,, | Performed by: INTERNAL MEDICINE

## 2020-09-30 PROCEDURE — 99214 OFFICE O/P EST MOD 30 MIN: CPT | Mod: S$PBB,,, | Performed by: INTERNAL MEDICINE

## 2020-09-30 PROCEDURE — 90694 VACC AIIV4 NO PRSRV 0.5ML IM: CPT | Mod: PBBFAC

## 2020-09-30 PROCEDURE — 71046 X-RAY EXAM CHEST 2 VIEWS: CPT | Mod: TC

## 2020-09-30 PROCEDURE — 71046 X-RAY EXAM CHEST 2 VIEWS: CPT | Mod: 26,,, | Performed by: RADIOLOGY

## 2020-09-30 PROCEDURE — 94010 BREATHING CAPACITY TEST: ICD-10-PCS | Mod: 26,S$PBB,, | Performed by: INTERNAL MEDICINE

## 2020-09-30 PROCEDURE — G0008 ADMIN INFLUENZA VIRUS VAC: HCPCS | Mod: PBBFAC

## 2020-09-30 PROCEDURE — 71046 XR CHEST PA AND LATERAL: ICD-10-PCS | Mod: 26,,, | Performed by: RADIOLOGY

## 2020-09-30 PROCEDURE — 94010 BREATHING CAPACITY TEST: CPT | Mod: 26,S$PBB,, | Performed by: INTERNAL MEDICINE

## 2020-09-30 PROCEDURE — 94010 BREATHING CAPACITY TEST: CPT | Mod: PBBFAC

## 2020-09-30 PROCEDURE — 99214 PR OFFICE/OUTPT VISIT, EST, LEVL IV, 30-39 MIN: ICD-10-PCS | Mod: S$PBB,,, | Performed by: INTERNAL MEDICINE

## 2020-09-30 PROCEDURE — 99999 PR PBB SHADOW E&M-EST. PATIENT-LVL IV: ICD-10-PCS | Mod: PBBFAC,,, | Performed by: INTERNAL MEDICINE

## 2020-09-30 PROCEDURE — 99214 OFFICE O/P EST MOD 30 MIN: CPT | Mod: PBBFAC,25 | Performed by: INTERNAL MEDICINE

## 2020-09-30 RX ORDER — ALBUTEROL SULFATE 90 UG/1
2 AEROSOL, METERED RESPIRATORY (INHALATION) EVERY 4 HOURS PRN
Qty: 54 G | Refills: 3 | Status: SHIPPED | OUTPATIENT
Start: 2020-09-30 | End: 2021-03-05 | Stop reason: SDUPTHER

## 2020-09-30 RX ORDER — FLUTICASONE PROPIONATE 50 MCG
2 SPRAY, SUSPENSION (ML) NASAL DAILY
Qty: 48 G | Refills: 3 | Status: SHIPPED | OUTPATIENT
Start: 2020-09-30 | End: 2021-08-25 | Stop reason: SDUPTHER

## 2020-09-30 RX ORDER — ALBUTEROL SULFATE 0.83 MG/ML
2.5 SOLUTION RESPIRATORY (INHALATION) EVERY 6 HOURS PRN
Qty: 360 ML | Refills: 11 | Status: SHIPPED | OUTPATIENT
Start: 2020-09-30 | End: 2021-03-08 | Stop reason: SDUPTHER

## 2020-09-30 RX ORDER — BUDESONIDE AND FORMOTEROL FUMARATE DIHYDRATE 160; 4.5 UG/1; UG/1
2 AEROSOL RESPIRATORY (INHALATION) EVERY 12 HOURS
Qty: 3 INHALER | Refills: 3 | Status: SHIPPED | OUTPATIENT
Start: 2020-09-30 | End: 2021-08-25 | Stop reason: SDUPTHER

## 2020-09-30 NOTE — PROGRESS NOTES
Subjective:       Patient ID: Yung Mcconnell is a 65 y.o. male.    Chief Complaint: He       Sleep Apnea    HPI   Hx of COVID 19 positive August 16 and mid September  Negative test last week  Lost over 50 lbs over the past 6 months    Asthma Follow-up  The patient has previously been evaluated here for asthma and presents for an asthma follow-up. The patient is not currently having symptoms / an exacerbation. Current symptoms include dyspnea, non-productive cough and wheezing. Symptoms have been present since several weeks ago and have been gradually worsening. He denies chest pain and chest tightness. Associated symptoms include poor exercise tolerance and shortness of breath.  This episode appears to have been triggered by pollens. Treatments tried for the current exacerbation include inhaled corticosteroids and short-acting inhaled beta-adrenergic agonists, which have provided some relief of symptoms. The patient has been having similar episodes for approximately 10 years.    Current Disease Severity  The patient is having daytime symptoms throughout the day. The patient is having daytime symptoms more than once per week, but not nightly. The patient is using short-acting beta agonists for symptom control daily. He has exacerbations requiring oral systemic corticosteroids 2 times per year. Current limitations in activity from asthma: none. Number of days of school or work missed in the last month: not applicable. Number of urgent/emergent visit in last year: 1.  The patient is not using a spacer with MDIs. His best peak flow rate is na. He is not monitoring peak flow rates at home.    Obstructive Sleep Apnea:    Obstructive Sleep Apnea: Continuous Positive Airway Pressure complianceContinuous Positive Airway Pressure Usage 08/07/2018 - 11/04/2018  Usage days 90/90 days (100%)  >= 4 hours 90 days (100%)  < 4 hours 0 days (0%)    Average usage (total days) 7 hours 17 minutes  Average usage (days used) 7 hours 17  minutes  Median usage (days used) 6 hours 52 minutes  AirSense 10 AutoSet  Serial number [id number]  Mode CPAP  Set pressure 11 cmH2O  EPR Ramp Only  EPR level 3  Therapy  Leaks - L/min Median: 0.0 95th percentile: 4.9 Maximum: 12.6  Events per hour AI: 1.5 HI: 0.7 AHI: 2.2  Apnea Index Central: 0.6 Obstructive: 0.9 Unknown: 0.0  Cheyne-Haley respiration (average duration per night) 0 minutes (0%)     Patient is using CPAP as prescribed and benefiting from therapy.     Patient has complaints of Needs supplies    Past Medical History:   Diagnosis Date    Allergy     Asthma, chronic 7/9/2013    Colon polyps     COPD (chronic obstructive pulmonary disease)     GERD (gastroesophageal reflux disease)     High cholesterol     Osteoarthritis of both knees 7/9/2013    Sleep apnea      Past Surgical History:   Procedure Laterality Date    CHOLECYSTECTOMY      DIAGNOSTIC LAPAROSCOPY N/A 6/21/2018    Procedure: LAPAROSCOPY, DIAGNOSTIC;  Surgeon: Casper Martinez MD;  Location: [facility] OR;  Service: General;  Laterality: N/A;    LUNG SURGERY Right     benign mass    UMBILICAL HERNIA REPAIR N/A 6/21/2018    Procedure: REPAIR, HERNIA, UMBILICAL, AGE 5 YEARS OR OLDER;  Surgeon: Casper Martinez MD;  Location: [facility] OR;  Service: General;  Laterality: N/A;     Social History     Socioeconomic History    Marital status:      Spouse name: Not on file    Number of children: Not on file    Years of education: Not on file    Highest education level: Not on file   Occupational History     Employer: [employer]   Social Needs    Financial resource strain: Not on file    Food insecurity     Worry: Not on file     Inability: Not on file    Transportation needs     Medical: Not on file     Non-medical: Not on file   Tobacco Use    Smoking status: Never Smoker    Smokeless tobacco: Never Used   Substance and Sexual Activity    Alcohol use: Yes     Comment: rare    Drug use: No    Sexual activity:  Yes     Partners: Female     Birth control/protection: None   Lifestyle    Physical activity     Days per week: Not on file     Minutes per session: Not on file    Stress: Not on file   Relationships    Social connections     Talks on phone: Not on file     Gets together: Not on file     Attends Judaism service: Not on file     Active member of club or organization: Not on file     Attends meetings of clubs or organizations: Not on file     Relationship status: Not on file   Other Topics Concern    Not on file   Social History Narrative    Not on file     Review of Systems   Constitutional: Positive for fatigue. Negative for fever.   HENT: Positive for postnasal drip, rhinorrhea and congestion.    Eyes: Negative for redness and itching.   Respiratory: Positive for apnea, cough, sputum production, shortness of breath, dyspnea on extertion, use of rescue inhaler and Paroxysmal Nocturnal Dyspnea.    Cardiovascular: Negative for chest pain, palpitations and leg swelling.   Genitourinary: Negative for difficulty urinating and hematuria.   Endocrine: Negative for cold intolerance and heat intolerance.    Skin: Negative for rash.   Gastrointestinal: Negative for nausea and abdominal pain.   Neurological: Negative for dizziness, syncope, weakness and light-headedness.   Hematological: Negative for adenopathy. Does not bruise/bleed easily.   Psychiatric/Behavioral: Positive for sleep disturbance. The patient is not nervous/anxious.        Objective:      /74   Pulse 64   Resp 17   Ht 6' (1.829 m)   Wt 111 kg (244 lb 11.4 oz)   SpO2 96%   BMI 33.19 kg/m²   Physical Exam  Vitals signs and nursing note reviewed.   Constitutional:       Appearance: He is well-developed.   HENT:      Head: Normocephalic and atraumatic.      Mouth/Throat:      Pharynx: Oropharyngeal exudate present.   Eyes:      Conjunctiva/sclera: Conjunctivae normal.      Pupils: Pupils are equal, round, and reactive to light.   Neck:       Musculoskeletal: Neck supple.      Thyroid: No thyromegaly.      Vascular: No JVD.      Trachea: No tracheal deviation.   Cardiovascular:      Rate and Rhythm: Normal rate and regular rhythm.      Heart sounds: Normal heart sounds. No murmur.   Pulmonary:      Effort: Pulmonary effort is normal.      Breath sounds: Examination of the right-lower field reveals wheezing. Examination of the left-lower field reveals wheezing. Decreased breath sounds and wheezing present. No rhonchi or rales.   Abdominal:      General: Bowel sounds are normal.      Palpations: Abdomen is soft.   Musculoskeletal: Normal range of motion.         General: No tenderness.   Lymphadenopathy:      Cervical: No cervical adenopathy.   Skin:     General: Skin is warm and dry.   Neurological:      Mental Status: He is alert and oriented to person, place, and time.       Personal Diagnostic Review  Chest x-ray: hyperinflation    X-Ray Chest PA And Lateral  Narrative: EXAMINATION:  XR CHEST PA AND LATERAL    CLINICAL HISTORY:  SOB; Moderate persistent asthma with (acute) exacerbation    TECHNIQUE:  PA and lateral views of the chest were performed.    COMPARISON:  08/23/2020.  02/26/2020.    FINDINGS:  Bibasilar scarring and/or atelectasis with scarring/ossification at the right lung apex, appearance not significantly changed since 02/26/2020.  No focal consolidation.  No effusion.  Aortic atherosclerosis.  Cardiomediastinal silhouette and osseous structures are stable in appearance.  Impression: No acute findings.  Stable chest.    Electronically signed by: Mo Tripp MD  Date:    09/30/2020  Time:    10:09      Office Spirometry Results:     No flowsheet data found.  Pulmonary Studies Review 9/30/2020   SpO2 96   Height 72   Weight 3915.37   BMI (Calculated) 33.2   Predicted Distance 335.77   Predicted Distance Meters (Calculated) 543.85   [facility], [facility]  Sleep Disorder Evaluation  Patient Name: Yung  Jayesh Date of Report: 11 Date of PS2011  [facility] No.: [id number] : 1955 Time of PSG: 10:28:40 PM - 05:01:40 AM  Sex: M Age: 55 Weight: 246 lbs Height: 6 2 Type of PSG: Diagnostic  REASONS FOR REFERRAL: Mr. Mcconnell is a 55 year old male, referred for diagnostic polysomnography by Dr. Rafal Lozano, for evaluation of possible obstructive sleep  apnea / hypopnea syndrome (OSAHS), based on the patient s reported loud snoring and observed respiratory pauses in sleep, and daytime somnolence. His Aroda  Sleepiness scale was abnormal ( > 11). Dr. Rubén Kauffman is the patient s primary care physician.  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  1. The diagnostic polysomnography revealed a mild obstructive sleep apnea / hypopnea syndrome (A + H Index = 13.2 events / hr asleep; 7.3 arousals / hr asleep), with a mean  SaO2 during events = 89.0 %, significant; lowest SaO2 during events = 80 %, waking baseline SaO2 = 98 %. Sporadic, loud snoring was noted. A CPAP titration  polysomnography is recommended.  2. As respiratory events had a strong supine positional tendency, a device to discourage sleep in the supine position is recommended to reduce respiratory events and snoring.  3. Weight loss to the normal range is recommended as it can decrease respiratory events and snoring in overweight patients.  4. The following changes in sleep hygiene / sleep - related behavior are recommended after medical treatments are successful:  ? Set time for sleep to number of hours of sleep needed for adequate daytime functioning (7.5 to 9.0 hrs / night).  ? Regular bedtimes and wake times, including weekends: Total sleep time /  night should not be more than one hour more  than usual, and bedtime or wake time should not be more than one hour earlier or later than usual.  ? Do not attempt to make up lost sleep by extending sleep periods.  ? Avoid naps; none longer than 20 min or later than mid - afternoon.  SECONDARY TREATMENT RECOMMENDATIONS Treat, or refer to SDC if problems are not satisfactorily resolved by the above.  1. A borderline PLM disorder was observed (PLMS Index = 7.3 / hr sleep), but the PLMS were not at all disruptive of sleep (only 0.7 arousals / hr asleep)? however, as there  was SDI evidence of restless legs syndrome (which can be treated with the same medications as PLMS), consider treatment of PLM disorder and restless legs syndrome if RLS is  confirmed. Note that the benzodiazepine medications sometimes used to treat PLM disorder (e.g., clonazepam) may exacerbate some sleep - related respiratory  disorders, and that dopaminergic medications such as Mirapex and Requip are used in such instances.  2. Follow - up inquiry regarding frequency, duration and nature of reported sleep loss (possible role of OSAHS; r/o voluntary sleep restriction).  See below for a complete interpretation of data from the polysomnography and Sleep Disorders Inventory.  Thank you for referring this patient to the [facility].  Marin Ruby, Ph.D., ABPP; Diplomate, American Board of Sleep Medicine  INTERPRETATION OF DATA FROM POLYSOMNOGRAPHY AND SLEEP DISORDERS INVENTORY  NOTE: The polysomnography electrophysiological record for this patient has been reviewed in its entirety by Dr. Ruby.  SLEEP QUANTITY: Sufficient time asleep for diagnostic evaluation (5.6 hrs). Good sleep efficiency (85.4 %), with no difficulty falling asleep (29.5 min) or maintaining sleep  (awake 8.3 % of time after sleep onset).  ? There was no evidence of hypersomnolence (8.5 hrs sleep prior night).  ? The normal sleep onset and sleep maintenance are  consistent with Mr. Mcconnell s Sleep Disorders Inventory (SDI), where he  reports having no significant difficulty falling asleep or staying asleep at home.  SLEEP QUALITY: Nonrestorative sleep due to poor sleep depth, but with good sleep continuity.  ? Sleep Continuity: Sleep Depth: High 13.5 % Stage 1 NREM sleep, normal 18.3 % slow wave sleep.  ? Normal 13.1 transient arousals / hr asleep, 29.0 sleep stage changes / hr asleep, 1.5 awakenings / hr  RAPID EYE MOVEMENT SLEEP: Normal REM sleep architecture.  ? Normal 21.4 % of the sleep period was REM sleep; normal REM sleep onset, 68.5 min after sleep onset.  SLEEP - RELATED RESPIRATORY DISORDERS: Mild obstructive sleep apnea / hypopnea syndrome (A + H Index = 13.2 events / hr  asleep; 7.3 arousals / hr asleep), with a mean SaO2 during events = 89.0 %, significant; lowest SaO2 during events = 80 %, waking  baseline SaO2 = 98 %. Sporadic, loud snoring was noted.  ? Mr. Mcconnell had 52 hypopneas, 9 obstructive sleep apneas, 8 central sleep apneas, and 5 mixed sleep apneas.  ? Respiratory events had a strong supine positional tendency (78.4 % of events were supine, but only 37.8 % of sleep was supine? supine sleep RDI / other position  RDI = 27.5 / 4.6).  ? Respiratory events had no REM sleep tendency (4.1 % of events were in REM sleep, 23.4 % of sleep was REM sleep; REM sleep RDI / NREM sleep RDI = 2.3 /  16.6).  EKG (Scoring Technician): Mr. Mcconnell had some mild sinus bradycardia (rate in the 50s).  CPAP: Not initiated because the number of respiratory events counted by the polysomnography technician before 1:30 am (22) was below the 80 event laboratory criterion for a  CPAP titration trial.  Patient Name: MARNIE MCCONNELL  MRN: [id number]  : 1955 Sex: M  SLEEP - RELATED MOVEMENT DISORDERS: Mr. Mcconnell evidenced a borderline periodic limb movement disorder (PLMS Index = 7.3 PLMS / hr asleep), with only 0.7 arousals /  hr asleep.  ? He had 41 periodic limb movements of  sleep in 5.6 hrs of sleep (5.5 % of arousals).  SPONTANEOUS TRANSIENT AROUSALS: Mr. Mcconnell had a normal rate of spontaneous transient arousals (5.0 STA / hr asleep, 38.4 % of arousals). Thus, STA did not impair  sleep quality / restorativeness.  EVIDENCE OF OTHER SLEEP DISORDERS: SLEEP 5 0, Sleep Disorders Inventory (SDI)  ? Sleep Deprivation: Mr. Mcconnell is sleep deprived; he reports getting 6 7 hrs sleep 2 - 3 nights / wk, and 7 8 hrs sleep 5 - 6 nights / wk.  ? Insomnia: SLEEP 50 Insomnia score < 19 (12)? no significant problem falling asleep initially at night or maintaining sleep.  ? Sleep Restriction: Rule out. Yes if allocates 7 hrs for sleep on 5 work nights / wk (reports allocating 7 8 hrs for sleep / night)  ? Hypersomnolence: SLEEP 50 Impact score < 15 (14).  ? Sleep Apnea / Hypopnea Syndrome: SLEEP 50 Sleep Apnea score > 14 (15).  ? Restless Legs Syndrome: Rule out. Sleep is delayed by restless / odd feelings in legs that compel him to move his legs in bed, 20 + nights / mo. Moving legs in bed or  getting up and moving helps him fall asleep. RLS typically causes insomnia and frequently accompanies PLM disorder; Mr. Mcconnell has PLMS, but denies insomnia.  There was no evidence of: Advanced or delayed sleep phase syndrome, insomnia, psychophysiological insomnia, hypersomnolence, narcolepsy, idiopathic hypersomnia,  periodic limb movement disorder, REM sleep behavior disorder, sleep bruxism, nightmares, sleepwalking, sleep disruption due to physical discomfort or pain, sleep - related  gastroesophageal reflux, or shift work sleep disorder.  MALADAPTIVE SLEEP - RELATED BEHAVIOR: These behaviors may impair nocturnal sleep quantity and quality:  ? Sleeps more than 1 hr later on days off than on workdays.  ? Often goes to bed earlier and / or wakes up later to make up for lost or unrefreshing sleep.  ? Takes 1 2 hour naps during normal waking hours 2 days / wk.  STRESS: There are no indications that stress is a  factor in this patient s sleep complaint.  Electronically signed by Marin Ruby, PH.D., [facility] 07/14/2011 12:37:50 PM      Assessment:       Asthma with COPD    Chronic asthma without complication, unspecified asthma severity, unspecified whether persistent    KEVEN (obstructive sleep apnea)  -     CPAP/BIPAP SUPPLIES    Asthma, chronic, moderate persistent, uncomplicated  -     albuterol (PROVENTIL) 2.5 mg /3 mL (0.083 %) nebulizer solution; Take 3 mLs (2.5 mg total) by nebulization every 6 (six) hours as needed for Wheezing or Shortness of Breath. Rescue  Dispense: 360 mL; Refill: 11  -     albuterol (PROVENTIL/VENTOLIN HFA) 90 mcg/actuation inhaler; Inhale 2 puffs into the lungs every 4 (four) hours as needed. Every 4-6 hours  Dispense: 54 g; Refill: 3  -     budesonide-formoterol 160-4.5 mcg (SYMBICORT) 160-4.5 mcg/actuation HFAA; Inhale 2 puffs into the lungs every 12 (twelve) hours. Wash out mouth after using  Dispense: 3 Inhaler; Refill: 3  -     NEBULIZER FOR HOME USE  -     NEBULIZER KIT (SUPPLIES) FOR HOME USE    Mild intermittent asthma, uncomplicated  -     albuterol (PROVENTIL/VENTOLIN HFA) 90 mcg/actuation inhaler; Inhale 2 puffs into the lungs every 4 (four) hours as needed. Every 4-6 hours  Dispense: 54 g; Refill: 3    Asthma, chronic, moderate persistent, uncomplicated  Comments:  Well controlled on current medications.  Refill Symbicort.  Orders:  -     albuterol (PROVENTIL) 2.5 mg /3 mL (0.083 %) nebulizer solution; Take 3 mLs (2.5 mg total) by nebulization every 6 (six) hours as needed for Wheezing or Shortness of Breath. Rescue  Dispense: 360 mL; Refill: 11  -     albuterol (PROVENTIL/VENTOLIN HFA) 90 mcg/actuation inhaler; Inhale 2 puffs into the lungs every 4 (four) hours as needed. Every 4-6 hours  Dispense: 54 g; Refill: 3  -     budesonide-formoterol 160-4.5 mcg (SYMBICORT) 160-4.5 mcg/actuation HFAA; Inhale 2 puffs into the lungs every 12 (twelve) hours. Wash out mouth after using   Dispense: 3 Inhaler; Refill: 3  -     NEBULIZER FOR HOME USE  -     NEBULIZER KIT (SUPPLIES) FOR HOME USE    Acute non-recurrent sinusitis, unspecified location  Comments:  old diagnosis.  Needed to refill Flonase today  Orders:  -     fluticasone propionate (FLONASE) 50 mcg/actuation nasal spray; 2 sprays (100 mcg total) by Each Nostril route once daily.  Dispense: 48 g; Refill: 3    KEVEN on CPAP  -     CPAP/BIPAP SUPPLIES    Other orders  -     Influenza - Quadrivalent (Adjuvanted)          Outpatient Encounter Medications as of 9/30/2020   Medication Sig Dispense Refill    albuterol (PROVENTIL) 2.5 mg /3 mL (0.083 %) nebulizer solution Take 3 mLs (2.5 mg total) by nebulization every 6 (six) hours as needed for Wheezing or Shortness of Breath. Rescue 360 mL 11    albuterol (PROVENTIL/VENTOLIN HFA) 90 mcg/actuation inhaler Inhale 2 puffs into the lungs every 4 (four) hours as needed. Every 4-6 hours 54 g 3    budesonide-formoterol 160-4.5 mcg (SYMBICORT) 160-4.5 mcg/actuation HFAA Inhale 2 puffs into the lungs every 12 (twelve) hours. Wash out mouth after using 3 Inhaler 3    cyclobenzaprine (FLEXERIL) 5 MG tablet 1 Tablet Oral Three times a day as needed. 20 tablet 0    fluticasone propionate (FLONASE) 50 mcg/actuation nasal spray 2 sprays (100 mcg total) by Each Nostril route once daily. 48 g 3    GLUCOSAMINE HCL/CHONDR ALFONSO A NA (OSTEO BI-FLEX ORAL) Take 1 tablet by mouth once daily.       HYDROcodone-acetaminophen (NORCO) 5-325 mg per tablet Take 1 tablet by mouth every 8 (eight) hours as needed. 10 tablet 0    multivitamin (MULTIPLE VITAMIN) per tablet 1 Tablet Oral Every day      tadalafiL (CIALIS) 20 MG Tab TAKE 1 TABLET DAILY AS NEEDED 24 tablet 5    [DISCONTINUED] albuterol (PROVENTIL) 2.5 mg /3 mL (0.083 %) nebulizer solution Take 3 mLs (2.5 mg total) by nebulization every 6 (six) hours as needed for Wheezing. Rescue 1 Box 2    [DISCONTINUED] albuterol (PROVENTIL/VENTOLIN HFA) 90 mcg/actuation  inhaler Inhale 2 puffs into the lungs every 4 (four) hours as needed. Every 4-6 hours 3 Inhaler 11    [DISCONTINUED] budesonide-formoterol 160-4.5 mcg (SYMBICORT) 160-4.5 mcg/actuation HFAA Inhale 2 puffs into the lungs every 12 (twelve) hours. Wash out mouth after using 3 Inhaler 3    [DISCONTINUED] fluticasone propionate (FLONASE) 50 mcg/actuation nasal spray 2 sprays (100 mcg total) by Each Nostril route once daily. 1 Bottle 11    cetirizine (ZYRTEC) 10 MG tablet Take 10 mg by mouth once daily.      diclofenac 1.3 % PT12 Apply 1 patch topically every 12 (twelve) hours as needed. 30 patch 0    levoFLOXacin (LEVAQUIN) 500 MG tablet Take 1 tablet (500 mg total) by mouth once daily. 7 tablet 0    metroNIDAZOLE (METROGEL) 0.75 % gel Apply topically 2 (two) times daily. 45 g 2    nozaseptin (NOZIN) nasal  1 each by Each Nare route 2 (two) times daily. (Patient not taking: Reported on 2020) 1 applicator 0    predniSONE (DELTASONE) 20 MG tablet Prednisone 60 mg/ day for 3 days, 40 mg/day for 3 days,20 mg/ day for 3 days, (1/2 tablet )10 mg a day for 3 days. 20 tablet 1     No facility-administered encounter medications on file as of 2020.      Plan:       Requested Prescriptions     Signed Prescriptions Disp Refills    albuterol (PROVENTIL) 2.5 mg /3 mL (0.083 %) nebulizer solution 360 mL 11     Sig: Take 3 mLs (2.5 mg total) by nebulization every 6 (six) hours as needed for Wheezing or Shortness of Breath. Rescue    albuterol (PROVENTIL/VENTOLIN HFA) 90 mcg/actuation inhaler 54 g 3     Sig: Inhale 2 puffs into the lungs every 4 (four) hours as needed. Every 4-6 hours    budesonide-formoterol 160-4.5 mcg (SYMBICORT) 160-4.5 mcg/actuation HFAA 3 Inhaler 3     Sig: Inhale 2 puffs into the lungs every 12 (twelve) hours. Wash out mouth after using    fluticasone propionate (FLONASE) 50 mcg/actuation nasal spray 48 g 3     Si sprays (100 mcg total) by Each Nostril route once daily.      Problem List Items Addressed This Visit     Asthma with COPD - Primary    Asthma, chronic    Relevant Medications    albuterol (PROVENTIL) 2.5 mg /3 mL (0.083 %) nebulizer solution    albuterol (PROVENTIL/VENTOLIN HFA) 90 mcg/actuation inhaler    budesonide-formoterol 160-4.5 mcg (SYMBICORT) 160-4.5 mcg/actuation HFAA    KEVEN (obstructive sleep apnea)    Relevant Orders    CPAP/BIPAP SUPPLIES      Other Visit Diagnoses     Asthma, chronic, moderate persistent, uncomplicated        Relevant Medications    albuterol (PROVENTIL) 2.5 mg /3 mL (0.083 %) nebulizer solution    albuterol (PROVENTIL/VENTOLIN HFA) 90 mcg/actuation inhaler    budesonide-formoterol 160-4.5 mcg (SYMBICORT) 160-4.5 mcg/actuation HFAA    Other Relevant Orders    NEBULIZER FOR HOME USE    NEBULIZER KIT (SUPPLIES) FOR HOME USE    Mild intermittent asthma, uncomplicated        Relevant Medications    albuterol (PROVENTIL/VENTOLIN HFA) 90 mcg/actuation inhaler    Asthma, chronic, moderate persistent, uncomplicated        Well controlled on current medications.  Refill Symbicort.    Relevant Medications    albuterol (PROVENTIL) 2.5 mg /3 mL (0.083 %) nebulizer solution    albuterol (PROVENTIL/VENTOLIN HFA) 90 mcg/actuation inhaler    budesonide-formoterol 160-4.5 mcg (SYMBICORT) 160-4.5 mcg/actuation HFAA    Other Relevant Orders    NEBULIZER FOR HOME USE    NEBULIZER KIT (SUPPLIES) FOR HOME USE    Acute non-recurrent sinusitis, unspecified location        old diagnosis.  Needed to refill Flonase today    Relevant Medications    fluticasone propionate (FLONASE) 50 mcg/actuation nasal spray    KEVEN on CPAP        Relevant Orders    CPAP/BIPAP SUPPLIES             Follow up in about 1 year (around 9/30/2021).    MEDICAL DECISION MAKING: Moderate to high complexity.  Overall, the multiple problems listed are of moderate to high severity that may impact quality of life and activities of daily living. Side effects of medications, treatment plan as well as  options and alternatives reviewed and discussed with patient. There was counseling of patient concerning these issues.    Total time spent in face to face counseling and coordination of care - 30  minutes over 50% of time was used in discussion of prognosis, risks, benefits of treatment, instructions and compliance with regimen . Discussion with other physicians or health care providers (DME, NP, pharmacy, respiratory therapy) occurred.

## 2020-10-05 ENCOUNTER — TELEPHONE (OUTPATIENT)
Dept: ENDOSCOPY | Facility: HOSPITAL | Age: 65
End: 2020-10-05

## 2020-10-05 NOTE — TELEPHONE ENCOUNTER
----- Message from Abraham Ellsworth sent at 10/5/2020  8:20 AM CDT -----  Pt would like to reschedule colonoscopy.  Please call back at  [phone].  Md Theodora

## 2020-10-20 ENCOUNTER — LAB VISIT (OUTPATIENT)
Dept: OTOLARYNGOLOGY | Facility: CLINIC | Age: 65
End: 2020-10-20
Payer: MEDICARE

## 2020-10-20 DIAGNOSIS — Z01.818 PRE-OP TESTING: ICD-10-CM

## 2020-10-20 PROCEDURE — U0003 INFECTIOUS AGENT DETECTION BY NUCLEIC ACID (DNA OR RNA); SEVERE ACUTE RESPIRATORY SYNDROME CORONAVIRUS 2 (SARS-COV-2) (CORONAVIRUS DISEASE [COVID-19]), AMPLIFIED PROBE TECHNIQUE, MAKING USE OF HIGH THROUGHPUT TECHNOLOGIES AS DESCRIBED BY CMS-2020-01-R: HCPCS

## 2020-10-21 LAB — SARS-COV-2 RNA RESP QL NAA+PROBE: NOT DETECTED

## 2020-10-23 ENCOUNTER — ANESTHESIA (OUTPATIENT)
Dept: ENDOSCOPY | Facility: HOSPITAL | Age: 65
End: 2020-10-23
Payer: MEDICARE

## 2020-10-23 ENCOUNTER — ANESTHESIA EVENT (OUTPATIENT)
Dept: ENDOSCOPY | Facility: HOSPITAL | Age: 65
End: 2020-10-23
Payer: MEDICARE

## 2020-10-23 ENCOUNTER — HOSPITAL ENCOUNTER (OUTPATIENT)
Facility: HOSPITAL | Age: 65
Discharge: HOME | End: 2020-10-23
Attending: COLON & RECTAL SURGERY | Admitting: COLON & RECTAL SURGERY
Payer: MEDICARE

## 2020-10-23 VITALS
DIASTOLIC BLOOD PRESSURE: 78 MMHG | OXYGEN SATURATION: 95 % | RESPIRATION RATE: 18 BRPM | SYSTOLIC BLOOD PRESSURE: 118 MMHG | BODY MASS INDEX: 32.13 KG/M2 | HEART RATE: 67 BPM | HEIGHT: 72 IN | TEMPERATURE: 98 F | WEIGHT: 237.19 LBS

## 2020-10-23 DIAGNOSIS — Z12.11 SCREENING FOR COLON CANCER: ICD-10-CM

## 2020-10-23 DIAGNOSIS — Z86.010 PERSONAL HISTORY OF COLONIC POLYPS: Primary | ICD-10-CM

## 2020-10-23 PROCEDURE — 88305 TISSUE EXAM BY PATHOLOGIST: ICD-10-PCS | Mod: 26,,, | Performed by: PATHOLOGY

## 2020-10-23 PROCEDURE — 45380 COLONOSCOPY AND BIOPSY: CPT | Performed by: COLON & RECTAL SURGERY

## 2020-10-23 PROCEDURE — 45385 PR COLONOSCOPY,REMV LESN,SNARE: ICD-10-PCS | Mod: PT,,, | Performed by: COLON & RECTAL SURGERY

## 2020-10-23 PROCEDURE — 88305 TISSUE EXAM BY PATHOLOGIST: CPT | Mod: 59 | Performed by: PATHOLOGY

## 2020-10-23 PROCEDURE — 88305 TISSUE EXAM BY PATHOLOGIST: CPT | Mod: 26,,, | Performed by: PATHOLOGY

## 2020-10-23 PROCEDURE — 37000009 HC ANESTHESIA EA ADD 15 MINS: Performed by: COLON & RECTAL SURGERY

## 2020-10-23 PROCEDURE — 27201012 HC FORCEPS, HOT/COLD, DISP: Performed by: COLON & RECTAL SURGERY

## 2020-10-23 PROCEDURE — 37000008 HC ANESTHESIA 1ST 15 MINUTES: Performed by: COLON & RECTAL SURGERY

## 2020-10-23 PROCEDURE — 63600175 PHARM REV CODE 636 W HCPCS: Performed by: NURSE ANESTHETIST, CERTIFIED REGISTERED

## 2020-10-23 PROCEDURE — 45385 COLONOSCOPY W/LESION REMOVAL: CPT | Mod: PT,,, | Performed by: COLON & RECTAL SURGERY

## 2020-10-23 PROCEDURE — 27201089 HC SNARE, DISP (ANY): Performed by: COLON & RECTAL SURGERY

## 2020-10-23 PROCEDURE — 45380 COLONOSCOPY AND BIOPSY: CPT | Mod: 59,,, | Performed by: COLON & RECTAL SURGERY

## 2020-10-23 PROCEDURE — 45380 PR COLONOSCOPY,BIOPSY: ICD-10-PCS | Mod: 59,,, | Performed by: COLON & RECTAL SURGERY

## 2020-10-23 PROCEDURE — 63600175 PHARM REV CODE 636 W HCPCS: Performed by: COLON & RECTAL SURGERY

## 2020-10-23 PROCEDURE — 45385 COLONOSCOPY W/LESION REMOVAL: CPT | Performed by: COLON & RECTAL SURGERY

## 2020-10-23 RX ORDER — PROPOFOL 10 MG/ML
VIAL (ML) INTRAVENOUS
Status: DISCONTINUED | OUTPATIENT
Start: 2020-10-23 | End: 2020-10-23

## 2020-10-23 RX ORDER — SODIUM CHLORIDE, SODIUM LACTATE, POTASSIUM CHLORIDE, CALCIUM CHLORIDE 600; 310; 30; 20 MG/100ML; MG/100ML; MG/100ML; MG/100ML
INJECTION, SOLUTION INTRAVENOUS CONTINUOUS
Status: DISCONTINUED | OUTPATIENT
Start: 2020-10-23 | End: 2020-10-23 | Stop reason: HOSPADM

## 2020-10-23 RX ADMIN — PROPOFOL 40 MG: 10 INJECTION, EMULSION INTRAVENOUS at 08:10

## 2020-10-23 RX ADMIN — PROPOFOL 80 MG: 10 INJECTION, EMULSION INTRAVENOUS at 07:10

## 2020-10-23 RX ADMIN — PROPOFOL 20 MG: 10 INJECTION, EMULSION INTRAVENOUS at 08:10

## 2020-10-23 RX ADMIN — SODIUM CHLORIDE, SODIUM LACTATE, POTASSIUM CHLORIDE, AND CALCIUM CHLORIDE: 600; 310; 30; 20 INJECTION, SOLUTION INTRAVENOUS at 07:10

## 2020-10-23 NOTE — TRANSFER OF CARE
Anesthesia Transfer of Care Note    Patient: Yung Mcconnell    Procedure(s) Performed: Procedure(s) (LRB):  COLONOSCOPY (N/A)    Patient location: GI    Anesthesia Type: MAC    Transport from OR: Transported from OR on room air with adequate spontaneous ventilation    Post pain: adequate analgesia    Post assessment: no apparent anesthetic complications    Post vital signs: stable    Level of consciousness: awake    Nausea/Vomiting: no nausea/vomiting    Complications: none    Transfer of care protocol was followed      Last vitals:   Visit Vitals  /65 (BP Location: Left arm, Patient Position: Lying)   Pulse (!) 58   Temp 36.5 °C (97.7 °F) (Temporal)   Resp 18   Ht 6' (1.829 m)   Wt 107.6 kg (237 lb 3.4 oz)   SpO2 (!) 94%   BMI 32.17 kg/m²

## 2020-10-23 NOTE — DISCHARGE INSTRUCTIONS
Colonoscopy     A camera attached to a flexible tube with a viewing lens is used to take video pictures.     Colonoscopy is a test to view the inside of your lower digestive tract (colon and rectum). Sometimes it can show the last part of the small intestine (ileum). During the test, small pieces of tissue may be removed for testing. This is called a biopsy. Small growths, such as polyps, may also be removed.   Why is colonoscopy done?  The test is done to help look for colon cancer. And it can help find the source of abdominal pain, bleeding, and changes in bowel habits. It may be needed once a year, depending on factors such as your:  · Age  · Health history  · Family health history  · Symptoms  · Results from any prior colonoscopy  Risks and possible complications  These include:  · Bleeding               · A puncture or tear in the colon   · Risks of anesthesia  · A cancer lesion not being seen  Getting ready   To prepare for the test:  · Talk with your healthcare provider about the risks of the test (see below). Also ask your healthcare provider about alternatives to the test.  · Tell your healthcare provider about any medicines you take. Also tell him or her about any health conditions you may have.  · Make sure your rectum and colon are empty for the test. Follow the diet and bowel prep instructions exactly. If you dont, the test may need to be rescheduled.  · Plan for a friend or family member to drive you home after the test.     Colonoscopy provides an inside view of the entire colon.     You may discuss the results with your doctor right away or at a future visit.  During the test   The test is usually done in the hospital on an outpatient basis. This means you go home the same day. The procedure takes about 30 minutes. During that time:  · You are given relaxing (sedating) medicine through an IV line. You may be drowsy, or fully asleep.  · The healthcare provider will first give you a physical exam to  check for anal and rectal problems.  · Then the anus is lubricated and the scope inserted.  · If you are awake, you may have a feeling similar to needing to have a bowel movement. You may also feel pressure as air is pumped into the colon. Its OK to pass gas during the procedure.  · Biopsy, polyp removal, or other treatments may be done during the test.  After the test   You may have gas right after the test. It can help to try to pass it to help prevent later bloating. Your healthcare provider may discuss the results with you right away. Or you may need to schedule a follow-up visit to talk about the results. After the test, you can go back to your normal eating and other activities. You may be tired from the sedation and need to rest for a few hours.  Date Last Reviewed: 11/1/2016 © 2000-2017 [employer]. [address] [place] [address]. All rights reserved. This information is not intended as a substitute for professional medical care. Always follow your healthcare professional's instructions.        Hemorrhoids    Hemorrhoids are swollen and inflamed veins inside the rectum and near the anus. The rectum is the last several inches of the colon. The anus is the passage between the rectum and the outside of the body.  Causes  The veins can become swollen due to increased pressure in them. This is most often caused by:  · Chronic constipation or diarrhea  · Straining when having a bowel movement  · Sitting too long on the toilet  · A low-fiber diet  · Pregnancy  Symptoms  · Bleeding from the rectum (this may be noticeable after bowel movements)  · Lump near the anus  · Itching around the anus  · Pain around the anus  There are different types of hemorrhoids. Depending on the type you have and the severity, you may be able to treat yourself at home. In some cases, a procedure may be the best treatment option. Your healthcare provider can tell you more about this, if needed.  Home  care  General care  · To get relief from pain or itching, try:  ¨ Topical products. Your healthcare provider may prescribe or recommend creams, ointments, or pads that can be applied to the hemorrhoid. Use these exactly as directed.  ¨ Medicines. Your healthcare provider may recommend stool softeners, suppositories, or laxatives to help manage constipation. Use these exactly as directed.  ¨ Sitz baths. A sitz bath involves sitting in a few inches of warm bath water. Be careful not to make the water so hot that you burn yourself--test it before sitting in it. Soak for about 10 to 15 minutes a few times a day. This may help relieve pain.  Tips to help prevent hemorrhoids  · Eat more fiber. Fiber adds bulk to stool and absorbs water as it moves through your colon. This makes stool softer and easier to pass.  ¨ Increase the fiber in your diet with more fiber-rich foods. These include fresh fruit, vegetables, and whole grains.  ¨ Take a fiber supplement or bulking agent, if advised to by your provider. These include products such as psyllium or methylcellulose.  · Drink plenty of water, if directed to by your provider. This can help keep stool soft.  · Be more active. Frequent exercise aids digestion and helps prevent constipation. It may also help make bowel movements more regular.  · Dont strain during bowel movements. This can make hemorrhoids more likely. Also, dont sit on the toilet for long periods of time.  Follow-up care  Follow up with your healthcare provider, or as advised. If a culture or imaging tests were done, you will be notified of the results when they are ready. This may take a few days or longer.  When to seek medical advice  Call your healthcare provider right away if any of these occur:  · Increased bleeding from the rectum  · Increased pain around the rectum or anus  · Weakness or dizziness  Call 911  Call 911 or return to the emergency department right away if any of these occur:  · Trouble  breathing or swallowing  · Fainting or loss of consciousness  · Unusually fast heart rate  · Vomiting blood  · Large amounts of blood in stool  Date Last Reviewed: 6/22/2015 © 2000-2017 [employer]. [address] [place] [address], [place] [address]. All rights reserved. This information is not intended as a substitute for professional medical care. Always follow your healthcare professional's instructions.        Understanding Colon and Rectal Polyps    The colon (also called the large intestine) is a muscular tube that forms the last part of the digestive tract. It absorbs water and stores food waste. The colon is about 4 to 6 feet long. The rectum is the last 6 inches of the colon. The colon and rectum have a smooth lining composed of millions of cells. Changes in these cells can lead to growths in the colon that can become cancerous and should be removed. Multiple tests are available to screen for colon cancer, but the colonoscopy is the most recommended test. During colonoscopy, these polyps can be removed. How often you need this test depends on many things including your condition, your family history, symptoms, and what the findings were at the previous colonoscopy.   When the colon lining changes  Changes that happen in the cells that line the colon or rectum can lead to growths called polyps. Over a period of years, polyps can turn cancerous. Removing polyps early may prevent cancer from ever forming.  Polyps  Polyps are fleshy clumps of tissue that form on the lining of the colon or rectum. Small polyps are usually benign (not cancerous). However, over time, cells in a polyp can change and become cancerous. Certain types of polyps known as adenomatous polyps are premalignant. The risk for invasive cancer increases with the size of the polyp and certain cell and gene features. This means that they can become cancerous if they're not removed. Hyperplastic polyps are benign. They can grow quite large  and not turn cancerous.   Cancer  Almost all colorectal cancers start when polyp cells begin growing abnormally. As a cancerous tumor grows, it may involve more and more of the colon or rectum. In time, cancer can also grow beyond the colon or rectum and spread to nearby organs or to glands called lymph nodes. The cells can also travel to other parts of the body. This is known as metastasis. The earlier a cancerous tumor is removed, the better the chance of preventing its spread.    Date Last Reviewed: 8/1/2016  © 9242-4410 [employer]. [address] [place] [address], [place] [address]. All rights reserved. This information is not intended as a substitute for professional medical care. Always follow your healthcare professional's instructions.        Diverticulosis    Diverticulosis means that small pouches have formed in the wall of your large intestine (colon). Most often, this problem causes no symptoms and is common as people age. But the pouches in the colon are at risk of becoming infected. When this happens, the condition is called diverticulitis. Although most people with diverticulosis never develop diverticulitis, it is still not uncommon. Rectal bleeding can also occur and in less common situations, a type of colon inflammation called colitis.  While most people do not have symptoms, some people with diverticulosis may have:  · Abdominal cramps and pain  · Bloating  · Constipation  · Change in bowel habits  Causes  The exact cause of diverticulosis (and diverticulitis) has not been proved, but a few things are associated with the condition:  · Low-fiber diet  · Constipation  · Lack of exercise  Your healthcare provider will talk with you about how to manage your condition. Diet changes may be all that are needed to help control diverticulosis and prevent progression to diverticulitis. If you develop diverticulitis, you will likely need other treatments.  Home care  You may be told to take fiber  supplements daily. Fiber adds bulk to the stool so that it passes through the colon more easily. Stool softeners may be recommended. You may also be given medications for pain relief. Be sure to take all medications as directed.  In the past, people were told to avoid corn, nuts, and seeds. This is no longer necessary.  Follow these guidelines when caring for yourself at home:  · Eat unprocessed foods that are high in fiber. Whole grains, fruits, and vegetables are good choices.  · Drink 6 to 8 glasses of water every day unless your healthcare provider has you limit how much fluid you should have.  · Watch for changes in your bowel movements. Tell your provider if you notice any changes.  · Begin an exercise program. Ask your provider how to get started. Generally, walking is the best.  · Get plenty of rest and sleep.  Follow-up care  Follow up with your healthcare provider, or as advised. Regular visits may be needed to check on your health. Sometimes special procedures such as colonoscopy, are needed after an episode of diverticulitis or blooding. Be sure to keep all your appointments.  If a stool sample was taken, or cultures were done, you should be told if they are positive, or if your treatment needs to be changed. You can call as directed for the results.  If X-rays were done, a radiologist will look at them. You will be told if there is a change in your treatment.  If antibiotics were prescribed, be sure to finish them all.  When to seek medical advice  Call your healthcare provider right away if any of these occur:  · Fever of 100.4°F (38°C) or higher, or as directed by your healthcare provider  · Severe cramps in the lower left side of the abdomen or pain that is getting worse  · Tenderness in the lower left side of the abdomen or worsening pain throughout the abdomen  · Diarrhea or constipation that doesn't get better within 24 hours  · Nausea and vomiting  · Bleeding from the rectum  Call 911  Call  emergency services if any of the following occur:  · Trouble breathing  · Confusion  · Very drowsy or trouble awakening  · Fainting or loss of consciousness  · Rapid heart rate  · Chest pain  Date Last Reviewed: 12/30/2015  © 6264-3894 [employer]. [address] [place] [address]. All rights reserved. This information is not intended as a substitute for professional medical care. Always follow your healthcare professional's instructions.

## 2020-10-23 NOTE — ANESTHESIA PREPROCEDURE EVALUATION
10/23/2020  Yung Mcconnell is a 65 y.o., male.    Anesthesia Evaluation    I have reviewed the Patient Summary Reports.    I have reviewed the Nursing Notes. I have reviewed the NPO Status.   I have reviewed the Medications.     Review of Systems  Anesthesia Hx:  Hx of Anesthetic complications + PONV History of prior surgery of interest to airway management or planning: Denies Family Hx of Anesthesia complications.   Denies Personal Hx of Anesthesia complications.   Social:  Non-Smoker, Social Alcohol Use    Hematology/Oncology:  Hematology Normal   Oncology Normal     EENT/Dental:   chronic allergic rhinitis   Cardiovascular:   hyperlipidemia ECG has been reviewed.    Pulmonary:   COPD Asthma Sleep Apnea, CPAP    Education provided regarding risk of obstructive sleep apnea     Renal/:  Renal/ Normal     Hepatic/GI:   Bowel Prep. GERD    Musculoskeletal:   Arthritis  Lumbar radiculopathy  Cervical spondylosis   Neurological:  Neurology Normal    Endocrine:  Endocrine Normal    Dermatological:  Skin Normal    Psych:  Psychiatric Normal           Physical Exam  General:  Obesity, Well nourished    Airway/Jaw/Neck:  Airway Findings: Mouth Opening: Normal Tongue: Normal  General Airway Assessment: Adult  Mallampati: II  TM Distance: Normal, at least 6 cm      Dental:  Dental Findings: In tact             Anesthesia Plan  Type of Anesthesia, risks & benefits discussed:  Anesthesia Type:  MAC  Patient's Preference:   Intra-op Monitoring Plan: standard ASA monitors  Intra-op Monitoring Plan Comments:   Post Op Pain Control Plan:   Post Op Pain Control Plan Comments:   Induction:   IV  Beta Blocker:  Patient is not currently on a Beta-Blocker (No further documentation required).       Informed Consent: Patient understands risks and agrees with Anesthesia plan.  Questions answered. Anesthesia consent signed with  patient.  ASA Score: 3     Day of Surgery Review of History & Physical: I have interviewed and examined the patient. I have reviewed the patient's H&P dated:  There are no significant changes.  H&P update referred to the provider.         Ready For Surgery From Anesthesia Perspective.

## 2020-10-23 NOTE — H&P
[facility]  Colon and Rectal Surgery  History & Physical    Patient Name: Yung Mcconnell  MRN: [id number]  Admission Date: 10/23/2020  Attending Physician: Sae Valentine MD  Primary Care Provider: Prasanna Gaxiola MD    Patient information was obtained from patient and medical records.    Subjective:     Chief Complaint/Reason for Admission: Here for Colonoscopy    History of Present Illness:  Patient is a 65 y.o. male presents for colonoscopy. Last cscope in 2015 and has personal hx of colonic polyps. No current hematochezia, melena or change in bowel habits. No personal or fam hx of CRC or IBD.    No current facility-administered medications on file prior to encounter.      Current Outpatient Medications on File Prior to Encounter   Medication Sig    cetirizine (ZYRTEC) 10 MG tablet Take 10 mg by mouth once daily.    cyclobenzaprine (FLEXERIL) 5 MG tablet 1 Tablet Oral Three times a day as needed.    diclofenac 1.3 % PT12 Apply 1 patch topically every 12 (twelve) hours as needed.    GLUCOSAMINE HCL/CHONDR ALFONSO A NA (OSTEO BI-FLEX ORAL) Take 1 tablet by mouth once daily.     HYDROcodone-acetaminophen (NORCO) 5-325 mg per tablet Take 1 tablet by mouth every 8 (eight) hours as needed.    levoFLOXacin (LEVAQUIN) 500 MG tablet Take 1 tablet (500 mg total) by mouth once daily.    metroNIDAZOLE (METROGEL) 0.75 % gel Apply topically 2 (two) times daily.    multivitamin (MULTIPLE VITAMIN) per tablet 1 Tablet Oral Every day    nozaseptin (NOZIN) nasal  1 each by Each Nare route 2 (two) times daily. (Patient not taking: Reported on 6/12/2020)    predniSONE (DELTASONE) 20 MG tablet Prednisone 60 mg/ day for 3 days, 40 mg/day for 3 days,20 mg/ day for 3 days, (1/2 tablet )10 mg a day for 3 days.       Review of patient's allergies indicates:   Allergen Reactions    Zithromax [azithromycin] Palpitations    Aspirin Other (See Comments)     Other reaction(s): Difficulty breathing    Lipitor  [atorvastatin] Other (See Comments)     Leg cramps/hand cramps       Past Medical History:   Diagnosis Date    Allergy     Asthma, chronic 7/9/2013    Colon polyps     COPD (chronic obstructive pulmonary disease)     GERD (gastroesophageal reflux disease)     High cholesterol     Osteoarthritis of both knees 7/9/2013    Sleep apnea      Past Surgical History:   Procedure Laterality Date    CHOLECYSTECTOMY      DIAGNOSTIC LAPAROSCOPY N/A 6/21/2018    Procedure: LAPAROSCOPY, DIAGNOSTIC;  Surgeon: Casper Martinez MD;  Location: [facility] OR;  Service: General;  Laterality: N/A;    LUNG SURGERY Right     benign mass    UMBILICAL HERNIA REPAIR N/A 6/21/2018    Procedure: REPAIR, HERNIA, UMBILICAL, AGE 5 YEARS OR OLDER;  Surgeon: Casper Martinez MD;  Location: [facility] OR;  Service: General;  Laterality: N/A;     Family History     Problem Relation (Age of Onset)    Aneurysm Father    Cancer Mother    Colon polyps Father    Diabetes Mother    Heart disease Father    Hypertension Mother        Tobacco Use    Smoking status: Never Smoker    Smokeless tobacco: Never Used   Substance and Sexual Activity    Alcohol use: Yes     Comment: rare    Drug use: No    Sexual activity: Yes     Partners: Female     Birth control/protection: None     Review of Systems   Constitutional: Negative for activity change, appetite change, chills, fatigue, fever and unexpected weight change.   HENT: Negative for congestion, ear pain, sore throat and trouble swallowing.    Eyes: Negative for pain, redness and itching.   Respiratory: Negative for cough, shortness of breath and wheezing.    Cardiovascular: Negative for chest pain, palpitations and leg swelling.   Gastrointestinal: Negative for abdominal distention, abdominal pain, anal bleeding, blood in stool, constipation, diarrhea, nausea, rectal pain and vomiting.   Endocrine: Negative for cold intolerance, heat intolerance and polyuria.   Genitourinary: Negative for dysuria, flank  pain, frequency and hematuria.   Musculoskeletal: Negative for gait problem, joint swelling and neck pain.   Skin: Negative for color change, rash and wound.   Allergic/Immunologic: Negative for environmental allergies and immunocompromised state.   Neurological: Negative for dizziness, speech difficulty, weakness and numbness.   Psychiatric/Behavioral: Negative for agitation, confusion and hallucinations.     Objective:     /75 (BP Location: Left arm, Patient Position: Lying)   Pulse 64   Temp 98.4 °F (36.9 °C) (Temporal)   Resp 18   Ht 6' (1.829 m)   Wt 107.6 kg (237 lb 3.4 oz)   SpO2 95%   BMI 32.17 kg/m²     Physical Exam  Constitutional:       Appearance: He is well-developed.   HENT:      Head: Normocephalic and atraumatic.   Eyes:      Conjunctiva/sclera: Conjunctivae normal.   Neck:      Musculoskeletal: Normal range of motion.      Thyroid: No thyromegaly.   Cardiovascular:      Rate and Rhythm: Normal rate and regular rhythm.   Pulmonary:      Effort: Pulmonary effort is normal. No respiratory distress.   Abdominal:      General: There is no distension.      Palpations: Abdomen is soft. There is no mass.      Tenderness: There is no abdominal tenderness.   Musculoskeletal: Normal range of motion.         General: No tenderness.   Skin:     General: Skin is warm and dry.      Capillary Refill: Capillary refill takes less than 2 seconds.      Findings: No rash.   Neurological:      Mental Status: He is alert and oriented to person, place, and time.           Assessment/Plan:     Patient is a 65 y.o. male who presents for colonoscopy     - Ok to proceed to endoscopy suite for colonoscopy  - Consent obtained. All risks, benefits and alternatives fully explained to patient, including but not limited to bleeding, infection, perforation, and missed polyps. All questions appropriately answered to patient's satisfaction. Consent signed and placed on chart.    There are no hospital problems to display  for this patient.    VTE Risk Mitigation (From admission, onward)    None          Sae Valentine MD  Colon and Rectal Surgery  [facility]

## 2020-10-23 NOTE — BRIEF OP NOTE
[facility] -   Brief Operative Note     SUMMARY     Surgery Date: 10/23/2020     Surgeon(s) and Role:     * Sae Valentine MD - Primary    Assisting Surgeon: None    Pre-op Diagnosis:  Screening [Z13.9]    Post-op Diagnosis:  Post-Op Diagnosis Codes:     * Screening [Z13.9]    Procedure(s) (LRB):  COLONOSCOPY (N/A)    Anesthesia: Choice    Description of the findings of the procedure: See Op Note    Findings/Key Components: See Op Note    Estimated Blood Loss: * No values recorded between 10/23/2020 12:00 AM and 10/23/2020  8:21 AM *         Specimens:   Specimen (12h ago, onward)    None          Discharge Note    SUMMARY     Admit Date: 10/23/2020    Discharge Date and Time: 10/23/2020 8:21 AM    Hospital Course Patient was seen in the preoperative area by both myself and anesthesia. All consents were verified and all questions appropriately answered. All risks, benefits and alternatives explained to patient. Patient proceeded to endoscopy suite for colonoscopy and was discharged home postoperative once cleared by anesthesia.    Final Diagnosis: Post-Op Diagnosis Codes:     * Screening [Z13.9]    Disposition: Home or Self Care    Follow Up/Patient Instructions: See Provation report    Medications:  Reconciled Home Medications:      Medication List      CONTINUE taking these medications    * albuterol 2.5 mg /3 mL (0.083 %) nebulizer solution  Commonly known as: PROVENTIL  Take 3 mLs (2.5 mg total) by nebulization every 6 (six) hours as needed for Wheezing or Shortness of Breath. Rescue     * albuterol 90 mcg/actuation inhaler  Commonly known as: PROVENTIL/VENTOLIN HFA  Inhale 2 puffs into the lungs every 4 (four) hours as needed. Every 4-6 hours     budesonide-formoterol 160-4.5 mcg 160-4.5 mcg/actuation Hfaa  Commonly known as: SYMBICORT  Inhale 2 puffs into the lungs every 12 (twelve) hours. Wash out mouth after using     cetirizine 10 MG tablet  Commonly known as: ZYRTEC  Take 10 mg by mouth  once daily.     cyclobenzaprine 5 MG tablet  Commonly known as: FLEXERIL  1 Tablet Oral Three times a day as needed.     diclofenac 1.3 % Pt12  Commonly known as: FLECTOR  Apply 1 patch topically every 12 (twelve) hours as needed.     fluticasone propionate 50 mcg/actuation nasal spray  Commonly known as: FLONASE  2 sprays (100 mcg total) by Each Nostril route once daily.     HYDROcodone-acetaminophen 5-325 mg per tablet  Commonly known as: NORCO  Take 1 tablet by mouth every 8 (eight) hours as needed.     metroNIDAZOLE 0.75 % gel  Commonly known as: METROGEL  Apply topically 2 (two) times daily.     MULTIPLE VITAMIN per tablet  Generic drug: multivitamin  1 Tablet Oral Every day     OSTEO BI-FLEX ORAL  Take 1 tablet by mouth once daily.     predniSONE 20 MG tablet  Commonly known as: DELTASONE  Prednisone 60 mg/ day for 3 days, 40 mg/day for 3 days,20 mg/ day for 3 days, (1/2 tablet )10 mg a day for 3 days.     tadalafiL 20 MG Tab  Commonly known as: CIALIS  TAKE 1 TABLET DAILY AS NEEDED         * This list has 2 medication(s) that are the same as other medications prescribed for you. Read the directions carefully, and ask your doctor or other care provider to review them with you.            STOP taking these medications    levoFLOXacin 500 MG tablet  Commonly known as: LEVAQUIN     nozaseptin  nasal   Commonly known as: NOZIN          Discharge Procedure Orders   Diet general     Call MD for:  temperature >100.4     Call MD for:  persistent nausea and vomiting     Call MD for:  severe uncontrolled pain     Call MD for:  difficulty breathing, headache or visual disturbances     Call MD for:  redness, tenderness, or signs of infection (pain, swelling, redness, odor or green/yellow discharge around incision site)     Call MD for:  hives     Call MD for:  persistent dizziness or light-headedness     Call MD for:  extreme fatigue     Activity as tolerated     Follow-up Information     Prasanna Gaxiola MD.     Specialty: Family Medicine  Why: As needed  Contact information:  [address] [place] [address] [id number] [phone]

## 2020-10-23 NOTE — [employer] PATIENT INSTRUCTIONS
Discharge Summary/Instructions after an Endoscopic Procedure  Patient Name: Yung Mcconnell  Patient MRN: [id number]  Patient YOB: 1955  Friday, October 23, 2020 Sae Valentine MD  RESTRICTIONS:  During your procedure today, you received medications for sedation.  These   medications may affect your judgment, balance and coordination.  Therefore,   for 24 hours, you have the following restrictions:   - DO NOT drive a car, operate machinery, make legal/financial decisions,   sign important papers or drink alcohol.    ACTIVITY:  Today: no heavy lifting, straining or running due to procedural   sedation/anesthesia.  The following day: return to full activity including work.  DIET:  Eat and drink normally unless instructed otherwise.     TREATMENT FOR COMMON SIDE EFFECTS:  - Mild abdominal pain, nausea, belching, bloating or excessive gas:  rest,   eat lightly and use a heating pad.  - Sore Throat: treat with throat lozenges and/or gargle with warm salt   water.  - Because air was used during the procedure, expelling large amounts of air   from your rectum or belching is normal.  - If a bowel prep was taken, you may not have a bowel movement for 1-3 days.    This is normal.  SYMPTOMS TO WATCH FOR AND REPORT TO YOUR PHYSICIAN:  1. Abdominal pain or bloating, other than gas cramps.  2. Chest pain.  3. Back pain.  4. Signs of infection such as: chills or fever occurring within 24 hours   after the procedure.  5. Rectal bleeding, which would show as bright red, maroon, or black stools.   (A tablespoon of blood from the rectum is not serious, especially if   hemorrhoids are present.)  6. Vomiting.  7. Weakness or dizziness.  GO DIRECTLY TO THE NEAREST EMERGENCY ROOM IF YOU HAVE ANY OF THE FOLLOWING:      Difficulty breathing              Chills and/or fever over 101 F   Persistent vomiting and/or vomiting blood   Severe abdominal pain   Severe chest pain   Black, tarry stools   Bleeding- more than one  tablespoon   Any other symptom or condition that you feel may need urgent attention  Your doctor recommends these additional instructions:  If any biopsies were taken, your doctors clinic will contact you in 1 to 2   weeks with any results.  - Discharge patient to home.   - High fiber diet.   - Continue present medications.   - Await pathology results.   - Repeat colonoscopy in 5 years for surveillance.   - Return to primary care physician PRN.  For questions, problems or results please call your physician Sae Valentine MD at Work:  [phone]  If you have any questions about the above instructions, call the GI   department at [phone] or call the endoscopy unit at [phone]   from 7am until 3 pm.  [facility] [place] [place], EMERGENCY ROOM PHONE NUMBER:   [phone]  IF A COMPLICATION OR EMERGENCY SITUATION ARISES AND YOU ARE UNABLE TO REACH   YOUR PHYSICIAN - GO DIRECTLY TO THE EMERGENCY ROOM.  I have read or have had read to me these discharge instructions for my   procedure and have received a written copy.  I understand these   instructions and will follow-up with my physician if I have any questions.     __________________________________       _____________________________________  Nurse Signature                                          Patient/Designated   Responsible Party Signature  MD Sae Peres MD  10/23/2020 8:19:53 AM  This report has been verified and signed electronically.  [employer]

## 2020-10-23 NOTE — ANESTHESIA POSTPROCEDURE EVALUATION
Anesthesia Post Evaluation    Patient: Yung Mcconnell    Procedure(s) Performed: Procedure(s) (LRB):  COLONOSCOPY (N/A)    Final Anesthesia Type: MAC    Patient location during evaluation: GI PACU  Patient participation: Yes- Able to Participate  Level of consciousness: awake and alert and oriented  Post-procedure vital signs: reviewed and stable  Pain management: adequate  Airway patency: patent    PONV status at discharge: No PONV  Anesthetic complications: no      Cardiovascular status: blood pressure returned to baseline, stable and hemodynamically stable  Respiratory status: unassisted, room air and spontaneous ventilation  Hydration status: euvolemic  Follow-up not needed.          Vitals Value Taken Time   /78 10/23/20 0839   Temp 36.5 °C (97.7 °F) 10/23/20 0819   Pulse 67 10/23/20 0839   Resp 18 10/23/20 0839   SpO2 95 % 10/23/20 0839         Event Time   Out of Recovery 08:52:17         Pain/Jim Score: Jim Score: 10 (10/23/2020  8:39 AM)

## 2020-10-27 LAB
FINAL PATHOLOGIC DIAGNOSIS: NORMAL
GROSS: NORMAL
Lab: NORMAL

## 2020-10-27 NOTE — PROGRESS NOTES
Please call patient and let him know that the lesions removed on recent colonoscopy were benign, but were all pre cancerous adenomas.  Nothing further needs to be done at this time as there were completely excised.  Recommend repeat surveillance colonoscopy in 3 years instead of 5 years.  Please change this in his health maintenance tab as well.  Thank you.

## 2020-11-30 ENCOUNTER — OFFICE VISIT (OUTPATIENT)
Dept: FAMILY MEDICINE | Facility: CLINIC | Age: 65
End: 2020-11-30
Payer: MEDICARE

## 2020-11-30 ENCOUNTER — HOSPITAL ENCOUNTER (OUTPATIENT)
Dept: RADIOLOGY | Facility: HOSPITAL | Age: 65
Discharge: HOME | End: 2020-11-30
Attending: FAMILY MEDICINE
Payer: MEDICARE

## 2020-11-30 VITALS
WEIGHT: 247.44 LBS | SYSTOLIC BLOOD PRESSURE: 124 MMHG | DIASTOLIC BLOOD PRESSURE: 64 MMHG | HEART RATE: 76 BPM | HEIGHT: 72 IN | BODY MASS INDEX: 33.52 KG/M2 | TEMPERATURE: 98 F | OXYGEN SATURATION: 96 %

## 2020-11-30 DIAGNOSIS — Z01.818 PREOPERATIVE CLEARANCE: Primary | ICD-10-CM

## 2020-11-30 DIAGNOSIS — J44.89 ASTHMA WITH COPD: ICD-10-CM

## 2020-11-30 DIAGNOSIS — R79.9 ABNORMAL FINDING OF BLOOD CHEMISTRY, UNSPECIFIED: ICD-10-CM

## 2020-11-30 DIAGNOSIS — G47.33 OSA (OBSTRUCTIVE SLEEP APNEA): ICD-10-CM

## 2020-11-30 DIAGNOSIS — E78.5 HYPERLIPIDEMIA, UNSPECIFIED HYPERLIPIDEMIA TYPE: ICD-10-CM

## 2020-11-30 DIAGNOSIS — R79.1 ABNORMAL COAGULATION PROFILE: ICD-10-CM

## 2020-11-30 DIAGNOSIS — R74.8 ABNORMAL TRANSAMINASES: ICD-10-CM

## 2020-11-30 DIAGNOSIS — Z01.818 PREOPERATIVE CLEARANCE: ICD-10-CM

## 2020-11-30 DIAGNOSIS — D69.6 THROMBOCYTOPENIA: ICD-10-CM

## 2020-11-30 PROCEDURE — 99214 PR OFFICE/OUTPT VISIT, EST, LEVL IV, 30-39 MIN: ICD-10-PCS | Mod: S$PBB,,, | Performed by: FAMILY MEDICINE

## 2020-11-30 PROCEDURE — 99214 OFFICE O/P EST MOD 30 MIN: CPT | Mod: PBBFAC,PO,25 | Performed by: FAMILY MEDICINE

## 2020-11-30 PROCEDURE — 93005 ELECTROCARDIOGRAM TRACING: CPT | Mod: PBBFAC,PO | Performed by: INTERNAL MEDICINE

## 2020-11-30 PROCEDURE — 99999 PR PBB SHADOW E&M-EST. PATIENT-LVL IV: ICD-10-PCS | Mod: PBBFAC,,, | Performed by: FAMILY MEDICINE

## 2020-11-30 PROCEDURE — 99214 OFFICE O/P EST MOD 30 MIN: CPT | Mod: S$PBB,,, | Performed by: FAMILY MEDICINE

## 2020-11-30 PROCEDURE — 71046 X-RAY EXAM CHEST 2 VIEWS: CPT | Mod: 26,,, | Performed by: RADIOLOGY

## 2020-11-30 PROCEDURE — 93010 ELECTROCARDIOGRAM REPORT: CPT | Mod: S$PBB,,, | Performed by: INTERNAL MEDICINE

## 2020-11-30 PROCEDURE — 71046 X-RAY EXAM CHEST 2 VIEWS: CPT | Mod: TC,PO

## 2020-11-30 PROCEDURE — 99999 PR PBB SHADOW E&M-EST. PATIENT-LVL IV: CPT | Mod: PBBFAC,,, | Performed by: FAMILY MEDICINE

## 2020-11-30 PROCEDURE — 87081 CULTURE SCREEN ONLY: CPT

## 2020-11-30 PROCEDURE — 71046 XR CHEST PA AND LATERAL: ICD-10-PCS | Mod: 26,,, | Performed by: RADIOLOGY

## 2020-11-30 PROCEDURE — 93010 EKG 12-LEAD: ICD-10-PCS | Mod: S$PBB,,, | Performed by: INTERNAL MEDICINE

## 2020-11-30 RX ORDER — AZELAIC ACID 0.15 G/G
1 GEL TOPICAL 2 TIMES DAILY
COMMUNITY
Start: 2020-10-05 | End: 2024-03-31

## 2020-11-30 NOTE — PROGRESS NOTES
Subjective:       Patient ID: Yung Mcconnell is a 65 y.o. male.    Chief Complaint: Pre-op Exam      HPI Comments:       Current Outpatient Medications:     azelaic acid (AZELEX) 15 % gel, Apply 1 application topically 2 (two) times a day., Disp: , Rfl:     budesonide-formoterol 160-4.5 mcg (SYMBICORT) 160-4.5 mcg/actuation HFAA, Inhale 2 puffs into the lungs every 12 (twelve) hours. Wash out mouth after using, Disp: 3 Inhaler, Rfl: 3    cyclobenzaprine (FLEXERIL) 5 MG tablet, 1 Tablet Oral Three times a day as needed., Disp: 20 tablet, Rfl: 0    diclofenac 1.3 % PT12, Apply 1 patch topically every 12 (twelve) hours as needed., Disp: 30 patch, Rfl: 0    fluticasone propionate (FLONASE) 50 mcg/actuation nasal spray, 2 sprays (100 mcg total) by Each Nostril route once daily., Disp: 48 g, Rfl: 3    GLUCOSAMINE HCL/CHONDR ALFONSO A NA (OSTEO BI-FLEX ORAL), Take 1 tablet by mouth once daily. , Disp: , Rfl:     multivitamin (MULTIPLE VITAMIN) per tablet, 1 Tablet Oral Every day, Disp: , Rfl:     albuterol (PROVENTIL) 2.5 mg /3 mL (0.083 %) nebulizer solution, Take 3 mLs (2.5 mg total) by nebulization every 6 (six) hours as needed for Wheezing or Shortness of Breath. Rescue (Patient not taking: Reported on 11/30/2020), Disp: 360 mL, Rfl: 11    albuterol (PROVENTIL/VENTOLIN HFA) 90 mcg/actuation inhaler, Inhale 2 puffs into the lungs every 4 (four) hours as needed. Every 4-6 hours (Patient not taking: Reported on 11/30/2020), Disp: 54 g, Rfl: 3    cetirizine (ZYRTEC) 10 MG tablet, Take 10 mg by mouth once daily., Disp: , Rfl:     HYDROcodone-acetaminophen (NORCO) 5-325 mg per tablet, Take 1 tablet by mouth every 8 (eight) hours as needed. (Patient not taking: Reported on 11/30/2020), Disp: 10 tablet, Rfl: 0    metroNIDAZOLE (METROGEL) 0.75 % gel, Apply topically 2 (two) times daily., Disp: 45 g, Rfl: 2    predniSONE (DELTASONE) 20 MG tablet, Prednisone 60 mg/ day for 3 days, 40 mg/day for 3 days,20 mg/ day for 3  days, (1/2 tablet )10 mg a day for 3 days., Disp: 20 tablet, Rfl: 1    tadalafiL (CIALIS) 20 MG Tab, TAKE 1 TABLET DAILY AS NEEDED, Disp: 24 tablet, Rfl: 5      Here for preoperative clearance.  Dr. Leo at [facility].  Left total knee replacement.  12/16/20    Preop formal from surgeon completed, scanned, faxed to surgeon.  Past medical history, past surgical history, family history, social history, medication list all updated    Moderate persistent asthma is well controlled.  Uses rescue inhaler only once every 2 1-2 weeks.  This is a distinct improvement over his previous baseline.  Told by pulmonology that his pulmonary function tests are improving    Review of Systems   Constitutional: Negative for activity change, appetite change and fever.   HENT: Negative for sore throat.    Respiratory: Negative for cough and shortness of breath.    Cardiovascular: Negative for chest pain.   Gastrointestinal: Negative for abdominal pain, diarrhea and nausea.   Genitourinary: Negative for difficulty urinating.   Musculoskeletal: Negative for arthralgias and myalgias.   Neurological: Negative for dizziness and headaches.       Objective:      Vitals:    11/30/20 0740   BP: 124/64   Pulse: 76   Temp: 98.2 °F (36.8 °C)   TempSrc: Temporal   SpO2: 96%   Weight: 112.2 kg (247 lb 7.5 oz)   Height: 6' (1.829 m)   PainSc:   8   PainLoc: Knee     Physical Exam  Vitals signs and nursing note reviewed.   Constitutional:       General: He is not in acute distress.     Appearance: He is well-developed. He is not diaphoretic.   HENT:      Head: Normocephalic.      Mouth/Throat:      Pharynx: No oropharyngeal exudate.   Neck:      Musculoskeletal: Neck supple.      Thyroid: No thyromegaly.   Cardiovascular:      Rate and Rhythm: Normal rate and regular rhythm.      Heart sounds: Normal heart sounds. No murmur.   Pulmonary:      Effort: Pulmonary effort is normal.      Breath sounds: Normal breath sounds. No wheezing  or rales.   Abdominal:      General: There is no distension.      Palpations: Abdomen is soft. There is no hepatomegaly, splenomegaly or mass.      Tenderness: There is no abdominal tenderness.   Lymphadenopathy:      Cervical: No cervical adenopathy.   Skin:     General: Skin is warm and dry.   Neurological:      Mental Status: He is alert and oriented to person, place, and time.   Psychiatric:         Behavior: Behavior normal.         Thought Content: Thought content normal.         Judgment: Judgment normal.         Assessment:       1. Preoperative clearance    2. Asthma with COPD    3. KEVEN (obstructive sleep apnea)    4. Hyperlipidemia, unspecified hyperlipidemia type    5. Abnormal transaminases    6. Thrombocytopenia    7. Abnormal coagulation profile     8. Abnormal finding of blood chemistry, unspecified         Plan:   Preoperative clearance  Comments:  Patient is acceptable risk for scheduled surgery and anesthesia  Orders:  -     Comprehensive Metabolic Panel; Future; Expected date: 11/30/2020  -     CBC Auto Differential; Future; Expected date: 11/30/2020  -     EKG 12-lead  -     X-Ray Chest PA And Lateral; Future; Expected date: 11/30/2020  -     Protime-INR; Future; Expected date: 11/30/2020  -     APTT; Future; Expected date: 11/30/2020  -     Culture, MRSA  -     TYPE & SCREEN; Future; Expected date: 11/30/2020  -     Hemoglobin A1C; Future; Expected date: 11/30/2020    Asthma with COPD  Comments:  Much controlled now on maintenance medications    KEVEN (obstructive sleep apnea)  Comments:  Regular use of machine    Hyperlipidemia, unspecified hyperlipidemia type  Comments:  History of statin intolerance    Abnormal transaminases  Comments:  Recheck CMP  Orders:  -     Comprehensive Metabolic Panel; Future; Expected date: 11/30/2020    Thrombocytopenia  Comments:  At time of COVID infection.  Recheck today  Orders:  -     CBC Auto Differential; Future; Expected date: 11/30/2020    Abnormal  coagulation profile   Comments:  PT, PTT  Orders:  -     Protime-INR; Future; Expected date: 11/30/2020  -     APTT; Future; Expected date: 11/30/2020    Abnormal finding of blood chemistry, unspecified   -     Hemoglobin A1C; Future; Expected date: 11/30/2020

## 2020-12-02 LAB — MRSA SPEC QL CULT: NORMAL

## 2021-03-05 DIAGNOSIS — J45.40 ASTHMA, CHRONIC, MODERATE PERSISTENT, UNCOMPLICATED: ICD-10-CM

## 2021-03-05 DIAGNOSIS — J45.20 MILD INTERMITTENT ASTHMA, UNCOMPLICATED: ICD-10-CM

## 2021-03-05 RX ORDER — ALBUTEROL SULFATE 90 UG/1
2 AEROSOL, METERED RESPIRATORY (INHALATION) EVERY 4 HOURS PRN
Qty: 54 G | Refills: 3 | Status: SHIPPED | OUTPATIENT
Start: 2021-03-05 | End: 2021-03-08 | Stop reason: SDUPTHER

## 2021-03-08 DIAGNOSIS — J45.20 MILD INTERMITTENT ASTHMA, UNCOMPLICATED: ICD-10-CM

## 2021-03-08 DIAGNOSIS — J45.40 ASTHMA, CHRONIC, MODERATE PERSISTENT, UNCOMPLICATED: ICD-10-CM

## 2021-03-10 RX ORDER — ALBUTEROL SULFATE 0.83 MG/ML
2.5 SOLUTION RESPIRATORY (INHALATION) EVERY 6 HOURS PRN
Qty: 360 ML | Refills: 11 | Status: SHIPPED | OUTPATIENT
Start: 2021-03-10 | End: 2021-08-25 | Stop reason: SDUPTHER

## 2021-03-10 RX ORDER — ALBUTEROL SULFATE 90 UG/1
2 AEROSOL, METERED RESPIRATORY (INHALATION) EVERY 4 HOURS PRN
Qty: 54 G | Refills: 3 | Status: SHIPPED | OUTPATIENT
Start: 2021-03-10 | End: 2021-08-25 | Stop reason: SDUPTHER

## 2021-04-08 ENCOUNTER — PES CALL (OUTPATIENT)
Dept: ADMINISTRATIVE | Facility: CLINIC | Age: 66
End: 2021-04-08

## 2021-05-26 ENCOUNTER — TELEPHONE (OUTPATIENT)
Dept: ADMINISTRATIVE | Facility: CLINIC | Age: 66
End: 2021-05-26

## 2021-07-12 ENCOUNTER — TELEPHONE (OUTPATIENT)
Dept: FAMILY MEDICINE | Facility: CLINIC | Age: 66
End: 2021-07-12

## 2021-07-13 DIAGNOSIS — Z12.5 SCREENING FOR PROSTATE CANCER: ICD-10-CM

## 2021-07-13 DIAGNOSIS — E78.5 HYPERLIPIDEMIA, UNSPECIFIED HYPERLIPIDEMIA TYPE: Primary | ICD-10-CM

## 2021-07-16 ENCOUNTER — TELEPHONE (OUTPATIENT)
Dept: ADMINISTRATIVE | Facility: CLINIC | Age: 66
End: 2021-07-16

## 2021-07-19 ENCOUNTER — PES CALL (OUTPATIENT)
Dept: ADMINISTRATIVE | Facility: CLINIC | Age: 66
End: 2021-07-19

## 2021-07-19 ENCOUNTER — LAB VISIT (OUTPATIENT)
Dept: LAB | Facility: HOSPITAL | Age: 66
End: 2021-07-19
Attending: FAMILY MEDICINE
Payer: COMMERCIAL

## 2021-07-19 DIAGNOSIS — Z12.5 SCREENING FOR PROSTATE CANCER: ICD-10-CM

## 2021-07-19 DIAGNOSIS — E78.5 HYPERLIPIDEMIA, UNSPECIFIED HYPERLIPIDEMIA TYPE: ICD-10-CM

## 2021-07-19 LAB
ALBUMIN SERPL BCP-MCNC: 4 G/DL (ref 3.5–5.2)
ALP SERPL-CCNC: 70 U/L (ref 55–135)
ALT SERPL W/O P-5'-P-CCNC: 25 U/L (ref 10–44)
ANION GAP SERPL CALC-SCNC: 10 MMOL/L (ref 8–16)
AST SERPL-CCNC: 22 U/L (ref 10–40)
BILIRUB SERPL-MCNC: 0.8 MG/DL (ref 0.1–1)
BUN SERPL-MCNC: 12 MG/DL (ref 8–23)
CALCIUM SERPL-MCNC: 10.3 MG/DL (ref 8.7–10.5)
CHLORIDE SERPL-SCNC: 106 MMOL/L (ref 95–110)
CHOLEST SERPL-MCNC: 187 MG/DL (ref 120–199)
CHOLEST/HDLC SERPL: 3.9 {RATIO} (ref 2–5)
CO2 SERPL-SCNC: 27 MMOL/L (ref 23–29)
COMPLEXED PSA SERPL-MCNC: 0.53 NG/ML (ref 0–4)
CREAT SERPL-MCNC: 0.8 MG/DL (ref 0.5–1.4)
EST. GFR  (AFRICAN AMERICAN): >60 ML/MIN/1.73 M^2
EST. GFR  (NON AFRICAN AMERICAN): >60 ML/MIN/1.73 M^2
GLUCOSE SERPL-MCNC: 92 MG/DL (ref 70–110)
HDLC SERPL-MCNC: 48 MG/DL (ref 40–75)
HDLC SERPL: 25.7 % (ref 20–50)
LDLC SERPL CALC-MCNC: 119 MG/DL (ref 63–159)
NONHDLC SERPL-MCNC: 139 MG/DL
POTASSIUM SERPL-SCNC: 4.8 MMOL/L (ref 3.5–5.1)
PROT SERPL-MCNC: 7.4 G/DL (ref 6–8.4)
SODIUM SERPL-SCNC: 143 MMOL/L (ref 136–145)
TRIGL SERPL-MCNC: 100 MG/DL (ref 30–150)

## 2021-07-19 PROCEDURE — 80053 COMPREHEN METABOLIC PANEL: CPT | Performed by: FAMILY MEDICINE

## 2021-07-19 PROCEDURE — 80061 LIPID PANEL: CPT | Performed by: FAMILY MEDICINE

## 2021-07-19 PROCEDURE — 84153 ASSAY OF PSA TOTAL: CPT | Performed by: FAMILY MEDICINE

## 2021-07-19 PROCEDURE — 36415 COLL VENOUS BLD VENIPUNCTURE: CPT | Mod: PO | Performed by: FAMILY MEDICINE

## 2021-07-26 ENCOUNTER — OFFICE VISIT (OUTPATIENT)
Dept: FAMILY MEDICINE | Facility: CLINIC | Age: 66
End: 2021-07-26
Payer: COMMERCIAL

## 2021-07-26 VITALS
HEIGHT: 74 IN | DIASTOLIC BLOOD PRESSURE: 78 MMHG | HEART RATE: 74 BPM | BODY MASS INDEX: 34.25 KG/M2 | TEMPERATURE: 99 F | SYSTOLIC BLOOD PRESSURE: 112 MMHG | OXYGEN SATURATION: 97 % | WEIGHT: 266.88 LBS

## 2021-07-26 DIAGNOSIS — J44.89 ASTHMA WITH COPD: ICD-10-CM

## 2021-07-26 DIAGNOSIS — E78.5 HYPERLIPIDEMIA, UNSPECIFIED HYPERLIPIDEMIA TYPE: ICD-10-CM

## 2021-07-26 DIAGNOSIS — Z00.00 ANNUAL PHYSICAL EXAM: Primary | ICD-10-CM

## 2021-07-26 PROCEDURE — 99999 PR PBB SHADOW E&M-EST. PATIENT-LVL IV: ICD-10-PCS | Mod: PBBFAC,,, | Performed by: FAMILY MEDICINE

## 2021-07-26 PROCEDURE — 99397 PER PM REEVAL EST PAT 65+ YR: CPT | Mod: S$GLB,,, | Performed by: FAMILY MEDICINE

## 2021-07-26 PROCEDURE — 99999 PR PBB SHADOW E&M-EST. PATIENT-LVL IV: CPT | Mod: PBBFAC,,, | Performed by: FAMILY MEDICINE

## 2021-07-26 PROCEDURE — 99397 PR PREVENTIVE VISIT,EST,65 & OVER: ICD-10-PCS | Mod: S$GLB,,, | Performed by: FAMILY MEDICINE

## 2021-07-28 ENCOUNTER — TELEPHONE (OUTPATIENT)
Dept: ADMINISTRATIVE | Facility: CLINIC | Age: 66
End: 2021-07-28

## 2021-08-25 ENCOUNTER — OFFICE VISIT (OUTPATIENT)
Dept: PULMONOLOGY | Facility: CLINIC | Age: 66
End: 2021-08-25
Payer: MEDICARE

## 2021-08-25 ENCOUNTER — HOSPITAL ENCOUNTER (OUTPATIENT)
Dept: RADIOLOGY | Facility: HOSPITAL | Age: 66
Discharge: HOME | End: 2021-08-25
Attending: INTERNAL MEDICINE
Payer: MEDICARE

## 2021-08-25 VITALS
BODY MASS INDEX: 34.75 KG/M2 | WEIGHT: 270.75 LBS | SYSTOLIC BLOOD PRESSURE: 120 MMHG | DIASTOLIC BLOOD PRESSURE: 72 MMHG | OXYGEN SATURATION: 97 % | HEART RATE: 69 BPM | RESPIRATION RATE: 18 BRPM | HEIGHT: 74 IN

## 2021-08-25 DIAGNOSIS — J45.20 MILD INTERMITTENT ASTHMA, UNCOMPLICATED: ICD-10-CM

## 2021-08-25 DIAGNOSIS — J44.89 ASTHMA WITH COPD: Primary | ICD-10-CM

## 2021-08-25 DIAGNOSIS — J45.40 ASTHMA, CHRONIC, MODERATE PERSISTENT, UNCOMPLICATED: ICD-10-CM

## 2021-08-25 DIAGNOSIS — J44.89 ASTHMA WITH COPD: ICD-10-CM

## 2021-08-25 DIAGNOSIS — J01.90 ACUTE NON-RECURRENT SINUSITIS, UNSPECIFIED LOCATION: ICD-10-CM

## 2021-08-25 DIAGNOSIS — G47.33 OSA ON CPAP: Primary | ICD-10-CM

## 2021-08-25 PROCEDURE — 71046 XR CHEST PA AND LATERAL: ICD-10-PCS | Mod: 26,,, | Performed by: RADIOLOGY

## 2021-08-25 PROCEDURE — 99999 PR PBB SHADOW E&M-EST. PATIENT-LVL IV: ICD-10-PCS | Mod: PBBFAC,,, | Performed by: INTERNAL MEDICINE

## 2021-08-25 PROCEDURE — 71046 X-RAY EXAM CHEST 2 VIEWS: CPT | Mod: TC

## 2021-08-25 PROCEDURE — 99999 PR PBB SHADOW E&M-EST. PATIENT-LVL IV: CPT | Mod: PBBFAC,,, | Performed by: INTERNAL MEDICINE

## 2021-08-25 PROCEDURE — 99213 OFFICE O/P EST LOW 20 MIN: CPT | Mod: S$GLB,,, | Performed by: INTERNAL MEDICINE

## 2021-08-25 PROCEDURE — 99213 PR OFFICE/OUTPT VISIT, EST, LEVL III, 20-29 MIN: ICD-10-PCS | Mod: S$GLB,,, | Performed by: INTERNAL MEDICINE

## 2021-08-25 PROCEDURE — 71046 X-RAY EXAM CHEST 2 VIEWS: CPT | Mod: 26,,, | Performed by: RADIOLOGY

## 2021-08-25 RX ORDER — BUDESONIDE AND FORMOTEROL FUMARATE DIHYDRATE 160; 4.5 UG/1; UG/1
2 AEROSOL RESPIRATORY (INHALATION) EVERY 12 HOURS
Qty: 3 INHALER | Refills: 3 | Status: SHIPPED | OUTPATIENT
Start: 2021-08-25 | End: 2022-08-08 | Stop reason: ALTCHOICE

## 2021-08-25 RX ORDER — FLUTICASONE PROPIONATE 50 MCG
2 SPRAY, SUSPENSION (ML) NASAL DAILY
Qty: 48 G | Refills: 3 | Status: SHIPPED | OUTPATIENT
Start: 2021-08-25 | End: 2022-08-25

## 2021-08-25 RX ORDER — ALBUTEROL SULFATE 90 UG/1
2 AEROSOL, METERED RESPIRATORY (INHALATION) EVERY 4 HOURS PRN
Qty: 54 G | Refills: 3 | Status: SHIPPED | OUTPATIENT
Start: 2021-08-25 | End: 2023-11-09 | Stop reason: SDUPTHER

## 2021-08-25 RX ORDER — ALBUTEROL SULFATE 0.83 MG/ML
2.5 SOLUTION RESPIRATORY (INHALATION) EVERY 6 HOURS PRN
Qty: 360 ML | Refills: 11 | Status: SHIPPED | OUTPATIENT
Start: 2021-08-25 | End: 2022-08-25

## 2021-08-25 RX ORDER — METHYLPREDNISOLONE 4 MG/1
TABLET ORAL
Qty: 1 PACKAGE | Refills: 1 | Status: SHIPPED | OUTPATIENT
Start: 2021-08-25 | End: 2022-08-11

## 2021-08-25 RX ORDER — DOXYCYCLINE 100 MG/1
100 CAPSULE ORAL EVERY 12 HOURS
Qty: 20 CAPSULE | Refills: 1 | Status: SHIPPED | OUTPATIENT
Start: 2021-08-25 | End: 2022-10-17

## 2021-11-19 ENCOUNTER — PATIENT MESSAGE (OUTPATIENT)
Dept: FAMILY MEDICINE | Facility: CLINIC | Age: 66
End: 2021-11-19
Payer: MEDICARE

## 2021-11-19 DIAGNOSIS — N52.9 ERECTILE DYSFUNCTION, UNSPECIFIED ERECTILE DYSFUNCTION TYPE: ICD-10-CM

## 2021-11-22 RX ORDER — TADALAFIL 20 MG/1
20 TABLET ORAL DAILY PRN
Qty: 24 TABLET | Refills: 3 | Status: SHIPPED | OUTPATIENT
Start: 2021-11-22 | End: 2021-12-10 | Stop reason: SDUPTHER

## 2021-12-10 DIAGNOSIS — N52.9 ERECTILE DYSFUNCTION, UNSPECIFIED ERECTILE DYSFUNCTION TYPE: ICD-10-CM

## 2021-12-13 RX ORDER — TADALAFIL 20 MG/1
20 TABLET ORAL DAILY PRN
Qty: 24 TABLET | Refills: 3 | Status: SHIPPED | OUTPATIENT
Start: 2021-12-13 | End: 2022-07-26

## 2022-01-06 ENCOUNTER — TELEPHONE (OUTPATIENT)
Dept: FAMILY MEDICINE | Facility: CLINIC | Age: 67
End: 2022-01-06
Payer: MEDICARE

## 2022-01-06 NOTE — TELEPHONE ENCOUNTER
----- Message from Elin Chen sent at 1/6/2022 11:19 AM CST -----  .Type:  Needs Medical Advice    Who Called: MARNIE SHAH [[id number]]  Symptoms (please be specific):   How long has patient had these symptoms:   Pharmacy name and phone #:    Would the patient rather a call back or a response via [facility]?  Best Call Back Number:  [phone]  Additional Information:  pt is requesting a call from office regarding his wife has tested positive for covid and he needs to know what should he do. Please call

## 2022-01-06 NOTE — TELEPHONE ENCOUNTER
Pt is showing no symptoms. Wife tested positive. Pt is letting work know to see if they require pt to be tested or not for exposure. Pt was given information to former [employer] for the just in case.

## 2022-02-17 ENCOUNTER — TELEPHONE (OUTPATIENT)
Dept: ADMINISTRATIVE | Facility: HOSPITAL | Age: 67
End: 2022-02-17
Payer: MEDICARE

## 2022-07-22 ENCOUNTER — OFFICE VISIT (OUTPATIENT)
Dept: FAMILY MEDICINE | Facility: CLINIC | Age: 67
End: 2022-07-22
Payer: MEDICARE

## 2022-07-22 ENCOUNTER — LAB VISIT (OUTPATIENT)
Dept: LAB | Facility: HOSPITAL | Age: 67
End: 2022-07-22
Attending: FAMILY MEDICINE
Payer: MEDICARE

## 2022-07-22 VITALS
OXYGEN SATURATION: 98 % | DIASTOLIC BLOOD PRESSURE: 74 MMHG | WEIGHT: 267.88 LBS | TEMPERATURE: 98 F | HEIGHT: 74 IN | SYSTOLIC BLOOD PRESSURE: 126 MMHG | BODY MASS INDEX: 34.38 KG/M2 | HEART RATE: 54 BPM

## 2022-07-22 DIAGNOSIS — R79.89 OTHER SPECIFIED ABNORMAL FINDINGS OF BLOOD CHEMISTRY: ICD-10-CM

## 2022-07-22 DIAGNOSIS — R53.83 FATIGUE, UNSPECIFIED TYPE: ICD-10-CM

## 2022-07-22 DIAGNOSIS — G47.33 OSA (OBSTRUCTIVE SLEEP APNEA): ICD-10-CM

## 2022-07-22 DIAGNOSIS — K21.9 GASTROESOPHAGEAL REFLUX DISEASE, UNSPECIFIED WHETHER ESOPHAGITIS PRESENT: ICD-10-CM

## 2022-07-22 DIAGNOSIS — E78.5 HYPERLIPIDEMIA, UNSPECIFIED HYPERLIPIDEMIA TYPE: ICD-10-CM

## 2022-07-22 DIAGNOSIS — Z12.5 SCREENING FOR PROSTATE CANCER: ICD-10-CM

## 2022-07-22 DIAGNOSIS — J44.89 ASTHMA WITH COPD: ICD-10-CM

## 2022-07-22 DIAGNOSIS — N52.9 ERECTILE DYSFUNCTION, UNSPECIFIED ERECTILE DYSFUNCTION TYPE: ICD-10-CM

## 2022-07-22 DIAGNOSIS — Z00.00 ANNUAL PHYSICAL EXAM: Primary | ICD-10-CM

## 2022-07-22 LAB
ALBUMIN SERPL BCP-MCNC: 4 G/DL (ref 3.5–5.2)
ALP SERPL-CCNC: 71 U/L (ref 55–135)
ALT SERPL W/O P-5'-P-CCNC: 19 U/L (ref 10–44)
ANION GAP SERPL CALC-SCNC: 11 MMOL/L (ref 8–16)
AST SERPL-CCNC: 21 U/L (ref 10–40)
BASOPHILS # BLD AUTO: 0.07 K/UL (ref 0–0.2)
BASOPHILS NFR BLD: 1.2 % (ref 0–1.9)
BILIRUB SERPL-MCNC: 0.6 MG/DL (ref 0.1–1)
BUN SERPL-MCNC: 18 MG/DL (ref 8–23)
CALCIUM SERPL-MCNC: 9.9 MG/DL (ref 8.7–10.5)
CHLORIDE SERPL-SCNC: 106 MMOL/L (ref 95–110)
CHOLEST SERPL-MCNC: 186 MG/DL (ref 120–199)
CHOLEST/HDLC SERPL: 4.5 {RATIO} (ref 2–5)
CO2 SERPL-SCNC: 26 MMOL/L (ref 23–29)
COMPLEXED PSA SERPL-MCNC: 0.46 NG/ML (ref 0–4)
CREAT SERPL-MCNC: 0.8 MG/DL (ref 0.5–1.4)
DIFFERENTIAL METHOD: ABNORMAL
EOSINOPHIL # BLD AUTO: 0.2 K/UL (ref 0–0.5)
EOSINOPHIL NFR BLD: 3.7 % (ref 0–8)
ERYTHROCYTE [DISTWIDTH] IN BLOOD BY AUTOMATED COUNT: 13.9 % (ref 11.5–14.5)
EST. GFR  (AFRICAN AMERICAN): >60 ML/MIN/1.73 M^2
EST. GFR  (NON AFRICAN AMERICAN): >60 ML/MIN/1.73 M^2
ESTIMATED AVG GLUCOSE: 108 MG/DL (ref 68–131)
GLUCOSE SERPL-MCNC: 91 MG/DL (ref 70–110)
HBA1C MFR BLD: 5.4 % (ref 4–5.6)
HCT VFR BLD AUTO: 43.1 % (ref 40–54)
HDLC SERPL-MCNC: 41 MG/DL (ref 40–75)
HDLC SERPL: 22 % (ref 20–50)
HGB BLD-MCNC: 14.9 G/DL (ref 14–18)
IMM GRANULOCYTES # BLD AUTO: 0.01 K/UL (ref 0–0.04)
IMM GRANULOCYTES NFR BLD AUTO: 0.2 % (ref 0–0.5)
LDLC SERPL CALC-MCNC: 125.8 MG/DL (ref 63–159)
LYMPHOCYTES # BLD AUTO: 2.8 K/UL (ref 1–4.8)
LYMPHOCYTES NFR BLD: 48.1 % (ref 18–48)
MCH RBC QN AUTO: 30.5 PG (ref 27–31)
MCHC RBC AUTO-ENTMCNC: 34.6 G/DL (ref 32–36)
MCV RBC AUTO: 88 FL (ref 82–98)
MONOCYTES # BLD AUTO: 0.6 K/UL (ref 0.3–1)
MONOCYTES NFR BLD: 9.3 % (ref 4–15)
NEUTROPHILS # BLD AUTO: 2.2 K/UL (ref 1.8–7.7)
NEUTROPHILS NFR BLD: 37.5 % (ref 38–73)
NONHDLC SERPL-MCNC: 145 MG/DL
NRBC BLD-RTO: 0 /100 WBC
PLATELET # BLD AUTO: 247 K/UL (ref 150–450)
PMV BLD AUTO: 10.6 FL (ref 9.2–12.9)
POTASSIUM SERPL-SCNC: 4.3 MMOL/L (ref 3.5–5.1)
PROT SERPL-MCNC: 7.3 G/DL (ref 6–8.4)
RBC # BLD AUTO: 4.89 M/UL (ref 4.6–6.2)
SODIUM SERPL-SCNC: 143 MMOL/L (ref 136–145)
TESTOST SERPL-MCNC: 556 NG/DL (ref 304–1227)
TRIGL SERPL-MCNC: 96 MG/DL (ref 30–150)
TSH SERPL DL<=0.005 MIU/L-ACNC: 0.94 UIU/ML (ref 0.4–4)
WBC # BLD AUTO: 5.9 K/UL (ref 3.9–12.7)

## 2022-07-22 PROCEDURE — 99999 PR PBB SHADOW E&M-EST. PATIENT-LVL IV: ICD-10-PCS | Mod: PBBFAC,,, | Performed by: FAMILY MEDICINE

## 2022-07-22 PROCEDURE — 3008F PR BODY MASS INDEX (BMI) DOCUMENTED: ICD-10-PCS | Mod: CPTII,S$GLB,, | Performed by: FAMILY MEDICINE

## 2022-07-22 PROCEDURE — 80061 LIPID PANEL: CPT | Performed by: FAMILY MEDICINE

## 2022-07-22 PROCEDURE — 85025 COMPLETE CBC W/AUTO DIFF WBC: CPT | Performed by: FAMILY MEDICINE

## 2022-07-22 PROCEDURE — 90677 PNEUMOCOCCAL CONJUGATE VACCINE 20-VALENT: ICD-10-PCS | Mod: S$GLB,,, | Performed by: FAMILY MEDICINE

## 2022-07-22 PROCEDURE — 3008F BODY MASS INDEX DOCD: CPT | Mod: CPTII,S$GLB,, | Performed by: FAMILY MEDICINE

## 2022-07-22 PROCEDURE — 3078F PR MOST RECENT DIASTOLIC BLOOD PRESSURE < 80 MM HG: ICD-10-PCS | Mod: CPTII,S$GLB,, | Performed by: FAMILY MEDICINE

## 2022-07-22 PROCEDURE — G0009 ADMIN PNEUMOCOCCAL VACCINE: HCPCS | Mod: S$GLB,,, | Performed by: FAMILY MEDICINE

## 2022-07-22 PROCEDURE — 1159F PR MEDICATION LIST DOCUMENTED IN MEDICAL RECORD: ICD-10-PCS | Mod: CPTII,S$GLB,, | Performed by: FAMILY MEDICINE

## 2022-07-22 PROCEDURE — 99397 PR PREVENTIVE VISIT,EST,65 & OVER: ICD-10-PCS | Mod: S$GLB,,, | Performed by: FAMILY MEDICINE

## 2022-07-22 PROCEDURE — 99999 PR PBB SHADOW E&M-EST. PATIENT-LVL IV: CPT | Mod: PBBFAC,,, | Performed by: FAMILY MEDICINE

## 2022-07-22 PROCEDURE — G0009 PNEUMOCOCCAL CONJUGATE VACCINE 20-VALENT: ICD-10-PCS | Mod: S$GLB,,, | Performed by: FAMILY MEDICINE

## 2022-07-22 PROCEDURE — 1126F AMNT PAIN NOTED NONE PRSNT: CPT | Mod: CPTII,S$GLB,, | Performed by: FAMILY MEDICINE

## 2022-07-22 PROCEDURE — 84153 ASSAY OF PSA TOTAL: CPT | Performed by: FAMILY MEDICINE

## 2022-07-22 PROCEDURE — 84403 ASSAY OF TOTAL TESTOSTERONE: CPT | Performed by: FAMILY MEDICINE

## 2022-07-22 PROCEDURE — 1101F PT FALLS ASSESS-DOCD LE1/YR: CPT | Mod: CPTII,S$GLB,, | Performed by: FAMILY MEDICINE

## 2022-07-22 PROCEDURE — 1101F PR PT FALLS ASSESS DOC 0-1 FALLS W/OUT INJ PAST YR: ICD-10-PCS | Mod: CPTII,S$GLB,, | Performed by: FAMILY MEDICINE

## 2022-07-22 PROCEDURE — 99397 PER PM REEVAL EST PAT 65+ YR: CPT | Mod: S$GLB,,, | Performed by: FAMILY MEDICINE

## 2022-07-22 PROCEDURE — 3078F DIAST BP <80 MM HG: CPT | Mod: CPTII,S$GLB,, | Performed by: FAMILY MEDICINE

## 2022-07-22 PROCEDURE — 3074F PR MOST RECENT SYSTOLIC BLOOD PRESSURE < 130 MM HG: ICD-10-PCS | Mod: CPTII,S$GLB,, | Performed by: FAMILY MEDICINE

## 2022-07-22 PROCEDURE — 1159F MED LIST DOCD IN RCRD: CPT | Mod: CPTII,S$GLB,, | Performed by: FAMILY MEDICINE

## 2022-07-22 PROCEDURE — 36415 COLL VENOUS BLD VENIPUNCTURE: CPT | Mod: PO | Performed by: FAMILY MEDICINE

## 2022-07-22 PROCEDURE — 80053 COMPREHEN METABOLIC PANEL: CPT | Performed by: FAMILY MEDICINE

## 2022-07-22 PROCEDURE — 83036 HEMOGLOBIN GLYCOSYLATED A1C: CPT | Performed by: FAMILY MEDICINE

## 2022-07-22 PROCEDURE — 3288F FALL RISK ASSESSMENT DOCD: CPT | Mod: CPTII,S$GLB,, | Performed by: FAMILY MEDICINE

## 2022-07-22 PROCEDURE — 84443 ASSAY THYROID STIM HORMONE: CPT | Performed by: FAMILY MEDICINE

## 2022-07-22 PROCEDURE — 90677 PCV20 VACCINE IM: CPT | Mod: S$GLB,,, | Performed by: FAMILY MEDICINE

## 2022-07-22 PROCEDURE — 3074F SYST BP LT 130 MM HG: CPT | Mod: CPTII,S$GLB,, | Performed by: FAMILY MEDICINE

## 2022-07-22 PROCEDURE — 1126F PR PAIN SEVERITY QUANTIFIED, NO PAIN PRESENT: ICD-10-PCS | Mod: CPTII,S$GLB,, | Performed by: FAMILY MEDICINE

## 2022-07-22 PROCEDURE — 3288F PR FALLS RISK ASSESSMENT DOCUMENTED: ICD-10-PCS | Mod: CPTII,S$GLB,, | Performed by: FAMILY MEDICINE

## 2022-07-22 NOTE — PROGRESS NOTES
Subjective:       Patient ID: Yung Mcconnell is a 66 y.o. male.    Chief Complaint: Annual Exam      HPI Comments:       Current Outpatient Medications:     albuterol (PROVENTIL) 2.5 mg /3 mL (0.083 %) nebulizer solution, Take 3 mLs (2.5 mg total) by nebulization every 6 (six) hours as needed for Wheezing or Shortness of Breath., Disp: 360 mL, Rfl: 11    albuterol (PROVENTIL/VENTOLIN HFA) 90 mcg/actuation inhaler, Inhale 2 puffs into the lungs every 4 (four) hours as needed for Wheezing or Shortness of Breath., Disp: 54 g, Rfl: 3    azelaic acid (AZELEX) 15 % gel, Apply 1 application topically 2 (two) times a day., Disp: , Rfl:     budesonide-formoterol 160-4.5 mcg (SYMBICORT) 160-4.5 mcg/actuation HFAA, Inhale 2 puffs into the lungs every 12 (twelve) hours. Wash out mouth after using, Disp: 3 Inhaler, Rfl: 3    cetirizine (ZYRTEC) 10 MG tablet, Take 10 mg by mouth once daily., Disp: , Rfl:     cyclobenzaprine (FLEXERIL) 5 MG tablet, 1 Tablet Oral Three times a day as needed., Disp: 20 tablet, Rfl: 0    fluticasone propionate (FLONASE) 50 mcg/actuation nasal spray, 2 sprays (100 mcg total) by Each Nostril route once daily., Disp: 48 g, Rfl: 3    GLUCOSAMINE HCL/CHONDR ALFONSO A NA (OSTEO BI-FLEX ORAL), Take 1 tablet by mouth once daily. , Disp: , Rfl:     methylPREDNISolone (MEDROL DOSEPACK) 4 mg tablet, use as directed, Disp: 1 Package, Rfl: 1    multivitamin (THERAGRAN) per tablet, 1 Tablet Oral Every day, Disp: , Rfl:     tadalafiL (CIALIS) 20 MG Tab, Take 1 tablet (20 mg total) by mouth daily as needed., Disp: 24 tablet, Rfl: 3    diclofenac 1.3 % PT12, Apply 1 patch topically every 12 (twelve) hours as needed., Disp: 30 patch, Rfl: 0    doxycycline (MONODOX) 100 MG capsule, Take 1 capsule (100 mg total) by mouth every 12 (twelve) hours. (Patient not taking: Reported on 7/22/2022), Disp: 20 capsule, Rfl: 1    metroNIDAZOLE (METROGEL) 0.75 % gel, Apply topically 2 (two) times daily., Disp: 45 g, Rfl:  "2    Here for annual physical.  Complains of lethargy.  No pep.  Would like testosterone checked.  Denies depression.  Wife does not suspect depression..  Rare alcohol.  Started exercising more with walking.  Uses Cialis for ED.  libido could be better.  Feels rested in the a.m. after CPAP use nightly.  Denies any problems with mood or interest in things    Does not uses Symbicort very often.  Would like to start using it more.  Also needs to use his Flonase more to control his postnasal drip.    Has episode of heartburn every afternoon.  Controlled with Pepcid    Review of Systems   Constitutional: Positive for fatigue. Negative for activity change, appetite change and fever.   HENT: Negative for sore throat.    Respiratory: Negative for cough and shortness of breath.    Cardiovascular: Negative for chest pain.   Gastrointestinal: Negative for abdominal pain, diarrhea and nausea.   Genitourinary: Negative for difficulty urinating.   Musculoskeletal: Negative for arthralgias and myalgias.   Neurological: Negative for dizziness and headaches.       Objective:      Vitals:    07/22/22 1021   BP: 126/74   Pulse: (!) 54   Temp: 97.6 °F (36.4 °C)   TempSrc: Temporal   SpO2: 98%   Weight: 121.5 kg (267 lb 13.7 oz)   Height: 6' 2" (1.88 m)   PainSc: 0-No pain     Physical Exam  Vitals and nursing note reviewed.   Constitutional:       General: He is not in acute distress.     Appearance: He is well-developed. He is not diaphoretic.   HENT:      Head: Normocephalic.   Neck:      Thyroid: No thyromegaly.   Cardiovascular:      Rate and Rhythm: Normal rate and regular rhythm.      Heart sounds: Normal heart sounds. No murmur heard.  Pulmonary:      Effort: Pulmonary effort is normal.      Breath sounds: Normal breath sounds. No wheezing or rales.   Abdominal:      General: There is no distension.      Palpations: Abdomen is soft. There is no hepatomegaly, splenomegaly or mass.      Tenderness: There is no abdominal " tenderness.   Musculoskeletal:      Cervical back: Neck supple.      Right lower leg: No edema.      Left lower leg: No edema.   Lymphadenopathy:      Cervical: No cervical adenopathy.   Skin:     General: Skin is warm and dry.   Neurological:      Mental Status: He is alert and oriented to person, place, and time.   Psychiatric:         Mood and Affect: Mood normal.         Behavior: Behavior normal.         Thought Content: Thought content normal.         Judgment: Judgment normal.         Assessment:       1. Annual physical exam    2. Hyperlipidemia, unspecified hyperlipidemia type    3. Asthma with COPD    4. KEVEN (obstructive sleep apnea)    5. Erectile dysfunction, unspecified erectile dysfunction type    6. Screening for prostate cancer    7. Fatigue, unspecified type    8. Other specified abnormal findings of blood chemistry     9. Gastroesophageal reflux disease, unspecified whether esophagitis present        Plan:   Annual physical exam  Comments:  Prevnar 20.   Orders:  -     (In Office Administered) Pneumococcal Conjugate Vaccine (20 Valent) (IM)    Hyperlipidemia, unspecified hyperlipidemia type  Comments:  Diet controlled.  Fasting lipid profile  Orders:  -     Lipid Panel; Future; Expected date: 07/22/2022    Asthma with COPD  Comments:  Not using medications regularly.  Encouraged regular use.  May help with energy    KEVEN (obstructive sleep apnea)  Comments:  nightly CPAP. Feels rested    Erectile dysfunction, unspecified erectile dysfunction type  Comments:  Doing well with Cialis p.r.n.    Screening for prostate cancer  Comments:  PSA ordered  Orders:  -     PSA, Screening; Future; Expected date: 07/22/2022    Fatigue, unspecified type  Comments:  No Pap.  CPAP effective.  Negative initial screening for depression.  No alcohol.  Check testosterone, TSH.  Has increased physical activity  Orders:  -     CBC Auto Differential; Future; Expected date: 07/22/2022  -     Comprehensive Metabolic Panel;  Future; Expected date: 07/22/2022  -     Testosterone; Future; Expected date: 07/22/2022  -     TSH; Future; Expected date: 07/22/2022  -     Hemoglobin A1C; Future; Expected date: 07/22/2022    Other specified abnormal findings of blood chemistry   -     Hemoglobin A1C; Future; Expected date: 07/22/2022    Gastroesophageal reflux disease, unspecified whether esophagitis present  Comments:  Frequent Pepcid use.  Will monitor

## 2022-07-25 DIAGNOSIS — N52.9 ERECTILE DYSFUNCTION, UNSPECIFIED ERECTILE DYSFUNCTION TYPE: ICD-10-CM

## 2022-07-25 NOTE — TELEPHONE ENCOUNTER
No new care gaps identified.  [facility] Embedded Care Gaps. Reference number: [id number]. 7/25/2022   12:04:47 AM MILAGROST

## 2022-07-26 RX ORDER — TADALAFIL 20 MG/1
TABLET ORAL
Qty: 24 TABLET | Refills: 3 | Status: SHIPPED | OUTPATIENT
Start: 2022-07-26 | End: 2023-03-20

## 2022-07-26 NOTE — TELEPHONE ENCOUNTER
Refill Authorization Note   Yung Mcconnell  is requesting a refill authorization.  Brief Assessment and Rationale for Refill:  Approve     Medication Therapy Plan:       Medication Reconciliation Completed: No   Comments:     No Care Gaps Recommended     Note composed:9:54 AM 07/26/2022

## 2022-08-02 DIAGNOSIS — J44.89 ASTHMA WITH COPD: Primary | ICD-10-CM

## 2022-08-05 ENCOUNTER — OFFICE VISIT (OUTPATIENT)
Dept: URGENT CARE | Facility: CLINIC | Age: 67
End: 2022-08-05
Payer: MEDICARE

## 2022-08-05 VITALS
TEMPERATURE: 98 F | DIASTOLIC BLOOD PRESSURE: 66 MMHG | HEIGHT: 74 IN | OXYGEN SATURATION: 96 % | BODY MASS INDEX: 34.39 KG/M2 | HEART RATE: 70 BPM | WEIGHT: 268 LBS | SYSTOLIC BLOOD PRESSURE: 122 MMHG | RESPIRATION RATE: 18 BRPM

## 2022-08-05 DIAGNOSIS — J45.901 ASTHMA EXACERBATION IN COPD: Primary | ICD-10-CM

## 2022-08-05 DIAGNOSIS — J44.1 ASTHMA EXACERBATION IN COPD: Primary | ICD-10-CM

## 2022-08-05 LAB
CTP QC/QA: YES
SARS-COV-2 RDRP RESP QL NAA+PROBE: NEGATIVE

## 2022-08-05 PROCEDURE — 99214 OFFICE O/P EST MOD 30 MIN: CPT | Mod: S$GLB,,, | Performed by: PHYSICIAN ASSISTANT

## 2022-08-05 PROCEDURE — 3078F DIAST BP <80 MM HG: CPT | Mod: CPTII,S$GLB,, | Performed by: PHYSICIAN ASSISTANT

## 2022-08-05 PROCEDURE — 1159F MED LIST DOCD IN RCRD: CPT | Mod: CPTII,S$GLB,, | Performed by: PHYSICIAN ASSISTANT

## 2022-08-05 PROCEDURE — 1159F PR MEDICATION LIST DOCUMENTED IN MEDICAL RECORD: ICD-10-PCS | Mod: CPTII,S$GLB,, | Performed by: PHYSICIAN ASSISTANT

## 2022-08-05 PROCEDURE — 3008F PR BODY MASS INDEX (BMI) DOCUMENTED: ICD-10-PCS | Mod: CPTII,S$GLB,, | Performed by: PHYSICIAN ASSISTANT

## 2022-08-05 PROCEDURE — 3044F HG A1C LEVEL LT 7.0%: CPT | Mod: CPTII,S$GLB,, | Performed by: PHYSICIAN ASSISTANT

## 2022-08-05 PROCEDURE — 99214 PR OFFICE/OUTPT VISIT, EST, LEVL IV, 30-39 MIN: ICD-10-PCS | Mod: S$GLB,,, | Performed by: PHYSICIAN ASSISTANT

## 2022-08-05 PROCEDURE — 1160F RVW MEDS BY RX/DR IN RCRD: CPT | Mod: CPTII,S$GLB,, | Performed by: PHYSICIAN ASSISTANT

## 2022-08-05 PROCEDURE — 1160F PR REVIEW ALL MEDS BY PRESCRIBER/CLIN PHARMACIST DOCUMENTED: ICD-10-PCS | Mod: CPTII,S$GLB,, | Performed by: PHYSICIAN ASSISTANT

## 2022-08-05 PROCEDURE — 3074F PR MOST RECENT SYSTOLIC BLOOD PRESSURE < 130 MM HG: ICD-10-PCS | Mod: CPTII,S$GLB,, | Performed by: PHYSICIAN ASSISTANT

## 2022-08-05 PROCEDURE — 3074F SYST BP LT 130 MM HG: CPT | Mod: CPTII,S$GLB,, | Performed by: PHYSICIAN ASSISTANT

## 2022-08-05 PROCEDURE — 3044F PR MOST RECENT HEMOGLOBIN A1C LEVEL <7.0%: ICD-10-PCS | Mod: CPTII,S$GLB,, | Performed by: PHYSICIAN ASSISTANT

## 2022-08-05 PROCEDURE — 3078F PR MOST RECENT DIASTOLIC BLOOD PRESSURE < 80 MM HG: ICD-10-PCS | Mod: CPTII,S$GLB,, | Performed by: PHYSICIAN ASSISTANT

## 2022-08-05 PROCEDURE — 3008F BODY MASS INDEX DOCD: CPT | Mod: CPTII,S$GLB,, | Performed by: PHYSICIAN ASSISTANT

## 2022-08-05 PROCEDURE — U0002 COVID-19 LAB TEST NON-CDC: HCPCS | Mod: QW,S$GLB,, | Performed by: PHYSICIAN ASSISTANT

## 2022-08-05 PROCEDURE — U0002: ICD-10-PCS | Mod: QW,S$GLB,, | Performed by: PHYSICIAN ASSISTANT

## 2022-08-05 RX ORDER — CODEINE PHOSPHATE AND GUAIFENESIN 10; 100 MG/5ML; MG/5ML
10 SOLUTION ORAL EVERY 6 HOURS PRN
Qty: 240 ML | Refills: 0 | Status: SHIPPED | OUTPATIENT
Start: 2022-08-05 | End: 2022-08-15 | Stop reason: SDUPTHER

## 2022-08-05 RX ORDER — PREDNISONE 20 MG/1
TABLET ORAL
Qty: 10 TABLET | Refills: 0 | Status: SHIPPED | OUTPATIENT
Start: 2022-08-05 | End: 2022-08-11

## 2022-08-05 NOTE — PATIENT INSTRUCTIONS
Take cheratussin cough syrup every 4-6 hours (but do not combine with over the counter cough syrup or mucinex products). The codeine may cause drowsiness. Rest and drink plenty of fluids.Tylenol for fever, sore throat or body aches. Flonase 1 spray/nostril daily will help with nasal congestion.     Your albuterol 2-4 puffs or nebulizer every 4-6 hours as needed. Follow up with your pulmonologist on Monday as scheduled.     You need re-evaluated for any new persistent fever > 4 days, sinus pain/pressure not improving > 10 days, progressive chest pain or shortness of breath, or cough > 4 weeks.

## 2022-08-05 NOTE — PROGRESS NOTES
"Subjective:       Patient ID: Yung Mcconnell is a 66 y.o. male.    Vitals:  height is 6' 2" (1.88 m) and weight is 121.6 kg (268 lb). His oral temperature is 98.3 °F (36.8 °C). His blood pressure is 122/66 and his pulse is 70. His respiration is 18 and oxygen saturation is 96%.     Chief Complaint: Cough    Pt is a 65 yo with hx of COPD and asthma who c/o cough with phlegm, congestion, st, nasal drip, sob, and st x 8/2 and progressively got worse yesterday. Pt got pneumonia vaccine Friday 7/29 and mentions sx started a few days after. No fever or CP. Using albuterol rescue inhaler 3-4x daily. Has not been using Symbicort recently bc he states it has not been "helping me."     Cough  This is a recurrent problem. The current episode started in the past 7 days. The cough is productive of sputum. Associated symptoms include nasal congestion, postnasal drip, a sore throat and shortness of breath. Pertinent negatives include no chest pain, chills, ear congestion, ear pain, fever, headaches, heartburn, hemoptysis, myalgias, rash, rhinorrhea, sweats, weight loss or wheezing. Exacerbated by: activity  He has tried OTC cough suppressant for the symptoms. The treatment provided no relief.       Constitution: Negative for chills and fever.   HENT: Positive for congestion, postnasal drip and sore throat. Negative for ear pain.    Neck: Negative for neck pain and neck stiffness.   Cardiovascular: Negative for chest pain, leg swelling, palpitations and sob on exertion.   Eyes: Negative for eye discharge, eye itching and eye pain.   Respiratory: Positive for cough, sputum production and shortness of breath. Negative for bloody sputum and wheezing.    Gastrointestinal: Negative for abdominal pain, nausea, vomiting, diarrhea and heartburn.   Genitourinary: Negative for dysuria.   Musculoskeletal: Negative for muscle ache.   Skin: Negative for rash.   Neurological: Negative for headaches.       Objective:      Physical Exam "   Constitutional: He is oriented to person, place, and time. He appears well-developed. He is cooperative.  Non-toxic appearance. He does not appear ill. No distress.   HENT:   Head: Normocephalic and atraumatic.   Ears:   Right Ear: Hearing, tympanic membrane, external ear and ear canal normal.   Left Ear: Hearing, tympanic membrane, external ear and ear canal normal.   Nose: Mucosal edema and congestion present. No rhinorrhea or nasal deformity. No epistaxis. Right sinus exhibits no maxillary sinus tenderness and no frontal sinus tenderness. Left sinus exhibits no maxillary sinus tenderness and no frontal sinus tenderness.   Mouth/Throat: Uvula is midline, oropharynx is clear and moist and mucous membranes are normal. No trismus in the jaw. Normal dentition. No uvula swelling. No oropharyngeal exudate, posterior oropharyngeal edema or posterior oropharyngeal erythema.   Eyes: Conjunctivae and lids are normal. No scleral icterus.   Neck: Trachea normal and phonation normal. Neck supple. No edema present. No erythema present. No neck rigidity present.   Cardiovascular: Normal rate, regular rhythm, normal heart sounds and normal pulses.   Pulmonary/Chest: Effort normal and breath sounds normal. No respiratory distress. He has no decreased breath sounds. He has no wheezes. He has no rhonchi. He has no rales.   Abdominal: Normal appearance. There is no abdominal tenderness.   Musculoskeletal: Normal range of motion.         General: No deformity. Normal range of motion.   Neurological: He is alert and oriented to person, place, and time. He exhibits normal muscle tone. Coordination normal.   Skin: Skin is warm, dry, intact, not diaphoretic and not pale.   Psychiatric: His speech is normal and behavior is normal. Judgment and thought content normal.   Nursing note and vitals reviewed.        Results for orders placed or performed in visit on 08/05/22   POCT COVID-19 Rapid Screening   Result Value Ref Range    POC Rapid  COVID Negative Negative     Acceptable Yes        Assessment:       1. Asthma exacerbation in COPD          Plan:       Lungs currently CTA jeanne. VSS afebrile. I see no indications for abx at this point in time. Discussed that he should be on his long acting inhaler to prevent exacerbations. He states he has appt with pulm on Monday for routine visit and will discuss other options with him at that time.    rx prednisone, cheratussin and flonase prn. Monitor for fever or worsening symptoms to seek re-eval    Asthma exacerbation in COPD  -     POCT COVID-19 Rapid Screening  -     predniSONE (DELTASONE) 20 MG tablet; Take 1 tablet (20 mg total) by mouth 2 (two) times daily for 3 days, THEN 1 tablet (20 mg total) once daily for 4 days.  Dispense: 10 tablet; Refill: 0  -     guaiFENesin-codeine 100-10 mg/5 ml (CHERATUSSIN AC)  mg/5 mL syrup; Take 10 mLs by mouth every 6 (six) hours as needed for Cough or Congestion.  Dispense: 240 mL; Refill: 0    Lyric Ward PA-C  [facility]         Patient Instructions   Take cheratussin cough syrup every 4-6 hours (but do not combine with over the counter cough syrup or mucinex products). The codeine may cause drowsiness. Rest and drink plenty of fluids.Tylenol for fever, sore throat or body aches. Flonase 1 spray/nostril daily will help with nasal congestion.     Your albuterol 2-4 puffs or nebulizer every 4-6 hours as needed. Follow up with your pulmonologist on Monday as scheduled.     You need re-evaluated for any new persistent fever > 4 days, sinus pain/pressure not improving > 10 days, progressive chest pain or shortness of breath, or cough > 4 weeks.

## 2022-08-08 ENCOUNTER — TELEPHONE (OUTPATIENT)
Dept: URGENT CARE | Facility: CLINIC | Age: 67
End: 2022-08-08
Payer: MEDICARE

## 2022-08-08 ENCOUNTER — HOSPITAL ENCOUNTER (OUTPATIENT)
Dept: RADIOLOGY | Facility: HOSPITAL | Age: 67
Discharge: HOME | End: 2022-08-08
Attending: INTERNAL MEDICINE
Payer: MEDICARE

## 2022-08-08 ENCOUNTER — OFFICE VISIT (OUTPATIENT)
Dept: PULMONOLOGY | Facility: CLINIC | Age: 67
End: 2022-08-08
Payer: MEDICARE

## 2022-08-08 VITALS
DIASTOLIC BLOOD PRESSURE: 90 MMHG | RESPIRATION RATE: 18 BRPM | OXYGEN SATURATION: 96 % | BODY MASS INDEX: 34.55 KG/M2 | HEIGHT: 74 IN | HEART RATE: 62 BPM | SYSTOLIC BLOOD PRESSURE: 142 MMHG | WEIGHT: 269.19 LBS

## 2022-08-08 DIAGNOSIS — G47.33 OSA ON CPAP: ICD-10-CM

## 2022-08-08 DIAGNOSIS — J44.89 ASTHMA WITH COPD: ICD-10-CM

## 2022-08-08 DIAGNOSIS — G47.33 OSA (OBSTRUCTIVE SLEEP APNEA): ICD-10-CM

## 2022-08-08 DIAGNOSIS — J45.21 MILD INTERMITTENT ASTHMA WITH ACUTE EXACERBATION: Primary | ICD-10-CM

## 2022-08-08 PROCEDURE — 1159F PR MEDICATION LIST DOCUMENTED IN MEDICAL RECORD: ICD-10-PCS | Mod: CPTII,S$GLB,, | Performed by: INTERNAL MEDICINE

## 2022-08-08 PROCEDURE — 99215 PR OFFICE/OUTPT VISIT, EST, LEVL V, 40-54 MIN: ICD-10-PCS | Mod: S$GLB,,, | Performed by: INTERNAL MEDICINE

## 2022-08-08 PROCEDURE — 1101F PT FALLS ASSESS-DOCD LE1/YR: CPT | Mod: CPTII,S$GLB,, | Performed by: INTERNAL MEDICINE

## 2022-08-08 PROCEDURE — 3080F PR MOST RECENT DIASTOLIC BLOOD PRESSURE >= 90 MM HG: ICD-10-PCS | Mod: CPTII,S$GLB,, | Performed by: INTERNAL MEDICINE

## 2022-08-08 PROCEDURE — 71046 X-RAY EXAM CHEST 2 VIEWS: CPT | Mod: TC

## 2022-08-08 PROCEDURE — 1101F PR PT FALLS ASSESS DOC 0-1 FALLS W/OUT INJ PAST YR: ICD-10-PCS | Mod: CPTII,S$GLB,, | Performed by: INTERNAL MEDICINE

## 2022-08-08 PROCEDURE — 3077F PR MOST RECENT SYSTOLIC BLOOD PRESSURE >= 140 MM HG: ICD-10-PCS | Mod: CPTII,S$GLB,, | Performed by: INTERNAL MEDICINE

## 2022-08-08 PROCEDURE — 3044F PR MOST RECENT HEMOGLOBIN A1C LEVEL <7.0%: ICD-10-PCS | Mod: CPTII,S$GLB,, | Performed by: INTERNAL MEDICINE

## 2022-08-08 PROCEDURE — 1159F MED LIST DOCD IN RCRD: CPT | Mod: CPTII,S$GLB,, | Performed by: INTERNAL MEDICINE

## 2022-08-08 PROCEDURE — 3077F SYST BP >= 140 MM HG: CPT | Mod: CPTII,S$GLB,, | Performed by: INTERNAL MEDICINE

## 2022-08-08 PROCEDURE — 3080F DIAST BP >= 90 MM HG: CPT | Mod: CPTII,S$GLB,, | Performed by: INTERNAL MEDICINE

## 2022-08-08 PROCEDURE — 99215 OFFICE O/P EST HI 40 MIN: CPT | Mod: S$GLB,,, | Performed by: INTERNAL MEDICINE

## 2022-08-08 PROCEDURE — 3044F HG A1C LEVEL LT 7.0%: CPT | Mod: CPTII,S$GLB,, | Performed by: INTERNAL MEDICINE

## 2022-08-08 PROCEDURE — 3008F PR BODY MASS INDEX (BMI) DOCUMENTED: ICD-10-PCS | Mod: CPTII,S$GLB,, | Performed by: INTERNAL MEDICINE

## 2022-08-08 PROCEDURE — 3288F PR FALLS RISK ASSESSMENT DOCUMENTED: ICD-10-PCS | Mod: CPTII,S$GLB,, | Performed by: INTERNAL MEDICINE

## 2022-08-08 PROCEDURE — 99999 PR PBB SHADOW E&M-EST. PATIENT-LVL IV: CPT | Mod: PBBFAC,,, | Performed by: INTERNAL MEDICINE

## 2022-08-08 PROCEDURE — 99999 PR PBB SHADOW E&M-EST. PATIENT-LVL IV: ICD-10-PCS | Mod: PBBFAC,,, | Performed by: INTERNAL MEDICINE

## 2022-08-08 PROCEDURE — 71046 X-RAY EXAM CHEST 2 VIEWS: CPT | Mod: 26,,, | Performed by: RADIOLOGY

## 2022-08-08 PROCEDURE — 3008F BODY MASS INDEX DOCD: CPT | Mod: CPTII,S$GLB,, | Performed by: INTERNAL MEDICINE

## 2022-08-08 PROCEDURE — 71046 XR CHEST PA AND LATERAL: ICD-10-PCS | Mod: 26,,, | Performed by: RADIOLOGY

## 2022-08-08 PROCEDURE — 3288F FALL RISK ASSESSMENT DOCD: CPT | Mod: CPTII,S$GLB,, | Performed by: INTERNAL MEDICINE

## 2022-08-08 RX ORDER — FLUTICASONE FUROATE, UMECLIDINIUM BROMIDE AND VILANTEROL TRIFENATATE 200; 62.5; 25 UG/1; UG/1; UG/1
1 POWDER RESPIRATORY (INHALATION) DAILY
Qty: 60 EACH | Refills: 11 | Status: SHIPPED | OUTPATIENT
Start: 2022-08-08 | End: 2022-11-14 | Stop reason: SDUPTHER

## 2022-08-08 NOTE — PROGRESS NOTES
Subjective:     Patient ID: Yung Mcconnell is a 66 y.o. male.    Chief Complaint:      HPI  65 y/o with history of Chronic Obstructive Pulmonary Disease, s/p right lung surgery and Obstructive Sleep Apnea  history of COVID in August 2020  Worsening Chest congestion after pneumonia vaccine  Started on steroids and amoxil  Occurred about two weeks ago   Symbicort not as effective  Using neb and albuterol     Obstructive Sleep Apnea on Continuous Positive Airway Pressure:  Patient is using CPAP as prescribed and benefiting from therapy. Patient has complaints of none  Compliance Report  Compliance  Payor Standard  Usage 05/10/2022 - 08/07/2022  Usage days 90/90 days (100%)  >= 4 hours 90 days (100%)  < 4 hours 0 days (0%)  Usage hours 651 hours 49 minutes  Average usage (total days) 7 hours 15 minutes  Average usage (days used) 7 hours 15 minutes  Median usage (days used) 7 hours 11 minutes  AirSense 10 AutoSet  Serial number [id number]  Mode CPAP  Set pressure 11 cmH2O  EPR Ramp Only  EPR level 3  Therapy  Leaks - L/min Median: 2.9 95th percentile: 19.8 Maximum: 35.6  Events per hour AI: 0.9 HI: 0.6 AHI: 1.5  Apnea Index Central: 0.3 Obstructive: 0.5 Unknown: 0.1          Past Medical History:   Diagnosis Date    Allergy     Asthma, chronic 7/9/2013    Colon polyps     COPD (chronic obstructive pulmonary disease)     GERD (gastroesophageal reflux disease)     High cholesterol     Osteoarthritis of both knees 7/9/2013    Sleep apnea      Past Surgical History:   Procedure Laterality Date    CHOLECYSTECTOMY      COLONOSCOPY N/A 10/23/2020    Procedure: COLONOSCOPY;  Surgeon: Sae Valentine MD;  Location: [facility] ENDO;  Service: Endoscopy;  Laterality: N/A;    DIAGNOSTIC LAPAROSCOPY N/A 6/21/2018    Procedure: LAPAROSCOPY, DIAGNOSTIC;  Surgeon: Casper Martinez MD;  Location: [facility] OR;  Service: General;  Laterality: N/A;    LUNG SURGERY Right     benign mass    TOTAL KNEE ARTHROPLASTY Left     UMBILICAL  HERNIA REPAIR N/A 6/21/2018    Procedure: REPAIR, HERNIA, UMBILICAL, AGE 5 YEARS OR OLDER;  Surgeon: Casper Martinez MD;  Location: [facility];  Service: General;  Laterality: N/A;     Review of patient's allergies indicates:   Allergen Reactions    Zithromax [azithromycin] Palpitations    Aspirin Other (See Comments)     Other reaction(s): Difficulty breathing    Lipitor [atorvastatin] Other (See Comments)     Leg cramps/hand cramps     Current Outpatient Medications on File Prior to Visit   Medication Sig Dispense Refill    albuterol (PROVENTIL) 2.5 mg /3 mL (0.083 %) nebulizer solution Take 3 mLs (2.5 mg total) by nebulization every 6 (six) hours as needed for Wheezing or Shortness of Breath. 360 mL 11    albuterol (PROVENTIL/VENTOLIN HFA) 90 mcg/actuation inhaler Inhale 2 puffs into the lungs every 4 (four) hours as needed for Wheezing or Shortness of Breath. 54 g 3    azelaic acid (AZELEX) 15 % gel Apply 1 application topically 2 (two) times a day.      cetirizine (ZYRTEC) 10 MG tablet Take 10 mg by mouth once daily.      cyclobenzaprine (FLEXERIL) 5 MG tablet 1 Tablet Oral Three times a day as needed. 20 tablet 0    doxycycline (MONODOX) 100 MG capsule Take 1 capsule (100 mg total) by mouth every 12 (twelve) hours. 20 capsule 1    fluticasone propionate (FLONASE) 50 mcg/actuation nasal spray 2 sprays (100 mcg total) by Each Nostril route once daily. 48 g 3    GLUCOSAMINE HCL/CHONDR ALFONSO A NA (OSTEO BI-FLEX ORAL) Take 1 tablet by mouth once daily.       guaiFENesin-codeine 100-10 mg/5 ml (CHERATUSSIN AC)  mg/5 mL syrup Take 10 mLs by mouth every 6 (six) hours as needed for Cough or Congestion. 240 mL 0    methylPREDNISolone (MEDROL DOSEPACK) 4 mg tablet use as directed 1 Package 1    multivitamin (THERAGRAN) per tablet 1 Tablet Oral Every day      predniSONE (DELTASONE) 20 MG tablet Take 1 tablet (20 mg total) by mouth 2 (two) times daily for 3 days, THEN 1 tablet (20 mg total) once daily for  4 days. 10 tablet 0    tadalafiL (CIALIS) 20 MG Tab TAKE 1 TABLET DAILY AS NEEDED 24 tablet 3    [DISCONTINUED] budesonide-formoterol 160-4.5 mcg (SYMBICORT) 160-4.5 mcg/actuation HFAA Inhale 2 puffs into the lungs every 12 (twelve) hours. Wash out mouth after using 3 Inhaler 3    diclofenac 1.3 % PT12 Apply 1 patch topically every 12 (twelve) hours as needed. 30 patch 0    metroNIDAZOLE (METROGEL) 0.75 % gel Apply topically 2 (two) times daily. 45 g 2     No current facility-administered medications on file prior to visit.     Social History     Socioeconomic History    Marital status:    Occupational History     Employer: [employer]   Tobacco Use    Smoking status: Never Smoker    Smokeless tobacco: Never Used   Substance and Sexual Activity    Alcohol use: Never    Drug use: No     Family History   Problem Relation Age of Onset    Hypertension Mother     Diabetes Mother     Cancer Mother         Breast Ca     Heart disease Father         CAD , CHF     Aneurysm Father     Colon polyps Father     Melanoma Neg Hx     Eczema Neg Hx     Lupus Neg Hx     Psoriasis Neg Hx        Review of Systems   Constitutional: Positive for fatigue. Negative for fever.   HENT: Positive for congestion. Negative for postnasal drip and rhinorrhea.    Eyes: Negative for redness and itching.   Respiratory: Positive for cough, shortness of breath, dyspnea on extertion, use of rescue inhaler and Paroxysmal Nocturnal Dyspnea. Negative for sputum production.    Cardiovascular: Negative for chest pain, palpitations and leg swelling.   Genitourinary: Negative for difficulty urinating and hematuria.   Endocrine: Negative for cold intolerance and heat intolerance.    Skin: Negative for rash.   Gastrointestinal: Negative for nausea and abdominal pain.   Neurological: Negative for dizziness, syncope, weakness and light-headedness.   Hematological: Negative for adenopathy. Does not bruise/bleed easily.  "  Psychiatric/Behavioral: Negative for sleep disturbance. The patient is not nervous/anxious.        Objective:      BP (!) 142/90   Pulse 62   Resp 18   Ht 6' 2" (1.88 m)   Wt 122.1 kg (269 lb 2.9 oz)   SpO2 96%   BMI 34.56 kg/m²   Physical Exam  Vitals and nursing note reviewed.   Constitutional:       Appearance: He is well-developed.   HENT:      Head: Normocephalic and atraumatic.      Nose: Congestion present.   Eyes:      Conjunctiva/sclera: Conjunctivae normal.      Pupils: Pupils are equal, round, and reactive to light.   Neck:      Thyroid: No thyromegaly.      Vascular: No JVD.      Trachea: No tracheal deviation.   Cardiovascular:      Rate and Rhythm: Normal rate and regular rhythm.      Heart sounds: No murmur heard.  Pulmonary:      Breath sounds: Examination of the right-lower field reveals wheezing. Examination of the left-lower field reveals wheezing. Decreased breath sounds and wheezing present. No rhonchi or rales.   Abdominal:      General: Bowel sounds are normal.      Palpations: Abdomen is soft.   Musculoskeletal:         General: No tenderness. Normal range of motion.      Cervical back: Neck supple.   Lymphadenopathy:      Cervical: No cervical adenopathy.   Skin:     General: Skin is warm and dry.   Neurological:      Mental Status: He is alert and oriented to person, place, and time.       Personal Diagnostic Review  Chest x-ray: stable\      Pulmonary Studies Review 8/8/2022   SpO2 96   Height 74   Weight 4306.91   BMI (Calculated) 34.5   Predicted Distance 321.54   Predicted Distance Meters (Calculated) 557.9       X-Ray Chest PA And Lateral  Narrative: EXAMINATION:  XR CHEST PA AND LATERAL    CLINICAL HISTORY:  SOB; Chronic obstructive pulmonary disease, unspecified    TECHNIQUE:  PA and lateral views of the chest were performed.    COMPARISON:  Chest x-ray dated August 25, 2021    FINDINGS:  The trachea and cardiomediastinal silhouette are within normal limits allowing for " stable mild right hilar fullness.  There is no evidence of pleural effusions, pneumothoraces or consolidations.  Chronic appearing interstitial markings are identified bilaterally.  Osseous structures demonstrate no evidence for acute fractures or dislocations.  Impression: No interval change    Electronically signed by: Juan Jose Herr MD  Date:    08/08/2022  Time:    09:09      Office Spirometry Results:     No flowsheet data found.  Pulmonary Studies Review 8/8/2022   SpO2 96   Height 74   Weight 4306.91   BMI (Calculated) 34.5   Predicted Distance 321.54   Predicted Distance Meters (Calculated) 557.9         Assessment:            Mild intermittent asthma with acute exacerbation  -     fluticasone-umeclidin-vilanter (TRELEGY ELLIPTA) 200-62.5-25 mcg inhaler; Inhale 1 puff into the lungs once daily. Wash out mouth after use  Dispense: 60 each; Refill: 11  -     Complete PFT with bronchodilator; Future; Expected date: 08/08/2022    KEVEN (obstructive sleep apnea)  -     CPAP/BIPAP SUPPLIES    KEVEN on CPAP  -     CPAP/BIPAP SUPPLIES          Outpatient Encounter Medications as of 8/8/2022   Medication Sig Dispense Refill    albuterol (PROVENTIL) 2.5 mg /3 mL (0.083 %) nebulizer solution Take 3 mLs (2.5 mg total) by nebulization every 6 (six) hours as needed for Wheezing or Shortness of Breath. 360 mL 11    albuterol (PROVENTIL/VENTOLIN HFA) 90 mcg/actuation inhaler Inhale 2 puffs into the lungs every 4 (four) hours as needed for Wheezing or Shortness of Breath. 54 g 3    azelaic acid (AZELEX) 15 % gel Apply 1 application topically 2 (two) times a day.      cetirizine (ZYRTEC) 10 MG tablet Take 10 mg by mouth once daily.      cyclobenzaprine (FLEXERIL) 5 MG tablet 1 Tablet Oral Three times a day as needed. 20 tablet 0    doxycycline (MONODOX) 100 MG capsule Take 1 capsule (100 mg total) by mouth every 12 (twelve) hours. 20 capsule 1    fluticasone propionate (FLONASE) 50 mcg/actuation nasal spray 2 sprays (100 mcg  total) by Each Nostril route once daily. 48 g 3    GLUCOSAMINE HCL/CHONDR ALFONSO A NA (OSTEO BI-FLEX ORAL) Take 1 tablet by mouth once daily.       guaiFENesin-codeine 100-10 mg/5 ml (CHERATUSSIN AC)  mg/5 mL syrup Take 10 mLs by mouth every 6 (six) hours as needed for Cough or Congestion. 240 mL 0    methylPREDNISolone (MEDROL DOSEPACK) 4 mg tablet use as directed 1 Package 1    multivitamin (THERAGRAN) per tablet 1 Tablet Oral Every day      predniSONE (DELTASONE) 20 MG tablet Take 1 tablet (20 mg total) by mouth 2 (two) times daily for 3 days, THEN 1 tablet (20 mg total) once daily for 4 days. 10 tablet 0    tadalafiL (CIALIS) 20 MG Tab TAKE 1 TABLET DAILY AS NEEDED 24 tablet 3    [DISCONTINUED] budesonide-formoterol 160-4.5 mcg (SYMBICORT) 160-4.5 mcg/actuation HFAA Inhale 2 puffs into the lungs every 12 (twelve) hours. Wash out mouth after using 3 Inhaler 3    diclofenac 1.3 % PT12 Apply 1 patch topically every 12 (twelve) hours as needed. 30 patch 0    fluticasone-umeclidin-vilanter (TRELEGY ELLIPTA) 200-62.5-25 mcg inhaler Inhale 1 puff into the lungs once daily. Wash out mouth after use 60 each 11    metroNIDAZOLE (METROGEL) 0.75 % gel Apply topically 2 (two) times daily. 45 g 2     No facility-administered encounter medications on file as of 8/8/2022.     Plan:       Requested Prescriptions     Signed Prescriptions Disp Refills    fluticasone-umeclidin-vilanter (TRELEGY ELLIPTA) 200-62.5-25 mcg inhaler 60 each 11     Sig: Inhale 1 puff into the lungs once daily. Wash out mouth after use     Problem List Items Addressed This Visit     KEVEN (obstructive sleep apnea)    Relevant Orders    CPAP/BIPAP SUPPLIES    Mild intermittent asthma with acute exacerbation - Primary    Relevant Medications    fluticasone-umeclidin-vilanter (TRELEGY ELLIPTA) 200-62.5-25 mcg inhaler    Other Relevant Orders    Complete PFT with bronchodilator      Other Visit Diagnoses     KEVEN on CPAP        Relevant Orders     CPAP/BIPAP SUPPLIES             Follow up in about 6 months (around 2/8/2023) for PFT on return.    MEDICAL DECISION MAKING: Moderate to high complexity.  Overall, the multiple problems listed are of moderate to high severity that may impact quality of life and activities of daily living. Side effects of medications, treatment plan as well as options and alternatives reviewed and discussed with patient. There was counseling of patient concerning these issues.    Total time spent in counseling and coordination of care - 40  minutes of total time spent on the encounter, which includes face to face time and non-face to face time preparing to see the patient (eg, review of tests), Obtaining and/or reviewing separately obtained history, Documenting clinical information in the electronic or other health record, Independently interpreting results (not separately reported) and communicating results to the patient/family/caregiver, or Care coordination (not separately reported).    Time was used in discussion of prognosis, risks, benefits of treatment, instructions and compliance with regimen . Discussion with other physicians and/or health care providers - home health or for use of durable medical equipment (oxygen, nebulizers, CPAP, BiPAP) occurred.

## 2022-08-11 ENCOUNTER — PATIENT MESSAGE (OUTPATIENT)
Dept: PULMONOLOGY | Facility: CLINIC | Age: 67
End: 2022-08-11
Payer: MEDICARE

## 2022-08-11 DIAGNOSIS — J45.21 MILD INTERMITTENT ASTHMA WITH ACUTE EXACERBATION: Primary | ICD-10-CM

## 2022-08-11 RX ORDER — PREDNISONE 20 MG/1
TABLET ORAL
Qty: 20 TABLET | Refills: 0 | Status: SHIPPED | OUTPATIENT
Start: 2022-08-11 | End: 2023-02-08

## 2022-08-11 RX ORDER — MOXIFLOXACIN HYDROCHLORIDE 400 MG/1
400 TABLET ORAL DAILY
Qty: 10 TABLET | Refills: 0 | Status: SHIPPED | OUTPATIENT
Start: 2022-08-11 | End: 2023-02-08

## 2022-08-11 NOTE — TELEPHONE ENCOUNTER
prescription sent to [id number] - [place], [place] - [address] AT [facility] [address]

## 2022-08-12 ENCOUNTER — PATIENT MESSAGE (OUTPATIENT)
Dept: PULMONOLOGY | Facility: CLINIC | Age: 67
End: 2022-08-12
Payer: MEDICARE

## 2022-08-15 DIAGNOSIS — J45.901 ASTHMA EXACERBATION IN COPD: ICD-10-CM

## 2022-08-15 DIAGNOSIS — J44.1 ASTHMA EXACERBATION IN COPD: ICD-10-CM

## 2022-08-15 RX ORDER — CODEINE PHOSPHATE AND GUAIFENESIN 10; 100 MG/5ML; MG/5ML
10 SOLUTION ORAL EVERY 6 HOURS PRN
Qty: 240 ML | Refills: 0 | Status: SHIPPED | OUTPATIENT
Start: 2022-08-15 | End: 2022-08-22

## 2022-08-31 NOTE — PLAN OF CARE
INR is 2 31 on 5 mg daily    Continue same dose and repeat in 1 month Problem: Patient Care Overview  Goal: Plan of Care Review  Outcome: Outcome(s) achieved Date Met: 08/04/17  Patient d/c home in stable condition via wheelchair with ride. Verbalized understanding of d/c instructions. Patient voiced no complaints at this time. Patient stood at side of bed, walked steps with no new motor deficits. Neurologically intact.

## 2022-09-28 ENCOUNTER — PATIENT MESSAGE (OUTPATIENT)
Dept: PULMONOLOGY | Facility: CLINIC | Age: 67
End: 2022-09-28
Payer: MEDICARE

## 2022-09-28 DIAGNOSIS — G47.33 OSA ON CPAP: Primary | ICD-10-CM

## 2022-10-01 NOTE — TELEPHONE ENCOUNTER
Order submitted for new Continuous Positive Airway Pressure machine      Subjective:     Patient ID: Yung Mcconnell is a 66 y.o. male.    Chief Complaint:      HPI  67 y/o with history of Chronic Obstructive Pulmonary Disease, s/p right lung surgery and Obstructive Sleep Apnea  history of COVID in August 2020  Worsening Chest congestion after pneumonia vaccine  Started on steroids and amoxil  Occurred about two weeks ago   Symbicort not as effective  Using neb and albuterol     Obstructive Sleep Apnea on Continuous Positive Airway Pressure:  Patient is using CPAP as prescribed and benefiting from therapy. Patient has complaints of none  Compliance Report  Compliance  Payor Standard  Usage 05/10/2022 - 08/07/2022  Usage days 90/90 days (100%)  >= 4 hours 90 days (100%)  < 4 hours 0 days (0%)  Usage hours 651 hours 49 minutes  Average usage (total days) 7 hours 15 minutes  Average usage (days used) 7 hours 15 minutes  Median usage (days used) 7 hours 11 minutes  AirSense 10 AutoSet  Serial number [id number]  Mode CPAP  Set pressure 11 cmH2O  EPR Ramp Only  EPR level 3  Therapy  Leaks - L/min Median: 2.9 95th percentile: 19.8 Maximum: 35.6  Events per hour AI: 0.9 HI: 0.6 AHI: 1.5  Apnea Index Central: 0.3 Obstructive: 0.5 Unknown: 0.1          Past Medical History:   Diagnosis Date    Allergy     Asthma, chronic 7/9/2013    Colon polyps     COPD (chronic obstructive pulmonary disease)     GERD (gastroesophageal reflux disease)     High cholesterol     Osteoarthritis of both knees 7/9/2013    Sleep apnea      Past Surgical History:   Procedure Laterality Date    CHOLECYSTECTOMY      COLONOSCOPY N/A 10/23/2020    Procedure: COLONOSCOPY;  Surgeon: Sae Valentine MD;  Location: [facility] ENDO;  Service: Endoscopy;  Laterality: N/A;    DIAGNOSTIC LAPAROSCOPY N/A 6/21/2018    Procedure: LAPAROSCOPY, DIAGNOSTIC;  Surgeon: Casper Martinez MD;  Location: [facility] OR;  Service: General;  Laterality: N/A;    LUNG SURGERY Right      benign mass    TOTAL KNEE ARTHROPLASTY Left     UMBILICAL HERNIA REPAIR N/A 6/21/2018    Procedure: REPAIR, HERNIA, UMBILICAL, AGE 5 YEARS OR OLDER;  Surgeon: Casper Martinez MD;  Location: [facility];  Service: General;  Laterality: N/A;     Review of patient's allergies indicates:   Allergen Reactions    Zithromax [azithromycin] Palpitations    Aspirin Other (See Comments)     Other reaction(s): Difficulty breathing    Lipitor [atorvastatin] Other (See Comments)     Leg cramps/hand cramps     Current Outpatient Medications on File Prior to Visit   Medication Sig Dispense Refill    albuterol (PROVENTIL) 2.5 mg /3 mL (0.083 %) nebulizer solution Take 3 mLs (2.5 mg total) by nebulization every 6 (six) hours as needed for Wheezing or Shortness of Breath. 360 mL 11    albuterol (PROVENTIL/VENTOLIN HFA) 90 mcg/actuation inhaler Inhale 2 puffs into the lungs every 4 (four) hours as needed for Wheezing or Shortness of Breath. 54 g 3    azelaic acid (AZELEX) 15 % gel Apply 1 application topically 2 (two) times a day.      cetirizine (ZYRTEC) 10 MG tablet Take 10 mg by mouth once daily.      cyclobenzaprine (FLEXERIL) 5 MG tablet 1 Tablet Oral Three times a day as needed. 20 tablet 0    doxycycline (MONODOX) 100 MG capsule Take 1 capsule (100 mg total) by mouth every 12 (twelve) hours. 20 capsule 1    fluticasone propionate (FLONASE) 50 mcg/actuation nasal spray 2 sprays (100 mcg total) by Each Nostril route once daily. 48 g 3    GLUCOSAMINE HCL/CHONDR ALFONSO A NA (OSTEO BI-FLEX ORAL) Take 1 tablet by mouth once daily.       guaiFENesin-codeine 100-10 mg/5 ml (CHERATUSSIN AC)  mg/5 mL syrup Take 10 mLs by mouth every 6 (six) hours as needed for Cough or Congestion. 240 mL 0    methylPREDNISolone (MEDROL DOSEPACK) 4 mg tablet use as directed 1 Package 1    multivitamin (THERAGRAN) per tablet 1 Tablet Oral Every day      predniSONE (DELTASONE) 20 MG tablet Take 1 tablet (20 mg total) by mouth 2 (two) times daily for 3 days,  THEN 1 tablet (20 mg total) once daily for 4 days. 10 tablet 0    tadalafiL (CIALIS) 20 MG Tab TAKE 1 TABLET DAILY AS NEEDED 24 tablet 3    [DISCONTINUED] budesonide-formoterol 160-4.5 mcg (SYMBICORT) 160-4.5 mcg/actuation HFAA Inhale 2 puffs into the lungs every 12 (twelve) hours. Wash out mouth after using 3 Inhaler 3    diclofenac 1.3 % PT12 Apply 1 patch topically every 12 (twelve) hours as needed. 30 patch 0    metroNIDAZOLE (METROGEL) 0.75 % gel Apply topically 2 (two) times daily. 45 g 2     No current facility-administered medications on file prior to visit.     Social History     Socioeconomic History    Marital status:    Occupational History     Employer: [employer]   Tobacco Use    Smoking status: Never Smoker    Smokeless tobacco: Never Used   Substance and Sexual Activity    Alcohol use: Never    Drug use: No     Family History   Problem Relation Age of Onset    Hypertension Mother     Diabetes Mother     Cancer Mother         Breast Ca     Heart disease Father         CAD , CHF     Aneurysm Father     Colon polyps Father     Melanoma Neg Hx     Eczema Neg Hx     Lupus Neg Hx     Psoriasis Neg Hx        Review of Systems   Constitutional: Positive for fatigue. Negative for fever.   HENT: Positive for congestion. Negative for postnasal drip and rhinorrhea.    Eyes: Negative for redness and itching.   Respiratory: Positive for cough, shortness of breath, dyspnea on extertion, use of rescue inhaler and Paroxysmal Nocturnal Dyspnea. Negative for sputum production.    Cardiovascular: Negative for chest pain, palpitations and leg swelling.   Genitourinary: Negative for difficulty urinating and hematuria.   Endocrine: Negative for cold intolerance and heat intolerance.    Skin: Negative for rash.   Gastrointestinal: Negative for nausea and abdominal pain.   Neurological: Negative for dizziness, syncope, weakness and light-headedness.   Hematological: Negative for adenopathy. Does not  "bruise/bleed easily.   Psychiatric/Behavioral: Negative for sleep disturbance. The patient is not nervous/anxious.        Objective:      BP (!) 142/90   Pulse 62   Resp 18   Ht 6' 2" (1.88 m)   Wt 122.1 kg (269 lb 2.9 oz)   SpO2 96%   BMI 34.56 kg/m²   Physical Exam  Vitals and nursing note reviewed.   Constitutional:       Appearance: He is well-developed.   HENT:      Head: Normocephalic and atraumatic.      Nose: Congestion present.   Eyes:      Conjunctiva/sclera: Conjunctivae normal.      Pupils: Pupils are equal, round, and reactive to light.   Neck:      Thyroid: No thyromegaly.      Vascular: No JVD.      Trachea: No tracheal deviation.   Cardiovascular:      Rate and Rhythm: Normal rate and regular rhythm.      Heart sounds: No murmur heard.  Pulmonary:      Breath sounds: Examination of the right-lower field reveals wheezing. Examination of the left-lower field reveals wheezing. Decreased breath sounds and wheezing present. No rhonchi or rales.   Abdominal:      General: Bowel sounds are normal.      Palpations: Abdomen is soft.   Musculoskeletal:         General: No tenderness. Normal range of motion.      Cervical back: Neck supple.   Lymphadenopathy:      Cervical: No cervical adenopathy.   Skin:     General: Skin is warm and dry.   Neurological:      Mental Status: He is alert and oriented to person, place, and time.       Personal Diagnostic Review    Patient Name: Yung Mcconnell Date of Report: 11 Date of PS2011  [facility] No.: [id number] : 1955 Time of PSG: 10:28:40 PM - 05:01:40 AM  Sex: M Age: 55 Weight: 246 lbs Height: 6 2 Type of PSG: Diagnostic  REASONS FOR REFERRAL: Mr. Mcconnell is a 55 year old male, referred for diagnostic polysomnography by Dr. Rafal Lozano, for evaluation of possible obstructive sleep  apnea / hypopnea syndrome (OSAHS), based on the patient s reported loud snoring and observed respiratory pauses in sleep, and daytime somnolence. His " [place]  Sleepiness scale was abnormal ( > 11). Dr. Rubén Kauffman is the patient s primary care physician.  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  1. The diagnostic polysomnography revealed a mild obstructive sleep apnea / hypopnea syndrome (A + H Index = 13.2 events / hr asleep; 7.3 arousals / hr asleep), with a mean  SaO2 during events = 89.0 %, significant; lowest SaO2 during events = 80 %, waking baseline SaO2 = 98 %. Sporadic, loud snoring was noted. A CPAP titration  polysomnography is recommended.  2. As respiratory events had a strong supine positional tendency, a device to discourage sleep in the supine position is recommended to reduce respiratory events and snoring.  3. Weight loss to the normal range is recommended as it can decrease respiratory events and snoring in overweight patients.  4. The following changes in sleep hygiene / sleep - related behavior are recommended after medical treatments are successful:  ? Set time for sleep to number of hours of sleep needed for adequate daytime functioning (7.5 to 9.0 hrs / night).  ? Regular bedtimes and wake times, including weekends: Total sleep time / night should not be more than one hour more  than usual, and bedtime or wake time should not be more than one hour earlier or later than usual.  ? Do not attempt to make up lost sleep by extending sleep periods.  ? Avoid naps; none longer than 20 min or later than mid - afternoon.  SECONDARY TREATMENT RECOMMENDATIONS Treat, or refer to SDC if problems are not satisfactorily resolved by the above.  1. A borderline PLM disorder was observed (PLMS Index = 7.3 / hr sleep), but the PLMS were not at all disruptive of  sleep (only 0.7 arousals / hr asleep)? however, as there  was SDI evidence of restless legs syndrome (which can be treated with the same medications as PLMS), consider treatment of PLM disorder and restless legs syndrome if RLS is  confirmed. Note that the benzodiazepine medications sometimes used to treat PLM disorder (e.g., clonazepam) may exacerbate some sleep - related respiratory  disorders, and that dopaminergic medications such as Mirapex and Requip are used in such instances.  2. Follow - up inquiry regarding frequency, duration and nature of reported sleep loss (possible role of OSAHS; r/o voluntary sleep restriction).  See below for a complete interpretation of data from the polysomnography and Sleep Disorders Inventory.  Thank you for referring this patient to the [facility].  Marin Ruby, Ph.D., ABPP; Diplomate, American Board of Sleep Medicine  INTERPRETATION OF DATA FROM POLYSOMNOGRAPHY AND SLEEP DISORDERS INVENTORY  NOTE: The polysomnography electrophysiological record for this patient has been reviewed in its entirety by Dr. Ruby.  SLEEP QUANTITY: Sufficient time asleep for diagnostic evaluation (5.6 hrs). Good sleep efficiency (85.4 %), with no difficulty falling asleep (29.5 min) or maintaining sleep  (awake 8.3 % of time after sleep onset).  ? There was no evidence of hypersomnolence (8.5 hrs sleep prior night).  ? The normal sleep onset and sleep maintenance are consistent with Mr. Mcconnell s Sleep Disorders Inventory (SDI), where he  reports having no significant difficulty falling asleep or staying asleep at home.  SLEEP QUALITY: Nonrestorative sleep due to poor sleep depth, but with good sleep continuity.  ? Sleep Continuity: Sleep Depth: High 13.5 % Stage 1 NREM sleep, normal 18.3 % slow wave sleep.  ? Normal 13.1 transient arousals / hr asleep, 29.0 sleep stage changes / hr asleep, 1.5 awakenings / hr  RAPID EYE MOVEMENT SLEEP: Normal REM sleep architecture.  ? Normal  21.4 % of the sleep period was REM sleep; normal REM sleep onset, 68.5 min after sleep onset.  SLEEP - RELATED RESPIRATORY DISORDERS: Mild obstructive sleep apnea / hypopnea syndrome (A + H Index = 13.2 events / hr  asleep; 7.3 arousals / hr asleep), with a mean SaO2 during events = 89.0 %, significant; lowest SaO2 during events = 80 %, waking  baseline SaO2 = 98 %. Sporadic, loud snoring was noted.  ? Mr. Mcconnell had 52 hypopneas, 9 obstructive sleep apneas, 8 central sleep apneas, and 5 mixed sleep apneas.  ? Respiratory events had a strong supine positional tendency (78.4 % of events were supine, but only 37.8 % of sleep was supine? supine sleep RDI / other position  RDI = 27.5 / 4.6).  ? Respiratory events had no REM sleep tendency (4.1 % of events were in REM sleep, 23.4 % of sleep was REM sleep; REM sleep RDI / NREM sleep RDI = 2.3 /  16.6).  EKG (Scoring Technician): Mr. Mcconnell had some mild sinus bradycardia (rate in the 50s).  CPAP: Not initiated because the number of respiratory events counted by the polysomnography technician before 1:30 am (22) was below the 80 event laboratory criterion for a  CPAP titration trial.  Patient Name: MARNIE MCCONNELL  MRN: [id number]  : 1955 Sex: M  SLEEP - RELATED MOVEMENT DISORDERS: Mr. Mcconnell evidenced a borderline periodic limb movement disorder (PLMS Index = 7.3 PLMS / hr asleep), with only 0.7 arousals /  hr asleep.  ? He had 41 periodic limb movements of sleep in 5.6 hrs of sleep (5.5 % of arousals).  SPONTANEOUS TRANSIENT AROUSALS: Mr. Mcconnell had a normal rate of spontaneous transient arousals (5.0 STA / hr asleep, 38.4 % of arousals). Thus, STA did not impair  sleep quality / restorativeness.  EVIDENCE OF OTHER SLEEP DISORDERS: SLEEP 5 0, Sleep Disorders Inventory (SDI)  ? Sleep Deprivation: Mr. Mcconnell is sleep deprived; he reports getting 6 7 hrs sleep 2 - 3 nights / wk, and 7 8 hrs sleep 5 - 6 nights / wk.  ? Insomnia: SLEEP 50 Insomnia score < 19 (12)? no  significant problem falling asleep initially at night or maintaining sleep.  ? Sleep Restriction: Rule out. Yes if allocates 7 hrs for sleep on 5 work nights / wk (reports allocating 7 8 hrs for sleep / night)  ? Hypersomnolence: SLEEP 50 Impact score < 15 (14).  ? Sleep Apnea / Hypopnea Syndrome: SLEEP 50 Sleep Apnea score > 14 (15).  ? Restless Legs Syndrome: Rule out. Sleep is delayed by restless / odd feelings in legs that compel him to move his legs in bed, 20 + nights / mo. Moving legs in bed or  getting up and moving helps him fall asleep. RLS typically causes insomnia and frequently accompanies PLM disorder; Mr. Mcconnell has PLMS, but denies insomnia.  There was no evidence of: Advanced or delayed sleep phase syndrome, insomnia, psychophysiological insomnia, hypersomnolence, narcolepsy, idiopathic hypersomnia,  periodic limb movement disorder, REM sleep behavior disorder, sleep bruxism, nightmares, sleepwalking, sleep disruption due to physical discomfort or pain, sleep - related  gastroesophageal reflux, or shift work sleep disorder.  MALADAPTIVE SLEEP - RELATED BEHAVIOR: These behaviors may impair nocturnal sleep quantity and quality:  ? Sleeps more than 1 hr later on days off than on workdays.  ? Often goes to bed earlier and / or wakes up later to make up for lost or unrefreshing sleep.  ? Takes 1 2 hour naps during normal waking hours 2 days / wk.  STRESS: There are no indications that stress is a factor in this patient s sleep complaint.  Electronically signed by Marin Ruby, PH.D., [facility] 07/14/2011 12:37:50 PM      Chest x-ray: stable\      Pulmonary Studies Review 8/8/2022   SpO2 96   Height 74   Weight 4306.91   BMI (Calculated) 34.5   Predicted Distance 321.54   Predicted Distance Meters (Calculated) 557.9       X-Ray Chest PA And Lateral  Narrative: EXAMINATION:  XR CHEST PA AND LATERAL    CLINICAL HISTORY:  SOB; Chronic obstructive pulmonary disease, unspecified    TECHNIQUE:  PA and lateral  views of the chest were performed.    COMPARISON:  Chest x-ray dated August 25, 2021    FINDINGS:  The trachea and cardiomediastinal silhouette are within normal limits allowing for stable mild right hilar fullness.  There is no evidence of pleural effusions, pneumothoraces or consolidations.  Chronic appearing interstitial markings are identified bilaterally.  Osseous structures demonstrate no evidence for acute fractures or dislocations.  Impression: No interval change    Electronically signed by: Juan Jose Herr MD  Date:    08/08/2022  Time:    09:09      Office Spirometry Results:     No flowsheet data found.  Pulmonary Studies Review 8/8/2022   SpO2 96   Height 74   Weight 4306.91   BMI (Calculated) 34.5   Predicted Distance 321.54   Predicted Distance Meters (Calculated) 557.9         Assessment:            Mild intermittent asthma with acute exacerbation  -     fluticasone-umeclidin-vilanter (TRELEGY ELLIPTA) 200-62.5-25 mcg inhaler; Inhale 1 puff into the lungs once daily. Wash out mouth after use  Dispense: 60 each; Refill: 11  -     Complete PFT with bronchodilator; Future; Expected date: 08/08/2022    KEVEN (obstructive sleep apnea)  -     CPAP/BIPAP SUPPLIES    KEVEN on CPAP  -     CPAP/BIPAP SUPPLIES          Outpatient Encounter Medications as of 8/8/2022   Medication Sig Dispense Refill    albuterol (PROVENTIL) 2.5 mg /3 mL (0.083 %) nebulizer solution Take 3 mLs (2.5 mg total) by nebulization every 6 (six) hours as needed for Wheezing or Shortness of Breath. 360 mL 11    albuterol (PROVENTIL/VENTOLIN HFA) 90 mcg/actuation inhaler Inhale 2 puffs into the lungs every 4 (four) hours as needed for Wheezing or Shortness of Breath. 54 g 3    azelaic acid (AZELEX) 15 % gel Apply 1 application topically 2 (two) times a day.      cetirizine (ZYRTEC) 10 MG tablet Take 10 mg by mouth once daily.      cyclobenzaprine (FLEXERIL) 5 MG tablet 1 Tablet Oral Three times a day as needed. 20 tablet 0    doxycycline (MONODOX)  100 MG capsule Take 1 capsule (100 mg total) by mouth every 12 (twelve) hours. 20 capsule 1    fluticasone propionate (FLONASE) 50 mcg/actuation nasal spray 2 sprays (100 mcg total) by Each Nostril route once daily. 48 g 3    GLUCOSAMINE HCL/CHONDR ALFONSO A NA (OSTEO BI-FLEX ORAL) Take 1 tablet by mouth once daily.       guaiFENesin-codeine 100-10 mg/5 ml (CHERATUSSIN AC)  mg/5 mL syrup Take 10 mLs by mouth every 6 (six) hours as needed for Cough or Congestion. 240 mL 0    methylPREDNISolone (MEDROL DOSEPACK) 4 mg tablet use as directed 1 Package 1    multivitamin (THERAGRAN) per tablet 1 Tablet Oral Every day      predniSONE (DELTASONE) 20 MG tablet Take 1 tablet (20 mg total) by mouth 2 (two) times daily for 3 days, THEN 1 tablet (20 mg total) once daily for 4 days. 10 tablet 0    tadalafiL (CIALIS) 20 MG Tab TAKE 1 TABLET DAILY AS NEEDED 24 tablet 3    [DISCONTINUED] budesonide-formoterol 160-4.5 mcg (SYMBICORT) 160-4.5 mcg/actuation HFAA Inhale 2 puffs into the lungs every 12 (twelve) hours. Wash out mouth after using 3 Inhaler 3    diclofenac 1.3 % PT12 Apply 1 patch topically every 12 (twelve) hours as needed. 30 patch 0    fluticasone-umeclidin-vilanter (TRELEGY ELLIPTA) 200-62.5-25 mcg inhaler Inhale 1 puff into the lungs once daily. Wash out mouth after use 60 each 11    metroNIDAZOLE (METROGEL) 0.75 % gel Apply topically 2 (two) times daily. 45 g 2     No facility-administered encounter medications on file as of 8/8/2022.     Plan:       Requested Prescriptions     Signed Prescriptions Disp Refills    fluticasone-umeclidin-vilanter (TRELEGY ELLIPTA) 200-62.5-25 mcg inhaler 60 each 11     Sig: Inhale 1 puff into the lungs once daily. Wash out mouth after use     Problem List Items Addressed This Visit       KEVEN (obstructive sleep apnea)    Relevant Orders    CPAP/BIPAP SUPPLIES    Mild intermittent asthma with acute exacerbation - Primary    Relevant Medications    fluticasone-umeclidin-vilanter (TRELEGY  ELLIPTA) 200-62.5-25 mcg inhaler    Other Relevant Orders    Complete PFT with bronchodilator          Other Visit Diagnoses       KEVEN on CPAP        Relevant Orders    CPAP/BIPAP SUPPLIES               Follow up in about 6 months (around 2/8/2023) for PFT on return.    MEDICAL DECISION MAKING: Moderate to high complexity.  Overall, the multiple problems listed are of moderate to high severity that may impact quality of life and activities of daily living. Side effects of medications, treatment plan as well as options and alternatives reviewed and discussed with patient. There was counseling of patient concerning these issues.    Total time spent in counseling and coordination of care - 40  minutes of total time spent on the encounter, which includes face to face time and non-face to face time preparing to see the patient (eg, review of tests), Obtaining and/or reviewing separately obtained history, Documenting clinical information in the electronic or other health record, Independently interpreting results (not separately reported) and communicating results to the patient/family/caregiver, or Care coordination (not separately reported).    Time was used in discussion of prognosis, risks, benefits of treatment, instructions and compliance with regimen . Discussion with other physicians and/or health care providers - home health or for use of durable medical equipment (oxygen, nebulizers, CPAP, BiPAP) occurred.

## 2022-10-03 ENCOUNTER — PATIENT MESSAGE (OUTPATIENT)
Dept: PULMONOLOGY | Facility: CLINIC | Age: 67
End: 2022-10-03
Payer: MEDICARE

## 2022-10-17 ENCOUNTER — OFFICE VISIT (OUTPATIENT)
Dept: FAMILY MEDICINE | Facility: CLINIC | Age: 67
End: 2022-10-17
Payer: MEDICARE

## 2022-10-17 VITALS
HEIGHT: 74 IN | HEART RATE: 94 BPM | SYSTOLIC BLOOD PRESSURE: 136 MMHG | DIASTOLIC BLOOD PRESSURE: 80 MMHG | TEMPERATURE: 98 F | WEIGHT: 270.06 LBS | BODY MASS INDEX: 34.66 KG/M2 | OXYGEN SATURATION: 94 %

## 2022-10-17 DIAGNOSIS — K21.9 GASTROESOPHAGEAL REFLUX DISEASE, UNSPECIFIED WHETHER ESOPHAGITIS PRESENT: ICD-10-CM

## 2022-10-17 DIAGNOSIS — E66.9 OBESITY (BMI 30.0-34.9): ICD-10-CM

## 2022-10-17 DIAGNOSIS — T46.6X5A STATIN MYOPATHY: ICD-10-CM

## 2022-10-17 DIAGNOSIS — E66.9 OBESITY (BMI 30.0-34.9): Primary | ICD-10-CM

## 2022-10-17 DIAGNOSIS — E78.5 HYPERLIPIDEMIA, UNSPECIFIED HYPERLIPIDEMIA TYPE: ICD-10-CM

## 2022-10-17 DIAGNOSIS — G72.0 STATIN MYOPATHY: ICD-10-CM

## 2022-10-17 PROCEDURE — 99999 PR PBB SHADOW E&M-EST. PATIENT-LVL III: ICD-10-PCS | Mod: PBBFAC,,, | Performed by: FAMILY MEDICINE

## 2022-10-17 PROCEDURE — 1126F PR PAIN SEVERITY QUANTIFIED, NO PAIN PRESENT: ICD-10-PCS | Mod: CPTII,S$GLB,, | Performed by: FAMILY MEDICINE

## 2022-10-17 PROCEDURE — 99214 OFFICE O/P EST MOD 30 MIN: CPT | Mod: S$GLB,,, | Performed by: FAMILY MEDICINE

## 2022-10-17 PROCEDURE — 1101F PR PT FALLS ASSESS DOC 0-1 FALLS W/OUT INJ PAST YR: ICD-10-PCS | Mod: CPTII,S$GLB,, | Performed by: FAMILY MEDICINE

## 2022-10-17 PROCEDURE — 3044F PR MOST RECENT HEMOGLOBIN A1C LEVEL <7.0%: ICD-10-PCS | Mod: CPTII,S$GLB,, | Performed by: FAMILY MEDICINE

## 2022-10-17 PROCEDURE — 1159F PR MEDICATION LIST DOCUMENTED IN MEDICAL RECORD: ICD-10-PCS | Mod: CPTII,S$GLB,, | Performed by: FAMILY MEDICINE

## 2022-10-17 PROCEDURE — 99214 PR OFFICE/OUTPT VISIT, EST, LEVL IV, 30-39 MIN: ICD-10-PCS | Mod: S$GLB,,, | Performed by: FAMILY MEDICINE

## 2022-10-17 PROCEDURE — 3079F DIAST BP 80-89 MM HG: CPT | Mod: CPTII,S$GLB,, | Performed by: FAMILY MEDICINE

## 2022-10-17 PROCEDURE — 3079F PR MOST RECENT DIASTOLIC BLOOD PRESSURE 80-89 MM HG: ICD-10-PCS | Mod: CPTII,S$GLB,, | Performed by: FAMILY MEDICINE

## 2022-10-17 PROCEDURE — 1126F AMNT PAIN NOTED NONE PRSNT: CPT | Mod: CPTII,S$GLB,, | Performed by: FAMILY MEDICINE

## 2022-10-17 PROCEDURE — 99999 PR PBB SHADOW E&M-EST. PATIENT-LVL III: CPT | Mod: PBBFAC,,, | Performed by: FAMILY MEDICINE

## 2022-10-17 PROCEDURE — 3075F PR MOST RECENT SYSTOLIC BLOOD PRESS GE 130-139MM HG: ICD-10-PCS | Mod: CPTII,S$GLB,, | Performed by: FAMILY MEDICINE

## 2022-10-17 PROCEDURE — 3288F PR FALLS RISK ASSESSMENT DOCUMENTED: ICD-10-PCS | Mod: CPTII,S$GLB,, | Performed by: FAMILY MEDICINE

## 2022-10-17 PROCEDURE — 1159F MED LIST DOCD IN RCRD: CPT | Mod: CPTII,S$GLB,, | Performed by: FAMILY MEDICINE

## 2022-10-17 PROCEDURE — 3075F SYST BP GE 130 - 139MM HG: CPT | Mod: CPTII,S$GLB,, | Performed by: FAMILY MEDICINE

## 2022-10-17 PROCEDURE — 3044F HG A1C LEVEL LT 7.0%: CPT | Mod: CPTII,S$GLB,, | Performed by: FAMILY MEDICINE

## 2022-10-17 PROCEDURE — 1101F PT FALLS ASSESS-DOCD LE1/YR: CPT | Mod: CPTII,S$GLB,, | Performed by: FAMILY MEDICINE

## 2022-10-17 PROCEDURE — 3288F FALL RISK ASSESSMENT DOCD: CPT | Mod: CPTII,S$GLB,, | Performed by: FAMILY MEDICINE

## 2022-10-17 NOTE — PROGRESS NOTES
Subjective:       Patient ID: Yung Mcconnell is a 67 y.o. male.    Chief Complaint: Follow-up (Pt here to discuss weight.)      HPI Comments:       Current Outpatient Medications:     albuterol (PROVENTIL/VENTOLIN HFA) 90 mcg/actuation inhaler, Inhale 2 puffs into the lungs every 4 (four) hours as needed for Wheezing or Shortness of Breath., Disp: 54 g, Rfl: 3    azelaic acid (AZELEX) 15 % gel, Apply 1 application topically 2 (two) times a day., Disp: , Rfl:     cetirizine (ZYRTEC) 10 MG tablet, Take 10 mg by mouth once daily., Disp: , Rfl:     cyclobenzaprine (FLEXERIL) 5 MG tablet, 1 Tablet Oral Three times a day as needed., Disp: 20 tablet, Rfl: 0    diclofenac 1.3 % PT12, Apply 1 patch topically every 12 (twelve) hours as needed., Disp: 30 patch, Rfl: 0    fluticasone-umeclidin-vilanter (TRELEGY ELLIPTA) 200-62.5-25 mcg inhaler, Inhale 1 puff into the lungs once daily. Wash out mouth after use, Disp: 60 each, Rfl: 11    GLUCOSAMINE HCL/CHONDR ALFONSO A NA (OSTEO BI-FLEX ORAL), Take 1 tablet by mouth once daily. , Disp: , Rfl:     metroNIDAZOLE (METROGEL) 0.75 % gel, Apply topically 2 (two) times daily., Disp: 45 g, Rfl: 2    moxifloxacin (AVELOX) 400 mg tablet, Take 1 tablet (400 mg total) by mouth once daily., Disp: 10 tablet, Rfl: 0    multivitamin (THERAGRAN) per tablet, 1 Tablet Oral Every day, Disp: , Rfl:     predniSONE (DELTASONE) 20 MG tablet, Prednisone 60 mg/ day for 3 days, 40 mg/day for 3 days,20 mg/ day for 3 days, (1/2 tablet )10 mg a day for 3 days., Disp: 20 tablet, Rfl: 0    semaglutide (OZEMPIC) 0.25 mg or 0.5 mg(2 mg/1.5 mL) pen injector, Inject 0.5 mg into the skin every 7 days., Disp: 4 pen, Rfl: 3    tadalafiL (CIALIS) 20 MG Tab, TAKE 1 TABLET DAILY AS NEEDED, Disp: 24 tablet, Rfl: 3      He is frustrated with the inability to lose weight.  BMI stays around 34.  His wife has done well with generic Ozempic, and he would like to try something similar.      No family or personal history of thyroid  "cancer.      Takes Pepcid most days in the evening for heartburn.  Otherwise controlled    Review of Systems   Constitutional:  Negative for activity change and unexpected weight change.   HENT:  Negative for hearing loss, rhinorrhea and trouble swallowing.    Eyes:  Negative for discharge and visual disturbance.   Respiratory:  Negative for chest tightness and wheezing.    Cardiovascular:  Negative for chest pain and palpitations.   Gastrointestinal:  Negative for blood in stool, constipation, diarrhea and vomiting.   Endocrine: Negative for polydipsia and polyuria.   Genitourinary:  Negative for difficulty urinating, hematuria and urgency.   Musculoskeletal:  Negative for arthralgias, joint swelling and neck pain.   Neurological:  Negative for weakness and headaches.   Psychiatric/Behavioral:  Negative for confusion and dysphoric mood.      Objective:      Vitals:    10/17/22 1454   BP: 136/80   Pulse: 94   Temp: 97.6 °F (36.4 °C)   SpO2: (!) 94%   Weight: 122.5 kg (270 lb 1 oz)   Height: 6' 2" (1.88 m)   PainSc: 0-No pain     Physical Exam  Vitals and nursing note reviewed.   Constitutional:       General: He is not in acute distress.     Appearance: He is well-developed. He is not diaphoretic.   HENT:      Head: Normocephalic.   Neck:      Thyroid: No thyromegaly.   Cardiovascular:      Rate and Rhythm: Normal rate and regular rhythm.      Heart sounds: Normal heart sounds. No murmur heard.  Pulmonary:      Effort: Pulmonary effort is normal.      Breath sounds: Normal breath sounds. No wheezing or rales.   Abdominal:      General: There is no distension.      Palpations: Abdomen is soft.   Musculoskeletal:      Cervical back: Neck supple.      Right lower leg: No edema.      Left lower leg: No edema.   Lymphadenopathy:      Cervical: No cervical adenopathy.   Skin:     General: Skin is warm and dry.   Neurological:      Mental Status: He is alert and oriented to person, place, and time.   Psychiatric:         " Mood and Affect: Mood normal.         Behavior: Behavior normal.         Thought Content: Thought content normal.         Judgment: Judgment normal.       Assessment:       1. Obesity (BMI 30.0-34.9)    2. Hyperlipidemia, unspecified hyperlipidemia type    3. Statin myopathy    4. Gastroesophageal reflux disease, unspecified whether esophagitis present          Plan:   Obesity (BMI 30.0-34.9)  Comments:  Trial of GLP1 therapy.  Follow-up 3 months  Orders:  -     semaglutide (OZEMPIC) 0.25 mg or 0.5 mg(2 mg/1.5 mL) pen injector; Inject 0.5 mg into the skin every 7 days.  Dispense: 4 pen; Refill: 3    Hyperlipidemia, unspecified hyperlipidemia type  Comments:      Statin myopathy    Gastroesophageal reflux disease, unspecified whether esophagitis present  Comments:  Controlled on Pepcid

## 2022-10-19 ENCOUNTER — PATIENT MESSAGE (OUTPATIENT)
Dept: FAMILY MEDICINE | Facility: CLINIC | Age: 67
End: 2022-10-19
Payer: MEDICARE

## 2022-10-19 ENCOUNTER — TELEPHONE (OUTPATIENT)
Dept: FAMILY MEDICINE | Facility: CLINIC | Age: 67
End: 2022-10-19
Payer: MEDICARE

## 2022-10-19 NOTE — TELEPHONE ENCOUNTER
----- Message from Dany Summers sent at 10/19/2022 12:53 PM CDT -----  Contact: self  Pt is requesting to consult with a nurse in regards to a drug that was prescribed to him that has been cancelled . Please give the pt a call back at [phone].     Thank you    Tank

## 2022-10-24 NOTE — TELEPHONE ENCOUNTER
PA for Ozempic was denied via insurance. Pt is wanting to see if something will be covered by insurance. Please advise.

## 2022-10-25 DIAGNOSIS — E66.9 OBESITY (BMI 30.0-34.9): Primary | ICD-10-CM

## 2022-10-25 RX ORDER — SEMAGLUTIDE 0.25 MG/.5ML
0.5 INJECTION, SOLUTION SUBCUTANEOUS WEEKLY
Qty: 0.5 ML | Refills: 8 | Status: SHIPPED | OUTPATIENT
Start: 2022-10-25 | End: 2024-03-31

## 2022-11-01 ENCOUNTER — PATIENT MESSAGE (OUTPATIENT)
Dept: PULMONOLOGY | Facility: CLINIC | Age: 67
End: 2022-11-01
Payer: MEDICARE

## 2022-11-02 NOTE — TELEPHONE ENCOUNTER
Spoke to pt.  Informed him that Dr. Lozano is out sick.  Offered him appt today with [facility].  Pt declined.  He stated that he might go to an urgent care or wait for Dr. Lozano to come back.

## 2022-11-11 ENCOUNTER — PATIENT MESSAGE (OUTPATIENT)
Dept: PULMONOLOGY | Facility: CLINIC | Age: 67
End: 2022-11-11
Payer: MEDICARE

## 2022-11-11 DIAGNOSIS — J45.21 MILD INTERMITTENT ASTHMA WITH ACUTE EXACERBATION: ICD-10-CM

## 2022-11-11 RX ORDER — FLUTICASONE FUROATE, UMECLIDINIUM BROMIDE AND VILANTEROL TRIFENATATE 200; 62.5; 25 UG/1; UG/1; UG/1
1 POWDER RESPIRATORY (INHALATION) DAILY
Qty: 60 EACH | Refills: 11 | Status: CANCELLED | OUTPATIENT
Start: 2022-11-11

## 2022-11-14 DIAGNOSIS — J45.21 MILD INTERMITTENT ASTHMA WITH ACUTE EXACERBATION: ICD-10-CM

## 2022-11-14 RX ORDER — FLUTICASONE FUROATE, UMECLIDINIUM BROMIDE AND VILANTEROL TRIFENATATE 200; 62.5; 25 UG/1; UG/1; UG/1
1 POWDER RESPIRATORY (INHALATION) DAILY
Qty: 60 EACH | Refills: 11 | Status: SHIPPED | OUTPATIENT
Start: 2022-11-14 | End: 2022-11-15 | Stop reason: SDUPTHER

## 2022-11-15 ENCOUNTER — PATIENT MESSAGE (OUTPATIENT)
Dept: PULMONOLOGY | Facility: CLINIC | Age: 67
End: 2022-11-15
Payer: MEDICARE

## 2022-11-15 DIAGNOSIS — J45.21 MILD INTERMITTENT ASTHMA WITH ACUTE EXACERBATION: ICD-10-CM

## 2022-11-16 ENCOUNTER — TELEPHONE (OUTPATIENT)
Dept: FAMILY MEDICINE | Facility: CLINIC | Age: 67
End: 2022-11-16
Payer: MEDICARE

## 2022-11-16 RX ORDER — FLUTICASONE FUROATE, UMECLIDINIUM BROMIDE AND VILANTEROL TRIFENATATE 200; 62.5; 25 UG/1; UG/1; UG/1
1 POWDER RESPIRATORY (INHALATION) DAILY
Qty: 180 EACH | Refills: 3 | Status: SHIPPED | OUTPATIENT
Start: 2022-11-16 | End: 2023-02-06 | Stop reason: SDUPTHER

## 2022-11-16 NOTE — TELEPHONE ENCOUNTER
----- Message from Cheri Alves sent at 11/16/2022  2:44 PM CST -----  Contact: [phone]  Calling regarding pt medication ,semaglutide, weight loss, (WEGOVY) 0.25 mg/0.5 mL PnIj Prior authorization was denied , appeal was faxed over (seeing if it was received) . Please call back at [phone] reference key#[id number] .thanks/dj

## 2023-01-20 ENCOUNTER — OFFICE VISIT (OUTPATIENT)
Dept: FAMILY MEDICINE | Facility: CLINIC | Age: 68
End: 2023-01-20
Payer: MEDICARE

## 2023-01-20 ENCOUNTER — LAB VISIT (OUTPATIENT)
Dept: LAB | Facility: HOSPITAL | Age: 68
End: 2023-01-20
Attending: FAMILY MEDICINE
Payer: MEDICARE

## 2023-01-20 VITALS
BODY MASS INDEX: 32.99 KG/M2 | SYSTOLIC BLOOD PRESSURE: 110 MMHG | DIASTOLIC BLOOD PRESSURE: 76 MMHG | TEMPERATURE: 100 F | HEART RATE: 81 BPM | WEIGHT: 257.06 LBS | OXYGEN SATURATION: 97 % | HEIGHT: 74 IN

## 2023-01-20 DIAGNOSIS — K21.9 GASTROESOPHAGEAL REFLUX DISEASE, UNSPECIFIED WHETHER ESOPHAGITIS PRESENT: ICD-10-CM

## 2023-01-20 DIAGNOSIS — E66.9 OBESITY (BMI 30.0-34.9): ICD-10-CM

## 2023-01-20 DIAGNOSIS — J44.89 ASTHMA WITH COPD: ICD-10-CM

## 2023-01-20 DIAGNOSIS — R79.9 ABNORMAL FINDING OF BLOOD CHEMISTRY, UNSPECIFIED: ICD-10-CM

## 2023-01-20 DIAGNOSIS — E78.5 HYPERLIPIDEMIA, UNSPECIFIED HYPERLIPIDEMIA TYPE: ICD-10-CM

## 2023-01-20 DIAGNOSIS — E66.9 OBESITY (BMI 30.0-34.9): Primary | ICD-10-CM

## 2023-01-20 DIAGNOSIS — G72.0 STATIN MYOPATHY: ICD-10-CM

## 2023-01-20 DIAGNOSIS — T46.6X5A STATIN MYOPATHY: ICD-10-CM

## 2023-01-20 LAB
ESTIMATED AVG GLUCOSE: 103 MG/DL (ref 68–131)
HBA1C MFR BLD: 5.2 % (ref 4–5.6)

## 2023-01-20 PROCEDURE — 3074F PR MOST RECENT SYSTOLIC BLOOD PRESSURE < 130 MM HG: ICD-10-PCS | Mod: CPTII,S$GLB,, | Performed by: FAMILY MEDICINE

## 2023-01-20 PROCEDURE — 99214 PR OFFICE/OUTPT VISIT, EST, LEVL IV, 30-39 MIN: ICD-10-PCS | Mod: S$GLB,,, | Performed by: FAMILY MEDICINE

## 2023-01-20 PROCEDURE — 99999 PR PBB SHADOW E&M-EST. PATIENT-LVL V: ICD-10-PCS | Mod: PBBFAC,,, | Performed by: FAMILY MEDICINE

## 2023-01-20 PROCEDURE — 1159F PR MEDICATION LIST DOCUMENTED IN MEDICAL RECORD: ICD-10-PCS | Mod: CPTII,S$GLB,, | Performed by: FAMILY MEDICINE

## 2023-01-20 PROCEDURE — 3078F PR MOST RECENT DIASTOLIC BLOOD PRESSURE < 80 MM HG: ICD-10-PCS | Mod: CPTII,S$GLB,, | Performed by: FAMILY MEDICINE

## 2023-01-20 PROCEDURE — 3008F PR BODY MASS INDEX (BMI) DOCUMENTED: ICD-10-PCS | Mod: CPTII,S$GLB,, | Performed by: FAMILY MEDICINE

## 2023-01-20 PROCEDURE — 1101F PT FALLS ASSESS-DOCD LE1/YR: CPT | Mod: CPTII,S$GLB,, | Performed by: FAMILY MEDICINE

## 2023-01-20 PROCEDURE — 99214 OFFICE O/P EST MOD 30 MIN: CPT | Mod: S$GLB,,, | Performed by: FAMILY MEDICINE

## 2023-01-20 PROCEDURE — 3288F PR FALLS RISK ASSESSMENT DOCUMENTED: ICD-10-PCS | Mod: CPTII,S$GLB,, | Performed by: FAMILY MEDICINE

## 2023-01-20 PROCEDURE — 1159F MED LIST DOCD IN RCRD: CPT | Mod: CPTII,S$GLB,, | Performed by: FAMILY MEDICINE

## 2023-01-20 PROCEDURE — 1101F PR PT FALLS ASSESS DOC 0-1 FALLS W/OUT INJ PAST YR: ICD-10-PCS | Mod: CPTII,S$GLB,, | Performed by: FAMILY MEDICINE

## 2023-01-20 PROCEDURE — 3008F BODY MASS INDEX DOCD: CPT | Mod: CPTII,S$GLB,, | Performed by: FAMILY MEDICINE

## 2023-01-20 PROCEDURE — 36415 COLL VENOUS BLD VENIPUNCTURE: CPT | Mod: PO | Performed by: FAMILY MEDICINE

## 2023-01-20 PROCEDURE — 3074F SYST BP LT 130 MM HG: CPT | Mod: CPTII,S$GLB,, | Performed by: FAMILY MEDICINE

## 2023-01-20 PROCEDURE — 3288F FALL RISK ASSESSMENT DOCD: CPT | Mod: CPTII,S$GLB,, | Performed by: FAMILY MEDICINE

## 2023-01-20 PROCEDURE — 1126F PR PAIN SEVERITY QUANTIFIED, NO PAIN PRESENT: ICD-10-PCS | Mod: CPTII,S$GLB,, | Performed by: FAMILY MEDICINE

## 2023-01-20 PROCEDURE — 83036 HEMOGLOBIN GLYCOSYLATED A1C: CPT | Performed by: FAMILY MEDICINE

## 2023-01-20 PROCEDURE — 99999 PR PBB SHADOW E&M-EST. PATIENT-LVL V: CPT | Mod: PBBFAC,,, | Performed by: FAMILY MEDICINE

## 2023-01-20 PROCEDURE — 3078F DIAST BP <80 MM HG: CPT | Mod: CPTII,S$GLB,, | Performed by: FAMILY MEDICINE

## 2023-01-20 PROCEDURE — 1126F AMNT PAIN NOTED NONE PRSNT: CPT | Mod: CPTII,S$GLB,, | Performed by: FAMILY MEDICINE

## 2023-01-20 NOTE — PROGRESS NOTES
Subjective:       Patient ID: Yung Mcconnell is a 67 y.o. male.    Chief Complaint: Follow-up      HPI Comments:       Current Outpatient Medications:     albuterol (PROVENTIL/VENTOLIN HFA) 90 mcg/actuation inhaler, Inhale 2 puffs into the lungs every 4 (four) hours as needed for Wheezing or Shortness of Breath., Disp: 54 g, Rfl: 3    azelaic acid (AZELEX) 15 % gel, Apply 1 application topically 2 (two) times a day., Disp: , Rfl:     cetirizine (ZYRTEC) 10 MG tablet, Take 10 mg by mouth once daily., Disp: , Rfl:     cyclobenzaprine (FLEXERIL) 5 MG tablet, 1 Tablet Oral Three times a day as needed., Disp: 20 tablet, Rfl: 0    fluticasone-umeclidin-vilanter (TRELEGY ELLIPTA) 200-62.5-25 mcg inhaler, Inhale 1 puff into the lungs once daily. Wash out mouth after use, Disp: 180 each, Rfl: 3    GLUCOSAMINE HCL/CHONDR ALFONSO A NA (OSTEO BI-FLEX ORAL), Take 1 tablet by mouth once daily. , Disp: , Rfl:     multivitamin (THERAGRAN) per tablet, 1 Tablet Oral Every day, Disp: , Rfl:     semaglutide, weight loss, (WEGOVY) 0.25 mg/0.5 mL PnIj, Inject 0.5 mg into the skin once a week., Disp: 0.5 mL, Rfl: 8    tadalafiL (CIALIS) 20 MG Tab, TAKE 1 TABLET DAILY AS NEEDED, Disp: 24 tablet, Rfl: 3    diclofenac 1.3 % PT12, Apply 1 patch topically every 12 (twelve) hours as needed., Disp: 30 patch, Rfl: 0    metroNIDAZOLE (METROGEL) 0.75 % gel, Apply topically 2 (two) times daily., Disp: 45 g, Rfl: 2    moxifloxacin (AVELOX) 400 mg tablet, Take 1 tablet (400 mg total) by mouth once daily., Disp: 10 tablet, Rfl: 0    predniSONE (DELTASONE) 20 MG tablet, Prednisone 60 mg/ day for 3 days, 40 mg/day for 3 days,20 mg/ day for 3 days, (1/2 tablet )10 mg a day for 3 days., Disp: 20 tablet, Rfl: 0      Short-term follow-up.  Never got on GLP 1 therapy because of insurance issues.  Has lost 13 lb on his own since our last visit in October.    Trelegy has been very helpful for his asthma.      Doing well with Pepcid for his acid reflux.      Has some  "early cataracts.  Followed regularly by Ophthalmology.    Review of Systems   Constitutional:  Negative for activity change, appetite change and fever.   HENT:  Negative for sore throat.    Respiratory:  Negative for cough and shortness of breath.    Cardiovascular:  Negative for chest pain.   Gastrointestinal:  Negative for abdominal pain, diarrhea and nausea.   Genitourinary:  Negative for difficulty urinating.   Musculoskeletal:  Negative for arthralgias and myalgias.   Neurological:  Negative for dizziness and headaches.     Objective:      Vitals:    01/20/23 0853   BP: 110/76   Pulse: 81   Temp: 99.7 °F (37.6 °C)   SpO2: 97%   Weight: 116.6 kg (257 lb 0.9 oz)   Height: 6' 2" (1.88 m)   PainSc: 0-No pain     Physical Exam  Vitals and nursing note reviewed.   Constitutional:       General: He is not in acute distress.     Appearance: He is well-developed. He is not diaphoretic.   HENT:      Head: Normocephalic.   Neck:      Thyroid: No thyromegaly.   Cardiovascular:      Rate and Rhythm: Normal rate and regular rhythm.      Heart sounds: Normal heart sounds. No murmur heard.  Pulmonary:      Effort: Pulmonary effort is normal.      Breath sounds: Normal breath sounds. No wheezing or rales.   Abdominal:      General: There is no distension.      Palpations: Abdomen is soft.   Musculoskeletal:      Cervical back: Neck supple.   Lymphadenopathy:      Cervical: No cervical adenopathy.   Skin:     General: Skin is warm and dry.   Neurological:      Mental Status: He is alert and oriented to person, place, and time.   Psychiatric:         Mood and Affect: Mood normal.         Behavior: Behavior normal.         Thought Content: Thought content normal.         Judgment: Judgment normal.       Assessment:       1. Obesity (BMI 30.0-34.9)    2. Hyperlipidemia, unspecified hyperlipidemia type    3. Statin myopathy    4. Gastroesophageal reflux disease, unspecified whether esophagitis present    5. Asthma with COPD    6. " Abnormal finding of blood chemistry, unspecified          Plan:   Obesity (BMI 30.0-34.9)  Comments:  Good weight loss to diet and physical activity.  Encouraged to continue.  A1c today  Orders:  -     Hemoglobin A1C; Future; Expected date: 01/20/2023  -     Ambulatory referral/consult to Weight Management Program; Future; Expected date: 01/27/2023    Hyperlipidemia, unspecified hyperlipidemia type  Comments:      Statin myopathy    Gastroesophageal reflux disease, unspecified whether esophagitis present  Comments:  Controlled with Pepcid    Asthma with COPD  Comments:  Controlled with Trelegy    Abnormal finding of blood chemistry, unspecified  -     Hemoglobin A1C; Future; Expected date: 01/20/2023

## 2023-02-03 ENCOUNTER — TELEPHONE (OUTPATIENT)
Dept: PULMONOLOGY | Facility: CLINIC | Age: 68
End: 2023-02-03
Payer: MEDICARE

## 2023-02-03 DIAGNOSIS — J44.89 ASTHMA WITH COPD: Primary | ICD-10-CM

## 2023-02-03 NOTE — TELEPHONE ENCOUNTER
----- Message from Lizzie Terrazas sent at 2/3/2023  8:07 AM CST -----  Contact: [phone]  Patient is requesting a call back regarding getting an xray for his appointment on 02/08/2023. Please call him back at [phone]. Thanks/JULIANNA

## 2023-02-06 DIAGNOSIS — J45.21 MILD INTERMITTENT ASTHMA WITH ACUTE EXACERBATION: ICD-10-CM

## 2023-02-07 RX ORDER — FLUTICASONE FUROATE, UMECLIDINIUM BROMIDE AND VILANTEROL TRIFENATATE 200; 62.5; 25 UG/1; UG/1; UG/1
1 POWDER RESPIRATORY (INHALATION) DAILY
Qty: 180 EACH | Refills: 3 | Status: SHIPPED | OUTPATIENT
Start: 2023-02-07 | End: 2023-02-08 | Stop reason: SDUPTHER

## 2023-02-08 ENCOUNTER — OFFICE VISIT (OUTPATIENT)
Dept: PULMONOLOGY | Facility: CLINIC | Age: 68
End: 2023-02-08
Payer: MEDICARE

## 2023-02-08 ENCOUNTER — CLINICAL SUPPORT (OUTPATIENT)
Dept: PULMONOLOGY | Facility: CLINIC | Age: 68
End: 2023-02-08
Payer: MEDICARE

## 2023-02-08 ENCOUNTER — HOSPITAL ENCOUNTER (OUTPATIENT)
Dept: RADIOLOGY | Facility: HOSPITAL | Age: 68
Discharge: HOME | End: 2023-02-08
Attending: INTERNAL MEDICINE
Payer: MEDICARE

## 2023-02-08 VITALS
OXYGEN SATURATION: 96 % | WEIGHT: 254 LBS | BODY MASS INDEX: 32.6 KG/M2 | HEIGHT: 74 IN | HEART RATE: 61 BPM | SYSTOLIC BLOOD PRESSURE: 120 MMHG | RESPIRATION RATE: 17 BRPM | DIASTOLIC BLOOD PRESSURE: 80 MMHG

## 2023-02-08 DIAGNOSIS — J44.89 ASTHMA WITH COPD: ICD-10-CM

## 2023-02-08 DIAGNOSIS — J45.21 MILD INTERMITTENT ASTHMA WITH ACUTE EXACERBATION: ICD-10-CM

## 2023-02-08 DIAGNOSIS — G47.33 OSA ON CPAP: Primary | ICD-10-CM

## 2023-02-08 DIAGNOSIS — G47.33 OSA (OBSTRUCTIVE SLEEP APNEA): ICD-10-CM

## 2023-02-08 DIAGNOSIS — J30.1 CHRONIC SEASONAL ALLERGIC RHINITIS DUE TO POLLEN: ICD-10-CM

## 2023-02-08 LAB
BRPFT: ABNORMAL
DLCO ADJ PRE: 23.21 ML/(MIN*MMHG) (ref 23.84–37.69)
DLCO SINGLE BREATH LLN: 23.84
DLCO SINGLE BREATH PRE REF: 75.4 %
DLCO SINGLE BREATH REF: 30.76
DLCOC SBVA LLN: 2.83
DLCOC SBVA PRE REF: 88.9 %
DLCOC SBVA REF: 3.87
DLCOC SINGLE BREATH LLN: 23.84
DLCOC SINGLE BREATH PRE REF: 75.4 %
DLCOC SINGLE BREATH REF: 30.76
DLCOVA LLN: 2.83
DLCOVA PRE REF: 88.9 %
DLCOVA PRE: 3.44 ML/(MIN*MMHG*L) (ref 2.83–4.91)
DLCOVA REF: 3.87
DLVAADJ PRE: 3.44 ML/(MIN*MMHG*L) (ref 2.83–4.91)
ERV LLN: -16448.82
ERV PRE REF: 130 %
ERV REF: 1.18
FEF 25 75 LLN: 1.19
FEF 25 75 PRE REF: 97.5 %
FEF 25 75 REF: 2.72
FEV1 FVC LLN: 62
FEV1 FVC PRE REF: 95.1 %
FEV1 FVC REF: 75
FEV1 LLN: 2.65
FEV1 PRE REF: 103.1 %
FEV1 REF: 3.66
FRCPLETH LLN: 2.94
FRCPLETH PREREF: 117.8 %
FRCPLETH REF: 3.93
FVC LLN: 3.63
FVC PRE REF: 107.8 %
FVC REF: 4.89
IVC PRE: 4.83 L (ref 3.63–6.14)
IVC SINGLE BREATH LLN: 3.63
IVC SINGLE BREATH PRE REF: 98.8 %
IVC SINGLE BREATH REF: 4.89
MVV LLN: 122
MVV PRE REF: 59.1 %
MVV REF: 144
PEF LLN: 6.8
PEF PRE REF: 100 %
PEF REF: 9.39
PRE DLCO: 23.21 ML/(MIN*MMHG) (ref 23.84–37.69)
PRE ERV: 1.53 L (ref -16448.82–16451.18)
PRE FEF 25 75: 2.65 L/S (ref 1.19–4.24)
PRE FET 100: 7.3 SEC
PRE FEV1 FVC: 71.66 % (ref 62.19–88.58)
PRE FEV1: 3.77 L (ref 2.65–4.67)
PRE FRC PL: 4.63 L
PRE FVC: 5.26 L (ref 3.63–6.14)
PRE MVV: 85 L/MIN (ref 122.16–165.28)
PRE PEF: 9.39 L/S (ref 6.8–11.98)
PRE RV: 3.1 L (ref 2.08–3.43)
PRE TLC: 8.37 L (ref 6.79–9.09)
RAW LLN: 3.06
RAW PRE REF: 93.3 %
RAW PRE: 2.85 CMH2O*S/L (ref 3.06–3.06)
RAW REF: 3.06
RV LLN: 2.08
RV PRE REF: 112.9 %
RV REF: 2.75
RVTLC LLN: 32
RVTLC PRE REF: 90.8 %
RVTLC PRE: 37.1 % (ref 31.89–49.85)
RVTLC REF: 41
TLC LLN: 6.79
TLC PRE REF: 105.4 %
TLC REF: 7.94
VA PRE: 6.74 L (ref 7.79–7.79)
VA SINGLE BREATH LLN: 7.79
VA SINGLE BREATH PRE REF: 86.5 %
VA SINGLE BREATH REF: 7.79
VC LLN: 3.63
VC PRE REF: 107.8 %
VC PRE: 5.26 L (ref 3.63–6.14)
VC REF: 4.89
VTGRAWPRE: 4.44 L

## 2023-02-08 PROCEDURE — 3008F PR BODY MASS INDEX (BMI) DOCUMENTED: ICD-10-PCS | Mod: CPTII,S$GLB,, | Performed by: INTERNAL MEDICINE

## 2023-02-08 PROCEDURE — 99999 PR PBB SHADOW E&M-EST. PATIENT-LVL IV: CPT | Mod: PBBFAC,,, | Performed by: INTERNAL MEDICINE

## 2023-02-08 PROCEDURE — 3288F PR FALLS RISK ASSESSMENT DOCUMENTED: ICD-10-PCS | Mod: CPTII,S$GLB,, | Performed by: INTERNAL MEDICINE

## 2023-02-08 PROCEDURE — 94726 PLETHYSMOGRAPHY LUNG VOLUMES: CPT | Mod: S$GLB,,, | Performed by: INTERNAL MEDICINE

## 2023-02-08 PROCEDURE — 94729 PR C02/MEMBANE DIFFUSE CAPACITY: ICD-10-PCS | Mod: S$GLB,,, | Performed by: INTERNAL MEDICINE

## 2023-02-08 PROCEDURE — 94010 BREATHING CAPACITY TEST: ICD-10-PCS | Mod: S$GLB,,, | Performed by: INTERNAL MEDICINE

## 2023-02-08 PROCEDURE — 3074F PR MOST RECENT SYSTOLIC BLOOD PRESSURE < 130 MM HG: ICD-10-PCS | Mod: CPTII,S$GLB,, | Performed by: INTERNAL MEDICINE

## 2023-02-08 PROCEDURE — 99213 OFFICE O/P EST LOW 20 MIN: CPT | Mod: 25,S$GLB,, | Performed by: INTERNAL MEDICINE

## 2023-02-08 PROCEDURE — 3079F DIAST BP 80-89 MM HG: CPT | Mod: CPTII,S$GLB,, | Performed by: INTERNAL MEDICINE

## 2023-02-08 PROCEDURE — 99213 PR OFFICE/OUTPT VISIT, EST, LEVL III, 20-29 MIN: ICD-10-PCS | Mod: 25,S$GLB,, | Performed by: INTERNAL MEDICINE

## 2023-02-08 PROCEDURE — 1101F PT FALLS ASSESS-DOCD LE1/YR: CPT | Mod: CPTII,S$GLB,, | Performed by: INTERNAL MEDICINE

## 2023-02-08 PROCEDURE — 3008F BODY MASS INDEX DOCD: CPT | Mod: CPTII,S$GLB,, | Performed by: INTERNAL MEDICINE

## 2023-02-08 PROCEDURE — 3044F HG A1C LEVEL LT 7.0%: CPT | Mod: CPTII,S$GLB,, | Performed by: INTERNAL MEDICINE

## 2023-02-08 PROCEDURE — 99999 PR PBB SHADOW E&M-EST. PATIENT-LVL IV: ICD-10-PCS | Mod: PBBFAC,,, | Performed by: INTERNAL MEDICINE

## 2023-02-08 PROCEDURE — 3288F FALL RISK ASSESSMENT DOCD: CPT | Mod: CPTII,S$GLB,, | Performed by: INTERNAL MEDICINE

## 2023-02-08 PROCEDURE — 3044F PR MOST RECENT HEMOGLOBIN A1C LEVEL <7.0%: ICD-10-PCS | Mod: CPTII,S$GLB,, | Performed by: INTERNAL MEDICINE

## 2023-02-08 PROCEDURE — 71046 X-RAY EXAM CHEST 2 VIEWS: CPT | Mod: TC

## 2023-02-08 PROCEDURE — 1159F PR MEDICATION LIST DOCUMENTED IN MEDICAL RECORD: ICD-10-PCS | Mod: CPTII,S$GLB,, | Performed by: INTERNAL MEDICINE

## 2023-02-08 PROCEDURE — 71046 XR CHEST PA AND LATERAL: ICD-10-PCS | Mod: 26,,, | Performed by: RADIOLOGY

## 2023-02-08 PROCEDURE — 94726 PULM FUNCT TST PLETHYSMOGRAP: ICD-10-PCS | Mod: S$GLB,,, | Performed by: INTERNAL MEDICINE

## 2023-02-08 PROCEDURE — 1101F PR PT FALLS ASSESS DOC 0-1 FALLS W/OUT INJ PAST YR: ICD-10-PCS | Mod: CPTII,S$GLB,, | Performed by: INTERNAL MEDICINE

## 2023-02-08 PROCEDURE — 71046 X-RAY EXAM CHEST 2 VIEWS: CPT | Mod: 26,,, | Performed by: RADIOLOGY

## 2023-02-08 PROCEDURE — 94010 BREATHING CAPACITY TEST: CPT | Mod: S$GLB,,, | Performed by: INTERNAL MEDICINE

## 2023-02-08 PROCEDURE — 3074F SYST BP LT 130 MM HG: CPT | Mod: CPTII,S$GLB,, | Performed by: INTERNAL MEDICINE

## 2023-02-08 PROCEDURE — 3079F PR MOST RECENT DIASTOLIC BLOOD PRESSURE 80-89 MM HG: ICD-10-PCS | Mod: CPTII,S$GLB,, | Performed by: INTERNAL MEDICINE

## 2023-02-08 PROCEDURE — 94729 DIFFUSING CAPACITY: CPT | Mod: S$GLB,,, | Performed by: INTERNAL MEDICINE

## 2023-02-08 PROCEDURE — 1159F MED LIST DOCD IN RCRD: CPT | Mod: CPTII,S$GLB,, | Performed by: INTERNAL MEDICINE

## 2023-02-08 RX ORDER — FLUTICASONE FUROATE, UMECLIDINIUM BROMIDE AND VILANTEROL TRIFENATATE 200; 62.5; 25 UG/1; UG/1; UG/1
1 POWDER RESPIRATORY (INHALATION) DAILY
Qty: 180 EACH | Refills: 3 | Status: SHIPPED | OUTPATIENT
Start: 2023-02-08 | End: 2023-11-09 | Stop reason: SDUPTHER

## 2023-02-08 NOTE — PROGRESS NOTES
Subjective:     Patient ID: Yung Mcconnell is a 67 y.o. male.    Chief Complaint:      HPI  66 y/o with history of Asthma / Chronic Obstructive Pulmonary Disease, s/p right lung surgery and Obstructive Sleep Apnea  history of COVID in August 2020.  Improved with occasional cough and chest congestion    Obstructive Sleep Apnea on Continuous Positive Airway Pressure:  Patient is using CPAP as prescribed and benefiting from therapy. Patient has complaints of none          Past Medical History:   Diagnosis Date    Allergy     Asthma, chronic 7/9/2013    Colon polyps     COPD (chronic obstructive pulmonary disease)     GERD (gastroesophageal reflux disease)     High cholesterol     Osteoarthritis of both knees 7/9/2013    Sleep apnea      Past Surgical History:   Procedure Laterality Date    CHOLECYSTECTOMY      COLONOSCOPY N/A 10/23/2020    Procedure: COLONOSCOPY;  Surgeon: Sae Valentine MD;  Location: [facility] ENDO;  Service: Endoscopy;  Laterality: N/A;    DIAGNOSTIC LAPAROSCOPY N/A 6/21/2018    Procedure: LAPAROSCOPY, DIAGNOSTIC;  Surgeon: Casper Martinez MD;  Location: [facility] OR;  Service: General;  Laterality: N/A;    LUNG SURGERY Right     benign mass    TOTAL KNEE ARTHROPLASTY Left     UMBILICAL HERNIA REPAIR N/A 6/21/2018    Procedure: REPAIR, HERNIA, UMBILICAL, AGE 5 YEARS OR OLDER;  Surgeon: Casper Martinez MD;  Location: [facility] OR;  Service: General;  Laterality: N/A;     Review of patient's allergies indicates:   Allergen Reactions    Zithromax [azithromycin] Palpitations    Aspirin Other (See Comments)     Other reaction(s): Difficulty breathing    Lipitor [atorvastatin] Other (See Comments)     Leg cramps/hand cramps     Current Outpatient Medications on File Prior to Visit   Medication Sig Dispense Refill    albuterol (PROVENTIL/VENTOLIN HFA) 90 mcg/actuation inhaler Inhale 2 puffs into the lungs every 4 (four) hours as needed for Wheezing or Shortness of Breath. 54 g 3    azelaic acid (AZELEX) 15 % gel  Apply 1 application topically 2 (two) times a day.      cetirizine (ZYRTEC) 10 MG tablet Take 10 mg by mouth once daily.      cyclobenzaprine (FLEXERIL) 5 MG tablet 1 Tablet Oral Three times a day as needed. 20 tablet 0    GLUCOSAMINE HCL/CHONDR ALFONSO A NA (OSTEO BI-FLEX ORAL) Take 1 tablet by mouth once daily.       multivitamin (THERAGRAN) per tablet 1 Tablet Oral Every day      semaglutide, weight loss, (WEGOVY) 0.25 mg/0.5 mL PnIj Inject 0.5 mg into the skin once a week. 0.5 mL 8    tadalafiL (CIALIS) 20 MG Tab TAKE 1 TABLET DAILY AS NEEDED 24 tablet 3    diclofenac 1.3 % PT12 Apply 1 patch topically every 12 (twelve) hours as needed. 30 patch 0    metroNIDAZOLE (METROGEL) 0.75 % gel Apply topically 2 (two) times daily. 45 g 2     No current facility-administered medications on file prior to visit.     Social History     Socioeconomic History    Marital status:    Occupational History     Employer: [employer]   Tobacco Use    Smoking status: Never    Smokeless tobacco: Never   Substance and Sexual Activity    Alcohol use: Never    Drug use: No     Family History   Problem Relation Age of Onset    Hypertension Mother     Diabetes Mother     Cancer Mother         Breast Ca     Heart disease Father         CAD , CHF     Aneurysm Father     Colon polyps Father     Melanoma Neg Hx     Eczema Neg Hx     Lupus Neg Hx     Psoriasis Neg Hx        Review of Systems   Constitutional:  Negative for fever and fatigue.   HENT:  Negative for postnasal drip and rhinorrhea.    Eyes:  Negative for redness and itching.   Respiratory:  Negative for cough, shortness of breath, wheezing, dyspnea on extertion and Paroxysmal Nocturnal Dyspnea.    Cardiovascular:  Negative for chest pain.   Genitourinary:  Negative for difficulty urinating and hematuria.   Endocrine:  Negative for polyphagia, cold intolerance and heat intolerance.    Musculoskeletal:  Negative for arthralgias.   Skin:  Negative for rash.  "  Gastrointestinal:  Negative for nausea, vomiting, abdominal pain and abdominal distention.   Neurological:  Negative for dizziness and headaches.   Hematological:  Negative for adenopathy. Does not bruise/bleed easily and no excessive bruising.   Psychiatric/Behavioral:  The patient is not nervous/anxious.      Objective:      /80   Pulse 61   Resp 17   Ht 6' 2" (1.88 m)   Wt 115.2 kg (253 lb 15.5 oz)   SpO2 96%   BMI 32.61 kg/m²   Physical Exam  Vitals and nursing note reviewed.   Constitutional:       Appearance: He is well-developed.   HENT:      Head: Normocephalic and atraumatic.      Nose: Nose normal.   Eyes:      Conjunctiva/sclera: Conjunctivae normal.      Pupils: Pupils are equal, round, and reactive to light.   Neck:      Thyroid: No thyromegaly.      Vascular: No JVD.      Trachea: No tracheal deviation.   Cardiovascular:      Rate and Rhythm: Normal rate and regular rhythm.      Heart sounds: Normal heart sounds.   Pulmonary:      Effort: Pulmonary effort is normal.      Breath sounds: Normal breath sounds.   Abdominal:      Palpations: Abdomen is soft.   Musculoskeletal:         General: Normal range of motion.      Cervical back: Neck supple.   Lymphadenopathy:      Cervical: No cervical adenopathy.   Skin:     General: Skin is warm and dry.   Neurological:      Mental Status: He is alert and oriented to person, place, and time.   Psychiatric:         Mood and Affect: Mood normal.         Behavior: Behavior normal.     Personal Diagnostic Review  Chest x-ray: WN      Pulmonary Studies Review 2/8/2023   SpO2 96   Height 74   Weight 4063.52   BMI (Calculated) 32.6   Predicted Distance 325.25   Predicted Distance Meters (Calculated) 565.03       X-Ray Chest PA And Lateral  Narrative: EXAMINATION:  XR CHEST PA AND LATERAL    CLINICAL HISTORY:  SOB; Chronic obstructive pulmonary disease, unspecified    TECHNIQUE:  PA and lateral views of the chest were " performed.    COMPARISON:  08/08/2022    FINDINGS:  The lungs are clear and free of infiltrate.  No pleural effusion or pneumothorax. The heart is not enlarged. Bilateral calcified pleural plaques are noted.  There is also significant thickening of the pleural in the region of the right lung apex with more minimal thickening seen within the left lung apex.  Impression: 1.  No acute cardiopulmonary process.    Electronically signed by: Bulmaro Vasquez DO  Date:    02/08/2023  Time:    09:19      Office Spirometry Results: normal Pulmonary Function Studies      No flowsheet data found.  Pulmonary Studies Review 2/8/2023   SpO2 96   Height 74   Weight 4063.52   BMI (Calculated) 32.6   Predicted Distance 325.25   Predicted Distance Meters (Calculated) 565.03         Assessment:            KEVEN on CPAP  -     CPAP/BIPAP SUPPLIES    Mild intermittent asthma with acute exacerbation  -     fluticasone-umeclidin-vilanter (TRELEGY ELLIPTA) 200-62.5-25 mcg inhaler; Inhale 1 puff into the lungs once daily. Wash out mouth after use  Dispense: 180 each; Refill: 3  -     Spirometry with/without bronchodilator; Future; Expected date: 08/11/2023  -     X-Ray Chest PA And Lateral; Future; Expected date: 02/08/2023    KEVEN (obstructive sleep apnea)    Asthma with COPD    Chronic seasonal allergic rhinitis due to pollen          Outpatient Encounter Medications as of 2/8/2023   Medication Sig Dispense Refill    albuterol (PROVENTIL/VENTOLIN HFA) 90 mcg/actuation inhaler Inhale 2 puffs into the lungs every 4 (four) hours as needed for Wheezing or Shortness of Breath. 54 g 3    azelaic acid (AZELEX) 15 % gel Apply 1 application topically 2 (two) times a day.      cetirizine (ZYRTEC) 10 MG tablet Take 10 mg by mouth once daily.      cyclobenzaprine (FLEXERIL) 5 MG tablet 1 Tablet Oral Three times a day as needed. 20 tablet 0    GLUCOSAMINE HCL/CHONDR ALFONSO A NA (OSTEO BI-FLEX ORAL) Take 1 tablet by mouth once daily.       multivitamin  (THERAGRAN) per tablet 1 Tablet Oral Every day      semaglutide, weight loss, (WEGOVY) 0.25 mg/0.5 mL PnIj Inject 0.5 mg into the skin once a week. 0.5 mL 8    tadalafiL (CIALIS) 20 MG Tab TAKE 1 TABLET DAILY AS NEEDED 24 tablet 3    [DISCONTINUED] fluticasone-umeclidin-vilanter (TRELEGY ELLIPTA) 200-62.5-25 mcg inhaler Inhale 1 puff into the lungs once daily. Wash out mouth after use 180 each 3    diclofenac 1.3 % PT12 Apply 1 patch topically every 12 (twelve) hours as needed. 30 patch 0    fluticasone-umeclidin-vilanter (TRELEGY ELLIPTA) 200-62.5-25 mcg inhaler Inhale 1 puff into the lungs once daily. Wash out mouth after use 180 each 3    metroNIDAZOLE (METROGEL) 0.75 % gel Apply topically 2 (two) times daily. 45 g 2    [DISCONTINUED] fluticasone-umeclidin-vilanter (TRELEGY ELLIPTA) 200-62.5-25 mcg inhaler Inhale 1 puff into the lungs once daily. Wash out mouth after use 180 each 3    [DISCONTINUED] moxifloxacin (AVELOX) 400 mg tablet Take 1 tablet (400 mg total) by mouth once daily. 10 tablet 0    [DISCONTINUED] predniSONE (DELTASONE) 20 MG tablet Prednisone 60 mg/ day for 3 days, 40 mg/day for 3 days,20 mg/ day for 3 days, (1/2 tablet )10 mg a day for 3 days. 20 tablet 0     No facility-administered encounter medications on file as of 2/8/2023.     Plan:       Requested Prescriptions     Signed Prescriptions Disp Refills    fluticasone-umeclidin-vilanter (TRELEGY ELLIPTA) 200-62.5-25 mcg inhaler 180 each 3     Sig: Inhale 1 puff into the lungs once daily. Wash out mouth after use     Problem List Items Addressed This Visit       KEVEN (obstructive sleep apnea)    Asthma with COPD    Chronic seasonal allergic rhinitis due to pollen    Mild intermittent asthma with acute exacerbation    Relevant Medications    fluticasone-umeclidin-vilanter (TRELEGY ELLIPTA) 200-62.5-25 mcg inhaler    Other Relevant Orders    Spirometry with/without bronchodilator    X-Ray Chest PA And Lateral     Other Visit Diagnoses       KEVEN on  CPAP    -  Primary    Relevant Orders    CPAP/BIPAP SUPPLIES               Follow up in about 11 months (around 1/8/2024) for Review althea - on return, Review CXR - on return.    MEDICAL DECISION MAKING: Moderate to high complexity.  Overall, the multiple problems listed are of moderate to high severity that may impact quality of life and activities of daily living. Side effects of medications, treatment plan as well as options and alternatives reviewed and discussed with patient. There was counseling of patient concerning these issues.    Total time spent in counseling and coordination of care - 25  minutes of total time spent on the encounter, which includes face to face time and non-face to face time preparing to see the patient (eg, review of tests), Obtaining and/or reviewing separately obtained history, Documenting clinical information in the electronic or other health record, Independently interpreting results (not separately reported) and communicating results to the patient/family/caregiver, or Care coordination (not separately reported).    Time was used in discussion of prognosis, risks, benefits of treatment, instructions and compliance with regimen . Discussion with other physicians and/or health care providers - home health or for use of durable medical equipment (oxygen, nebulizers, CPAP, BiPAP) occurred.

## 2023-03-20 DIAGNOSIS — N52.9 ERECTILE DYSFUNCTION, UNSPECIFIED ERECTILE DYSFUNCTION TYPE: ICD-10-CM

## 2023-03-20 RX ORDER — TADALAFIL 20 MG/1
TABLET ORAL
Qty: 24 TABLET | Refills: 3 | Status: SHIPPED | OUTPATIENT
Start: 2023-03-20 | End: 2023-11-15

## 2023-03-20 NOTE — TELEPHONE ENCOUNTER
No new care gaps identified.  [facility] Embedded Care Gaps. Reference number: [id number]. 3/20/2023   12:56:13 AM CDT

## 2023-03-20 NOTE — TELEPHONE ENCOUNTER
Refill Decision Note   Yung Jayesh  is requesting a refill authorization.  Brief Assessment and Rationale for Refill:  Approve     Medication Therapy Plan:       Medication Reconciliation Completed: No   Comments:     No Care Gaps recommended.     Note composed:6:15 AM 03/20/2023

## 2023-03-27 ENCOUNTER — PATIENT MESSAGE (OUTPATIENT)
Dept: PULMONOLOGY | Facility: CLINIC | Age: 68
End: 2023-03-27
Payer: MEDICARE

## 2023-03-27 ENCOUNTER — OFFICE VISIT (OUTPATIENT)
Dept: PULMONOLOGY | Facility: CLINIC | Age: 68
End: 2023-03-27
Payer: MEDICARE

## 2023-03-27 VITALS
SYSTOLIC BLOOD PRESSURE: 126 MMHG | WEIGHT: 250.88 LBS | HEIGHT: 75 IN | DIASTOLIC BLOOD PRESSURE: 78 MMHG | HEART RATE: 58 BPM | RESPIRATION RATE: 18 BRPM | BODY MASS INDEX: 31.19 KG/M2 | OXYGEN SATURATION: 98 %

## 2023-03-27 DIAGNOSIS — J44.1 ASTHMA EXACERBATION IN COPD: Primary | ICD-10-CM

## 2023-03-27 DIAGNOSIS — J45.901 ASTHMA EXACERBATION IN COPD: Primary | ICD-10-CM

## 2023-03-27 DIAGNOSIS — J30.89 SEASONAL ALLERGIC RHINITIS DUE TO OTHER ALLERGIC TRIGGER: ICD-10-CM

## 2023-03-27 DIAGNOSIS — J44.89 ASTHMA WITH COPD: ICD-10-CM

## 2023-03-27 DIAGNOSIS — G47.33 OSA ON CPAP: ICD-10-CM

## 2023-03-27 PROCEDURE — 1159F PR MEDICATION LIST DOCUMENTED IN MEDICAL RECORD: ICD-10-PCS | Mod: CPTII,S$GLB,, | Performed by: INTERNAL MEDICINE

## 2023-03-27 PROCEDURE — 1160F PR REVIEW ALL MEDS BY PRESCRIBER/CLIN PHARMACIST DOCUMENTED: ICD-10-PCS | Mod: CPTII,S$GLB,, | Performed by: INTERNAL MEDICINE

## 2023-03-27 PROCEDURE — 96372 THER/PROPH/DIAG INJ SC/IM: CPT | Mod: S$GLB,,, | Performed by: INTERNAL MEDICINE

## 2023-03-27 PROCEDURE — 1101F PT FALLS ASSESS-DOCD LE1/YR: CPT | Mod: CPTII,S$GLB,, | Performed by: INTERNAL MEDICINE

## 2023-03-27 PROCEDURE — 99999 PR PBB SHADOW E&M-EST. PATIENT-LVL IV: ICD-10-PCS | Mod: PBBFAC,,, | Performed by: INTERNAL MEDICINE

## 2023-03-27 PROCEDURE — 3288F PR FALLS RISK ASSESSMENT DOCUMENTED: ICD-10-PCS | Mod: CPTII,S$GLB,, | Performed by: INTERNAL MEDICINE

## 2023-03-27 PROCEDURE — 3044F PR MOST RECENT HEMOGLOBIN A1C LEVEL <7.0%: ICD-10-PCS | Mod: CPTII,S$GLB,, | Performed by: INTERNAL MEDICINE

## 2023-03-27 PROCEDURE — 3044F HG A1C LEVEL LT 7.0%: CPT | Mod: CPTII,S$GLB,, | Performed by: INTERNAL MEDICINE

## 2023-03-27 PROCEDURE — 99214 OFFICE O/P EST MOD 30 MIN: CPT | Mod: 25,S$GLB,, | Performed by: INTERNAL MEDICINE

## 2023-03-27 PROCEDURE — 3074F SYST BP LT 130 MM HG: CPT | Mod: CPTII,S$GLB,, | Performed by: INTERNAL MEDICINE

## 2023-03-27 PROCEDURE — 1101F PR PT FALLS ASSESS DOC 0-1 FALLS W/OUT INJ PAST YR: ICD-10-PCS | Mod: CPTII,S$GLB,, | Performed by: INTERNAL MEDICINE

## 2023-03-27 PROCEDURE — 1160F RVW MEDS BY RX/DR IN RCRD: CPT | Mod: CPTII,S$GLB,, | Performed by: INTERNAL MEDICINE

## 2023-03-27 PROCEDURE — 99999 PR PBB SHADOW E&M-EST. PATIENT-LVL IV: CPT | Mod: PBBFAC,,, | Performed by: INTERNAL MEDICINE

## 2023-03-27 PROCEDURE — 3078F DIAST BP <80 MM HG: CPT | Mod: CPTII,S$GLB,, | Performed by: INTERNAL MEDICINE

## 2023-03-27 PROCEDURE — 3288F FALL RISK ASSESSMENT DOCD: CPT | Mod: CPTII,S$GLB,, | Performed by: INTERNAL MEDICINE

## 2023-03-27 PROCEDURE — 1159F MED LIST DOCD IN RCRD: CPT | Mod: CPTII,S$GLB,, | Performed by: INTERNAL MEDICINE

## 2023-03-27 PROCEDURE — 3008F PR BODY MASS INDEX (BMI) DOCUMENTED: ICD-10-PCS | Mod: CPTII,S$GLB,, | Performed by: INTERNAL MEDICINE

## 2023-03-27 PROCEDURE — 99214 PR OFFICE/OUTPT VISIT, EST, LEVL IV, 30-39 MIN: ICD-10-PCS | Mod: 25,S$GLB,, | Performed by: INTERNAL MEDICINE

## 2023-03-27 PROCEDURE — 1126F PR PAIN SEVERITY QUANTIFIED, NO PAIN PRESENT: ICD-10-PCS | Mod: CPTII,S$GLB,, | Performed by: INTERNAL MEDICINE

## 2023-03-27 PROCEDURE — 96372 PR INJECTION,THERAP/PROPH/DIAG2ST, IM OR SUBCUT: ICD-10-PCS | Mod: S$GLB,,, | Performed by: INTERNAL MEDICINE

## 2023-03-27 PROCEDURE — 3008F BODY MASS INDEX DOCD: CPT | Mod: CPTII,S$GLB,, | Performed by: INTERNAL MEDICINE

## 2023-03-27 PROCEDURE — 1126F AMNT PAIN NOTED NONE PRSNT: CPT | Mod: CPTII,S$GLB,, | Performed by: INTERNAL MEDICINE

## 2023-03-27 PROCEDURE — 3078F PR MOST RECENT DIASTOLIC BLOOD PRESSURE < 80 MM HG: ICD-10-PCS | Mod: CPTII,S$GLB,, | Performed by: INTERNAL MEDICINE

## 2023-03-27 PROCEDURE — 3074F PR MOST RECENT SYSTOLIC BLOOD PRESSURE < 130 MM HG: ICD-10-PCS | Mod: CPTII,S$GLB,, | Performed by: INTERNAL MEDICINE

## 2023-03-27 RX ORDER — PREDNISONE 20 MG/1
TABLET ORAL
Qty: 20 TABLET | Refills: 0 | Status: SHIPPED | OUTPATIENT
Start: 2023-03-27 | End: 2024-03-31

## 2023-03-27 RX ORDER — FLUTICASONE PROPIONATE 50 MCG
2 SPRAY, SUSPENSION (ML) NASAL DAILY
Qty: 48 G | Refills: 3 | Status: SHIPPED | OUTPATIENT
Start: 2023-03-27 | End: 2023-11-09 | Stop reason: SDUPTHER

## 2023-03-27 RX ORDER — ALBUTEROL SULFATE 0.83 MG/ML
2.5 SOLUTION RESPIRATORY (INHALATION)
Qty: 360 ML | Refills: 11 | Status: SHIPPED | OUTPATIENT
Start: 2023-03-27 | End: 2023-11-09 | Stop reason: SDUPTHER

## 2023-03-27 RX ORDER — FLUTICASONE PROPIONATE 50 MCG
2 SPRAY, SUSPENSION (ML) NASAL DAILY
Qty: 16 G | Refills: 11 | Status: SHIPPED | OUTPATIENT
Start: 2023-03-27 | End: 2023-03-27 | Stop reason: SDUPTHER

## 2023-03-27 RX ORDER — DOXYCYCLINE 100 MG/1
100 CAPSULE ORAL EVERY 12 HOURS
Qty: 20 CAPSULE | Refills: 1 | Status: SHIPPED | OUTPATIENT
Start: 2023-03-27 | End: 2024-03-31

## 2023-03-27 RX ORDER — TRIAMCINOLONE ACETONIDE 40 MG/ML
40 INJECTION, SUSPENSION INTRA-ARTICULAR; INTRAMUSCULAR
Status: COMPLETED | OUTPATIENT
Start: 2023-03-27 | End: 2023-03-27

## 2023-03-27 RX ADMIN — TRIAMCINOLONE ACETONIDE 40 MG: 40 INJECTION, SUSPENSION INTRA-ARTICULAR; INTRAMUSCULAR at 04:03

## 2023-03-27 NOTE — PROGRESS NOTES
Subjective:     Patient ID: Yung Mcconnell is a 67 y.o. male.    Chief Complaint:      HPI    ith history of Asthma / Chronic Obstructive Pulmonary Disease, s/p right lung surgery and Obstructive Sleep Apnea  history of COVID in August 2020.    Ran out of [facility]    Asthma Follow-up  The patient has previously been evaluated here for asthma and presents for an asthma follow-up. The patient is currently having symptoms / an exacerbation. Current symptoms include dyspnea, non-productive cough, and wheezing. Symptoms have been present since several days ago and have been rapidly worsening. He denies chest tightness, dyspnea, productive cough, and wheezing. Associated symptoms include fatigue and poor exercise tolerance.  This episode appears to have been triggered by pollens. Treatments tried for the current exacerbation include inhaled corticosteroids, long-acting inhaled beta-adrenergic agonists, and short-acting inhaled beta-adrenergic agonists, which have provided some relief of symptoms. The patient has been having similar episodes for approximately  many  years.    Current Disease Severity  The patient is having daytime symptoms throughout the day. The patient is having daytime symptoms often 7 times per week. The patient is using short-acting beta agonists for symptom control several times per day. He has exacerbations requiring oral systemic corticosteroids 2 times per year. Current limitations in activity from asthma:  unable to exercise . Number of days of school or work missed in the last month: not applicable. Number of urgent/emergent visit in last year: 0.  The patient is using a spacer with MDIs. His best peak flow rate is na. He is not monitoring peak flow rates at home.    Past Medical History:   Diagnosis Date    Allergy     Asthma, chronic 7/9/2013    Colon polyps     COPD (chronic obstructive pulmonary disease)     GERD (gastroesophageal reflux disease)     High cholesterol     Osteoarthritis of  both knees 7/9/2013    Sleep apnea      Past Surgical History:   Procedure Laterality Date    CHOLECYSTECTOMY      COLONOSCOPY N/A 10/23/2020    Procedure: COLONOSCOPY;  Surgeon: Sae Valentine MD;  Location: [facility] ENDO;  Service: Endoscopy;  Laterality: N/A;    DIAGNOSTIC LAPAROSCOPY N/A 6/21/2018    Procedure: LAPAROSCOPY, DIAGNOSTIC;  Surgeon: Casper Martinez MD;  Location: [facility] OR;  Service: General;  Laterality: N/A;    LUNG SURGERY Right     benign mass    TOTAL KNEE ARTHROPLASTY Left     UMBILICAL HERNIA REPAIR N/A 6/21/2018    Procedure: REPAIR, HERNIA, UMBILICAL, AGE 5 YEARS OR OLDER;  Surgeon: Casper Martinez MD;  Location: [facility] OR;  Service: General;  Laterality: N/A;     Review of patient's allergies indicates:   Allergen Reactions    Zithromax [azithromycin] Palpitations    Aspirin Other (See Comments)     Other reaction(s): Difficulty breathing    Lipitor [atorvastatin] Other (See Comments)     Leg cramps/hand cramps     Current Outpatient Medications on File Prior to Visit   Medication Sig Dispense Refill    albuterol (PROVENTIL/VENTOLIN HFA) 90 mcg/actuation inhaler Inhale 2 puffs into the lungs every 4 (four) hours as needed for Wheezing or Shortness of Breath. 54 g 3    azelaic acid (AZELEX) 15 % gel Apply 1 application topically 2 (two) times a day.      cetirizine (ZYRTEC) 10 MG tablet Take 10 mg by mouth once daily.      cyclobenzaprine (FLEXERIL) 5 MG tablet 1 Tablet Oral Three times a day as needed. 20 tablet 0    fluticasone-umeclidin-vilanter (TRELEGY ELLIPTA) 200-62.5-25 mcg inhaler Inhale 1 puff into the lungs once daily. Wash out mouth after use 180 each 3    GLUCOSAMINE HCL/CHONDR ALFONSO A NA (OSTEO BI-FLEX ORAL) Take 1 tablet by mouth once daily.       multivitamin (THERAGRAN) per tablet 1 Tablet Oral Every day      tadalafiL (CIALIS) 20 MG Tab TAKE 1 TABLET DAILY AS NEEDED 24 tablet 3    diclofenac 1.3 % PT12 Apply 1 patch topically every 12 (twelve) hours as needed. 30 patch 0     "metroNIDAZOLE (METROGEL) 0.75 % gel Apply topically 2 (two) times daily. 45 g 2    semaglutide, weight loss, (WEGOVY) 0.25 mg/0.5 mL PnIj Inject 0.5 mg into the skin once a week. (Patient not taking: Reported on 3/27/2023) 0.5 mL 8     No current facility-administered medications on file prior to visit.     Social History     Socioeconomic History    Marital status:    Occupational History     Employer: [employer]   Tobacco Use    Smoking status: Never    Smokeless tobacco: Never   Substance and Sexual Activity    Alcohol use: Never    Drug use: No     Family History   Problem Relation Age of Onset    Hypertension Mother     Diabetes Mother     Cancer Mother         Breast Ca     Heart disease Father         CAD , CHF     Aneurysm Father     Colon polyps Father     Melanoma Neg Hx     Eczema Neg Hx     Lupus Neg Hx     Psoriasis Neg Hx        Review of Systems   Constitutional:  Positive for fatigue. Negative for fever.   HENT:  Positive for postnasal drip, rhinorrhea and congestion.    Eyes:  Negative for redness and itching.   Respiratory:  Positive for cough, sputum production, shortness of breath, dyspnea on extertion, use of rescue inhaler and Paroxysmal Nocturnal Dyspnea.    Cardiovascular:  Negative for chest pain, palpitations and leg swelling.   Genitourinary:  Negative for difficulty urinating and hematuria.   Endocrine:  Negative for cold intolerance and heat intolerance.    Skin:  Negative for rash.   Gastrointestinal:  Negative for nausea and abdominal pain.   Neurological:  Negative for dizziness, syncope, weakness and light-headedness.   Hematological:  Negative for adenopathy. Does not bruise/bleed easily.   Psychiatric/Behavioral:  Negative for sleep disturbance. The patient is not nervous/anxious.      Objective:      /78   Pulse (!) 58   Resp 18   Ht 6' 2.5" (1.892 m)   Wt 113.8 kg (250 lb 14.1 oz)   SpO2 98%   BMI 31.78 kg/m²   Physical Exam  Vitals and nursing " note reviewed.   Constitutional:       Appearance: He is well-developed.   HENT:      Head: Normocephalic and atraumatic.      Nose: Congestion and rhinorrhea present.   Eyes:      Conjunctiva/sclera: Conjunctivae normal.      Pupils: Pupils are equal, round, and reactive to light.   Neck:      Thyroid: No thyromegaly.      Vascular: No JVD.      Trachea: No tracheal deviation.   Cardiovascular:      Rate and Rhythm: Normal rate and regular rhythm.      Heart sounds: No murmur heard.  Pulmonary:      Breath sounds: Examination of the right-lower field reveals wheezing. Examination of the left-lower field reveals wheezing. Decreased breath sounds and wheezing present. No rhonchi or rales.   Abdominal:      General: Bowel sounds are normal.      Palpations: Abdomen is soft.   Musculoskeletal:         General: No tenderness. Normal range of motion.      Cervical back: Neck supple.   Lymphadenopathy:      Cervical: No cervical adenopathy.   Skin:     General: Skin is warm and dry.   Neurological:      Mental Status: He is alert and oriented to person, place, and time.     Personal Diagnostic Review  Chest x-ray: hyperinflation      Pulmonary Studies Review 3/27/2023   SpO2 98   Height 74.5   Weight 4014.14   BMI (Calculated) 31.8   Predicted Distance 329.74   Predicted Distance Meters (Calculated) 576.58       X-Ray Chest PA And Lateral  Narrative: EXAMINATION:  XR CHEST PA AND LATERAL    CLINICAL HISTORY:  SOB; Chronic obstructive pulmonary disease, unspecified    TECHNIQUE:  PA and lateral views of the chest were performed.    COMPARISON:  08/08/2022    FINDINGS:  The lungs are clear and free of infiltrate.  No pleural effusion or pneumothorax. The heart is not enlarged. Bilateral calcified pleural plaques are noted.  There is also significant thickening of the pleural in the region of the right lung apex with more minimal thickening seen within the left lung apex.  Impression: 1.  No acute cardiopulmonary  process.    Electronically signed by: Bulmaro Vasquez DO  Date:    02/08/2023  Time:    09:19      Office Spirometry Results:     No flowsheet data found.  Pulmonary Studies Review 3/27/2023   SpO2 98   Height 74.5   Weight 4014.14   BMI (Calculated) 31.8   Predicted Distance 329.74   Predicted Distance Meters (Calculated) 576.58         Assessment:            Asthma exacerbation in COPD  -     triamcinolone acetonide injection 40 mg  -     predniSONE (DELTASONE) 20 MG tablet; Prednisone 60 mg/ day for 3 days, 40 mg/day for 3 days,20 mg/ day for 3 days, (1/2 tablet )10 mg a day for 3 days.  Dispense: 20 tablet; Refill: 0  -     albuterol (PROVENTIL) 2.5 mg /3 mL (0.083 %) nebulizer solution; Take 3 mLs (2.5 mg total) by nebulization every 4 to 6 hours as needed for Wheezing or Shortness of Breath.  Dispense: 360 mL; Refill: 11  -     doxycycline (MONODOX) 100 MG capsule; Take 1 capsule (100 mg total) by mouth every 12 (twelve) hours.  Dispense: 20 capsule; Refill: 1    Seasonal allergic rhinitis due to other allergic trigger  -     Discontinue: fluticasone propionate (FLONASE) 50 mcg/actuation nasal spray; 2 sprays (100 mcg total) by Each Nostril route once daily.  Dispense: 16 g; Refill: 11  -     fluticasone propionate (FLONASE) 50 mcg/actuation nasal spray; 2 sprays (100 mcg total) by Each Nostril route once daily.  Dispense: 48 g; Refill: 3    KEVEN on CPAP  -     CPAP/BIPAP SUPPLIES    Asthma with COPD  -     Spirometry with/without bronchodilator; Future; Expected date: 09/27/2023          Outpatient Encounter Medications as of 3/27/2023   Medication Sig Dispense Refill    albuterol (PROVENTIL/VENTOLIN HFA) 90 mcg/actuation inhaler Inhale 2 puffs into the lungs every 4 (four) hours as needed for Wheezing or Shortness of Breath. 54 g 3    azelaic acid (AZELEX) 15 % gel Apply 1 application topically 2 (two) times a day.      cetirizine (ZYRTEC) 10 MG tablet Take 10 mg by mouth once daily.      cyclobenzaprine  (FLEXERIL) 5 MG tablet 1 Tablet Oral Three times a day as needed. 20 tablet 0    fluticasone-umeclidin-vilanter (TRELEGY ELLIPTA) 200-62.5-25 mcg inhaler Inhale 1 puff into the lungs once daily. Wash out mouth after use 180 each 3    GLUCOSAMINE HCL/CHONDR ALFONSO A NA (OSTEO BI-FLEX ORAL) Take 1 tablet by mouth once daily.       multivitamin (THERAGRAN) per tablet 1 Tablet Oral Every day      tadalafiL (CIALIS) 20 MG Tab TAKE 1 TABLET DAILY AS NEEDED 24 tablet 3    albuterol (PROVENTIL) 2.5 mg /3 mL (0.083 %) nebulizer solution Take 3 mLs (2.5 mg total) by nebulization every 4 to 6 hours as needed for Wheezing or Shortness of Breath. 360 mL 11    diclofenac 1.3 % PT12 Apply 1 patch topically every 12 (twelve) hours as needed. 30 patch 0    doxycycline (MONODOX) 100 MG capsule Take 1 capsule (100 mg total) by mouth every 12 (twelve) hours. 20 capsule 1    fluticasone propionate (FLONASE) 50 mcg/actuation nasal spray 2 sprays (100 mcg total) by Each Nostril route once daily. 48 g 3    metroNIDAZOLE (METROGEL) 0.75 % gel Apply topically 2 (two) times daily. 45 g 2    predniSONE (DELTASONE) 20 MG tablet Prednisone 60 mg/ day for 3 days, 40 mg/day for 3 days,20 mg/ day for 3 days, (1/2 tablet )10 mg a day for 3 days. 20 tablet 0    semaglutide, weight loss, (WEGOVY) 0.25 mg/0.5 mL PnIj Inject 0.5 mg into the skin once a week. (Patient not taking: Reported on 3/27/2023) 0.5 mL 8    [DISCONTINUED] fluticasone propionate (FLONASE) 50 mcg/actuation nasal spray 2 sprays (100 mcg total) by Each Nostril route once daily. 16 g 11    [] triamcinolone acetonide injection 40 mg        No facility-administered encounter medications on file as of 3/27/2023.     Plan:       Requested Prescriptions     Signed Prescriptions Disp Refills    predniSONE (DELTASONE) 20 MG tablet 20 tablet 0     Sig: Prednisone 60 mg/ day for 3 days, 40 mg/day for 3 days,20 mg/ day for 3 days, (1/2 tablet )10 mg a day for 3 days.    albuterol (PROVENTIL)  2.5 mg /3 mL (0.083 %) nebulizer solution 360 mL 11     Sig: Take 3 mLs (2.5 mg total) by nebulization every 4 to 6 hours as needed for Wheezing or Shortness of Breath.    doxycycline (MONODOX) 100 MG capsule 20 capsule 1     Sig: Take 1 capsule (100 mg total) by mouth every 12 (twelve) hours.    fluticasone propionate (FLONASE) 50 mcg/actuation nasal spray 48 g 3     Si sprays (100 mcg total) by Each Nostril route once daily.     Problem List Items Addressed This Visit       Asthma exacerbation in COPD - Primary    Relevant Medications    predniSONE (DELTASONE) 20 MG tablet    albuterol (PROVENTIL) 2.5 mg /3 mL (0.083 %) nebulizer solution    doxycycline (MONODOX) 100 MG capsule    Asthma with COPD    Relevant Orders    Spirometry with/without bronchodilator    Seasonal allergic rhinitis    Relevant Medications    fluticasone propionate (FLONASE) 50 mcg/actuation nasal spray     Other Visit Diagnoses       KEVEN on CPAP        Relevant Orders    CPAP/BIPAP SUPPLIES               Follow up in about 6 months (around 2023) for Review althea - on return.    MEDICAL DECISION MAKING: Moderate to high complexity.  Overall, the multiple problems listed are of moderate to high severity that may impact quality of life and activities of daily living. Side effects of medications, treatment plan as well as options and alternatives reviewed and discussed with patient. There was counseling of patient concerning these issues.    Total time spent in counseling and coordination of care - 30  minutes of total time spent on the encounter, which includes face to face time and non-face to face time preparing to see the patient (eg, review of tests), Obtaining and/or reviewing separately obtained history, Documenting clinical information in the electronic or other health record, Independently interpreting results (not separately reported) and communicating results to the patient/family/caregiver, or Care coordination (not  separately reported).    Time was used in discussion of prognosis, risks, benefits of treatment, instructions and compliance with regimen . Discussion with other physicians and/or health care providers - home health or for use of durable medical equipment (oxygen, nebulizers, CPAP, BiPAP) occurred.

## 2023-03-30 PROBLEM — J44.1 ASTHMA EXACERBATION IN COPD: Status: ACTIVE | Noted: 2023-03-30

## 2023-03-30 PROBLEM — J30.2 SEASONAL ALLERGIC RHINITIS: Status: ACTIVE | Noted: 2017-07-11

## 2023-03-30 PROBLEM — J45.901 ASTHMA EXACERBATION IN COPD: Status: ACTIVE | Noted: 2023-03-30

## 2023-07-05 ENCOUNTER — OFFICE VISIT (OUTPATIENT)
Dept: FAMILY MEDICINE | Facility: CLINIC | Age: 68
End: 2023-07-05
Payer: MEDICARE

## 2023-07-05 ENCOUNTER — HOSPITAL ENCOUNTER (OUTPATIENT)
Dept: RADIOLOGY | Facility: HOSPITAL | Age: 68
Discharge: HOME | End: 2023-07-05
Attending: FAMILY MEDICINE
Payer: MEDICARE

## 2023-07-05 VITALS
HEIGHT: 75 IN | BODY MASS INDEX: 30.36 KG/M2 | SYSTOLIC BLOOD PRESSURE: 128 MMHG | HEART RATE: 59 BPM | DIASTOLIC BLOOD PRESSURE: 70 MMHG | TEMPERATURE: 99 F | OXYGEN SATURATION: 97 % | WEIGHT: 244.19 LBS

## 2023-07-05 DIAGNOSIS — E66.9 OBESITY (BMI 30.0-34.9): ICD-10-CM

## 2023-07-05 DIAGNOSIS — R10.12 LUQ PAIN: Primary | ICD-10-CM

## 2023-07-05 DIAGNOSIS — E78.5 HYPERLIPIDEMIA, UNSPECIFIED HYPERLIPIDEMIA TYPE: ICD-10-CM

## 2023-07-05 DIAGNOSIS — K21.9 GASTROESOPHAGEAL REFLUX DISEASE, UNSPECIFIED WHETHER ESOPHAGITIS PRESENT: ICD-10-CM

## 2023-07-05 DIAGNOSIS — R10.12 LUQ PAIN: ICD-10-CM

## 2023-07-05 DIAGNOSIS — J44.89 ASTHMA WITH COPD: ICD-10-CM

## 2023-07-05 LAB
BILIRUB UR QL STRIP: NEGATIVE
CLARITY UR REFRACT.AUTO: CLEAR
COLOR UR AUTO: YELLOW
GLUCOSE UR QL STRIP: NEGATIVE
HGB UR QL STRIP: NEGATIVE
KETONES UR QL STRIP: NEGATIVE
LEUKOCYTE ESTERASE UR QL STRIP: NEGATIVE
NITRITE UR QL STRIP: NEGATIVE
PH UR STRIP: 6 [PH] (ref 5–8)
PROT UR QL STRIP: NEGATIVE
SP GR UR STRIP: 1.02 (ref 1–1.03)
URN SPEC COLLECT METH UR: NORMAL

## 2023-07-05 PROCEDURE — 3288F FALL RISK ASSESSMENT DOCD: CPT | Mod: CPTII,S$GLB,, | Performed by: FAMILY MEDICINE

## 2023-07-05 PROCEDURE — 3074F PR MOST RECENT SYSTOLIC BLOOD PRESSURE < 130 MM HG: ICD-10-PCS | Mod: CPTII,S$GLB,, | Performed by: FAMILY MEDICINE

## 2023-07-05 PROCEDURE — 1126F AMNT PAIN NOTED NONE PRSNT: CPT | Mod: CPTII,S$GLB,, | Performed by: FAMILY MEDICINE

## 2023-07-05 PROCEDURE — 99999 PR PBB SHADOW E&M-EST. PATIENT-LVL IV: ICD-10-PCS | Mod: PBBFAC,,, | Performed by: FAMILY MEDICINE

## 2023-07-05 PROCEDURE — 1159F MED LIST DOCD IN RCRD: CPT | Mod: CPTII,S$GLB,, | Performed by: FAMILY MEDICINE

## 2023-07-05 PROCEDURE — 3044F PR MOST RECENT HEMOGLOBIN A1C LEVEL <7.0%: ICD-10-PCS | Mod: CPTII,S$GLB,, | Performed by: FAMILY MEDICINE

## 2023-07-05 PROCEDURE — 99214 OFFICE O/P EST MOD 30 MIN: CPT | Mod: S$GLB,,, | Performed by: FAMILY MEDICINE

## 2023-07-05 PROCEDURE — 3078F DIAST BP <80 MM HG: CPT | Mod: CPTII,S$GLB,, | Performed by: FAMILY MEDICINE

## 2023-07-05 PROCEDURE — 3074F SYST BP LT 130 MM HG: CPT | Mod: CPTII,S$GLB,, | Performed by: FAMILY MEDICINE

## 2023-07-05 PROCEDURE — 1159F PR MEDICATION LIST DOCUMENTED IN MEDICAL RECORD: ICD-10-PCS | Mod: CPTII,S$GLB,, | Performed by: FAMILY MEDICINE

## 2023-07-05 PROCEDURE — 1126F PR PAIN SEVERITY QUANTIFIED, NO PAIN PRESENT: ICD-10-PCS | Mod: CPTII,S$GLB,, | Performed by: FAMILY MEDICINE

## 2023-07-05 PROCEDURE — 74019 RADEX ABDOMEN 2 VIEWS: CPT | Mod: 26,,, | Performed by: RADIOLOGY

## 2023-07-05 PROCEDURE — 99999 PR PBB SHADOW E&M-EST. PATIENT-LVL IV: CPT | Mod: PBBFAC,,, | Performed by: FAMILY MEDICINE

## 2023-07-05 PROCEDURE — 1101F PR PT FALLS ASSESS DOC 0-1 FALLS W/OUT INJ PAST YR: ICD-10-PCS | Mod: CPTII,S$GLB,, | Performed by: FAMILY MEDICINE

## 2023-07-05 PROCEDURE — 3008F BODY MASS INDEX DOCD: CPT | Mod: CPTII,S$GLB,, | Performed by: FAMILY MEDICINE

## 2023-07-05 PROCEDURE — 1101F PT FALLS ASSESS-DOCD LE1/YR: CPT | Mod: CPTII,S$GLB,, | Performed by: FAMILY MEDICINE

## 2023-07-05 PROCEDURE — 74019 RADEX ABDOMEN 2 VIEWS: CPT | Mod: TC,PO

## 2023-07-05 PROCEDURE — 81003 URINALYSIS AUTO W/O SCOPE: CPT | Performed by: FAMILY MEDICINE

## 2023-07-05 PROCEDURE — 99214 PR OFFICE/OUTPT VISIT, EST, LEVL IV, 30-39 MIN: ICD-10-PCS | Mod: S$GLB,,, | Performed by: FAMILY MEDICINE

## 2023-07-05 PROCEDURE — 74019 XR ABDOMEN FLAT AND ERECT: ICD-10-PCS | Mod: 26,,, | Performed by: RADIOLOGY

## 2023-07-05 PROCEDURE — 3078F PR MOST RECENT DIASTOLIC BLOOD PRESSURE < 80 MM HG: ICD-10-PCS | Mod: CPTII,S$GLB,, | Performed by: FAMILY MEDICINE

## 2023-07-05 PROCEDURE — 3008F PR BODY MASS INDEX (BMI) DOCUMENTED: ICD-10-PCS | Mod: CPTII,S$GLB,, | Performed by: FAMILY MEDICINE

## 2023-07-05 PROCEDURE — 3288F PR FALLS RISK ASSESSMENT DOCUMENTED: ICD-10-PCS | Mod: CPTII,S$GLB,, | Performed by: FAMILY MEDICINE

## 2023-07-05 PROCEDURE — 3044F HG A1C LEVEL LT 7.0%: CPT | Mod: CPTII,S$GLB,, | Performed by: FAMILY MEDICINE

## 2023-07-05 NOTE — PROGRESS NOTES
Subjective:       Patient ID: Yung Mcconnell is a 67 y.o. male.    Chief Complaint: Flank Pain      HPI Comments:       Current Outpatient Medications:     albuterol (PROVENTIL) 2.5 mg /3 mL (0.083 %) nebulizer solution, Take 3 mLs (2.5 mg total) by nebulization every 4 to 6 hours as needed for Wheezing or Shortness of Breath., Disp: 360 mL, Rfl: 11    albuterol (PROVENTIL/VENTOLIN HFA) 90 mcg/actuation inhaler, Inhale 2 puffs into the lungs every 4 (four) hours as needed for Wheezing or Shortness of Breath., Disp: 54 g, Rfl: 3    azelaic acid (AZELEX) 15 % gel, Apply 1 application topically 2 (two) times a day., Disp: , Rfl:     cetirizine (ZYRTEC) 10 MG tablet, Take 10 mg by mouth once daily., Disp: , Rfl:     cyclobenzaprine (FLEXERIL) 5 MG tablet, 1 Tablet Oral Three times a day as needed., Disp: 20 tablet, Rfl: 0    doxycycline (MONODOX) 100 MG capsule, Take 1 capsule (100 mg total) by mouth every 12 (twelve) hours., Disp: 20 capsule, Rfl: 1    fluticasone propionate (FLONASE) 50 mcg/actuation nasal spray, 2 sprays (100 mcg total) by Each Nostril route once daily., Disp: 48 g, Rfl: 3    fluticasone-umeclidin-vilanter (TRELEGY ELLIPTA) 200-62.5-25 mcg inhaler, Inhale 1 puff into the lungs once daily. Wash out mouth after use, Disp: 180 each, Rfl: 3    GLUCOSAMINE HCL/CHONDR ALFONSO A NA (OSTEO BI-FLEX ORAL), Take 1 tablet by mouth once daily. , Disp: , Rfl:     multivitamin (THERAGRAN) per tablet, 1 Tablet Oral Every day, Disp: , Rfl:     predniSONE (DELTASONE) 20 MG tablet, Prednisone 60 mg/ day for 3 days, 40 mg/day for 3 days,20 mg/ day for 3 days, (1/2 tablet )10 mg a day for 3 days., Disp: 20 tablet, Rfl: 0    semaglutide, weight loss, (WEGOVY) 0.25 mg/0.5 mL PnIj, Inject 0.5 mg into the skin once a week., Disp: 0.5 mL, Rfl: 8    tadalafiL (CIALIS) 20 MG Tab, TAKE 1 TABLET DAILY AS NEEDED, Disp: 24 tablet, Rfl: 3    diclofenac 1.3 % PT12, Apply 1 patch topically every 12 (twelve) hours as needed., Disp: 30  "patch, Rfl: 0    metroNIDAZOLE (METROGEL) 0.75 % gel, Apply topically 2 (two) times daily., Disp: 45 g, Rfl: 2    Has had diffuse left upper quadrant abdominal pain on 3 occasions over the last 2 weeks.  He has a history of diverticulosis and internal hemorrhoids.    First time was after eating watermelon.  This resolved after a couple of days.     Some days later he walked into a mouth handle and hit himself in the same area.  Resulting in a bruise.  But the pain was gone after about 2 hours.    One-week ago he ate some popcorn and started having pain.  Over the last few nights that has been 10/10 at times.  Sometimes radiates into his left flank.  No fever chills.  Had some brief nausea 1 was severe but no vomiting or diarrhea.  Has been constipated in the last week or so.  Prior to that was using prune juice on a regular basis but stop after the onset of his symptoms in the beginning.      No urinary symptoms or blood in his stool or urine    Today is feeling better    Review of Systems   Constitutional:  Negative for activity change, appetite change, chills and fever.   HENT:  Negative for sore throat.    Respiratory:  Negative for cough and shortness of breath.    Cardiovascular:  Negative for chest pain.   Gastrointestinal:  Positive for abdominal pain, constipation and vomiting. Negative for diarrhea and nausea.   Genitourinary:  Negative for difficulty urinating, dysuria, flank pain, frequency, hematuria and urgency.   Musculoskeletal:  Negative for arthralgias and myalgias.   Neurological:  Negative for dizziness and headaches.     Objective:      Vitals:    07/05/23 1015   BP: 128/70   Pulse: (!) 59   Temp: 98.9 °F (37.2 °C)   TempSrc: Tympanic   SpO2: 97%   Weight: 110.7 kg (244 lb 2.6 oz)   Height: 6' 2.5" (1.892 m)   PainSc: 0-No pain     Physical Exam  Vitals and nursing note reviewed.   Constitutional:       General: He is not in acute distress.     Appearance: He is well-developed. He is not " diaphoretic.   HENT:      Head: Normocephalic.      Mouth/Throat:      Pharynx: No oropharyngeal exudate.   Neck:      Thyroid: No thyromegaly.   Cardiovascular:      Rate and Rhythm: Normal rate and regular rhythm.      Heart sounds: Normal heart sounds. No murmur heard.  Pulmonary:      Effort: Pulmonary effort is normal.      Breath sounds: Normal breath sounds. No wheezing or rales.   Abdominal:      General: There is no distension.      Palpations: Abdomen is soft. There is no hepatomegaly, splenomegaly or mass.      Tenderness: There is no abdominal tenderness. There is no right CVA tenderness, left CVA tenderness, guarding or rebound.      Comments: No abdominal wall ecchymosis   Musculoskeletal:      Cervical back: Neck supple.   Lymphadenopathy:      Cervical: No cervical adenopathy.   Skin:     General: Skin is warm and dry.   Neurological:      Mental Status: He is alert and oriented to person, place, and time.   Psychiatric:         Behavior: Behavior normal.         Thought Content: Thought content normal.         Judgment: Judgment normal.       Assessment:       1. LUQ pain    2. Asthma with COPD    3. Hyperlipidemia, unspecified hyperlipidemia type    4. Obesity (BMI 30.0-34.9)    5. Gastroesophageal reflux disease, unspecified whether esophagitis present        Plan:   LUQ pain  Comments:  Sporadic.  History of diverticulosis.  On 2 of the 3 occasions pain follow-up the ingestion of foods with seeds.  Urinalysis.  Abdominal flat and upright  Orders:  -     Urinalysis; Future; Expected date: 07/05/2023  -     X-Ray Abdomen Flat And Erect; Future; Expected date: 07/05/2023    Asthma with COPD  Comments:  Stable    Hyperlipidemia, unspecified hyperlipidemia type  Comments:      Obesity (BMI 30.0-34.9)  Comments:  Lost 13 pounds through diet change since our last visit in January    Gastroesophageal reflux disease, unspecified whether esophagitis present  Comments:  Stable

## 2023-11-09 ENCOUNTER — OFFICE VISIT (OUTPATIENT)
Dept: PULMONOLOGY | Facility: CLINIC | Age: 68
End: 2023-11-09
Payer: MEDICARE

## 2023-11-09 ENCOUNTER — HOSPITAL ENCOUNTER (OUTPATIENT)
Dept: RADIOLOGY | Facility: HOSPITAL | Age: 68
Discharge: HOME | End: 2023-11-09
Attending: INTERNAL MEDICINE
Payer: MEDICARE

## 2023-11-09 ENCOUNTER — CLINICAL SUPPORT (OUTPATIENT)
Dept: PULMONOLOGY | Facility: CLINIC | Age: 68
End: 2023-11-09
Payer: MEDICARE

## 2023-11-09 VITALS
HEIGHT: 74 IN | BODY MASS INDEX: 34.21 KG/M2 | WEIGHT: 266.56 LBS | SYSTOLIC BLOOD PRESSURE: 144 MMHG | OXYGEN SATURATION: 95 % | DIASTOLIC BLOOD PRESSURE: 82 MMHG | HEART RATE: 62 BPM | RESPIRATION RATE: 18 BRPM

## 2023-11-09 DIAGNOSIS — J44.89 ASTHMA WITH COPD: ICD-10-CM

## 2023-11-09 DIAGNOSIS — Z77.090 PERSONAL HISTORY OF CONTACT WITH AND (SUSPECTED) EXPOSURE TO ASBESTOS: ICD-10-CM

## 2023-11-09 DIAGNOSIS — G47.33 OSA ON CPAP: Primary | ICD-10-CM

## 2023-11-09 DIAGNOSIS — J45.21 MILD INTERMITTENT ASTHMA WITH ACUTE EXACERBATION: ICD-10-CM

## 2023-11-09 DIAGNOSIS — J30.89 SEASONAL ALLERGIC RHINITIS DUE TO OTHER ALLERGIC TRIGGER: ICD-10-CM

## 2023-11-09 DIAGNOSIS — J45.20 MILD INTERMITTENT ASTHMA, UNCOMPLICATED: ICD-10-CM

## 2023-11-09 DIAGNOSIS — R91.1 PULMONARY NODULE: ICD-10-CM

## 2023-11-09 DIAGNOSIS — J44.1 ASTHMA EXACERBATION IN COPD: ICD-10-CM

## 2023-11-09 DIAGNOSIS — J45.901 ASTHMA EXACERBATION IN COPD: ICD-10-CM

## 2023-11-09 DIAGNOSIS — J45.40 ASTHMA, CHRONIC, MODERATE PERSISTENT, UNCOMPLICATED: ICD-10-CM

## 2023-11-09 LAB
BRPFT: NORMAL
FEF 25 75 LLN: 1.17
FEF 25 75 PRE REF: 78.8 %
FEF 25 75 REF: 2.7
FEV1 FVC LLN: 62
FEV1 FVC PRE REF: 93.3 %
FEV1 FVC REF: 75
FEV1 LLN: 2.65
FEV1 PRE REF: 87.6 %
FEV1 REF: 3.67
FVC LLN: 3.65
FVC PRE REF: 93.2 %
FVC REF: 4.92
PEF LLN: 6.77
PEF PRE REF: 88.4 %
PEF REF: 9.38
PRE FEF 25 75: 2.13 L/S
PRE FET 100: 8.42 SEC
PRE FEV1 FVC: 70.17 %
PRE FEV1: 3.22 L
PRE FVC: 4.58 L
PRE PEF: 8.29 L/S

## 2023-11-09 PROCEDURE — 71048 XR CHEST 4 OR MORE VIEW: ICD-10-PCS | Mod: 26,,, | Performed by: RADIOLOGY

## 2023-11-09 PROCEDURE — 71046 XR CHEST PA AND LATERAL: ICD-10-PCS | Mod: 26,59,, | Performed by: RADIOLOGY

## 2023-11-09 PROCEDURE — 3288F PR FALLS RISK ASSESSMENT DOCUMENTED: ICD-10-PCS | Mod: CPTII,S$GLB,, | Performed by: INTERNAL MEDICINE

## 2023-11-09 PROCEDURE — 71046 X-RAY EXAM CHEST 2 VIEWS: CPT | Mod: 26,59,, | Performed by: RADIOLOGY

## 2023-11-09 PROCEDURE — 3288F FALL RISK ASSESSMENT DOCD: CPT | Mod: CPTII,S$GLB,, | Performed by: INTERNAL MEDICINE

## 2023-11-09 PROCEDURE — 99214 OFFICE O/P EST MOD 30 MIN: CPT | Mod: 25,S$GLB,, | Performed by: INTERNAL MEDICINE

## 2023-11-09 PROCEDURE — 1160F RVW MEDS BY RX/DR IN RCRD: CPT | Mod: CPTII,S$GLB,, | Performed by: INTERNAL MEDICINE

## 2023-11-09 PROCEDURE — 99999 PR PBB SHADOW E&M-EST. PATIENT-LVL III: ICD-10-PCS | Mod: PBBFAC,,, | Performed by: INTERNAL MEDICINE

## 2023-11-09 PROCEDURE — 3044F HG A1C LEVEL LT 7.0%: CPT | Mod: CPTII,S$GLB,, | Performed by: INTERNAL MEDICINE

## 2023-11-09 PROCEDURE — 71048 X-RAY EXAM CHEST 4+ VIEWS: CPT | Mod: 26,,, | Performed by: RADIOLOGY

## 2023-11-09 PROCEDURE — 1101F PT FALLS ASSESS-DOCD LE1/YR: CPT | Mod: CPTII,S$GLB,, | Performed by: INTERNAL MEDICINE

## 2023-11-09 PROCEDURE — 1160F PR REVIEW ALL MEDS BY PRESCRIBER/CLIN PHARMACIST DOCUMENTED: ICD-10-PCS | Mod: CPTII,S$GLB,, | Performed by: INTERNAL MEDICINE

## 2023-11-09 PROCEDURE — 71046 X-RAY EXAM CHEST 2 VIEWS: CPT | Mod: TC,59

## 2023-11-09 PROCEDURE — 99214 PR OFFICE/OUTPT VISIT, EST, LEVL IV, 30-39 MIN: ICD-10-PCS | Mod: 25,S$GLB,, | Performed by: INTERNAL MEDICINE

## 2023-11-09 PROCEDURE — 3008F PR BODY MASS INDEX (BMI) DOCUMENTED: ICD-10-PCS | Mod: CPTII,S$GLB,, | Performed by: INTERNAL MEDICINE

## 2023-11-09 PROCEDURE — 3077F SYST BP >= 140 MM HG: CPT | Mod: CPTII,S$GLB,, | Performed by: INTERNAL MEDICINE

## 2023-11-09 PROCEDURE — 3044F PR MOST RECENT HEMOGLOBIN A1C LEVEL <7.0%: ICD-10-PCS | Mod: CPTII,S$GLB,, | Performed by: INTERNAL MEDICINE

## 2023-11-09 PROCEDURE — 3077F PR MOST RECENT SYSTOLIC BLOOD PRESSURE >= 140 MM HG: ICD-10-PCS | Mod: CPTII,S$GLB,, | Performed by: INTERNAL MEDICINE

## 2023-11-09 PROCEDURE — 71048 X-RAY EXAM CHEST 4+ VIEWS: CPT | Mod: TC

## 2023-11-09 PROCEDURE — 94010 BREATHING CAPACITY TEST: ICD-10-PCS | Mod: S$GLB,,, | Performed by: INTERNAL MEDICINE

## 2023-11-09 PROCEDURE — 1101F PR PT FALLS ASSESS DOC 0-1 FALLS W/OUT INJ PAST YR: ICD-10-PCS | Mod: CPTII,S$GLB,, | Performed by: INTERNAL MEDICINE

## 2023-11-09 PROCEDURE — 1159F PR MEDICATION LIST DOCUMENTED IN MEDICAL RECORD: ICD-10-PCS | Mod: CPTII,S$GLB,, | Performed by: INTERNAL MEDICINE

## 2023-11-09 PROCEDURE — 1159F MED LIST DOCD IN RCRD: CPT | Mod: CPTII,S$GLB,, | Performed by: INTERNAL MEDICINE

## 2023-11-09 PROCEDURE — 3008F BODY MASS INDEX DOCD: CPT | Mod: CPTII,S$GLB,, | Performed by: INTERNAL MEDICINE

## 2023-11-09 PROCEDURE — 3079F PR MOST RECENT DIASTOLIC BLOOD PRESSURE 80-89 MM HG: ICD-10-PCS | Mod: CPTII,S$GLB,, | Performed by: INTERNAL MEDICINE

## 2023-11-09 PROCEDURE — 94010 BREATHING CAPACITY TEST: CPT | Mod: S$GLB,,, | Performed by: INTERNAL MEDICINE

## 2023-11-09 PROCEDURE — 3079F DIAST BP 80-89 MM HG: CPT | Mod: CPTII,S$GLB,, | Performed by: INTERNAL MEDICINE

## 2023-11-09 PROCEDURE — 99999 PR PBB SHADOW E&M-EST. PATIENT-LVL III: CPT | Mod: PBBFAC,,, | Performed by: INTERNAL MEDICINE

## 2023-11-09 RX ORDER — ALBUTEROL SULFATE 90 UG/1
2 AEROSOL, METERED RESPIRATORY (INHALATION) EVERY 4 HOURS PRN
Qty: 54 G | Refills: 3 | Status: SHIPPED | OUTPATIENT
Start: 2023-11-09

## 2023-11-09 RX ORDER — FLUTICASONE PROPIONATE 50 MCG
2 SPRAY, SUSPENSION (ML) NASAL DAILY
Qty: 48 G | Refills: 3 | Status: SHIPPED | OUTPATIENT
Start: 2023-11-09

## 2023-11-09 RX ORDER — FLUTICASONE FUROATE, UMECLIDINIUM BROMIDE AND VILANTEROL TRIFENATATE 200; 62.5; 25 UG/1; UG/1; UG/1
1 POWDER RESPIRATORY (INHALATION) DAILY
Qty: 180 EACH | Refills: 3 | Status: SHIPPED | OUTPATIENT
Start: 2023-11-09

## 2023-11-09 RX ORDER — ALBUTEROL SULFATE 0.83 MG/ML
2.5 SOLUTION RESPIRATORY (INHALATION)
Qty: 360 ML | Refills: 11 | Status: SHIPPED | OUTPATIENT
Start: 2023-11-09 | End: 2024-11-08

## 2023-11-09 NOTE — PROGRESS NOTES
Subjective:     Patient ID: Yung Mcconnell is a 68 y.o. male.    Chief Complaint:      HPI    Obstructive Sleep Apnea on Continuous Positive Airway Pressure:  Patient is using CPAP as prescribed and benefiting from therapy. Patient has complaints of dry mouth    Compliance Report  Compliance  Payor Standard  Usage 02/25/2023 - 03/26/2023  Usage days 30/30 days (100%)  >= 4 hours 30 days (100%)  < 4 hours 0 days (0%)  Usage hours 232 hours 44 minutes  Average usage (total days) 7 hours 45 minutes  Average usage (days used) 7 hours 45 minutes  Median usage (days used) 7 hours 37 minutes  Total used hours (value since last reset - 03/26/2023) 1,260 hours  AirSense 11 AutoSet  Serial number [id number]  Mode CPAP  Set pressure 11 cmH2O  EPR Fulltime  EPR level 3  Therapy  Leaks - L/min Median: 0.1 95th percentile: 4.9 Maximum: 14.1  Events per hour AI: 1.3 HI: 0.1 AHI: 1.4  Apnea Index Central: 0.4 Obstructive: 0.8 Unknown: 0.0  RERA Index 0.3    ith history of Asthma / Chronic Obstructive Pulmonary Disease, s/p right lung surgery and Obstructive Sleep Apnea  history of COVID in August 2020.      History of asbestos exposure 1977- 1983 - [employer]      Asthma Follow-up  The patient has previously been evaluated here for asthma and presents for an asthma follow-up. The patient is currently having symptoms / an exacerbation. Current symptoms include dyspnea, non-productive cough, and wheezing. Symptoms have been present since several days ago and have been rapidly worsening. He denies chest tightness, dyspnea, productive cough, and wheezing. Associated symptoms include fatigue and poor exercise tolerance.  This episode appears to have been triggered by pollens. Treatments tried for the current exacerbation include inhaled corticosteroids, long-acting inhaled beta-adrenergic agonists, and short-acting inhaled beta-adrenergic agonists, which have provided some relief of symptoms. The patient has been having similar episodes for  approximately  many  years.    Current Disease Severity  The patient is having daytime symptoms throughout the day. The patient is having daytime symptoms often 7 times per week. The patient is using short-acting beta agonists for symptom control several times per day. He has exacerbations requiring oral systemic corticosteroids 2 times per year. Current limitations in activity from asthma:  unable to exercise . Number of days of school or work missed in the last month: not applicable. Number of urgent/emergent visit in last year: 0.  The patient is using a spacer with MDIs. His best peak flow rate is na. He is not monitoring peak flow rates at home.    Past Medical History:   Diagnosis Date    Allergy     Asthma, chronic 7/9/2013    Colon polyps     COPD (chronic obstructive pulmonary disease)     GERD (gastroesophageal reflux disease)     High cholesterol     Osteoarthritis of both knees 7/9/2013    Sleep apnea      Past Surgical History:   Procedure Laterality Date    CHOLECYSTECTOMY      COLONOSCOPY N/A 10/23/2020    Procedure: COLONOSCOPY;  Surgeon: Sae Valentine MD;  Location: [facility];  Service: Endoscopy;  Laterality: N/A;    DIAGNOSTIC LAPAROSCOPY N/A 6/21/2018    Procedure: LAPAROSCOPY, DIAGNOSTIC;  Surgeon: Casper Martinez MD;  Location: [facility] OR;  Service: General;  Laterality: N/A;    LUNG SURGERY Right     benign mass    TOTAL KNEE ARTHROPLASTY Left     UMBILICAL HERNIA REPAIR N/A 6/21/2018    Procedure: REPAIR, HERNIA, UMBILICAL, AGE 5 YEARS OR OLDER;  Surgeon: Casper Martinez MD;  Location: [facility];  Service: General;  Laterality: N/A;     Review of patient's allergies indicates:   Allergen Reactions    Zithromax [azithromycin] Palpitations    Aspirin Other (See Comments)     Other reaction(s): Difficulty breathing    Lipitor [atorvastatin] Other (See Comments)     Leg cramps/hand cramps     Current Outpatient Medications on File Prior to Visit   Medication Sig Dispense Refill    azelaic  acid (AZELEX) 15 % gel Apply 1 application topically 2 (two) times a day.      cetirizine (ZYRTEC) 10 MG tablet Take 10 mg by mouth once daily.      cyclobenzaprine (FLEXERIL) 5 MG tablet 1 Tablet Oral Three times a day as needed. 20 tablet 0    diclofenac 1.3 % PT12 Apply 1 patch topically every 12 (twelve) hours as needed. 30 patch 0    doxycycline (MONODOX) 100 MG capsule Take 1 capsule (100 mg total) by mouth every 12 (twelve) hours. 20 capsule 1    GLUCOSAMINE HCL/CHONDR ALFONSO A NA (OSTEO BI-FLEX ORAL) Take 1 tablet by mouth once daily.       metroNIDAZOLE (METROGEL) 0.75 % gel Apply topically 2 (two) times daily. 45 g 2    multivitamin (THERAGRAN) per tablet 1 Tablet Oral Every day      predniSONE (DELTASONE) 20 MG tablet Prednisone 60 mg/ day for 3 days, 40 mg/day for 3 days,20 mg/ day for 3 days, (1/2 tablet )10 mg a day for 3 days. 20 tablet 0    semaglutide, weight loss, (WEGOVY) 0.25 mg/0.5 mL PnIj Inject 0.5 mg into the skin once a week. 0.5 mL 8    tadalafiL (CIALIS) 20 MG Tab TAKE 1 TABLET DAILY AS NEEDED 24 tablet 3     No current facility-administered medications on file prior to visit.     Social History     Socioeconomic History    Marital status:    Occupational History     Employer: [employer]   Tobacco Use    Smoking status: Never    Smokeless tobacco: Never   Substance and Sexual Activity    Alcohol use: Never    Drug use: No     Family History   Problem Relation Age of Onset    Hypertension Mother     Diabetes Mother     Cancer Mother         Breast Ca     Heart disease Father         CAD , CHF     Aneurysm Father     Colon polyps Father     Melanoma Neg Hx     Eczema Neg Hx     Lupus Neg Hx     Psoriasis Neg Hx        Review of Systems   Constitutional:  Positive for fatigue. Negative for fever.   HENT:  Positive for postnasal drip, rhinorrhea and congestion.    Eyes:  Negative for redness and itching.   Respiratory:  Positive for cough, sputum production, shortness of  "breath, dyspnea on extertion, use of rescue inhaler and Paroxysmal Nocturnal Dyspnea.    Cardiovascular:  Negative for chest pain, palpitations and leg swelling.   Genitourinary:  Negative for difficulty urinating and hematuria.   Endocrine:  Negative for cold intolerance and heat intolerance.    Skin:  Negative for rash.   Gastrointestinal:  Negative for nausea and abdominal pain.   Neurological:  Negative for dizziness, syncope, weakness and light-headedness.   Hematological:  Negative for adenopathy. Does not bruise/bleed easily.   Psychiatric/Behavioral:  Negative for sleep disturbance. The patient is not nervous/anxious.        Objective:      BP (!) 144/82   Pulse 62   Resp 18   Ht 6' 2" (1.88 m)   Wt 120.9 kg (266 lb 8.6 oz)   SpO2 95%   BMI 34.22 kg/m²   Physical Exam  Vitals and nursing note reviewed.   Constitutional:       Appearance: He is well-developed.   HENT:      Head: Normocephalic and atraumatic.      Nose: Congestion and rhinorrhea present.   Eyes:      Conjunctiva/sclera: Conjunctivae normal.      Pupils: Pupils are equal, round, and reactive to light.   Neck:      Thyroid: No thyromegaly.      Vascular: No JVD.      Trachea: No tracheal deviation.   Cardiovascular:      Rate and Rhythm: Normal rate and regular rhythm.      Heart sounds: No murmur heard.  Pulmonary:      Breath sounds: Examination of the right-lower field reveals wheezing. Examination of the left-lower field reveals wheezing. Decreased breath sounds and wheezing present. No rhonchi or rales.   Abdominal:      General: Bowel sounds are normal.      Palpations: Abdomen is soft.   Musculoskeletal:         General: No tenderness. Normal range of motion.      Cervical back: Neck supple.   Lymphadenopathy:      Cervical: No cervical adenopathy.   Skin:     General: Skin is warm and dry.   Neurological:      Mental Status: He is alert and oriented to person, place, and time.       Personal Diagnostic Review  Chest x-ray: " "hyperinflation          11/9/2023    10:14 AM   Pulmonary Studies Review   SpO2 95 %   Height 6' 2" (1.88 m)   Weight 120.9 kg (266 lb 8.6 oz)   BMI (Calculated) 34.2   Predicted Distance 309.34   Predicted Distance Meters (Calculated) 549.98 meters       X-Ray Chest 4 Or More View  Narrative: EXAM:  XR CHEST 4 OR MORE VIEW    CLINICAL HISTORY: Left-sided nipple versus nodule.    FINDINGS:  Comparisons are made to a study performed one and half hours earlier. These 5 images were obtained with nipple markers.  The area of nodularity in the left lower lung in fact corresponds to a nipple shadow.  Impression: 1.  As above    Finalized on: 11/9/2023 11:49 AM By:  Mendez Fuentes MD  R# [id number]      2023-11-09 11:51:[id number]    [facility]  X-Ray Chest PA And Lateral  Narrative: EXAM:  XR CHEST PA AND LATERAL    CLINICAL INDICATION:  Mild intermittent asthma with acute exacerbation    FINDINGS: PA and lateral views of the chest were obtained.    Comparisons are made to studies dating back to 08/25/2021.  Chronic basilar reticular interstitial changes again seen.  Stable postoperative changes in the right lung apex with asymmetric right greater than left apical pleural-parenchymal changes.     The lungs are free of new pulmonary opacities. The cardiac silhouette size is normal. The trachea is midline and the mediastinal width is normal. Negative for focal infiltrate, effusion or pneumothorax. Pulmonary vasculature is normal. Negative for osseous abnormalities.  Stable convex left curvature the upper thoracic spine with marginal spondylosis, aortic arch calcifications, eventrations of the hemidiaphragms and cardiophrenic fat pads.    Probable left-sided nipple shadow noted.  Impression: 1.  Negative for acute process involving the chest.  2.  Probable left-sided nipple shadow noted.  Repeat frontal chest x-ray with bilateral shallow obliques and nipple markers recommended to confirm this suspicion.  3.  Stable findings as noted " "above.    Finalized on: 2023 9:41 AM By:  Mendez Fuentes MD  [facility]# [id number]      2023 09:43:[id number]    [facility]      Office Spirometry Results:         2023    10:14 AM 2023    10:15 AM 3/27/2023     3:05 PM 2023     9:55 AM 2023     8:53 AM 10/17/2022     2:54 PM 2022     9:34 AM   Pulmonary Function Tests   SpO2 95 % 97 % 98 % 96 % 97 % 94 % 96 %   Height 6' 2" (1.88 m) 6' 2.5" (1.892 m) 6' 2.5" (1.892 m) 6' 2" (1.88 m) 6' 2" (1.88 m) 6' 2" (1.88 m) 6' 2" (1.88 m)   Weight 120.9 kg (266 lb 8.6 oz) 110.7 kg (244 lb 2.6 oz) 113.8 kg (250 lb 14.1 oz) 115.2 kg (253 lb 15.5 oz) 116.6 kg (257 lb 0.9 oz) 122.5 kg (270 lb 1 oz) 122.1 kg (269 lb 2.9 oz)   BMI (Calculated) 34.2 30.9 31.8 32.6 33 34.7 34.5         2023    10:14 AM   Pulmonary Studies Review   SpO2 95 %   Height 6' 2" (1.88 m)   Weight 120.9 kg (266 lb 8.6 oz)   BMI (Calculated) 34.2   Predicted Distance 309.34   Predicted Distance Meters (Calculated) 549.98 meters         Assessment:            KEVEN on CPAP  -     CPAP/BIPAP SUPPLIES    Asthma, chronic, moderate persistent, uncomplicated  -     albuterol (PROVENTIL/VENTOLIN HFA) 90 mcg/actuation inhaler; Inhale 2 puffs into the lungs every 4 (four) hours as needed for Wheezing or Shortness of Breath.  Dispense: 54 g; Refill: 3  -     Fraction of  Nitric Oxide; Future    Mild intermittent asthma, uncomplicated  -     albuterol (PROVENTIL/VENTOLIN HFA) 90 mcg/actuation inhaler; Inhale 2 puffs into the lungs every 4 (four) hours as needed for Wheezing or Shortness of Breath.  Dispense: 54 g; Refill: 3  -     Fraction of  Nitric Oxide; Future  -     X-Ray Chest 4 Or More View; Future; Expected date: 2023    Mild intermittent asthma with acute exacerbation  -     fluticasone-umeclidin-vilanter (TRELEGY ELLIPTA) 200-62.5-25 mcg inhaler; Inhale 1 puff into the lungs once daily. Wash out mouth after use  Dispense: 180 each; Refill: 3    Seasonal allergic rhinitis due " to other allergic trigger  -     fluticasone propionate (FLONASE) 50 mcg/actuation nasal spray; 2 sprays (100 mcg total) by Each Nostril route once daily.  Dispense: 48 g; Refill: 3    Asthma exacerbation in COPD  -     albuterol (PROVENTIL) 2.5 mg /3 mL (0.083 %) nebulizer solution; Take 3 mLs (2.5 mg total) by nebulization every 4 to 6 hours as needed for Wheezing or Shortness of Breath.  Dispense: 360 mL; Refill: 11    Pulmonary nodule  -     X-Ray Chest 4 Or More View; Future; Expected date: 11/09/2023    Personal history of contact with and (suspected) exposure to asbestos  -     X-Ray Chest 4 Or More View; Future; Expected date: 11/09/2023          Outpatient Encounter Medications as of 11/9/2023   Medication Sig Dispense Refill    albuterol (PROVENTIL) 2.5 mg /3 mL (0.083 %) nebulizer solution Take 3 mLs (2.5 mg total) by nebulization every 4 to 6 hours as needed for Wheezing or Shortness of Breath. 360 mL 11    albuterol (PROVENTIL/VENTOLIN HFA) 90 mcg/actuation inhaler Inhale 2 puffs into the lungs every 4 (four) hours as needed for Wheezing or Shortness of Breath. 54 g 3    azelaic acid (AZELEX) 15 % gel Apply 1 application topically 2 (two) times a day.      cetirizine (ZYRTEC) 10 MG tablet Take 10 mg by mouth once daily.      cyclobenzaprine (FLEXERIL) 5 MG tablet 1 Tablet Oral Three times a day as needed. 20 tablet 0    diclofenac 1.3 % PT12 Apply 1 patch topically every 12 (twelve) hours as needed. 30 patch 0    doxycycline (MONODOX) 100 MG capsule Take 1 capsule (100 mg total) by mouth every 12 (twelve) hours. 20 capsule 1    fluticasone propionate (FLONASE) 50 mcg/actuation nasal spray 2 sprays (100 mcg total) by Each Nostril route once daily. 48 g 3    fluticasone-umeclidin-vilanter (TRELEGY ELLIPTA) 200-62.5-25 mcg inhaler Inhale 1 puff into the lungs once daily. Wash out mouth after use 180 each 3    GLUCOSAMINE HCL/CHONDR ALFONSO A NA (OSTEO BI-FLEX ORAL) Take 1 tablet by mouth once daily.        metroNIDAZOLE (METROGEL) 0.75 % gel Apply topically 2 (two) times daily. 45 g 2    multivitamin (THERAGRAN) per tablet 1 Tablet Oral Every day      predniSONE (DELTASONE) 20 MG tablet Prednisone 60 mg/ day for 3 days, 40 mg/day for 3 days,20 mg/ day for 3 days, (1/2 tablet )10 mg a day for 3 days. 20 tablet 0    semaglutide, weight loss, (WEGOVY) 0.25 mg/0.5 mL PnIj Inject 0.5 mg into the skin once a week. 0.5 mL 8    tadalafiL (CIALIS) 20 MG Tab TAKE 1 TABLET DAILY AS NEEDED 24 tablet 3    [DISCONTINUED] albuterol (PROVENTIL) 2.5 mg /3 mL (0.083 %) nebulizer solution Take 3 mLs (2.5 mg total) by nebulization every 4 to 6 hours as needed for Wheezing or Shortness of Breath. 360 mL 11    [DISCONTINUED] albuterol (PROVENTIL/VENTOLIN HFA) 90 mcg/actuation inhaler Inhale 2 puffs into the lungs every 4 (four) hours as needed for Wheezing or Shortness of Breath. 54 g 3    [DISCONTINUED] fluticasone propionate (FLONASE) 50 mcg/actuation nasal spray 2 sprays (100 mcg total) by Each Nostril route once daily. 48 g 3    [DISCONTINUED] fluticasone-umeclidin-vilanter (TRELEGY ELLIPTA) 200-62.5-25 mcg inhaler Inhale 1 puff into the lungs once daily. Wash out mouth after use 180 each 3     No facility-administered encounter medications on file as of 2023.     Plan:       Requested Prescriptions     Signed Prescriptions Disp Refills    albuterol (PROVENTIL/VENTOLIN HFA) 90 mcg/actuation inhaler 54 g 3     Sig: Inhale 2 puffs into the lungs every 4 (four) hours as needed for Wheezing or Shortness of Breath.    fluticasone-umeclidin-vilanter (TRELEGY ELLIPTA) 200-62.5-25 mcg inhaler 180 each 3     Sig: Inhale 1 puff into the lungs once daily. Wash out mouth after use    fluticasone propionate (FLONASE) 50 mcg/actuation nasal spray 48 g 3     Si sprays (100 mcg total) by Each Nostril route once daily.    albuterol (PROVENTIL) 2.5 mg /3 mL (0.083 %) nebulizer solution 360 mL 11     Sig: Take 3 mLs (2.5 mg total) by  nebulization every 4 to 6 hours as needed for Wheezing or Shortness of Breath.     Problem List Items Addressed This Visit       Asthma exacerbation in COPD    Relevant Medications    albuterol (PROVENTIL) 2.5 mg /3 mL (0.083 %) nebulizer solution    Mild intermittent asthma with acute exacerbation    Relevant Medications    fluticasone-umeclidin-vilanter (TRELEGY ELLIPTA) 200-62.5-25 mcg inhaler    Seasonal allergic rhinitis    Relevant Medications    fluticasone propionate (FLONASE) 50 mcg/actuation nasal spray     Other Visit Diagnoses       KEVEN on CPAP    -  Primary    Relevant Orders    CPAP/BIPAP SUPPLIES    Asthma, chronic, moderate persistent, uncomplicated        Relevant Medications    albuterol (PROVENTIL/VENTOLIN HFA) 90 mcg/actuation inhaler    Other Relevant Orders    Fraction of  Nitric Oxide    Mild intermittent asthma, uncomplicated        Relevant Medications    albuterol (PROVENTIL/VENTOLIN HFA) 90 mcg/actuation inhaler    Other Relevant Orders    Fraction of  Nitric Oxide    X-Ray Chest 4 Or More View (Completed)    Pulmonary nodule        Relevant Orders    X-Ray Chest 4 Or More View (Completed)    Personal history of contact with and (suspected) exposure to asbestos        Relevant Orders    X-Ray Chest 4 Or More View (Completed)             Follow up in about 5 months (around 2024) for Review progress, Review CXR - perform today.    MEDICAL DECISION MAKING: Moderate to high complexity.  Overall, the multiple problems listed are of moderate to high severity that may impact quality of life and activities of daily living. Side effects of medications, treatment plan as well as options and alternatives reviewed and discussed with patient. There was counseling of patient concerning these issues.    Total time spent in counseling and coordination of care - 30  minutes of total time spent on the encounter, which includes face to face time and non-face to face time preparing to see  the patient (eg, review of tests), Obtaining and/or reviewing separately obtained history, Documenting clinical information in the electronic or other health record, Independently interpreting results (not separately reported) and communicating results to the patient/family/caregiver, or Care coordination (not separately reported).    Time was used in discussion of prognosis, risks, benefits of treatment, instructions and compliance with regimen . Discussion with other physicians and/or health care providers - home health or for use of durable medical equipment (oxygen, nebulizers, CPAP, BiPAP) occurred.

## 2023-11-13 ENCOUNTER — PATIENT MESSAGE (OUTPATIENT)
Dept: PULMONOLOGY | Facility: CLINIC | Age: 68
End: 2023-11-13
Payer: MEDICARE

## 2023-11-14 DIAGNOSIS — N52.9 ERECTILE DYSFUNCTION, UNSPECIFIED ERECTILE DYSFUNCTION TYPE: ICD-10-CM

## 2023-11-15 RX ORDER — TADALAFIL 20 MG/1
TABLET ORAL
Qty: 24 TABLET | Refills: 2 | Status: SHIPPED | OUTPATIENT
Start: 2023-11-15 | End: 2024-01-22 | Stop reason: SDUPTHER

## 2023-11-15 NOTE — TELEPHONE ENCOUNTER
Provider Staff:  Action required for this patient    Requires labs      Please see care gap opportunities below in Care Due Message.    Thanks!  [facility]     Appointments      Date Provider   Last Visit   7/5/2023 Prasanna Gaxiola MD   Next Visit   Visit date not found Prasanna Gaxiola MD     Refill Decision Note   Yung Mcconnell  is requesting a refill authorization.  Brief Assessment and Rationale for Refill:  Approve     Medication Therapy Plan:         Comments:     Note composed:3:06 AM 11/15/2023

## 2023-11-15 NOTE — TELEPHONE ENCOUNTER
Care Due:                  Date            Visit Type   Department     Provider  --------------------------------------------------------------------------------                                MYCHART                              FOLLOWUP/OF  [facility] INTERNAL  Last Visit: 07-      FICE VISIT   MEDICINE       Prasanna Gaxiola  Next Visit: None Scheduled  None         None Found                                                            Last  Test          Frequency    Reason                     Performed    Due Date  --------------------------------------------------------------------------------    HBA1C.......  6 months...  semaglutide,.............  01- 07-    [facility] Embedded Care Due Messages. Reference number: [id number].   11/14/2023 11:58:57 PM CST

## 2023-12-08 ENCOUNTER — LAB VISIT (OUTPATIENT)
Dept: LAB | Facility: HOSPITAL | Age: 68
End: 2023-12-08
Attending: FAMILY MEDICINE
Payer: MEDICARE

## 2023-12-08 ENCOUNTER — OFFICE VISIT (OUTPATIENT)
Dept: FAMILY MEDICINE | Facility: CLINIC | Age: 68
End: 2023-12-08
Payer: MEDICARE

## 2023-12-08 DIAGNOSIS — T46.6X5A STATIN MYOPATHY: ICD-10-CM

## 2023-12-08 DIAGNOSIS — E66.9 OBESITY (BMI 30.0-34.9): ICD-10-CM

## 2023-12-08 DIAGNOSIS — E78.5 HYPERLIPIDEMIA, UNSPECIFIED HYPERLIPIDEMIA TYPE: ICD-10-CM

## 2023-12-08 DIAGNOSIS — Z12.5 SCREENING FOR PROSTATE CANCER: ICD-10-CM

## 2023-12-08 DIAGNOSIS — J44.89 ASTHMA WITH COPD: Primary | ICD-10-CM

## 2023-12-08 DIAGNOSIS — M26.69 TMJ INFLAMMATION: ICD-10-CM

## 2023-12-08 DIAGNOSIS — G72.0 STATIN MYOPATHY: ICD-10-CM

## 2023-12-08 DIAGNOSIS — K21.9 GASTROESOPHAGEAL REFLUX DISEASE, UNSPECIFIED WHETHER ESOPHAGITIS PRESENT: ICD-10-CM

## 2023-12-08 LAB
ALBUMIN SERPL BCP-MCNC: 3.9 G/DL (ref 3.5–5.2)
ALP SERPL-CCNC: 73 U/L (ref 55–135)
ALT SERPL W/O P-5'-P-CCNC: 19 U/L (ref 10–44)
ANION GAP SERPL CALC-SCNC: 12 MMOL/L (ref 8–16)
AST SERPL-CCNC: 17 U/L (ref 10–40)
BILIRUB SERPL-MCNC: 0.9 MG/DL (ref 0.1–1)
BUN SERPL-MCNC: 16 MG/DL (ref 8–23)
CALCIUM SERPL-MCNC: 9.5 MG/DL (ref 8.7–10.5)
CHLORIDE SERPL-SCNC: 104 MMOL/L (ref 95–110)
CHOLEST SERPL-MCNC: 177 MG/DL (ref 120–199)
CHOLEST/HDLC SERPL: 4.8 {RATIO} (ref 2–5)
CO2 SERPL-SCNC: 26 MMOL/L (ref 23–29)
CREAT SERPL-MCNC: 0.9 MG/DL (ref 0.5–1.4)
EST. GFR  (NO RACE VARIABLE): >60 ML/MIN/1.73 M^2
GLUCOSE SERPL-MCNC: 99 MG/DL (ref 70–110)
HDLC SERPL-MCNC: 37 MG/DL (ref 40–75)
HDLC SERPL: 20.9 % (ref 20–50)
LDLC SERPL CALC-MCNC: 123.8 MG/DL (ref 63–159)
NONHDLC SERPL-MCNC: 140 MG/DL
POTASSIUM SERPL-SCNC: 4.3 MMOL/L (ref 3.5–5.1)
PROT SERPL-MCNC: 7.2 G/DL (ref 6–8.4)
SODIUM SERPL-SCNC: 142 MMOL/L (ref 136–145)
TRIGL SERPL-MCNC: 81 MG/DL (ref 30–150)

## 2023-12-08 PROCEDURE — 36415 COLL VENOUS BLD VENIPUNCTURE: CPT | Mod: PO | Performed by: FAMILY MEDICINE

## 2023-12-08 PROCEDURE — 1101F PT FALLS ASSESS-DOCD LE1/YR: CPT | Mod: CPTII,S$GLB,, | Performed by: FAMILY MEDICINE

## 2023-12-08 PROCEDURE — 99999 PR PBB SHADOW E&M-EST. PATIENT-LVL I: CPT | Mod: PBBFAC,,, | Performed by: FAMILY MEDICINE

## 2023-12-08 PROCEDURE — 80053 COMPREHEN METABOLIC PANEL: CPT | Performed by: FAMILY MEDICINE

## 2023-12-08 PROCEDURE — 99214 OFFICE O/P EST MOD 30 MIN: CPT | Mod: S$GLB,,, | Performed by: FAMILY MEDICINE

## 2023-12-08 PROCEDURE — 3288F PR FALLS RISK ASSESSMENT DOCUMENTED: ICD-10-PCS | Mod: CPTII,S$GLB,, | Performed by: FAMILY MEDICINE

## 2023-12-08 PROCEDURE — 3044F HG A1C LEVEL LT 7.0%: CPT | Mod: CPTII,S$GLB,, | Performed by: FAMILY MEDICINE

## 2023-12-08 PROCEDURE — 99999 PR PBB SHADOW E&M-EST. PATIENT-LVL I: ICD-10-PCS | Mod: PBBFAC,,, | Performed by: FAMILY MEDICINE

## 2023-12-08 PROCEDURE — 84153 ASSAY OF PSA TOTAL: CPT | Performed by: FAMILY MEDICINE

## 2023-12-08 PROCEDURE — 3044F PR MOST RECENT HEMOGLOBIN A1C LEVEL <7.0%: ICD-10-PCS | Mod: CPTII,S$GLB,, | Performed by: FAMILY MEDICINE

## 2023-12-08 PROCEDURE — 3288F FALL RISK ASSESSMENT DOCD: CPT | Mod: CPTII,S$GLB,, | Performed by: FAMILY MEDICINE

## 2023-12-08 PROCEDURE — 80061 LIPID PANEL: CPT | Performed by: FAMILY MEDICINE

## 2023-12-08 PROCEDURE — 1101F PR PT FALLS ASSESS DOC 0-1 FALLS W/OUT INJ PAST YR: ICD-10-PCS | Mod: CPTII,S$GLB,, | Performed by: FAMILY MEDICINE

## 2023-12-08 PROCEDURE — 99214 PR OFFICE/OUTPT VISIT, EST, LEVL IV, 30-39 MIN: ICD-10-PCS | Mod: S$GLB,,, | Performed by: FAMILY MEDICINE

## 2023-12-08 NOTE — PROGRESS NOTES
Subjective:       Patient ID: Yung Mcconnell is a 68 y.o. male.    Chief Complaint: Follow-up      HPI Comments:       Current Outpatient Medications:     albuterol (PROVENTIL) 2.5 mg /3 mL (0.083 %) nebulizer solution, Take 3 mLs (2.5 mg total) by nebulization every 4 to 6 hours as needed for Wheezing or Shortness of Breath., Disp: 360 mL, Rfl: 11    albuterol (PROVENTIL/VENTOLIN HFA) 90 mcg/actuation inhaler, Inhale 2 puffs into the lungs every 4 (four) hours as needed for Wheezing or Shortness of Breath., Disp: 54 g, Rfl: 3    azelaic acid (AZELEX) 15 % gel, Apply 1 application topically 2 (two) times a day., Disp: , Rfl:     cetirizine (ZYRTEC) 10 MG tablet, Take 10 mg by mouth once daily., Disp: , Rfl:     cyclobenzaprine (FLEXERIL) 5 MG tablet, 1 Tablet Oral Three times a day as needed., Disp: 20 tablet, Rfl: 0    diclofenac 1.3 % PT12, Apply 1 patch topically every 12 (twelve) hours as needed., Disp: 30 patch, Rfl: 0    doxycycline (MONODOX) 100 MG capsule, Take 1 capsule (100 mg total) by mouth every 12 (twelve) hours., Disp: 20 capsule, Rfl: 1    fluticasone propionate (FLONASE) 50 mcg/actuation nasal spray, 2 sprays (100 mcg total) by Each Nostril route once daily., Disp: 48 g, Rfl: 3    fluticasone-umeclidin-vilanter (TRELEGY ELLIPTA) 200-62.5-25 mcg inhaler, Inhale 1 puff into the lungs once daily. Wash out mouth after use, Disp: 180 each, Rfl: 3    GLUCOSAMINE HCL/CHONDR ALFONSO A NA (OSTEO BI-FLEX ORAL), Take 1 tablet by mouth once daily. , Disp: , Rfl:     metroNIDAZOLE (METROGEL) 0.75 % gel, Apply topically 2 (two) times daily., Disp: 45 g, Rfl: 2    multivitamin (THERAGRAN) per tablet, 1 Tablet Oral Every day, Disp: , Rfl:     predniSONE (DELTASONE) 20 MG tablet, Prednisone 60 mg/ day for 3 days, 40 mg/day for 3 days,20 mg/ day for 3 days, (1/2 tablet )10 mg a day for 3 days., Disp: 20 tablet, Rfl: 0    semaglutide, weight loss, (WEGOVY) 0.25 mg/0.5 mL PnIj, Inject 0.5 mg into the skin once a week.,  Disp: 0.5 mL, Rfl: 8    tadalafiL (CIALIS) 20 MG Tab, TAKE 1 TABLET DAILY AS NEEDED, Disp: 24 tablet, Rfl: 2      Five month follow-up.  Interested in seeing his wife's cardiologist (Barrie) for Reclast.  Does not tolerate statins.    Complains of right ear pain for 2 weeks.  Hurts when he chews.  Comes and goes.  Lots of stress.  Wife says he grinds his teeth.  Has not talked to the dentist about it however.    Mood 6/10.  Stays active and interested.  Wants to do more exercise instead of taking antidepressants.      Review of Systems   Constitutional:  Negative for activity change, appetite change and fever.   HENT:  Positive for ear pain. Negative for sore throat.    Respiratory:  Negative for cough and shortness of breath.    Cardiovascular:  Negative for chest pain.   Gastrointestinal:  Negative for abdominal pain, diarrhea and nausea.   Genitourinary:  Negative for difficulty urinating.   Musculoskeletal:  Negative for arthralgias and myalgias.   Neurological:  Negative for dizziness and headaches.   Psychiatric/Behavioral:  Positive for dysphoric mood.        Objective:      There were no vitals filed for this visit.  Physical Exam  Vitals and nursing note reviewed.   Constitutional:       General: He is not in acute distress.     Appearance: He is well-developed. He is not diaphoretic.   HENT:      Head: Normocephalic.      Right Ear: Tympanic membrane, ear canal and external ear normal. Tenderness present.      Left Ear: Tympanic membrane and ear canal normal.      Ears:      Comments: Tenderness at TMJ     Mouth/Throat:      Pharynx: No oropharyngeal exudate.   Neck:      Thyroid: No thyromegaly.   Cardiovascular:      Rate and Rhythm: Normal rate and regular rhythm.      Heart sounds: Normal heart sounds. No murmur heard.  Pulmonary:      Effort: Pulmonary effort is normal.      Breath sounds: Normal breath sounds. No wheezing or rales.   Abdominal:      General: There is no distension.      Palpations:  Abdomen is soft.   Musculoskeletal:      Cervical back: Neck supple.   Lymphadenopathy:      Cervical: No cervical adenopathy.   Skin:     General: Skin is warm and dry.   Neurological:      Mental Status: He is alert and oriented to person, place, and time.   Psychiatric:         Behavior: Behavior normal.         Thought Content: Thought content normal.         Judgment: Judgment normal.         Assessment:       1. Asthma with COPD    2. Hyperlipidemia, unspecified hyperlipidemia type    3. Obesity (BMI 30.0-34.9)    4. Gastroesophageal reflux disease, unspecified whether esophagitis present    5. Statin myopathy    6. Screening for prostate cancer    7. TMJ inflammation        Plan:   Asthma with COPD  Comments:  Followed by pulmonology    Hyperlipidemia, unspecified hyperlipidemia type  Comments:  Fasting lipid profile.  Reclast  Orders:  -     Comprehensive Metabolic Panel; Future; Expected date: 12/08/2023  -     Lipid Panel; Future; Expected date: 12/08/2023    Obesity (BMI 30.0-34.9)  Comments:  Stable    Gastroesophageal reflux disease, unspecified whether esophagitis present    Statin myopathy  Comments:  To cardiology for Reclast    Screening for prostate cancer  Comments:  PSA today  Orders:  -     PSA, Screening; Future; Expected date: 12/08/2023    TMJ inflammation  Comments:  NSAIDs.  Talk to dentist

## 2023-12-09 LAB — COMPLEXED PSA SERPL-MCNC: 0.48 NG/ML (ref 0–4)

## 2023-12-21 ENCOUNTER — TELEPHONE (OUTPATIENT)
Dept: PULMONOLOGY | Facility: CLINIC | Age: 68
End: 2023-12-21
Payer: MEDICARE

## 2024-01-22 ENCOUNTER — PATIENT MESSAGE (OUTPATIENT)
Dept: FAMILY MEDICINE | Facility: CLINIC | Age: 69
End: 2024-01-22
Payer: MEDICARE

## 2024-01-22 DIAGNOSIS — N52.9 ERECTILE DYSFUNCTION, UNSPECIFIED ERECTILE DYSFUNCTION TYPE: ICD-10-CM

## 2024-01-22 NOTE — TELEPHONE ENCOUNTER
Care Due:                  Date            Visit Type   Department     Provider  --------------------------------------------------------------------------------                                MYCHART                              FOLLOWUP/OF  [facility] INTERNAL  Last Visit: 12-      FICE VISIT   MEDICINE       Prasanna Gaxiola  Next Visit: None Scheduled  None         None Found                                                            Last  Test          Frequency    Reason                     Performed    Due Date  --------------------------------------------------------------------------------    HBA1C.......  6 months...  semaglutide,.............  01- 07-    [facility] Embedded Care Due Messages. Reference number: [id number].   1/22/2024 3:11:27 PM CST

## 2024-01-24 RX ORDER — TADALAFIL 20 MG/1
TABLET ORAL
Qty: 24 TABLET | Refills: 2 | Status: SHIPPED | OUTPATIENT
Start: 2024-01-24 | End: 2024-02-05 | Stop reason: SDUPTHER

## 2024-02-05 ENCOUNTER — PATIENT MESSAGE (OUTPATIENT)
Dept: FAMILY MEDICINE | Facility: CLINIC | Age: 69
End: 2024-02-05
Payer: MEDICARE

## 2024-02-05 DIAGNOSIS — N52.9 ERECTILE DYSFUNCTION, UNSPECIFIED ERECTILE DYSFUNCTION TYPE: ICD-10-CM

## 2024-02-05 NOTE — TELEPHONE ENCOUNTER
No care due was identified.  [facility] Embedded Care Due Messages. Reference number: [id number].   2/05/2024 4:29:48 PM CST

## 2024-02-07 RX ORDER — TADALAFIL 20 MG/1
TABLET ORAL
Qty: 24 TABLET | Refills: 2 | Status: SHIPPED | OUTPATIENT
Start: 2024-02-07

## 2024-03-31 ENCOUNTER — HOSPITAL ENCOUNTER (INPATIENT)
Facility: HOSPITAL | Age: 69
LOS: 1 days | Discharge: HOME | DRG: 310 | End: 2024-04-02
Attending: EMERGENCY MEDICINE | Admitting: HOSPITALIST
Payer: MEDICARE

## 2024-03-31 DIAGNOSIS — I48.92 ATRIAL FLUTTER WITH RAPID VENTRICULAR RESPONSE: Primary | ICD-10-CM

## 2024-03-31 DIAGNOSIS — R07.9 CHEST PAIN: ICD-10-CM

## 2024-03-31 DIAGNOSIS — I48.91 ATRIAL FIBRILLATION: ICD-10-CM

## 2024-03-31 DIAGNOSIS — I48.92 NEW ONSET ATRIAL FLUTTER: ICD-10-CM

## 2024-03-31 LAB
ALBUMIN SERPL BCP-MCNC: 3.8 G/DL (ref 3.5–5.2)
ALP SERPL-CCNC: 75 U/L (ref 55–135)
ALT SERPL W/O P-5'-P-CCNC: 21 U/L (ref 10–44)
ANION GAP SERPL CALC-SCNC: 13 MMOL/L (ref 8–16)
APTT PPP: 26.3 SEC (ref 21–32)
AST SERPL-CCNC: 20 U/L (ref 10–40)
BASOPHILS # BLD AUTO: 0.06 K/UL (ref 0–0.2)
BASOPHILS NFR BLD: 0.6 % (ref 0–1.9)
BILIRUB SERPL-MCNC: 0.5 MG/DL (ref 0.1–1)
BNP SERPL-MCNC: 193 PG/ML (ref 0–99)
BUN SERPL-MCNC: 16 MG/DL (ref 8–23)
CALCIUM SERPL-MCNC: 9.2 MG/DL (ref 8.7–10.5)
CHLORIDE SERPL-SCNC: 109 MMOL/L (ref 95–110)
CO2 SERPL-SCNC: 19 MMOL/L (ref 23–29)
CREAT SERPL-MCNC: 0.9 MG/DL (ref 0.5–1.4)
DIFFERENTIAL METHOD BLD: ABNORMAL
EOSINOPHIL # BLD AUTO: 0.2 K/UL (ref 0–0.5)
EOSINOPHIL NFR BLD: 2 % (ref 0–8)
ERYTHROCYTE [DISTWIDTH] IN BLOOD BY AUTOMATED COUNT: 13.8 % (ref 11.5–14.5)
EST. GFR  (NO RACE VARIABLE): >60 ML/MIN/1.73 M^2
GLUCOSE SERPL-MCNC: 115 MG/DL (ref 70–110)
HCT VFR BLD AUTO: 44.2 % (ref 40–54)
HGB BLD-MCNC: 15.9 G/DL (ref 14–18)
IMM GRANULOCYTES # BLD AUTO: 0.04 K/UL (ref 0–0.04)
IMM GRANULOCYTES NFR BLD AUTO: 0.4 % (ref 0–0.5)
INR PPP: 0.9 (ref 0.8–1.2)
LYMPHOCYTES # BLD AUTO: 3.7 K/UL (ref 1–4.8)
LYMPHOCYTES NFR BLD: 39.2 % (ref 18–48)
MAGNESIUM SERPL-MCNC: 1.8 MG/DL (ref 1.6–2.6)
MCH RBC QN AUTO: 31.4 PG (ref 27–31)
MCHC RBC AUTO-ENTMCNC: 36 G/DL (ref 32–36)
MCV RBC AUTO: 87 FL (ref 82–98)
MONOCYTES # BLD AUTO: 0.9 K/UL (ref 0.3–1)
MONOCYTES NFR BLD: 9.7 % (ref 4–15)
NEUTROPHILS # BLD AUTO: 4.5 K/UL (ref 1.8–7.7)
NEUTROPHILS NFR BLD: 48.1 % (ref 38–73)
NRBC BLD-RTO: 0 /100 WBC
PLATELET # BLD AUTO: 256 K/UL (ref 150–450)
PMV BLD AUTO: 9.4 FL (ref 9.2–12.9)
POTASSIUM SERPL-SCNC: 3.6 MMOL/L (ref 3.5–5.1)
PROT SERPL-MCNC: 7 G/DL (ref 6–8.4)
PROTHROMBIN TIME: 11 SEC (ref 9–12.5)
RBC # BLD AUTO: 5.07 M/UL (ref 4.6–6.2)
SODIUM SERPL-SCNC: 141 MMOL/L (ref 136–145)
TROPONIN I SERPL DL<=0.01 NG/ML-MCNC: 0.02 NG/ML (ref 0–0.03)
TROPONIN I SERPL DL<=0.01 NG/ML-MCNC: 0.03 NG/ML (ref 0–0.03)
WBC # BLD AUTO: 9.3 K/UL (ref 3.9–12.7)

## 2024-03-31 PROCEDURE — 93005 ELECTROCARDIOGRAM TRACING: CPT

## 2024-03-31 PROCEDURE — 83880 ASSAY OF NATRIURETIC PEPTIDE: CPT | Performed by: PHYSICIAN ASSISTANT

## 2024-03-31 PROCEDURE — A4216 STERILE WATER/SALINE, 10 ML: HCPCS | Performed by: NURSE PRACTITIONER

## 2024-03-31 PROCEDURE — 96368 THER/DIAG CONCURRENT INF: CPT

## 2024-03-31 PROCEDURE — G0378 HOSPITAL OBSERVATION PER HR: HCPCS

## 2024-03-31 PROCEDURE — 84484 ASSAY OF TROPONIN QUANT: CPT | Mod: 91 | Performed by: EMERGENCY MEDICINE

## 2024-03-31 PROCEDURE — 85025 COMPLETE CBC W/AUTO DIFF WBC: CPT | Performed by: PHYSICIAN ASSISTANT

## 2024-03-31 PROCEDURE — 5A09357 ASSISTANCE WITH RESPIRATORY VENTILATION, LESS THAN 24 CONSECUTIVE HOURS, CONTINUOUS POSITIVE AIRWAY PRESSURE: ICD-10-PCS | Performed by: HOSPITALIST

## 2024-03-31 PROCEDURE — 63600175 PHARM REV CODE 636 W HCPCS: Performed by: EMERGENCY MEDICINE

## 2024-03-31 PROCEDURE — 80053 COMPREHEN METABOLIC PANEL: CPT | Performed by: EMERGENCY MEDICINE

## 2024-03-31 PROCEDURE — 85730 THROMBOPLASTIN TIME PARTIAL: CPT | Performed by: EMERGENCY MEDICINE

## 2024-03-31 PROCEDURE — 99285 EMERGENCY DEPT VISIT HI MDM: CPT | Mod: 25

## 2024-03-31 PROCEDURE — 25000003 PHARM REV CODE 250: Performed by: EMERGENCY MEDICINE

## 2024-03-31 PROCEDURE — S5010 5% DEXTROSE AND 0.45% SALINE: HCPCS | Performed by: NURSE PRACTITIONER

## 2024-03-31 PROCEDURE — 25000003 PHARM REV CODE 250: Performed by: NURSE PRACTITIONER

## 2024-03-31 PROCEDURE — 96365 THER/PROPH/DIAG IV INF INIT: CPT | Mod: 59

## 2024-03-31 PROCEDURE — 93010 ELECTROCARDIOGRAM REPORT: CPT | Mod: ,,, | Performed by: INTERNAL MEDICINE

## 2024-03-31 PROCEDURE — 96366 THER/PROPH/DIAG IV INF ADDON: CPT

## 2024-03-31 PROCEDURE — 83735 ASSAY OF MAGNESIUM: CPT | Performed by: NURSE PRACTITIONER

## 2024-03-31 PROCEDURE — 85610 PROTHROMBIN TIME: CPT | Performed by: EMERGENCY MEDICINE

## 2024-03-31 PROCEDURE — 84484 ASSAY OF TROPONIN QUANT: CPT | Performed by: PHYSICIAN ASSISTANT

## 2024-03-31 RX ORDER — DEXTROSE MONOHYDRATE AND SODIUM CHLORIDE 5; .45 G/100ML; G/100ML
INJECTION, SOLUTION INTRAVENOUS CONTINUOUS
Status: DISCONTINUED | OUTPATIENT
Start: 2024-03-31 | End: 2024-04-02 | Stop reason: HOSPADM

## 2024-03-31 RX ORDER — IPRATROPIUM BROMIDE 0.5 MG/2.5ML
0.5 SOLUTION RESPIRATORY (INHALATION) EVERY 6 HOURS
Status: DISCONTINUED | OUTPATIENT
Start: 2024-04-01 | End: 2024-04-02 | Stop reason: HOSPADM

## 2024-03-31 RX ORDER — NALOXONE HCL 0.4 MG/ML
0.02 VIAL (ML) INJECTION
Status: DISCONTINUED | OUTPATIENT
Start: 2024-03-31 | End: 2024-04-02 | Stop reason: HOSPADM

## 2024-03-31 RX ORDER — ARFORMOTEROL TARTRATE 15 UG/2ML
15 SOLUTION RESPIRATORY (INHALATION) 2 TIMES DAILY
Status: DISCONTINUED | OUTPATIENT
Start: 2024-04-01 | End: 2024-04-02 | Stop reason: HOSPADM

## 2024-03-31 RX ORDER — METOPROLOL TARTRATE 1 MG/ML
5 INJECTION, SOLUTION INTRAVENOUS ONCE AS NEEDED
Status: DISCONTINUED | OUTPATIENT
Start: 2024-03-31 | End: 2024-04-02 | Stop reason: HOSPADM

## 2024-03-31 RX ORDER — IBUPROFEN 200 MG
24 TABLET ORAL
Status: DISCONTINUED | OUTPATIENT
Start: 2024-03-31 | End: 2024-04-02 | Stop reason: HOSPADM

## 2024-03-31 RX ORDER — LANOLIN ALCOHOL/MO/W.PET/CERES
400 CREAM (GRAM) TOPICAL ONCE
Status: COMPLETED | OUTPATIENT
Start: 2024-03-31 | End: 2024-03-31

## 2024-03-31 RX ORDER — DILTIAZEM HYDROCHLORIDE 5 MG/ML
10 INJECTION INTRAVENOUS
Status: COMPLETED | OUTPATIENT
Start: 2024-03-31 | End: 2024-03-31

## 2024-03-31 RX ORDER — PROMETHAZINE HYDROCHLORIDE 25 MG/1
25 TABLET ORAL EVERY 6 HOURS PRN
Status: DISCONTINUED | OUTPATIENT
Start: 2024-03-31 | End: 2024-04-02 | Stop reason: HOSPADM

## 2024-03-31 RX ORDER — LEVALBUTEROL INHALATION SOLUTION 0.63 MG/3ML
1.25 SOLUTION RESPIRATORY (INHALATION) EVERY 8 HOURS
Status: DISCONTINUED | OUTPATIENT
Start: 2024-04-01 | End: 2024-04-02 | Stop reason: HOSPADM

## 2024-03-31 RX ORDER — SODIUM CHLORIDE 0.9 % (FLUSH) 0.9 %
3 SYRINGE (ML) INJECTION EVERY 8 HOURS
Status: DISCONTINUED | OUTPATIENT
Start: 2024-03-31 | End: 2024-04-02 | Stop reason: HOSPADM

## 2024-03-31 RX ORDER — BUDESONIDE 0.5 MG/2ML
0.5 INHALANT ORAL EVERY 12 HOURS
Status: DISCONTINUED | OUTPATIENT
Start: 2024-04-01 | End: 2024-04-02 | Stop reason: HOSPADM

## 2024-03-31 RX ORDER — DILTIAZEM HCL 1 MG/ML
0-15 INJECTION, SOLUTION INTRAVENOUS CONTINUOUS
Status: DISCONTINUED | OUTPATIENT
Start: 2024-03-31 | End: 2024-03-31

## 2024-03-31 RX ORDER — HEPARIN SODIUM,PORCINE/D5W 25000/250
0-40 INTRAVENOUS SOLUTION INTRAVENOUS CONTINUOUS
Status: DISCONTINUED | OUTPATIENT
Start: 2024-03-31 | End: 2024-04-01

## 2024-03-31 RX ORDER — GLUCAGON 1 MG
1 KIT INJECTION
Status: DISCONTINUED | OUTPATIENT
Start: 2024-03-31 | End: 2024-04-02 | Stop reason: HOSPADM

## 2024-03-31 RX ORDER — ACETAMINOPHEN 325 MG/1
650 TABLET ORAL EVERY 4 HOURS PRN
Status: DISCONTINUED | OUTPATIENT
Start: 2024-03-31 | End: 2024-04-02 | Stop reason: HOSPADM

## 2024-03-31 RX ORDER — ACETAMINOPHEN 325 MG/1
650 TABLET ORAL EVERY 8 HOURS PRN
Status: DISCONTINUED | OUTPATIENT
Start: 2024-03-31 | End: 2024-04-02 | Stop reason: HOSPADM

## 2024-03-31 RX ORDER — DILTIAZEM HYDROCHLORIDE 5 MG/ML
20 INJECTION INTRAVENOUS
Status: COMPLETED | OUTPATIENT
Start: 2024-03-31 | End: 2024-03-31

## 2024-03-31 RX ORDER — POLYETHYLENE GLYCOL 3350 17 G/17G
17 POWDER, FOR SOLUTION ORAL DAILY PRN
Status: DISCONTINUED | OUTPATIENT
Start: 2024-03-31 | End: 2024-04-02 | Stop reason: HOSPADM

## 2024-03-31 RX ORDER — FLUTICASONE PROPIONATE 50 MCG
2 SPRAY, SUSPENSION (ML) NASAL DAILY
Status: DISCONTINUED | OUTPATIENT
Start: 2024-04-01 | End: 2024-04-02 | Stop reason: HOSPADM

## 2024-03-31 RX ORDER — ONDANSETRON HYDROCHLORIDE 2 MG/ML
4 INJECTION, SOLUTION INTRAVENOUS EVERY 8 HOURS PRN
Status: DISCONTINUED | OUTPATIENT
Start: 2024-03-31 | End: 2024-04-02 | Stop reason: HOSPADM

## 2024-03-31 RX ORDER — IBUPROFEN 200 MG
16 TABLET ORAL
Status: DISCONTINUED | OUTPATIENT
Start: 2024-03-31 | End: 2024-04-02 | Stop reason: HOSPADM

## 2024-03-31 RX ORDER — ALUMINUM HYDROXIDE, MAGNESIUM HYDROXIDE, AND SIMETHICONE 1200; 120; 1200 MG/30ML; MG/30ML; MG/30ML
30 SUSPENSION ORAL 4 TIMES DAILY PRN
Status: DISCONTINUED | OUTPATIENT
Start: 2024-03-31 | End: 2024-04-02 | Stop reason: HOSPADM

## 2024-03-31 RX ORDER — DILTIAZEM HCL/D5W 125 MG/125
15 PLASTIC BAG, INJECTION (ML) INTRAVENOUS CONTINUOUS
Status: DISCONTINUED | OUTPATIENT
Start: 2024-03-31 | End: 2024-04-01

## 2024-03-31 RX ORDER — ARFORMOTEROL TARTRATE 15 UG/2ML
15 SOLUTION RESPIRATORY (INHALATION) 2 TIMES DAILY
Status: DISCONTINUED | OUTPATIENT
Start: 2024-03-31 | End: 2024-03-31

## 2024-03-31 RX ORDER — TALC
6 POWDER (GRAM) TOPICAL NIGHTLY PRN
Status: DISCONTINUED | OUTPATIENT
Start: 2024-03-31 | End: 2024-04-02 | Stop reason: HOSPADM

## 2024-03-31 RX ADMIN — POTASSIUM BICARBONATE 20 MEQ: 391 TABLET, EFFERVESCENT ORAL at 08:03

## 2024-03-31 RX ADMIN — Medication 15 MG/HR: at 08:03

## 2024-03-31 RX ADMIN — Medication 10 MG/HR: at 06:03

## 2024-03-31 RX ADMIN — DEXTROSE AND SODIUM CHLORIDE: 5; 450 INJECTION, SOLUTION INTRAVENOUS at 11:03

## 2024-03-31 RX ADMIN — HEPARIN SODIUM 12 UNITS/KG/HR: 10000 INJECTION, SOLUTION INTRAVENOUS at 08:03

## 2024-03-31 RX ADMIN — ALUMINUM HYDROXIDE, MAGNESIUM HYDROXIDE, AND DIMETHICONE 30 ML: 200; 20; 200 SUSPENSION ORAL at 11:03

## 2024-03-31 RX ADMIN — Medication 400 MG: at 11:03

## 2024-03-31 RX ADMIN — Medication 5 MG/HR: at 05:03

## 2024-03-31 RX ADMIN — Medication 15 MG/HR: at 06:03

## 2024-03-31 RX ADMIN — SODIUM CHLORIDE 500 ML: 9 INJECTION, SOLUTION INTRAVENOUS at 08:03

## 2024-03-31 RX ADMIN — DILTIAZEM HYDROCHLORIDE 20 MG: 5 INJECTION INTRAVENOUS at 07:03

## 2024-03-31 RX ADMIN — Medication 3 ML: at 11:03

## 2024-03-31 RX ADMIN — DILTIAZEM HYDROCHLORIDE 10 MG: 5 INJECTION INTRAVENOUS at 05:03

## 2024-03-31 NOTE — ED PROVIDER NOTES
Emergency Medicine Provider Note - 3/31/2024    SCRIBE NOTE: Salvatore FERREIRA, am scribing for, and in the presence of, Giselle Wolf DO.     History     Chief Complaint   Patient presents with    Tachycardia    Chest Pain    Shortness of Breath     Pt c/o chest tightness and SOB with rapid HR. Denies n/v       Allergies:  Review of patient's allergies indicates:   Allergen Reactions    Zithromax [azithromycin] Palpitations    Aspirin Other (See Comments)     Other reaction(s): Difficulty breathing    Lipitor [atorvastatin] Other (See Comments)     Leg cramps/hand cramps        History of Present Illness   HPI    3/31/2024, 4:53 PM  The history is provided by the patient    Yung Mcconnell is a 68 y.o. male presenting to the ED for SOB over the past week with chest tightness and a rapid HB onsetting 2 hours pta. Pt has a hx of Afib and a collapsed lung from 3 years ago. Symptoms are constant and moderate in severity. No mitigating or exacerbating factors reported. Associated sxs include lightheadedness, a headache, sore throat, and wheezing which subsided earlier this week. Patient denies any nausea, vomiting, diaphoresis, bloody stool, black tarry stool, and all other sxs at this time. No further complaints or concerns at this time.         Arrival mode: Private Vehicle     PCP: Prasanna Gaxiola MD     Past Medical History:  Past Medical History:   Diagnosis Date    Allergy     Asthma, chronic 7/9/2013    Colon polyps     COPD (chronic obstructive pulmonary disease)     GERD (gastroesophageal reflux disease)     High cholesterol     Osteoarthritis of both knees 7/9/2013    Sleep apnea        Past Surgical History:  Past Surgical History:   Procedure Laterality Date    CHOLECYSTECTOMY      COLONOSCOPY N/A 10/23/2020    Procedure: COLONOSCOPY;  Surgeon: Sae Valentine MD;  Location: [facility];  Service: Endoscopy;  Laterality: N/A;    DIAGNOSTIC LAPAROSCOPY N/A 6/21/2018    Procedure: LAPAROSCOPY,  DIAGNOSTIC;  Surgeon: Casper Martinez MD;  Location: [facility] OR;  Service: General;  Laterality: N/A;    LUNG SURGERY Right     benign mass    TOTAL KNEE ARTHROPLASTY Left     UMBILICAL HERNIA REPAIR N/A 6/21/2018    Procedure: REPAIR, HERNIA, UMBILICAL, AGE 5 YEARS OR OLDER;  Surgeon: Casper Martinez MD;  Location: [facility] OR;  Service: General;  Laterality: N/A;         Family History:  Family History   Problem Relation Age of Onset    Hypertension Mother     Diabetes Mother     Cancer Mother         Breast Ca     Heart disease Father         CAD , CHF     Aneurysm Father     Colon polyps Father     Melanoma Neg Hx     Eczema Neg Hx     Lupus Neg Hx     Psoriasis Neg Hx        Social History:  Social History     Tobacco Use    Smoking status: Never    Smokeless tobacco: Never   Substance and Sexual Activity    Alcohol use: Never    Drug use: No    Sexual activity: Yes     Partners: Female        Review of Systems   Review of Systems   Constitutional:  Negative for diaphoresis and fever.   HENT:  Positive for sore throat.    Respiratory:  Positive for chest tightness, shortness of breath and wheezing (subsided earlier this week).    Cardiovascular:  Negative for chest pain.   Gastrointestinal:  Negative for blood in stool, nausea and vomiting.        (-) black tarry stool   Genitourinary:  Negative for dysuria.   Musculoskeletal:  Negative for back pain.   Neurological:  Positive for light-headedness and headaches.      Physical Exam     Initial Vitals [03/31/24 1639]   BP Pulse Resp Temp SpO2   125/78 (!) 130 (!) 22 97.9 °F (36.6 °C) (!) 94 %      MAP       --          Physical Exam    Nursing Notes and Vital Signs Reviewed.  Constitutional: Patient is in no acute distress. Well-developed and well-nourished.  Head: Atraumatic. Normocephalic.  Eyes: PERRL. EOM intact. Conjunctivae are not pale. No scleral icterus.  ENT: Mucous membranes are moist. Oropharynx is clear and symmetric.    Neck: Supple. Full ROM. No  lymphadenopathy.  Cardiovascular: Tachycardic. Atrial Flutter. No murmurs, rubs, or gallops. Distal pulses are 2+ and symmetric.  Pulmonary/Chest: No respiratory distress. Clear to auscultation bilaterally. No wheezing or rales.  Abdominal: Soft and non-distended.  There is no tenderness.  No rebound, guarding, or rigidity. Good bowel sounds.  Genitourinary: No CVA tenderness  Musculoskeletal: Moves all extremities. No obvious deformities. No edema. No calf tenderness.  Skin: Warm and dry.  Neurological:  Alert, awake, and appropriate.  Normal speech.  No acute focal neurological deficits are appreciated.  Psychiatric: Normal affect. Good eye contact. Appropriate in content.     ED Course   ED Procedures:  Critical Care    Date/Time: 3/31/2024 4:53 PM    Performed by: Giselle Wolf DO  Authorized by: Smita Cobos MD  Direct patient critical care time: 25 minutes  Ordering / reviewing critical care time: 5 minutes  Documentation critical care time: 7 minutes  Consulting other physicians critical care time: 5 minutes  Total critical care time (exclusive of procedural time) : 42 minutes  Critical care time was exclusive of separately billable procedures and treating other patients.  Critical care was necessary to treat or prevent imminent or life-threatening deterioration of the following conditions: cardiac failure.  Critical care was time spent personally by me on the following activities: blood draw for specimens, development of treatment plan with patient or surrogate, discussions with consultants, interpretation of cardiac output measurements, evaluation of patient's response to treatment, examination of patient, obtaining history from patient or surrogate, ordering and performing treatments and interventions, ordering and review of laboratory studies, ordering and review of radiographic studies, re-evaluation of patient's condition and pulse oximetry.          ED Vital Signs:  Vitals:    03/31/24 1639  03/31/24 1720 03/31/24 1802 03/31/24 1822   BP: 125/78 139/78 114/75 106/67   Pulse: (!) 130 (!) 128 (!) 126 (!) 124   Resp: (!) 22 20 20 19   Temp: 97.9 °F (36.6 °C)      TempSrc: Oral      SpO2: (!) 94% 97% 97% 96%   Weight: 125.8 kg (277 lb 5.4 oz)       03/31/24 1900 03/31/24 1925 03/31/24 2000 03/31/24 2030   BP: 137/70 126/68 129/84 120/77   Pulse: (!) 121 (!) 120 96 97   Resp: 18 20 15 (!) 22   Temp:       TempSrc:       SpO2: 95% 96% 97% 97%   Weight:           Abnormal Lab Results:  Labs Reviewed   CBC W/ AUTO DIFFERENTIAL - Abnormal; Notable for the following components:       Result Value    MCH 31.4 (*)     All other components within normal limits   B-TYPE NATRIURETIC PEPTIDE - Abnormal; Notable for the following components:     (*)     All other components within normal limits   COMPREHENSIVE METABOLIC PANEL - Abnormal; Notable for the following components:    CO2 19 (*)     Glucose 115 (*)     All other components within normal limits   TROPONIN I - Abnormal; Notable for the following components:    Troponin I 0.031 (*)     All other components within normal limits   TROPONIN I   PROTIME-INR   APTT   MAGNESIUM        All Lab Results:  Results for orders placed or performed during the hospital encounter of 03/31/24   CBC auto differential   Result Value Ref Range    WBC 9.30 3.90 - 12.70 K/uL    RBC 5.07 4.60 - 6.20 M/uL    Hemoglobin 15.9 14.0 - 18.0 g/dL    Hematocrit 44.2 40.0 - 54.0 %    MCV 87 82 - 98 fL    MCH 31.4 (H) 27.0 - 31.0 pg    MCHC 36.0 32.0 - 36.0 g/dL    RDW 13.8 11.5 - 14.5 %    Platelets 256 150 - 450 K/uL    MPV 9.4 9.2 - 12.9 fL    Immature Granulocytes 0.4 0.0 - 0.5 %    Gran # (ANC) 4.5 1.8 - 7.7 K/uL    Immature Grans (Abs) 0.04 0.00 - 0.04 K/uL    Lymph # 3.7 1.0 - 4.8 K/uL    Mono # 0.9 0.3 - 1.0 K/uL    Eos # 0.2 0.0 - 0.5 K/uL    Baso # 0.06 0.00 - 0.20 K/uL    nRBC 0 0 /100 WBC    Gran % 48.1 38.0 - 73.0 %    Lymph % 39.2 18.0 - 48.0 %    Mono % 9.7 4.0 - 15.0 %     Eosinophil % 2.0 0.0 - 8.0 %    Basophil % 0.6 0.0 - 1.9 %    Differential Method Automated    Troponin I #2   Result Value Ref Range    Troponin I 0.022 0.000 - 0.026 ng/mL   BNP   Result Value Ref Range     (H) 0 - 99 pg/mL   Protime-INR   Result Value Ref Range    Prothrombin Time 11.0 9.0 - 12.5 sec    INR 0.9 0.8 - 1.2   APTT   Result Value Ref Range    aPTT 26.3 21.0 - 32.0 sec   Comprehensive metabolic panel   Result Value Ref Range    Sodium 141 136 - 145 mmol/L    Potassium 3.6 3.5 - 5.1 mmol/L    Chloride 109 95 - 110 mmol/L    CO2 19 (L) 23 - 29 mmol/L    Glucose 115 (H) 70 - 110 mg/dL    BUN 16 8 - 23 mg/dL    Creatinine 0.9 0.5 - 1.4 mg/dL    Calcium 9.2 8.7 - 10.5 mg/dL    Total Protein 7.0 6.0 - 8.4 g/dL    Albumin 3.8 3.5 - 5.2 g/dL    Total Bilirubin 0.5 0.1 - 1.0 mg/dL    Alkaline Phosphatase 75 55 - 135 U/L    AST 20 10 - 40 U/L    ALT 21 10 - 44 U/L    eGFR >60 >60 mL/min/1.73 m^2    Anion Gap 13 8 - 16 mmol/L   Troponin I   Result Value Ref Range    Troponin I 0.031 (H) 0.000 - 0.026 ng/mL       The EKG was ordered, reviewed, and independently interpreted by the ED provider:  Interpretation time: 16:33  Rate: 131 BPM  Rhythm: Atrial flutter with 2:1 A-V conduction  Interpretation: Inferior infarct, age undetermined. ST & T wave abnormality, consider anterolateral ischemia. No STEMI.        Imaging Results:  Imaging Results              X-Ray Chest AP Portable (Final result)  Result time 03/31/24 17:12:21      Final result by Angel Gray MD (03/31/24 17:12:21)                   Impression:      No acute abnormality.      Electronically signed by: Angel Gray  Date:    03/31/2024  Time:    17:12               Narrative:    EXAMINATION:  XR CHEST AP PORTABLE    CLINICAL HISTORY:  Chest Pain;    TECHNIQUE:  Single frontal view of the chest was performed.    COMPARISON:  None    FINDINGS:  The lungs are clear, with normal appearance of pulmonary vasculature and no pleural effusion or  pneumothorax.    The cardiac silhouette is normal in size. The hilar and mediastinal contours are unremarkable.    Bones are intact.                        Wet Read by Giselle Wolf DO (03/31/24 17:09:33, [facility] - Emergency Dept[facility] Emergency Medicine)    No acute                                          The Emergency Provider reviewed the vital signs and test results, which are outlined above.     ED Discussion   ED Medication(s):  Medications   heparin 25,000 units in dextrose 5% 250 mL (100 units/mL) infusion LOW INTENSITY nomogram - [facility] (12 Units/kg/hr × 99.6 kg (Adjusted) Intravenous New Bag 3/31/24 2001)   heparin 25,000 units in dextrose 5% (100 units/ml) IV bolus from bag LOW INTENSITY nomogram - [facility] (has no administration in time range)   heparin 25,000 units in dextrose 5% (100 units/ml) IV bolus from bag LOW INTENSITY nomogram - [facility] (has no administration in time range)   diltiaZEM 125 mg in dextrose 5% 125 mL infusion (non-titrating) (15 mg/hr Intravenous New Bag 3/31/24 2003)   sodium chloride 0.9% bolus 500 mL 500 mL (500 mLs Intravenous New Bag 3/31/24 2008)   potassium bicarbonate disintegrating tablet 20 mEq (has no administration in time range)   diltiaZEM injection 10 mg (10 mg Intravenous Given 3/31/24 1720)   diltiaZEM injection 20 mg (20 mg Intravenous Given 3/31/24 1925)   heparin 25,000 units in dextrose 5% (100 units/ml) IV bolus from bag LOW INTENSITY nomogram - [facility] (5,970 Units Intravenous Bolus from Bag 3/31/24 2002)       ED Course as of 03/31/24 2048   Sun Mar 31, 2024   1904 BNP(!): 193 [LB]   1904 Troponin I(!): 0.031 [LB]   2040 Secure chat per Sumaya Gonzales NP:  Observation, tele. [LB]      ED Course User Index  [LB] Giselle Wolf DO       7:58 PM: Discussed pt's case with Sumaya Horta NP (Hospital Medicine) who states they will come see pt soon for admission.    8:03 PM: Discussed pt's case with Dr. Saba (Cardiology) who recommends fluids, trend troponin and   echo.     8:36 PM: Discussed case with Sumaya Horta NP ([facility]). Dr. Cobos agrees with current care and management of pt and accepts admission.   Admitting Service: Hospital Medicine  Admitting Physician: Dr. Cobos  Admit to: Obs Tele    8:37 PM: Re-evaluated pt. I have discussed test results, shared treatment plan, and the need for admission with patient and family at bedside. Pt and family express understanding at this time and agree with all information. All questions answered. Pt and family have no further questions or concerns at this time. Pt is ready for admit.       MIPS Measures     Smoker? No     Hypertension: None     Medical Decision Making                 Medical Decision Making  Differential Diagnosis: Arrhythmia, ACS, Pneumonia,     Labs:   Glucose 115,  Trop 0.031, WBC nl, h/h nl.   EKG:  Atrial flutter with rapid ventricular rate  Imaging:   No pneumothorax.    ED course:   Diltiazem bolus, Ditliazem drip, aspirin and heparin drip.        Amount and/or Complexity of Data Reviewed  Labs: ordered. Decision-making details documented in ED Course.  Radiology: ordered and independent interpretation performed. Decision-making details documented in ED Course.  ECG/medicine tests: ordered and independent interpretation performed. Decision-making details documented in ED Course.  Discussion of management or test interpretation with external provider(s): Secure chat:    Hospital medicine observation floor.      Risk  Prescription drug management.  Drug therapy requiring intensive monitoring for toxicity.  Decision regarding hospitalization.        Coding    Prescription Management: I performed a review of the patient's current Rx medication list as input by nursing staff.    Patient's Medications   New Prescriptions    No medications on file   Previous Medications    ALBUTEROL (PROVENTIL) 2.5 MG /3 ML (0.083 %) NEBULIZER SOLUTION    Take 3 mLs (2.5 mg total) by nebulization every 4 to 6  "hours as needed for Wheezing or Shortness of Breath.    ALBUTEROL (PROVENTIL/VENTOLIN HFA) 90 MCG/ACTUATION INHALER    Inhale 2 puffs into the lungs every 4 (four) hours as needed for Wheezing or Shortness of Breath.    FLUTICASONE PROPIONATE (FLONASE) 50 MCG/ACTUATION NASAL SPRAY    2 sprays (100 mcg total) by Each Nostril route once daily.    FLUTICASONE-UMECLIDIN-VILANTER (TRELEGY ELLIPTA) 200-62.5-25 MCG INHALER    Inhale 1 puff into the lungs once daily. Wash out mouth after use    TADALAFIL (CIALIS) 20 MG TAB    TAKE 1 TABLET DAILY AS NEEDED   Modified Medications    No medications on file   Discontinued Medications    AZELAIC ACID (AZELEX) 15 % GEL    Apply 1 application topically 2 (two) times a day.    CETIRIZINE (ZYRTEC) 10 MG TABLET    Take 10 mg by mouth once daily.    CYCLOBENZAPRINE (FLEXERIL) 5 MG TABLET    1 Tablet Oral Three times a day as needed.    DICLOFENAC 1.3 % PT12    Apply 1 patch topically every 12 (twelve) hours as needed.    DOXYCYCLINE (MONODOX) 100 MG CAPSULE    Take 1 capsule (100 mg total) by mouth every 12 (twelve) hours.    GLUCOSAMINE HCL/CHONDR ALFONSO A NA (OSTEO BI-FLEX ORAL)    Take 1 tablet by mouth once daily.     METRONIDAZOLE (METROGEL) 0.75 % GEL    Apply topically 2 (two) times daily.    MULTIVITAMIN (THERAGRAN) PER TABLET    1 Tablet Oral Every day    PREDNISONE (DELTASONE) 20 MG TABLET    Prednisone 60 mg/ day for 3 days, 40 mg/day for 3 days,20 mg/ day for 3 days, (1/2 tablet )10 mg a day for 3 days.    SEMAGLUTIDE, WEIGHT LOSS, (WEGOVY) 0.25 MG/0.5 ML PNIJ    Inject 0.5 mg into the skin once a week.        Discussed case with:Cardiology and Hospital Medicine    Portions of this note may have been created with voice recognition software. Occasional "wrong-word" or "sound-a-like" substitutions may have occurred due to the inherent limitations of voice recognition software. Please, read the note carefully and recognize, using context, where substitutions have occurred.   "        Clinical Impression       ICD-10-CM ICD-9-CM   1. Atrial flutter with rapid ventricular response  I48.92 427.32   2. Chest pain  R07.9 786.50        Disposition        Disposition: Admit to telemetry  Patient condition: Stable        Scribe Attestation:   Scribe #1: I performed the above scribed service and the documentation accurately describes the services I performed. I attest to the accuracy of the note.      Attending:   Physician Attestation Statement for Scribe #1: I, Giselle Wolf DO, personally performed the services described in this documentation, as scribed by Salvatore Nava, in my presence, and it is both accurate and complete.       Giselle Wolf DO  03/31/24 2048

## 2024-03-31 NOTE — Clinical Note
Diagnosis: Atrial flutter with rapid ventricular response [302567]   Future Attending Provider: ISAC ALONZO [[id number]]

## 2024-03-31 NOTE — FIRST PROVIDER EVALUATION
Medical screening examination initiated.  I have conducted a focused provider triage encounter, findings are as follows:    Brief history of present illness:  chest tightness since 1430 today    There were no vitals filed for this visit.    Pertinent physical exam:  nad, alert    Brief workup plan:  cardiac    Preliminary workup initiated; this workup will be continued and followed by the physician or advanced practice provider that is assigned to the patient when roomed.

## 2024-04-01 ENCOUNTER — ANESTHESIA (OUTPATIENT)
Dept: CARDIOLOGY | Facility: HOSPITAL | Age: 69
DRG: 310 | End: 2024-04-01
Payer: MEDICARE

## 2024-04-01 ENCOUNTER — ANESTHESIA EVENT (OUTPATIENT)
Dept: CARDIOLOGY | Facility: HOSPITAL | Age: 69
DRG: 310 | End: 2024-04-01
Payer: MEDICARE

## 2024-04-01 DIAGNOSIS — J44.89 ASTHMA WITH COPD: Primary | ICD-10-CM

## 2024-04-01 LAB
ALBUMIN SERPL BCP-MCNC: 3.5 G/DL (ref 3.5–5.2)
ALP SERPL-CCNC: 71 U/L (ref 55–135)
ALT SERPL W/O P-5'-P-CCNC: 20 U/L (ref 10–44)
ANION GAP SERPL CALC-SCNC: 11 MMOL/L (ref 8–16)
AORTIC ROOT ANNULUS: 3.44 CM
APTT PPP: 37.9 SEC (ref 21–32)
APTT PPP: 45.8 SEC (ref 21–32)
APTT PPP: 47 SEC (ref 21–32)
ASCENDING AORTA: 3.27 CM
AST SERPL-CCNC: 18 U/L (ref 10–40)
AV INDEX (PROSTH): 0.81
AV MEAN GRADIENT: 2 MMHG
AV PEAK GRADIENT: 3 MMHG
AV VALVE AREA BY VELOCITY RATIO: 3.15 CM²
AV VALVE AREA: 3.06 CM²
AV VELOCITY RATIO: 0.84
BASOPHILS # BLD AUTO: 0.06 K/UL (ref 0–0.2)
BASOPHILS NFR BLD: 0.7 % (ref 0–1.9)
BILIRUB SERPL-MCNC: 0.9 MG/DL (ref 0.1–1)
BSA FOR ECHO PROCEDURE: 2.55 M2
BSA FOR ECHO PROCEDURE: 2.55 M2
BUN SERPL-MCNC: 12 MG/DL (ref 8–23)
CALCIUM SERPL-MCNC: 8.8 MG/DL (ref 8.7–10.5)
CHLORIDE SERPL-SCNC: 107 MMOL/L (ref 95–110)
CHOLEST SERPL-MCNC: 171 MG/DL (ref 120–199)
CHOLEST/HDLC SERPL: 3.9 {RATIO} (ref 2–5)
CO2 SERPL-SCNC: 23 MMOL/L (ref 23–29)
CREAT SERPL-MCNC: 0.8 MG/DL (ref 0.5–1.4)
CV ECHO LV RWT: 0.5 CM
DIFFERENTIAL METHOD BLD: NORMAL
DOP CALC AO PEAK VEL: 0.92 M/S
DOP CALC AO VTI: 19.3 CM
DOP CALC LVOT AREA: 3.8 CM2
DOP CALC LVOT DIAMETER: 2.19 CM
DOP CALC LVOT PEAK VEL: 0.77 M/S
DOP CALC LVOT STROKE VOLUME: 59.11 CM3
DOP CALC RVOT PEAK VEL: 0.73 M/S
DOP CALC RVOT VTI: 9.6 CM
DOP CALCLVOT PEAK VEL VTI: 15.7 CM
ECHO LV POSTERIOR WALL: 0.89 CM (ref 0.6–1.1)
EOSINOPHIL # BLD AUTO: 0.3 K/UL (ref 0–0.5)
EOSINOPHIL NFR BLD: 2.9 % (ref 0–8)
ERYTHROCYTE [DISTWIDTH] IN BLOOD BY AUTOMATED COUNT: 13.9 % (ref 11.5–14.5)
EST. GFR  (NO RACE VARIABLE): >60 ML/MIN/1.73 M^2
FRACTIONAL SHORTENING: 34 % (ref 28–44)
GLUCOSE SERPL-MCNC: 119 MG/DL (ref 70–110)
HCT VFR BLD AUTO: 43.8 % (ref 40–54)
HDLC SERPL-MCNC: 44 MG/DL (ref 40–75)
HDLC SERPL: 25.7 % (ref 20–50)
HGB BLD-MCNC: 15.5 G/DL (ref 14–18)
IMM GRANULOCYTES # BLD AUTO: 0.04 K/UL (ref 0–0.04)
IMM GRANULOCYTES NFR BLD AUTO: 0.5 % (ref 0–0.5)
INTERVENTRICULAR SEPTUM: 1.18 CM (ref 0.6–1.1)
IVRT: 95.15 MSEC
LA MAJOR: 4.12 CM
LA MINOR: 5.14 CM
LA WIDTH: 4.1 CM
LDLC SERPL CALC-MCNC: 108 MG/DL (ref 63–159)
LEFT ATRIUM SIZE: 3.29 CM
LEFT ATRIUM VOLUME INDEX: 21.1 ML/M2
LEFT ATRIUM VOLUME: 52.44 CM3
LEFT INTERNAL DIMENSION IN SYSTOLE: 2.34 CM (ref 2.1–4)
LEFT VENTRICLE DIASTOLIC VOLUME INDEX: 21.29 ML/M2
LEFT VENTRICLE DIASTOLIC VOLUME: 52.79 ML
LEFT VENTRICLE MASS INDEX: 45 G/M2
LEFT VENTRICLE SYSTOLIC VOLUME INDEX: 7.6 ML/M2
LEFT VENTRICLE SYSTOLIC VOLUME: 18.89 ML
LEFT VENTRICULAR INTERNAL DIMENSION IN DIASTOLE: 3.55 CM (ref 3.5–6)
LEFT VENTRICULAR MASS: 111.06 G
LVOT MG: 1.37 MMHG
LVOT MV: 0.56 CM/S
LYMPHOCYTES # BLD AUTO: 3.8 K/UL (ref 1–4.8)
LYMPHOCYTES NFR BLD: 44 % (ref 18–48)
MCH RBC QN AUTO: 30.9 PG (ref 27–31)
MCHC RBC AUTO-ENTMCNC: 35.4 G/DL (ref 32–36)
MCV RBC AUTO: 87 FL (ref 82–98)
MONOCYTES # BLD AUTO: 0.7 K/UL (ref 0.3–1)
MONOCYTES NFR BLD: 8.2 % (ref 4–15)
NEUTROPHILS # BLD AUTO: 3.7 K/UL (ref 1.8–7.7)
NEUTROPHILS NFR BLD: 43.7 % (ref 38–73)
NONHDLC SERPL-MCNC: 127 MG/DL
NRBC BLD-RTO: 0 /100 WBC
OHS QRS DURATION: 76 MS
OHS QRS DURATION: 76 MS
OHS QRS DURATION: 78 MS
OHS QTC CALCULATION: 431 MS
OHS QTC CALCULATION: 454 MS
OHS QTC CALCULATION: 504 MS
PLATELET # BLD AUTO: 224 K/UL (ref 150–450)
PMV BLD AUTO: 9.9 FL (ref 9.2–12.9)
POTASSIUM SERPL-SCNC: 3.5 MMOL/L (ref 3.5–5.1)
PROT SERPL-MCNC: 6 G/DL (ref 6–8.4)
PV MEAN GRADIENT: 1 MMHG
PV PEAK GRADIENT: 3 MMHG
PV PEAK VELOCITY: 0.92 M/S
RA MAJOR: 4.83 CM
RA PRESSURE ESTIMATED: 3 MMHG
RA PRESSURE ESTIMATED: 3 MMHG
RA WIDTH: 4.4 CM
RBC # BLD AUTO: 5.02 M/UL (ref 4.6–6.2)
RIGHT VENTRICULAR END-DIASTOLIC DIMENSION: 4.02 CM
SODIUM SERPL-SCNC: 141 MMOL/L (ref 136–145)
STJ: 3.43 CM
TRICUSPID ANNULAR PLANE SYSTOLIC EXCURSION: 1.98 CM
TRIGL SERPL-MCNC: 95 MG/DL (ref 30–150)
TROPONIN I SERPL DL<=0.01 NG/ML-MCNC: 0.03 NG/ML (ref 0–0.03)
TROPONIN I SERPL DL<=0.01 NG/ML-MCNC: <0.006 NG/ML (ref 0–0.03)
WBC # BLD AUTO: 8.55 K/UL (ref 3.9–12.7)
Z-SCORE OF LEFT VENTRICULAR DIMENSION IN END DIASTOLE: -13.15
Z-SCORE OF LEFT VENTRICULAR DIMENSION IN END SYSTOLE: -9.58

## 2024-04-01 PROCEDURE — 25000003 PHARM REV CODE 250: Performed by: HOSPITALIST

## 2024-04-01 PROCEDURE — A4216 STERILE WATER/SALINE, 10 ML: HCPCS | Performed by: NURSE PRACTITIONER

## 2024-04-01 PROCEDURE — 85730 THROMBOPLASTIN TIME PARTIAL: CPT | Mod: 91 | Performed by: HOSPITALIST

## 2024-04-01 PROCEDURE — 85025 COMPLETE CBC W/AUTO DIFF WBC: CPT | Performed by: NURSE PRACTITIONER

## 2024-04-01 PROCEDURE — 21400001 HC TELEMETRY ROOM

## 2024-04-01 PROCEDURE — 25000003 PHARM REV CODE 250: Performed by: NURSE PRACTITIONER

## 2024-04-01 PROCEDURE — 36415 COLL VENOUS BLD VENIPUNCTURE: CPT | Mod: XB | Performed by: HOSPITALIST

## 2024-04-01 PROCEDURE — 25000242 PHARM REV CODE 250 ALT 637 W/ HCPCS: Performed by: INTERNAL MEDICINE

## 2024-04-01 PROCEDURE — 80061 LIPID PANEL: CPT | Performed by: NURSE PRACTITIONER

## 2024-04-01 PROCEDURE — 36415 COLL VENOUS BLD VENIPUNCTURE: CPT | Performed by: INTERNAL MEDICINE

## 2024-04-01 PROCEDURE — 84484 ASSAY OF TROPONIN QUANT: CPT | Mod: 91 | Performed by: NURSE PRACTITIONER

## 2024-04-01 PROCEDURE — 5A2204Z RESTORATION OF CARDIAC RHYTHM, SINGLE: ICD-10-PCS | Performed by: INTERNAL MEDICINE

## 2024-04-01 PROCEDURE — 80053 COMPREHEN METABOLIC PANEL: CPT | Performed by: NURSE PRACTITIONER

## 2024-04-01 PROCEDURE — 94640 AIRWAY INHALATION TREATMENT: CPT

## 2024-04-01 PROCEDURE — 99900035 HC TECH TIME PER 15 MIN (STAT)

## 2024-04-01 PROCEDURE — 96366 THER/PROPH/DIAG IV INF ADDON: CPT

## 2024-04-01 PROCEDURE — 63600175 PHARM REV CODE 636 W HCPCS: Performed by: NURSE ANESTHETIST, CERTIFIED REGISTERED

## 2024-04-01 PROCEDURE — 25000003 PHARM REV CODE 250: Performed by: INTERNAL MEDICINE

## 2024-04-01 PROCEDURE — 92960 CARDIOVERSION ELECTRIC EXT: CPT | Performed by: INTERNAL MEDICINE

## 2024-04-01 PROCEDURE — 99203 OFFICE O/P NEW LOW 30 MIN: CPT | Mod: 25,,, | Performed by: INTERNAL MEDICINE

## 2024-04-01 PROCEDURE — 94761 N-INVAS EAR/PLS OXIMETRY MLT: CPT

## 2024-04-01 PROCEDURE — 25000003 PHARM REV CODE 250: Performed by: NURSE ANESTHETIST, CERTIFIED REGISTERED

## 2024-04-01 PROCEDURE — 85730 THROMBOPLASTIN TIME PARTIAL: CPT | Mod: 91 | Performed by: INTERNAL MEDICINE

## 2024-04-01 PROCEDURE — 25000003 PHARM REV CODE 250: Performed by: EMERGENCY MEDICINE

## 2024-04-01 PROCEDURE — 96368 THER/DIAG CONCURRENT INF: CPT

## 2024-04-01 PROCEDURE — 84484 ASSAY OF TROPONIN QUANT: CPT | Performed by: NURSE PRACTITIONER

## 2024-04-01 PROCEDURE — 37000008 HC ANESTHESIA 1ST 15 MINUTES: Performed by: INTERNAL MEDICINE

## 2024-04-01 PROCEDURE — 63600175 PHARM REV CODE 636 W HCPCS: Performed by: EMERGENCY MEDICINE

## 2024-04-01 PROCEDURE — 25000242 PHARM REV CODE 250 ALT 637 W/ HCPCS: Performed by: NURSE PRACTITIONER

## 2024-04-01 RX ORDER — PROPOFOL 10 MG/ML
VIAL (ML) INTRAVENOUS
Status: DISCONTINUED | OUTPATIENT
Start: 2024-04-01 | End: 2024-04-01

## 2024-04-01 RX ORDER — METOPROLOL SUCCINATE 25 MG/1
25 TABLET, EXTENDED RELEASE ORAL DAILY
Status: DISCONTINUED | OUTPATIENT
Start: 2024-04-01 | End: 2024-04-02 | Stop reason: HOSPADM

## 2024-04-01 RX ORDER — LIDOCAINE HYDROCHLORIDE 20 MG/ML
INJECTION INTRAVENOUS
Status: DISCONTINUED | OUTPATIENT
Start: 2024-04-01 | End: 2024-04-01

## 2024-04-01 RX ORDER — AMIODARONE HYDROCHLORIDE 200 MG/1
200 TABLET ORAL 2 TIMES DAILY
Status: DISCONTINUED | OUTPATIENT
Start: 2024-04-01 | End: 2024-04-02 | Stop reason: HOSPADM

## 2024-04-01 RX ADMIN — Medication 15 MG/HR: at 02:04

## 2024-04-01 RX ADMIN — AMIODARONE HYDROCHLORIDE 200 MG: 200 TABLET ORAL at 08:04

## 2024-04-01 RX ADMIN — Medication 3 ML: at 03:04

## 2024-04-01 RX ADMIN — Medication 6 MG: at 08:04

## 2024-04-01 RX ADMIN — APIXABAN 5 MG: 2.5 TABLET, FILM COATED ORAL at 08:04

## 2024-04-01 RX ADMIN — LEVALBUTEROL HYDROCHLORIDE 1.25 MG: 0.63 SOLUTION RESPIRATORY (INHALATION) at 12:04

## 2024-04-01 RX ADMIN — IPRATROPIUM BROMIDE 0.5 MG: 0.5 SOLUTION RESPIRATORY (INHALATION) at 12:04

## 2024-04-01 RX ADMIN — Medication 15 MG/HR: at 09:04

## 2024-04-01 RX ADMIN — FLUTICASONE PROPIONATE 100 MCG: 50 SPRAY, METERED NASAL at 08:04

## 2024-04-01 RX ADMIN — METOPROLOL SUCCINATE 25 MG: 25 TABLET, EXTENDED RELEASE ORAL at 06:04

## 2024-04-01 RX ADMIN — IPRATROPIUM BROMIDE 0.5 MG: 0.5 SOLUTION RESPIRATORY (INHALATION) at 07:04

## 2024-04-01 RX ADMIN — ARFORMOTEROL TARTRATE 15 MCG: 15 SOLUTION RESPIRATORY (INHALATION) at 07:04

## 2024-04-01 RX ADMIN — Medication 3 ML: at 09:04

## 2024-04-01 RX ADMIN — LEVALBUTEROL HYDROCHLORIDE 1.25 MG: 0.63 SOLUTION RESPIRATORY (INHALATION) at 07:04

## 2024-04-01 RX ADMIN — PROPOFOL 100 MG: 10 INJECTION, EMULSION INTRAVENOUS at 04:04

## 2024-04-01 RX ADMIN — BUDESONIDE 0.5 MG: 0.5 INHALANT ORAL at 07:04

## 2024-04-01 RX ADMIN — LEVALBUTEROL HYDROCHLORIDE 1.25 MG: 0.63 SOLUTION RESPIRATORY (INHALATION) at 01:04

## 2024-04-01 RX ADMIN — LIDOCAINE HYDROCHLORIDE 100 MG: 20 INJECTION INTRAVENOUS at 04:04

## 2024-04-01 RX ADMIN — SODIUM CHLORIDE: 9 INJECTION, SOLUTION INTRAVENOUS at 04:04

## 2024-04-01 RX ADMIN — Medication 3 ML: at 06:04

## 2024-04-01 RX ADMIN — PROPOFOL 20 MG: 10 INJECTION, EMULSION INTRAVENOUS at 04:04

## 2024-04-01 RX ADMIN — HEPARIN SODIUM 14 UNITS/KG/HR: 10000 INJECTION, SOLUTION INTRAVENOUS at 11:04

## 2024-04-01 RX ADMIN — IPRATROPIUM BROMIDE 0.5 MG: 0.5 SOLUTION RESPIRATORY (INHALATION) at 01:04

## 2024-04-01 NOTE — ANESTHESIA PREPROCEDURE EVALUATION
04/01/2024  Yung Mcconnell is a 68 y.o., male.      Pre-op Assessment    I have reviewed the Patient Summary Reports.     I have reviewed the Nursing Notes. I have reviewed the NPO Status.   I have reviewed the Medications.     Review of Systems  Anesthesia Hx:  No problems with previous Anesthesia             Denies Family Hx of Anesthesia complications.    Denies Personal Hx of Anesthesia complications.                    Social:  Non-Smoker, No Alcohol Use       Hematology/Oncology:  Hematology Normal   Oncology Normal                                   EENT/Dental:  EENT/Dental Normal           Cardiovascular:      Denies Hypertension.   Denies MI.     Denies CABG/stent. Dysrhythmias    Denies CHF.    hyperlipidemia   ECG has been reviewed. EKG 3/2024:  Atrial flutter with 2:1 A-V conduction 131  Inferior infarct ,age undetermined   ST and T wave abnormality, consider anterolateral ischemia   Abnormal ECG   When compared with ECG of 30-NOV-2020 09:02,   Significant changes have occurred     Echo 4/2024:  ·  Left Ventricle: There is normal systolic function with a visually estimated ejection fraction of 60 - 65%. Unable to assess diastolic function due to atrial flutter  ·  Right Ventricle: Normal right ventricular cavity size. Wall thickness is normal. Right ventricle wall motion  is normal. Systolic function is normal.  ·  IVC/SVC: Normal venous pressure at 3 mmHg.                             Pulmonary:   COPD Asthma    Sleep Apnea                Renal/:  Renal/ Normal                 Hepatic/GI:     GERD Denies Liver Disease.  Denies Hepatitis.           Musculoskeletal:  Arthritis   Cervical spondylosis    Lumbar radiculopathy            Neurological:    Denies CVA.    Denies Seizures.                                Endocrine:  Denies Diabetes. Denies Hypothyroidism.  Denies Hyperthyroidism.        Obesity / BMI > 30      Physical Exam    Airway:  Mallampati: II   Mouth Opening: Normal    Dental:  Intact    Chest/Lungs:  Normal Respiratory Rate, Clear to auscultation    Heart:  Rate: Tachycardia  Rhythm: Irregularly Irregular    Anesthesia Plan  Type of Anesthesia, risks & benefits discussed:    Anesthesia Type: MAC  Intra-op Monitoring Plan: Standard ASA Monitors  Induction:  IV  Informed Consent: Informed consent signed with the Patient and all parties understand the risks and agree with anesthesia plan.  All questions answered.   ASA Score: 3  Day of Surgery Review of History & Physical: H&P Update referred to the surgeon/provider.    Ready For Surgery From Anesthesia Perspective.   .

## 2024-04-01 NOTE — ANESTHESIA POSTPROCEDURE EVALUATION
Anesthesia Post Evaluation    Patient: Yung Mcconnell    Procedure(s) Performed: Procedure(s) (LRB):  Transesophageal echo (CHRIS) intra-procedure log documentation (N/A)    Final Anesthesia Type: MAC      [facility]  Patient participation: Yes- Able to Participate  Level of consciousness: awake  Post-procedure vital signs: reviewed and stable  Pain management: adequate  Airway patency: patent    PONV status at discharge: No PONV  Anesthetic complications: no      Cardiovascular status: blood pressure returned to baseline and hemodynamically stable  Respiratory status: unassisted, spontaneous ventilation and nasal cannula  Hydration status: euvolemic  Follow-up not needed.          Vitals Value Taken Time   /66 04/01/24 1524   Temp 36.7 °C (98 °F) 04/01/24 1524   Pulse 67 04/01/24 1620   Resp 14 04/01/24 1620   SpO2 93 % 04/01/24 1620   Vitals shown include unvalidated device data.      No case tracking events are documented in the log.      Pain/Jim Score: No data recorded

## 2024-04-01 NOTE — PLAN OF CARE
[facility] - Telemetry (Hospital)  Initial Discharge Assessment       Primary Care Provider: Prasanna Gaxiola MD    Admission Diagnosis: Atrial flutter with rapid ventricular response [I48.92]  Chest pain [R07.9]    Admission Date: 3/31/2024  Expected Discharge Date:     Transition of Care Barriers: Unable to afford medication    Payor: [employer] MANAGED MEDICARE / Plan: [employer] MEDICARE PLAN PPO / Product Type: Medicare Advantage /     Extended Emergency Contact Information  Primary Emergency Contact: Carmela Mcconnell  Address: [id number] [place] [address] .           [place], [place] [address] [place] [address]  Home Phone: [phone]  Mobile Phone: [phone]  Relation: Spouse    Discharge Plan A: Home with family         [id number] - [place], [place] - [address] [address] AT [facility] [address] [place] [address] [place] [address] [address] [address] [address]  Phone: [phone] Fax: [phone]    [facility] - [place] [address] [facility]  [address] [facility] [address]  Phone: [phone] Fax: [phone]    [facility] - [place], [place] - [address]  [address]  [facility] [address]  Phone: [phone] Fax: [phone]    [facility] - [facility] [place] [address] [address]  [address] [address] [address]  Phone: [phone] Fax: [phone]      Initial Assessment (most recent)       Adult Discharge Assessment - 04/01/24 1150          Discharge Assessment    Assessment Type Discharge Planning Assessment     Confirmed/corrected address, phone number and insurance Yes     Confirmed Demographics Correct on Facesheet     Source of Information patient;family     When was your last doctors appointment? 12/08/23   Prasanna Gaxiola MD - Family Medicine    Communicated MIKALA with patient/caregiver Date not available/Unable to determine     Reason For Admission Atrial flutter with rapid ventricular response      People in Home spouse     Facility Arrived From: home     Do you expect to return to your current living situation? Yes     Do you have help at home or someone to help you manage your care at home? Yes     Who are your caregiver(s) and their phone number(s)? Carmela Mcconnell - spouse     Prior to hospitilization cognitive status: Alert/Oriented     Current cognitive status: Alert/Oriented     Walking or Climbing Stairs Difficulty no     Dressing/Bathing Difficulty no     Home Accessibility wheelchair accessible     Equipment Currently Used at Home CPAP;nebulizer     Readmission within 30 days? No     Patient currently being followed by outpatient case management? No     Do you currently have service(s) that help you manage your care at home? No     Do you take prescription medications? Yes     Do you have prescription coverage? Yes     Coverage [employer] Managed Medicare     Do you have any problems affording any of your prescribed medications? Yes     If yes, what medications? Trelegy Ellipta and Cialis     Is the patient taking medications as prescribed? no     If no, which medications is patient not taking? Trelegy Ellipta - Patient cutting back dosing, to help the medication last longer and Cialis - Patient unable to afford     Who is going to help you get home at discharge? Carmela Mcconnell - spouse     How do you get to doctors appointments? car, drives self     Are you on dialysis? No     Do you take coumadin? No     Discharge Plan A Home with family     DME Needed Upon Discharge  none     Discharge Plan discussed with: Patient;Spouse/sig other     Name(s) and Number(s) Carmela Mcconnell     Transition of Care Barriers Unable to afford medication        Housing Stability    In the last 12 months, was there a time when you were not able to pay the mortgage or rent on time? No     In the last 12 months, was there a time when you did not have a steady place to sleep or slept in a shelter (including now)? No        Transportation  Needs    In the past 12 months, has lack of transportation kept you from medical appointments or from getting medications? No     In the past 12 months, has lack of transportation kept you from meetings, work, or from getting things needed for daily living? No        Food Insecurity    Within the past 12 months, you worried that your food would run out before you got the money to buy more. Never true     Within the past 12 months, the food you bought just didn't last and you didn't have money to get more. Never true                   Anticipated DC dispo: home with family  Prior Level of Function: independent with ADLs  People in home: Carmela Mcconnell - spouse     Comments:  SW met with patient and spouse at bedside to introduce role and discuss discharge planning. Patient's spouse will be help at home and can provide transport at time of discharge. Confirmed demographics, insurance, and emergency contacts. Patient and spouse verbalized issues affording medications: Trelegy Ellipta and Cialis. SW updated Patient's whiteboard with contact information and anticipated DC plan. CM discharge needs depends on hospital progress. SW will continue following to assist with other needs.

## 2024-04-01 NOTE — HPI
Patient is a 68-year-old male with past medical history of asthma, COPD, GERD, arthritis, obstructive sleep apnea on CPAP, hyperlipidemia with intolerance to statins, and seasonal allergies who presented to ED with complaints of rapid heart rate noted on apple watch, chest tightness, and shortness of breath.  He reports that he has had some shortness of breath over the past week, however he gets short of breath from time to time with his asthma/COPD said this was not out of the ordinary.  He states that he has never been evaluated by a cardiologist in the past.  He also reported feeling some lightheadedness.  He denies syncope, headache, dizziness, ear pain/fullness, visual changes, abdominal pain, nausea, vomiting, diarrhea, constipation, dysuria, fever, chills.  He reports that the chest tightness that occurred has resolved and he has no pain at this time.  He does not feel short of breath at rest.  Blood pressure stable, heart rate up to 130s.  Lab workup shows troponin 0.031 followed by repeat troponin 0.022, .  EKG shows atrial flutter with 2-1 AV conduction, ST and T-wave abnormality.  He was given IV Cardizem and started on Cardizem drip which is currently at 15 milligrams/hour as well as heparin drip.  Cardiology was consulted per ED, also recommended IV fluids and echo in a.m. hospital Medicine was consulted for admission due to new onset atrial flutter with rapid ventricular rate.     a 68 y.o. male presenting to the ED for SOB over the past week with chest tightness and a rapid HB onsetting 2 hours pta. Pt has a hx of Afib and a collapsed lung from 3 years ago. Symptoms are constant and moderate in severity. No mitigating or exacerbating factors reported. Associated sxs include lightheadedness, a headache, sore throat, and wheezing which subsided earlier this week. Patient denies any nausea, vomiting, diaphoresis, bloody stool, black tarry stool, and all other sxs at this time. No further  complaints or concerns at this time.

## 2024-04-01 NOTE — BRIEF OP NOTE
[facility] - Cath Lab ([facility])  Brief Operative Note  Cardiology    SUMMARY     Surgery Date: 4/1/2024     Surgeon(s) and Role:     * Simran Corley MD - Primary    Assisting Surgeon: None    Pre-op Diagnosis:  * No pre-op diagnosis entered *    Post-op Diagnosis: Post-Op Diagnosis Codes:     * Atrial flutter with rapid ventricular response [I48.92]    Procedure Performed:     Procedure(s) (LRB):  Transesophageal echo (CHRIS) intra-procedure log documentation (N/A)    Technical Procedures Used:     Operative Findings:   Successful cardioversion.    Start Eliquis.    DC Cardizem drip.  Start Toprol p.o..    Start p.o. amiodarone.        Estimated Blood Loss: * No values recorded between 4/1/2024 12:00 AM and 4/1/2024  4:52 PM *         Specimens:   Specimen (24h ago, onward)      None

## 2024-04-01 NOTE — H&P
[facility] - Emergency Dept.  [facility] Medicine  History & Physical    Patient Name: Yung Mcconnell  MRN: [id number]  Patient Class: OP- Observation  Admission Date: 3/31/2024  Attending Physician: Smita Cobos MD   Primary Care Provider: Prasanna Gaxiola MD         Patient information was obtained from patient, spouse/SO, past medical records, and ER records.     Subjective:     Principal Problem:Atrial flutter with rapid ventricular response    Chief Complaint:   Chief Complaint   Patient presents with    Tachycardia    Chest Pain    Shortness of Breath     Pt c/o chest tightness and SOB with rapid HR. Denies n/v        HPI: Patient is a 68-year-old male with past medical history of asthma, COPD, GERD, arthritis, obstructive sleep apnea on CPAP, hyperlipidemia with intolerance to statins, and seasonal allergies who presented to ED with complaints of rapid heart rate noted on apple watch, chest tightness, and shortness of breath.  He reports that he has had some shortness of breath over the past week, however he gets short of breath from time to time with his asthma/COPD said this was not out of the ordinary.  He states that he has never been evaluated by a cardiologist in the past.  He also reported feeling some lightheadedness.  He denies syncope, headache, dizziness, ear pain/fullness, visual changes, abdominal pain, nausea, vomiting, diarrhea, constipation, dysuria, fever, chills.  He reports that the chest tightness that occurred has resolved and he has no pain at this time.  He does not feel short of breath at rest.  Blood pressure stable, heart rate up to 130s.  Lab workup shows troponin 0.031 followed by repeat troponin 0.022, .  EKG shows atrial flutter with 2-1 AV conduction, ST and T-wave abnormality.  He was given IV Cardizem and started on Cardizem drip which is currently at 15 milligrams/hour as well as heparin drip.  Cardiology was consulted per ED, also recommended IV fluids and echo in a.m.  hospital Medicine was consulted for admission due to new onset atrial flutter with rapid ventricular rate.     a 68 y.o. male presenting to the ED for SOB over the past week with chest tightness and a rapid HB onsetting 2 hours pta. Pt has a hx of Afib and a collapsed lung from 3 years ago. Symptoms are constant and moderate in severity. No mitigating or exacerbating factors reported. Associated sxs include lightheadedness, a headache, sore throat, and wheezing which subsided earlier this week. Patient denies any nausea, vomiting, diaphoresis, bloody stool, black tarry stool, and all other sxs at this time. No further complaints or concerns at this time.        Past Medical History:   Diagnosis Date    Allergy     Asthma, chronic 7/9/2013    Colon polyps     COPD (chronic obstructive pulmonary disease)     GERD (gastroesophageal reflux disease)     High cholesterol     Osteoarthritis of both knees 7/9/2013    Sleep apnea        Past Surgical History:   Procedure Laterality Date    CHOLECYSTECTOMY      COLONOSCOPY N/A 10/23/2020    Procedure: COLONOSCOPY;  Surgeon: Sae Valentine MD;  Location: [facility] ENDO;  Service: Endoscopy;  Laterality: N/A;    DIAGNOSTIC LAPAROSCOPY N/A 6/21/2018    Procedure: LAPAROSCOPY, DIAGNOSTIC;  Surgeon: Casper Martinez MD;  Location: [facility];  Service: General;  Laterality: N/A;    LUNG SURGERY Right     benign mass    TOTAL KNEE ARTHROPLASTY Left     UMBILICAL HERNIA REPAIR N/A 6/21/2018    Procedure: REPAIR, HERNIA, UMBILICAL, AGE 5 YEARS OR OLDER;  Surgeon: Casper Martinez MD;  Location: [facility];  Service: General;  Laterality: N/A;       Review of patient's allergies indicates:   Allergen Reactions    Zithromax [azithromycin] Palpitations    Aspirin Other (See Comments)     Other reaction(s): Difficulty breathing    Lipitor [atorvastatin] Other (See Comments)     Leg cramps/hand cramps       No current facility-administered medications on file prior to encounter.     Current  Outpatient Medications on File Prior to Encounter   Medication Sig    albuterol (PROVENTIL) 2.5 mg /3 mL (0.083 %) nebulizer solution Take 3 mLs (2.5 mg total) by nebulization every 4 to 6 hours as needed for Wheezing or Shortness of Breath.    albuterol (PROVENTIL/VENTOLIN HFA) 90 mcg/actuation inhaler Inhale 2 puffs into the lungs every 4 (four) hours as needed for Wheezing or Shortness of Breath.    fluticasone propionate (FLONASE) 50 mcg/actuation nasal spray 2 sprays (100 mcg total) by Each Nostril route once daily.    fluticasone-umeclidin-vilanter (TRELEGY ELLIPTA) 200-62.5-25 mcg inhaler Inhale 1 puff into the lungs once daily. Wash out mouth after use    tadalafiL (CIALIS) 20 MG Tab TAKE 1 TABLET DAILY AS NEEDED    [DISCONTINUED] azelaic acid (AZELEX) 15 % gel Apply 1 application topically 2 (two) times a day.    [DISCONTINUED] cetirizine (ZYRTEC) 10 MG tablet Take 10 mg by mouth once daily.    [DISCONTINUED] cyclobenzaprine (FLEXERIL) 5 MG tablet 1 Tablet Oral Three times a day as needed.    [DISCONTINUED] diclofenac 1.3 % PT12 Apply 1 patch topically every 12 (twelve) hours as needed.    [DISCONTINUED] doxycycline (MONODOX) 100 MG capsule Take 1 capsule (100 mg total) by mouth every 12 (twelve) hours.    [DISCONTINUED] GLUCOSAMINE HCL/CHONDR ALFONSO A NA (OSTEO BI-FLEX ORAL) Take 1 tablet by mouth once daily.     [DISCONTINUED] metroNIDAZOLE (METROGEL) 0.75 % gel Apply topically 2 (two) times daily.    [DISCONTINUED] multivitamin (THERAGRAN) per tablet 1 Tablet Oral Every day    [DISCONTINUED] predniSONE (DELTASONE) 20 MG tablet Prednisone 60 mg/ day for 3 days, 40 mg/day for 3 days,20 mg/ day for 3 days, (1/2 tablet )10 mg a day for 3 days.    [DISCONTINUED] semaglutide, weight loss, (WEGOVY) 0.25 mg/0.5 mL PnIj Inject 0.5 mg into the skin once a week.     Family History       Problem Relation (Age of Onset)    Aneurysm Father    Cancer Mother    Colon polyps Father    Diabetes Mother    Heart disease Father     Hypertension Mother          Tobacco Use    Smoking status: Never    Smokeless tobacco: Never   Substance and Sexual Activity    Alcohol use: Never    Drug use: No    Sexual activity: Yes     Partners: Female     Review of Systems   Constitutional: Negative.  Negative for chills, diaphoresis, fatigue and fever.   HENT: Negative.  Negative for postnasal drip and sore throat.    Eyes: Negative.  Negative for visual disturbance.   Respiratory:  Positive for chest tightness and shortness of breath. Negative for cough and wheezing.    Cardiovascular:  Positive for chest pain, palpitations and leg swelling.   Gastrointestinal: Negative.  Negative for abdominal distention, abdominal pain, constipation, diarrhea, nausea and vomiting.   Endocrine: Negative.    Genitourinary: Negative.    Musculoskeletal: Negative.    Skin: Negative.    Allergic/Immunologic: Positive for environmental allergies.   Neurological:  Positive for light-headedness and headaches. Negative for dizziness, seizures, syncope, facial asymmetry, speech difficulty, weakness and numbness.   Hematological: Negative.    Psychiatric/Behavioral: Negative.       Objective:     Vital Signs (Most Recent):  Temp: 97.9 °F (36.6 °C) (03/31/24 1639)  Pulse: 97 (03/31/24 2030)  Resp: (!) 22 (03/31/24 2030)  BP: 120/77 (03/31/24 2030)  SpO2: 97 % (03/31/24 2030) Vital Signs (24h Range):  Temp:  [97.9 °F (36.6 °C)] 97.9 °F (36.6 °C)  Pulse:  [] 97  Resp:  [15-22] 22  SpO2:  [94 %-97 %] 97 %  BP: (106-139)/(67-84) 120/77     Weight: 125.8 kg (277 lb 5.4 oz)  Body mass index is 35.61 kg/m².     Physical Exam  Vitals reviewed.   Constitutional:       General: He is not in acute distress.     Appearance: He is not ill-appearing, toxic-appearing or diaphoretic.   HENT:      Head: Normocephalic.      Nose: Nose normal.      Mouth/Throat:      Mouth: Mucous membranes are moist.      Pharynx: Oropharynx is clear.   Eyes:      Extraocular Movements: Extraocular movements  intact.      Pupils: Pupils are equal, round, and reactive to light.   Cardiovascular:      Rate and Rhythm: Tachycardia present. Rhythm irregular.      Pulses: Normal pulses.      Heart sounds: Normal heart sounds.   Pulmonary:      Effort: Pulmonary effort is normal. No respiratory distress.      Breath sounds: Normal breath sounds. No rales.      Comments: Mild expiratory wheezing  Chest:      Chest wall: No tenderness.   Abdominal:      General: Bowel sounds are normal. There is no distension.      Palpations: Abdomen is soft.      Tenderness: There is no abdominal tenderness. There is no guarding.   Musculoskeletal:         General: Normal range of motion.      Cervical back: Normal range of motion and neck supple.      Right lower leg: Edema present.      Left lower leg: Edema present.   Skin:     General: Skin is warm and dry.      Capillary Refill: Capillary refill takes less than 2 seconds.   Neurological:      General: No focal deficit present.      Mental Status: He is alert and oriented to person, place, and time.   Psychiatric:         Mood and Affect: Mood normal.         Behavior: Behavior normal.         Thought Content: Thought content normal.         Judgment: Judgment normal.              CRANIAL NERVES     CN III, IV, VI   Pupils are equal, round, and reactive to light.       Significant Labs: All pertinent labs within the past 24 hours have been reviewed.  Recent Lab Results         03/31/24  1953   03/31/24  1737   03/31/24  1718        Albumin   3.8         ALP   75         ALT   21         Anion Gap   13         PTT   26.3  Comment: Refer to local heparin nomogram for intensity/dose specific   therapeutic   range.           AST   20         Baso #     0.06       Basophil %     0.6       BILIRUBIN TOTAL   0.5  Comment: For infants and newborns, interpretation of results should be based  on gestational age, weight and in agreement with clinical  observations.    Premature Infant recommended  reference ranges:  Up to 24 hours.............<8.0 mg/dL  Up to 48 hours............<12.0 mg/dL  3-5 days..................<15.0 mg/dL  6-29 days.................<15.0 mg/dL           BNP     193  Comment: Values of less than 100 pg/ml are consistent with non-CHF populations.       BUN   16         Calcium   9.2         Chloride   109         CO2   19         Creatinine   0.9         Differential Method     Automated       eGFR   >60         Eos #     0.2       Eos %     2.0       Glucose   115         Gran # (ANC)     4.5       Gran %     48.1       Hematocrit     44.2       Hemoglobin     15.9       Immature Grans (Abs)     0.04  Comment: Mild elevation in immature granulocytes is non specific and   can be seen in a variety of conditions including stress response,   acute inflammation, trauma and pregnancy. Correlation with other   laboratory and clinical findings is essential.         Immature Granulocytes     0.4       INR   0.9  Comment: Coumadin Therapy:  2.0 - 3.0 for INR for all indicators except mechanical heart valves  and antiphospholipid syndromes which should use 2.5 - 3.5.           Lymph #     3.7       Lymph %     39.2       MCH     31.4       MCHC     36.0       MCV     87       Mono #     0.9       Mono %     9.7       MPV     9.4       nRBC     0       Platelet Count     256       Potassium   3.6         PROTEIN TOTAL   7.0         PT   11.0         RBC     5.07       RDW     13.8       Sodium   141         Troponin I 0.022  Comment: The reference interval for Troponin I represents the 99th percentile   cutoff   for our facility and is consistent with 3rd generation assay   performance.     0.031  Comment: The reference interval for Troponin I represents the 99th percentile   cutoff   for our facility and is consistent with 3rd generation assay   performance.           WBC     9.30             EKG reviewed, atrial flutter with 2-1 AV conduction, ST and T-wave abnormality    Bedside cardiac monitor  personally reviewed, atrial flutter heart rate fluctuating between 100-120    Significant Imaging: I have reviewed all pertinent imaging results/findings within the past 24 hours.    CXR reviewed, lungs clear, no acute abnormality  Assessment/Plan:     * Atrial flutter with rapid ventricular response  Cardiology consulted per ED  Continue Cardizem drip at 15 milligrams/hour  Hydrating with IV fluids  Heparin drip, monitor PTT  Replete potassium and magnesium if indicated (goal K > 4 and Mg > 2)  Cardiac monitoring  Echo in a.m.  Initial troponin I 0.031, followed by repeat troponin 0.022  No chest pain at this time      Statin intolerance  Check lipid panel in a.m.  Per previous notes, positive statin induced myopathy      Seasonal allergic rhinitis  Continue Flonase      Asthma with COPD  Do not feel that patient is having acute exacerbation at this time  Hold albuterol, use Xopenex nebulizer treatments instead  Continue Trelegy  Chest x-ray reviewed with no acute abnormality      KEVEN (obstructive sleep apnea)  Continue nightly CPAP        VTE Risk Mitigation (From admission, onward)           Ordered     Reason for No Pharmacological VTE Prophylaxis  Once        Comments: On Heparin drip   Question:  Reasons:  Answer:  Physician Provided (leave comment)    03/31/24 2053     IP VTE HIGH RISK PATIENT  Once         03/31/24 2053     Place sequential compression device  Until discontinued         03/31/24 2053     heparin 25,000 units in dextrose 5% (100 units/ml) IV bolus from bag LOW INTENSITY nomogram - [facility]  As needed (PRN)        Question:  Heparin Infusion Adjustment (DO NOT MODIFY ANSWER)  Answer:  [email or web address]\epic\Images\Pharmacy\HeparinInfusions\heparin LOW INTENSITY nomogram for [facility] [id number]    03/31/24 1905     heparin 25,000 units in dextrose 5% (100 units/ml) IV bolus from bag LOW INTENSITY nomogram - [facility]  As needed (PRN)        Question:  Heparin Infusion Adjustment (DO NOT MODIFY ANSWER)  Answer:   [email or web address]\epic\Images\Pharmacy\HeparinInfusions\heparin LOW INTENSITY nomogram for [facility] [id number]    03/31/24 1905     heparin 25,000 units in dextrose 5% 250 mL (100 units/mL) infusion LOW INTENSITY nomogram - [facility]  Continuous        Question:  Begin at (units/kg/hr)  Answer:  12 03/31/24 1905                         On 03/31/2024, patient should be placed in hospital observation services under my care in collaboration with Dr. Smita Cobos.           Sumaya Horta NP  Department of Hospital Medicine  [facility] - Emergency Dept.

## 2024-04-01 NOTE — ASSESSMENT & PLAN NOTE
Continue IV heparin gtt for CVA protection  Continue Cardizem gtt for rate control  Keep NPO for planned CHRIS/DCCV today per Dr Corley to restore NSR  Risks, benefits and treatment alternatives reviewed with patient  He agrees to proceed with procedure as planned  Will need eliquis for Cardio-embolic protection post CHRIS/DCCV.   Further recs to follow

## 2024-04-01 NOTE — SUBJECTIVE & OBJECTIVE
Review of Systems   All other systems reviewed and are negative.    Objective:     Vital Signs (Most Recent):  Temp: 98.4 °F (36.9 °C) (04/01/24 1620)  Pulse: 61 (04/01/24 1640)  Resp: 19 (04/01/24 1640)  BP: (!) 113/58 (04/01/24 1650)  SpO2: 95 % (04/01/24 1640) Vital Signs (24h Range):  Temp:  [97.4 °F (36.3 °C)-98.4 °F (36.9 °C)] 98.4 °F (36.9 °C)  Pulse:  [] 61  Resp:  [15-27] 19  SpO2:  [91 %-100 %] 95 %  BP: (100-137)/(55-84) 113/58     Weight: 124.3 kg (274 lb)  Body mass index is 35.18 kg/m².    Intake/Output Summary (Last 24 hours) at 4/1/2024 1735  Last data filed at 4/1/2024 1614  Gross per 24 hour   Intake 700 ml   Output 1400 ml   Net -700 ml         Physical Exam  Vitals and nursing note reviewed.   Constitutional:       General: He is not in acute distress.     Appearance: Normal appearance. He is obese. He is ill-appearing.   Cardiovascular:      Rate and Rhythm: Tachycardia present. Rhythm irregular.      Heart sounds: No murmur heard.  Pulmonary:      Effort: Pulmonary effort is normal. No respiratory distress.      Breath sounds: Normal breath sounds. No wheezing.   Abdominal:      General: There is no distension.      Palpations: Abdomen is soft.      Tenderness: There is no abdominal tenderness.   Neurological:      Mental Status: He is alert.             Significant Labs: All pertinent labs within the past 24 hours have been reviewed.  Recent Lab Results  (Last 5 results in the past 24 hours)        04/01/24  1625   04/01/24  1543   04/01/24  1542   04/01/24  1025   04/01/24  0803        Ao root annulus       3.44         Ascending aorta       3.27         Ao peak kinjal       0.92         Ao VTI       19.30         PTT         37.9  Comment: Refer to local heparin nomogram for intensity/dose specific   therapeutic   range.         AV valve area       3.06         PARISH by Velocity Ratio       3.15         AV mean gradient       2         AV index (prosthetic)       0.81         AV peak  gradient       3         AV Velocity Ratio       0.84         BSA 2.55       2.55         Left Ventricle Relative Wall Thickness       0.50         FS       34         IVRT       95.15         IVSd       1.18         LA WIDTH       4.1         Left Atrium Major Axis       4.12         Left Atrium Minor Axis       5.14         LA size       3.29         LA volume       52.44         LA vol index       21.1         LVOT area       3.8         LV EDV BP       52.79         LV Diastolic Volume Index       21.29         LVIDd       3.55         LVIDs       2.34         LV mass       111.06         LV Mass Index       45         Left Ventricular Outflow Tract Mean Gradient       1.37         Left Ventricular Outflow Tract Mean Velocity       0.56         LVOT diameter       2.19         LVOT peak kinjal       0.77         LVOT stroke volume       59.11         LVOT peak VTI       15.70         LV ESV BP       18.89         LV Systolic Volume Index       7.6         QRS Duration   76   78           [facility] QTC Calculation   431   504           PV mean gradient       1         PV peak gradient       3         PV PEAK VELOCITY       0.92         Posterior Wall       0.89         RA Major Axis       4.83         Est. RA pres 3       3         RA Width       4.4         RVDD       4.02         RVOT peak kinjal       0.73         RVOT peak VTI       9.6         STJ       3.43         TAPSE       1.98         ZLVIDD       -13.15         ZLVIDS       -9.58                                Significant Imaging: I have reviewed all pertinent imaging results/findings within the past 24 hours.    X-Ray Chest AP Portable   ED Interpretation   No acute        Final Result      No acute abnormality.         Electronically signed by: Angel Gray   Date:    03/31/2024   Time:    17:12

## 2024-04-01 NOTE — SUBJECTIVE & OBJECTIVE
Past Medical History:   Diagnosis Date    Allergy     Asthma, chronic 7/9/2013    Colon polyps     COPD (chronic obstructive pulmonary disease)     GERD (gastroesophageal reflux disease)     High cholesterol     Osteoarthritis of both knees 7/9/2013    Sleep apnea        Past Surgical History:   Procedure Laterality Date    CHOLECYSTECTOMY      COLONOSCOPY N/A 10/23/2020    Procedure: COLONOSCOPY;  Surgeon: Sae Valentine MD;  Location: [facility];  Service: Endoscopy;  Laterality: N/A;    DIAGNOSTIC LAPAROSCOPY N/A 6/21/2018    Procedure: LAPAROSCOPY, DIAGNOSTIC;  Surgeon: Casper Martinez MD;  Location: [facility] OR;  Service: General;  Laterality: N/A;    LUNG SURGERY Right     benign mass    TOTAL KNEE ARTHROPLASTY Left     UMBILICAL HERNIA REPAIR N/A 6/21/2018    Procedure: REPAIR, HERNIA, UMBILICAL, AGE 5 YEARS OR OLDER;  Surgeon: Casper Martinez MD;  Location: [facility];  Service: General;  Laterality: N/A;       Review of patient's allergies indicates:   Allergen Reactions    Zithromax [azithromycin] Palpitations    Aspirin Other (See Comments)     Other reaction(s): Difficulty breathing    Lipitor [atorvastatin] Other (See Comments)     Leg cramps/hand cramps       No current facility-administered medications on file prior to encounter.     Current Outpatient Medications on File Prior to Encounter   Medication Sig    albuterol (PROVENTIL) 2.5 mg /3 mL (0.083 %) nebulizer solution Take 3 mLs (2.5 mg total) by nebulization every 4 to 6 hours as needed for Wheezing or Shortness of Breath.    albuterol (PROVENTIL/VENTOLIN HFA) 90 mcg/actuation inhaler Inhale 2 puffs into the lungs every 4 (four) hours as needed for Wheezing or Shortness of Breath.    fluticasone propionate (FLONASE) 50 mcg/actuation nasal spray 2 sprays (100 mcg total) by Each Nostril route once daily.    fluticasone-umeclidin-vilanter (TRELEGY ELLIPTA) 200-62.5-25 mcg inhaler Inhale 1 puff into the lungs once daily. Wash out mouth after use     tadalafiL (CIALIS) 20 MG Tab TAKE 1 TABLET DAILY AS NEEDED     Family History       Problem Relation (Age of Onset)    Aneurysm Father    Cancer Mother    Colon polyps Father    Diabetes Mother    Heart disease Father    Hypertension Mother          Tobacco Use    Smoking status: Never    Smokeless tobacco: Never   Substance and Sexual Activity    Alcohol use: Never    Drug use: No    Sexual activity: Yes     Partners: Female     Review of Systems   Constitutional: Positive for malaise/fatigue.   HENT:  Negative for hearing loss and hoarse voice.    Eyes:  Negative for blurred vision and visual disturbance.   Cardiovascular:  Positive for dyspnea on exertion, irregular heartbeat and palpitations. Negative for chest pain, claudication, leg swelling, near-syncope, orthopnea, paroxysmal nocturnal dyspnea and syncope.   Respiratory:  Negative for cough, hemoptysis, shortness of breath, sleep disturbances due to breathing, snoring and wheezing.         +KEVEN on CPAP nightly   Hematologic/Lymphatic: Does not bruise/bleed easily.   Skin:  Negative for color change, dry skin and nail changes.   Musculoskeletal:  Positive for arthritis and back pain. Negative for joint pain and myalgias.   Gastrointestinal:  Negative for bloating, abdominal pain, constipation, nausea and vomiting.   Genitourinary:  Negative for dysuria, flank pain, hematuria and hesitancy.   Neurological:  Negative for headaches, light-headedness, loss of balance, numbness, paresthesias and weakness.   Psychiatric/Behavioral:  Negative for altered mental status.    Allergic/Immunologic: Negative for environmental allergies.     Objective:     Vital Signs (Most Recent):  Temp: 97.8 °F (36.6 °C) (04/01/24 0852)  Pulse: (!) 126 (04/01/24 0938)  Resp: 16 (04/01/24 0852)  BP: 106/68 (04/01/24 0938)  SpO2: 95 % (04/01/24 0852) Vital Signs (24h Range):  Temp:  [97.7 °F (36.5 °C)-97.9 °F (36.6 °C)] 97.8 °F (36.6 °C)  Pulse:  [] 126  Resp:  [15-22] 16  SpO2:   [91 %-100 %] 95 %  BP: (100-139)/(55-84) 106/68     Weight: 124.3 kg (274 lb)  Body mass index is 35.18 kg/m².    SpO2: 95 %         Intake/Output Summary (Last 24 hours) at 4/1/2024 1044  Last data filed at 4/1/2024 0434  Gross per 24 hour   Intake 500 ml   Output 500 ml   Net 0 ml       Lines/Drains/Airways       Peripheral Intravenous Line  Duration                  Peripheral IV - Single Lumen 03/31/24 1717 20 G Posterior;Right Hand <1 day         Peripheral IV - Single Lumen 03/31/24 2000 20 G Left Antecubital <1 day                     Physical Exam  Vitals and nursing note reviewed.   Constitutional:       General: He is not in acute distress.     Appearance: Normal appearance. He is well-developed. He is obese. He is not ill-appearing.   HENT:      Head: Normocephalic and atraumatic.      Nose: Nose normal.      Mouth/Throat:      Mouth: Mucous membranes are moist.   Eyes:      Pupils: Pupils are equal, round, and reactive to light.   Neck:      Thyroid: No thyromegaly.      Vascular: No JVD.      Trachea: No tracheal deviation.   Cardiovascular:      Rate and Rhythm: Tachycardia present. Rhythm regularly irregular.      Chest Wall: PMI is not displaced.      Pulses: Intact distal pulses.           Radial pulses are 2+ on the right side and 2+ on the left side.        Dorsalis pedis pulses are 2+ on the right side and 2+ on the left side.      Heart sounds: S1 normal and S2 normal. No murmur heard.     Comments: Remains in AFL, V rates 120s  Pulmonary:      Effort: Pulmonary effort is normal. No respiratory distress.      Breath sounds: Normal breath sounds. No wheezing.   Abdominal:      General: Bowel sounds are normal. There is no distension.      Palpations: Abdomen is soft.      Tenderness: There is no abdominal tenderness.   Musculoskeletal:         General: No swelling. Normal range of motion.      Cervical back: Full passive range of motion without pain, normal range of motion and neck supple.       Right ankle: No swelling.      Left ankle: No swelling.   Skin:     General: Skin is warm and dry.      Capillary Refill: Capillary refill takes less than 2 seconds.      Nails: There is no clubbing.   Neurological:      General: No focal deficit present.      Mental Status: He is alert and oriented to person, place, and time.   Psychiatric:         Speech: Speech normal.         Behavior: Behavior normal.         Thought Content: Thought content normal.         Judgment: Judgment normal.          Significant Labs: CMP   Recent Labs   Lab 03/31/24  1737 04/01/24  0428    141   K 3.6 3.5    107   CO2 19* 23   * 119*   BUN 16 12   CREATININE 0.9 0.8   CALCIUM 9.2 8.8   PROT 7.0 6.0   ALBUMIN 3.8 3.5   BILITOT 0.5 0.9   ALKPHOS 75 71   AST 20 18   ALT 21 20   ANIONGAP 13 11   , CBC   Recent Labs   Lab 03/31/24  1718 04/01/24  0428   WBC 9.30 8.55   HGB 15.9 15.5   HCT 44.2 43.8    224   , Troponin   Recent Labs   Lab 03/31/24  1953/24  0110 04/01/24  0428   TROPONINI 0.022 0.025 <0.006   , and All pertinent lab results from the last 24 hours have been reviewed.    Significant Imaging: Echocardiogram: Transthoracic echo (TTE) complete (Cupid Only):   Results for orders placed or performed during the hospital encounter of 03/31/24   Echo   Result Value Ref Range    Left Atrium Major Axis 4.12 cm    Left Atrium Minor Axis 5.14 cm    RA Major Axis 4.83 cm    LV Diastolic Volume 52.79 mL    LV Systolic Volume 18.89 mL    PV mean gradient 1 mmHg    RVOT peak VTI 9.6 cm    RVOT peak kinjal 0.73 m/s    Ao VTI 19.30 cm    Ao peak kinjal 0.92 m/s    LVOT peak VTI 15.70 cm    LVOT peak kinjal 0.77 m/s    LVOT diameter 2.19 cm    IVRT 95.15 msec    PV peak gradient 3 mmHg    AV mean gradient 2 mmHg    TAPSE 1.98 cm    RVDD 4.02 cm    LA size 3.29 cm    Ascending aorta 3.27 cm    STJ 3.43 cm    LVIDs 2.34 2.1 - 4.0 cm    Ao root annulus 3.44 cm    Posterior Wall 0.89 0.6 - 1.1 cm    IVS 1.18 (A) 0.6 - 1.1 cm     LVIDd 3.55 3.5 - 6.0 cm    PV PEAK VELOCITY 0.92 m/s    Left Ventricular Outflow Tract Mean Gradient 1.37 mmHg    Left Ventricular Outflow Tract Mean Velocity 0.56 cm/s    FS 34 28 - 44 %    LV mass 111.06 g    ZLVIDD -13.15     ZLVIDS -9.58     Left Ventricle Relative Wall Thickness 0.50 cm    AV valve area 3.06 cm²    AV Velocity Ratio 0.84     AV index (prosthetic) 0.81     LVOT area 3.8 cm2    LVOT stroke volume 59.11 cm3    AV peak gradient 3 mmHg    LV Systolic Volume Index 7.6 mL/m2    LV Diastolic Volume Index 21.29 mL/m2    LV Mass Index 45 g/m2    PARISH by Velocity Ratio 3.15 cm²    BSA 2.55 m2    LA Volume Index 21.1 mL/m2    LA volume 52.44 cm3    LA WIDTH 4.1 cm    RA Width 4.4 cm    Est. RA pres 3 mmHg    Narrative      Left Ventricle: There is normal systolic function with a visually   estimated ejection fraction of 60 - 65%. Unable to assess diastolic   function due to atrial flutter    Right Ventricle: Normal right ventricular cavity size. Wall thickness   is normal. Right ventricle wall motion  is normal. Systolic function is   normal.    IVC/SVC: Normal venous pressure at 3 mmHg.

## 2024-04-01 NOTE — H&P (VIEW-ONLY)
[facility] ([facility])  Cardiology  Consult Note    Patient Name: Yung Mcconnell  MRN: [id number]  Admission Date: 3/31/2024  Hospital Length of Stay: 0 days  Code Status: Full Code   Attending Provider: Ricardo Good MD   Consulting Provider: SILVANO Tao  Primary Care Physician: Prasanna Gaxiola MD  Principal Problem:Atrial flutter with rapid ventricular response    Patient information was obtained from patient, spouse/SO, past medical records, and ER records.     Inpatient consult to Cardiology  Consult performed by: Thao Garcia FNP-C  Consult ordered by: Giselle Wolf DO        Subjective:     Chief Complaint:  palpitations, shortness of breath     HPI:   Yung Mcconnell is a 68 year old male who presented to [facility] due to fatigue, palpitations, rapid heart beat, chest tightness and shortness of breath for at least 2 weeks. His current medical conditions include Asthma, COPD, GERD, KEVEN on CPAP, HTN, HLP, intolerant to statins. ED workup revealed Troponin 0.031>>0.022, , EKG revealed AFL 2:1 AV conduction. He was started on IV Cardizem gtt and IV heparin gtt. He was placed in observation under the care of hospital medicine. Cardiology consulted to assist with medical management. Chart reviewed, patient seen and examined in room with wife at bedside. He remains in AFL, V rates 120s with continued palps and shortness of breath with exertional activities. ECHO pending. Plan for CHRIS/DCCV to restore NSR today per Dr Corley. Denies chest pain on exam today. Remains comfortable at rest today. Further recs to follow.     Past Medical History:   Diagnosis Date    Allergy     Asthma, chronic 7/9/2013    Colon polyps     COPD (chronic obstructive pulmonary disease)     GERD (gastroesophageal reflux disease)     High cholesterol     Osteoarthritis of both knees 7/9/2013    Sleep apnea        Past Surgical History:   Procedure Laterality Date    CHOLECYSTECTOMY      COLONOSCOPY N/A 10/23/2020     Procedure: COLONOSCOPY;  Surgeon: Sae Valentine MD;  Location: [facility];  Service: Endoscopy;  Laterality: N/A;    DIAGNOSTIC LAPAROSCOPY N/A 6/21/2018    Procedure: LAPAROSCOPY, DIAGNOSTIC;  Surgeon: Casper Martinez MD;  Location: [facility] OR;  Service: General;  Laterality: N/A;    LUNG SURGERY Right     benign mass    TOTAL KNEE ARTHROPLASTY Left     UMBILICAL HERNIA REPAIR N/A 6/21/2018    Procedure: REPAIR, HERNIA, UMBILICAL, AGE 3 YEARS OR OLDER;  Surgeon: Casper Martinez MD;  Location: [facility] OR;  Service: General;  Laterality: N/A;       Review of patient's allergies indicates:   Allergen Reactions    Zithromax [azithromycin] Palpitations    Aspirin Other (See Comments)     Other reaction(s): Difficulty breathing    Lipitor [atorvastatin] Other (See Comments)     Leg cramps/hand cramps       No current facility-administered medications on file prior to encounter.     Current Outpatient Medications on File Prior to Encounter   Medication Sig    albuterol (PROVENTIL) 2.5 mg /3 mL (0.083 %) nebulizer solution Take 3 mLs (2.5 mg total) by nebulization every 4 to 6 hours as needed for Wheezing or Shortness of Breath.    albuterol (PROVENTIL/VENTOLIN HFA) 90 mcg/actuation inhaler Inhale 2 puffs into the lungs every 4 (four) hours as needed for Wheezing or Shortness of Breath.    fluticasone propionate (FLONASE) 50 mcg/actuation nasal spray 2 sprays (100 mcg total) by Each Nostril route once daily.    fluticasone-umeclidin-vilanter (TRELEGY ELLIPTA) 200-62.5-25 mcg inhaler Inhale 1 puff into the lungs once daily. Wash out mouth after use    tadalafiL (CIALIS) 20 MG Tab TAKE 1 TABLET DAILY AS NEEDED     Family History       Problem Relation (Age of Onset)    Aneurysm Father    Cancer Mother    Colon polyps Father    Diabetes Mother    Heart disease Father    Hypertension Mother          Tobacco Use    Smoking status: Never    Smokeless tobacco: Never   Substance and Sexual Activity    Alcohol use: Never     Drug use: No    Sexual activity: Yes     Partners: Female     Review of Systems   Constitutional: Positive for malaise/fatigue.   HENT:  Negative for hearing loss and hoarse voice.    Eyes:  Negative for blurred vision and visual disturbance.   Cardiovascular:  Positive for dyspnea on exertion, irregular heartbeat and palpitations. Negative for chest pain, claudication, leg swelling, near-syncope, orthopnea, paroxysmal nocturnal dyspnea and syncope.   Respiratory:  Negative for cough, hemoptysis, shortness of breath, sleep disturbances due to breathing, snoring and wheezing.         +KEVEN on CPAP nightly   Hematologic/Lymphatic: Does not bruise/bleed easily.   Skin:  Negative for color change, dry skin and nail changes.   Musculoskeletal:  Positive for arthritis and back pain. Negative for joint pain and myalgias.   Gastrointestinal:  Negative for bloating, abdominal pain, constipation, nausea and vomiting.   Genitourinary:  Negative for dysuria, flank pain, hematuria and hesitancy.   Neurological:  Negative for headaches, light-headedness, loss of balance, numbness, paresthesias and weakness.   Psychiatric/Behavioral:  Negative for altered mental status.    Allergic/Immunologic: Negative for environmental allergies.     Objective:     Vital Signs (Most Recent):  Temp: 97.8 °F (36.6 °C) (04/01/24 0852)  Pulse: (!) 126 (04/01/24 0938)  Resp: 16 (04/01/24 0852)  BP: 106/68 (04/01/24 0938)  SpO2: 95 % (04/01/24 0852) Vital Signs (24h Range):  Temp:  [97.7 °F (36.5 °C)-97.9 °F (36.6 °C)] 97.8 °F (36.6 °C)  Pulse:  [] 126  Resp:  [15-22] 16  SpO2:  [91 %-100 %] 95 %  BP: (100-139)/(55-84) 106/68     Weight: 124.3 kg (274 lb)  Body mass index is 35.18 kg/m².    SpO2: 95 %         Intake/Output Summary (Last 24 hours) at 4/1/2024 1044  Last data filed at 4/1/2024 0434  Gross per 24 hour   Intake 500 ml   Output 500 ml   Net 0 ml       Lines/Drains/Airways       Peripheral Intravenous Line  Duration                   Peripheral IV - Single Lumen 03/31/24 1717 20 G Posterior;Right Hand <1 day         Peripheral IV - Single Lumen 03/31/24 2000 20 G Left Antecubital <1 day                     Physical Exam  Vitals and nursing note reviewed.   Constitutional:       General: He is not in acute distress.     Appearance: Normal appearance. He is well-developed. He is obese. He is not ill-appearing.   HENT:      Head: Normocephalic and atraumatic.      Nose: Nose normal.      Mouth/Throat:      Mouth: Mucous membranes are moist.   Eyes:      Pupils: Pupils are equal, round, and reactive to light.   Neck:      Thyroid: No thyromegaly.      Vascular: No JVD.      Trachea: No tracheal deviation.   Cardiovascular:      Rate and Rhythm: Tachycardia present. Rhythm regularly irregular.      Chest Wall: PMI is not displaced.      Pulses: Intact distal pulses.           Radial pulses are 2+ on the right side and 2+ on the left side.        Dorsalis pedis pulses are 2+ on the right side and 2+ on the left side.      Heart sounds: S1 normal and S2 normal. No murmur heard.     Comments: Remains in AFL, V rates 120s  Pulmonary:      Effort: Pulmonary effort is normal. No respiratory distress.      Breath sounds: Normal breath sounds. No wheezing.   Abdominal:      General: Bowel sounds are normal. There is no distension.      Palpations: Abdomen is soft.      Tenderness: There is no abdominal tenderness.   Musculoskeletal:         General: No swelling. Normal range of motion.      Cervical back: Full passive range of motion without pain, normal range of motion and neck supple.      Right ankle: No swelling.      Left ankle: No swelling.   Skin:     General: Skin is warm and dry.      Capillary Refill: Capillary refill takes less than 2 seconds.      Nails: There is no clubbing.   Neurological:      General: No focal deficit present.      Mental Status: He is alert and oriented to person, place, and time.   Psychiatric:         Speech: Speech  normal.         Behavior: Behavior normal.         Thought Content: Thought content normal.         Judgment: Judgment normal.          Significant Labs: CMP   Recent Labs   Lab 03/31/24  1737 04/01/24  0428    141   K 3.6 3.5    107   CO2 19* 23   * 119*   BUN 16 12   CREATININE 0.9 0.8   CALCIUM 9.2 8.8   PROT 7.0 6.0   ALBUMIN 3.8 3.5   BILITOT 0.5 0.9   ALKPHOS 75 71   AST 20 18   ALT 21 20   ANIONGAP 13 11   , CBC   Recent Labs   Lab 03/31/24  1718 04/01/24  0428   WBC 9.30 8.55   HGB 15.9 15.5   HCT 44.2 43.8    224   , Troponin   Recent Labs   Lab 03/31/24  1953/24  0110 04/01/24  0428   TROPONINI 0.022 0.025 <0.006   , and All pertinent lab results from the last 24 hours have been reviewed.    Significant Imaging: Echocardiogram: Transthoracic echo (TTE) complete (Cupid Only):   Results for orders placed or performed during the hospital encounter of 03/31/24   Echo   Result Value Ref Range    Left Atrium Major Axis 4.12 cm    Left Atrium Minor Axis 5.14 cm    RA Major Axis 4.83 cm    LV Diastolic Volume 52.79 mL    LV Systolic Volume 18.89 mL    PV mean gradient 1 mmHg    RVOT peak VTI 9.6 cm    RVOT peak kinjal 0.73 m/s    Ao VTI 19.30 cm    Ao peak kinjal 0.92 m/s    LVOT peak VTI 15.70 cm    LVOT peak kinjal 0.77 m/s    LVOT diameter 2.19 cm    IVRT 95.15 msec    PV peak gradient 3 mmHg    AV mean gradient 2 mmHg    TAPSE 1.98 cm    RVDD 4.02 cm    LA size 3.29 cm    Ascending aorta 3.27 cm    STJ 3.43 cm    LVIDs 2.34 2.1 - 4.0 cm    Ao root annulus 3.44 cm    Posterior Wall 0.89 0.6 - 1.1 cm    IVS 1.18 (A) 0.6 - 1.1 cm    LVIDd 3.55 3.5 - 6.0 cm    PV PEAK VELOCITY 0.92 m/s    Left Ventricular Outflow Tract Mean Gradient 1.37 mmHg    Left Ventricular Outflow Tract Mean Velocity 0.56 cm/s    FS 34 28 - 44 %    LV mass 111.06 g    ZLVIDD -13.15     ZLVIDS -9.58     Left Ventricle Relative Wall Thickness 0.50 cm    AV valve area 3.06 cm²    AV Velocity Ratio 0.84     AV index  (prosthetic) 0.81     LVOT area 3.8 cm2    LVOT stroke volume 59.11 cm3    AV peak gradient 3 mmHg    LV Systolic Volume Index 7.6 mL/m2    LV Diastolic Volume Index 21.29 mL/m2    LV Mass Index 45 g/m2    PARISH by Velocity Ratio 3.15 cm²    BSA 2.55 m2    LA Volume Index 21.1 mL/m2    LA volume 52.44 cm3    LA WIDTH 4.1 cm    RA Width 4.4 cm    Est. RA pres 3 mmHg    Narrative      Left Ventricle: There is normal systolic function with a visually   estimated ejection fraction of 60 - 65%. Unable to assess diastolic   function due to atrial flutter    Right Ventricle: Normal right ventricular cavity size. Wall thickness   is normal. Right ventricle wall motion  is normal. Systolic function is   normal.    IVC/SVC: Normal venous pressure at 3 mmHg.       Assessment and Plan:     * Atrial flutter with rapid ventricular response  Continue IV heparin gtt for CVA protection  Continue Cardizem gtt for rate control  Keep NPO for planned CHRIS/DCCV today per Dr Corley to restore NSR  Risks, benefits and treatment alternatives reviewed with patient  He agrees to proceed with procedure as planned  Will need eliquis for Cardio-embolic protection post CHRIS/DCCV.   Further recs to follow      Statin intolerance  Monitor    KEVEN (obstructive sleep apnea)  Continue nightly cpap therapy        VTE Risk Mitigation (From admission, onward)           Ordered     Reason for No Pharmacological VTE Prophylaxis  Once        Comments: On Heparin drip   Question:  Reasons:  Answer:  Physician Provided (leave comment)    03/31/24 2053     IP VTE HIGH RISK PATIENT  Once         03/31/24 2053     Place sequential compression device  Until discontinued         03/31/24 2053     heparin 25,000 units in dextrose 5% (100 units/ml) IV bolus from bag LOW INTENSITY nomogram - [facility]  As needed (PRN)        Question:  Heparin Infusion Adjustment (DO NOT MODIFY ANSWER)  Answer:  [email or web address]\Images\Pharmacy\HeparinInfusions\heparin LOW INTENSITY  nomogram for [facility] [id number]    03/31/24 1905     heparin 25,000 units in dextrose 5% (100 units/ml) IV bolus from bag LOW INTENSITY nomogram - [facility]  As needed (PRN)        Question:  Heparin Infusion Adjustment (DO NOT MODIFY ANSWER)  Answer:  [email or web address]\Images\Pharmacy\HeparinInfusions\heparin LOW INTENSITY nomogram for [facility] [id number]    03/31/24 1905     heparin 25,000 units in dextrose 5% 250 mL (100 units/mL) infusion LOW INTENSITY nomogram - [facility]  Continuous        Question:  Begin at (units/kg/hr)  Answer:  12    03/31/24 1905                    Thank you for your consult. I will follow-up with patient. Please contact us if you have any additional questions.    LOUISE TaoP-C  Cardiology   [facility] ([facility])

## 2024-04-01 NOTE — CONSULTS
[facility] ([facility])  Cardiology  Consult Note    Patient Name: Yung Mcconnell  MRN: [id number]  Admission Date: 3/31/2024  Hospital Length of Stay: 0 days  Code Status: Full Code   Attending Provider: Ricardo Good MD   Consulting Provider: SILVANO Tao  Primary Care Physician: Prasanna Gaxiola MD  Principal Problem:Atrial flutter with rapid ventricular response    Patient information was obtained from patient, spouse/SO, past medical records, and ER records.     Inpatient consult to Cardiology  Consult performed by: Thao Garcia FNP-C  Consult ordered by: Giselle Wolf DO        Subjective:     Chief Complaint:  palpitations, shortness of breath     HPI:   Yung Mcconnell is a 68 year old male who presented to [facility] due to fatigue, palpitations, rapid heart beat, chest tightness and shortness of breath for at least 2 weeks. His current medical conditions include Asthma, COPD, GERD, KEVEN on CPAP, HTN, HLP, intolerant to statins. ED workup revealed Troponin 0.031>>0.022, , EKG revealed AFL 2:1 AV conduction. He was started on IV Cardizem gtt and IV heparin gtt. He was placed in observation under the care of hospital medicine. Cardiology consulted to assist with medical management. Chart reviewed, patient seen and examined in room with wife at bedside. He remains in AFL, V rates 120s with continued palps and shortness of breath with exertional activities. ECHO pending. Plan for CHRIS/DCCV to restore NSR today per Dr Corley. Denies chest pain on exam today. Remains comfortable at rest today. Further recs to follow.     Past Medical History:   Diagnosis Date    Allergy     Asthma, chronic 7/9/2013    Colon polyps     COPD (chronic obstructive pulmonary disease)     GERD (gastroesophageal reflux disease)     High cholesterol     Osteoarthritis of both knees 7/9/2013    Sleep apnea        Past Surgical History:   Procedure Laterality Date    CHOLECYSTECTOMY      COLONOSCOPY N/A 10/23/2020     Procedure: COLONOSCOPY;  Surgeon: Sae Valentine MD;  Location: [facility];  Service: Endoscopy;  Laterality: N/A;    DIAGNOSTIC LAPAROSCOPY N/A 6/21/2018    Procedure: LAPAROSCOPY, DIAGNOSTIC;  Surgeon: Casper Martinez MD;  Location: [facility] OR;  Service: General;  Laterality: N/A;    LUNG SURGERY Right     benign mass    TOTAL KNEE ARTHROPLASTY Left     UMBILICAL HERNIA REPAIR N/A 6/21/2018    Procedure: REPAIR, HERNIA, UMBILICAL, AGE 5 YEARS OR OLDER;  Surgeon: Casper Martinez MD;  Location: [facility] OR;  Service: General;  Laterality: N/A;       Review of patient's allergies indicates:   Allergen Reactions    Zithromax [azithromycin] Palpitations    Aspirin Other (See Comments)     Other reaction(s): Difficulty breathing    Lipitor [atorvastatin] Other (See Comments)     Leg cramps/hand cramps       No current facility-administered medications on file prior to encounter.     Current Outpatient Medications on File Prior to Encounter   Medication Sig    albuterol (PROVENTIL) 2.5 mg /3 mL (0.083 %) nebulizer solution Take 3 mLs (2.5 mg total) by nebulization every 4 to 6 hours as needed for Wheezing or Shortness of Breath.    albuterol (PROVENTIL/VENTOLIN HFA) 90 mcg/actuation inhaler Inhale 2 puffs into the lungs every 4 (four) hours as needed for Wheezing or Shortness of Breath.    fluticasone propionate (FLONASE) 50 mcg/actuation nasal spray 2 sprays (100 mcg total) by Each Nostril route once daily.    fluticasone-umeclidin-vilanter (TRELEGY ELLIPTA) 200-62.5-25 mcg inhaler Inhale 1 puff into the lungs once daily. Wash out mouth after use    tadalafiL (CIALIS) 20 MG Tab TAKE 1 TABLET DAILY AS NEEDED     Family History       Problem Relation (Age of Onset)    Aneurysm Father    Cancer Mother    Colon polyps Father    Diabetes Mother    Heart disease Father    Hypertension Mother          Tobacco Use    Smoking status: Never    Smokeless tobacco: Never   Substance and Sexual Activity    Alcohol use: Never     Drug use: No    Sexual activity: Yes     Partners: Female     Review of Systems   Constitutional: Positive for malaise/fatigue.   HENT:  Negative for hearing loss and hoarse voice.    Eyes:  Negative for blurred vision and visual disturbance.   Cardiovascular:  Positive for dyspnea on exertion, irregular heartbeat and palpitations. Negative for chest pain, claudication, leg swelling, near-syncope, orthopnea, paroxysmal nocturnal dyspnea and syncope.   Respiratory:  Negative for cough, hemoptysis, shortness of breath, sleep disturbances due to breathing, snoring and wheezing.         +KEVEN on CPAP nightly   Hematologic/Lymphatic: Does not bruise/bleed easily.   Skin:  Negative for color change, dry skin and nail changes.   Musculoskeletal:  Positive for arthritis and back pain. Negative for joint pain and myalgias.   Gastrointestinal:  Negative for bloating, abdominal pain, constipation, nausea and vomiting.   Genitourinary:  Negative for dysuria, flank pain, hematuria and hesitancy.   Neurological:  Negative for headaches, light-headedness, loss of balance, numbness, paresthesias and weakness.   Psychiatric/Behavioral:  Negative for altered mental status.    Allergic/Immunologic: Negative for environmental allergies.     Objective:     Vital Signs (Most Recent):  Temp: 97.8 °F (36.6 °C) (04/01/24 0852)  Pulse: (!) 126 (04/01/24 0938)  Resp: 16 (04/01/24 0852)  BP: 106/68 (04/01/24 0938)  SpO2: 95 % (04/01/24 0852) Vital Signs (24h Range):  Temp:  [97.7 °F (36.5 °C)-97.9 °F (36.6 °C)] 97.8 °F (36.6 °C)  Pulse:  [] 126  Resp:  [15-22] 16  SpO2:  [91 %-100 %] 95 %  BP: (100-139)/(55-84) 106/68     Weight: 124.3 kg (274 lb)  Body mass index is 35.18 kg/m².    SpO2: 95 %         Intake/Output Summary (Last 24 hours) at 4/1/2024 1044  Last data filed at 4/1/2024 0434  Gross per 24 hour   Intake 500 ml   Output 500 ml   Net 0 ml       Lines/Drains/Airways       Peripheral Intravenous Line  Duration                   Peripheral IV - Single Lumen 03/31/24 1717 20 G Posterior;Right Hand <1 day         Peripheral IV - Single Lumen 03/31/24 2000 20 G Left Antecubital <1 day                     Physical Exam  Vitals and nursing note reviewed.   Constitutional:       General: He is not in acute distress.     Appearance: Normal appearance. He is well-developed. He is obese. He is not ill-appearing.   HENT:      Head: Normocephalic and atraumatic.      Nose: Nose normal.      Mouth/Throat:      Mouth: Mucous membranes are moist.   Eyes:      Pupils: Pupils are equal, round, and reactive to light.   Neck:      Thyroid: No thyromegaly.      Vascular: No JVD.      Trachea: No tracheal deviation.   Cardiovascular:      Rate and Rhythm: Tachycardia present. Rhythm regularly irregular.      Chest Wall: PMI is not displaced.      Pulses: Intact distal pulses.           Radial pulses are 2+ on the right side and 2+ on the left side.        Dorsalis pedis pulses are 2+ on the right side and 2+ on the left side.      Heart sounds: S1 normal and S2 normal. No murmur heard.     Comments: Remains in AFL, V rates 120s  Pulmonary:      Effort: Pulmonary effort is normal. No respiratory distress.      Breath sounds: Normal breath sounds. No wheezing.   Abdominal:      General: Bowel sounds are normal. There is no distension.      Palpations: Abdomen is soft.      Tenderness: There is no abdominal tenderness.   Musculoskeletal:         General: No swelling. Normal range of motion.      Cervical back: Full passive range of motion without pain, normal range of motion and neck supple.      Right ankle: No swelling.      Left ankle: No swelling.   Skin:     General: Skin is warm and dry.      Capillary Refill: Capillary refill takes less than 2 seconds.      Nails: There is no clubbing.   Neurological:      General: No focal deficit present.      Mental Status: He is alert and oriented to person, place, and time.   Psychiatric:         Speech: Speech  normal.         Behavior: Behavior normal.         Thought Content: Thought content normal.         Judgment: Judgment normal.          Significant Labs: CMP   Recent Labs   Lab 03/31/24  1737 04/01/24  0428    141   K 3.6 3.5    107   CO2 19* 23   * 119*   BUN 16 12   CREATININE 0.9 0.8   CALCIUM 9.2 8.8   PROT 7.0 6.0   ALBUMIN 3.8 3.5   BILITOT 0.5 0.9   ALKPHOS 75 71   AST 20 18   ALT 21 20   ANIONGAP 13 11   , CBC   Recent Labs   Lab 03/31/24  1718 04/01/24  0428   WBC 9.30 8.55   HGB 15.9 15.5   HCT 44.2 43.8    224   , Troponin   Recent Labs   Lab 03/31/24  1953/24  0110 04/01/24  0428   TROPONINI 0.022 0.025 <0.006   , and All pertinent lab results from the last 24 hours have been reviewed.    Significant Imaging: Echocardiogram: Transthoracic echo (TTE) complete (Cupid Only):   Results for orders placed or performed during the hospital encounter of 03/31/24   Echo   Result Value Ref Range    Left Atrium Major Axis 4.12 cm    Left Atrium Minor Axis 5.14 cm    RA Major Axis 4.83 cm    LV Diastolic Volume 52.79 mL    LV Systolic Volume 18.89 mL    PV mean gradient 1 mmHg    RVOT peak VTI 9.6 cm    RVOT peak kinjal 0.73 m/s    Ao VTI 19.30 cm    Ao peak kinjal 0.92 m/s    LVOT peak VTI 15.70 cm    LVOT peak kinjal 0.77 m/s    LVOT diameter 2.19 cm    IVRT 95.15 msec    PV peak gradient 3 mmHg    AV mean gradient 2 mmHg    TAPSE 1.98 cm    RVDD 4.02 cm    LA size 3.29 cm    Ascending aorta 3.27 cm    STJ 3.43 cm    LVIDs 2.34 2.1 - 4.0 cm    Ao root annulus 3.44 cm    Posterior Wall 0.89 0.6 - 1.1 cm    IVS 1.18 (A) 0.6 - 1.1 cm    LVIDd 3.55 3.5 - 6.0 cm    PV PEAK VELOCITY 0.92 m/s    Left Ventricular Outflow Tract Mean Gradient 1.37 mmHg    Left Ventricular Outflow Tract Mean Velocity 0.56 cm/s    FS 34 28 - 44 %    LV mass 111.06 g    ZLVIDD -13.15     ZLVIDS -9.58     Left Ventricle Relative Wall Thickness 0.50 cm    AV valve area 3.06 cm²    AV Velocity Ratio 0.84     AV index  (prosthetic) 0.81     LVOT area 3.8 cm2    LVOT stroke volume 59.11 cm3    AV peak gradient 3 mmHg    LV Systolic Volume Index 7.6 mL/m2    LV Diastolic Volume Index 21.29 mL/m2    LV Mass Index 45 g/m2    PARISH by Velocity Ratio 3.15 cm²    BSA 2.55 m2    LA Volume Index 21.1 mL/m2    LA volume 52.44 cm3    LA WIDTH 4.1 cm    RA Width 4.4 cm    Est. RA pres 3 mmHg    Narrative      Left Ventricle: There is normal systolic function with a visually   estimated ejection fraction of 60 - 65%. Unable to assess diastolic   function due to atrial flutter    Right Ventricle: Normal right ventricular cavity size. Wall thickness   is normal. Right ventricle wall motion  is normal. Systolic function is   normal.    IVC/SVC: Normal venous pressure at 3 mmHg.       Assessment and Plan:     * Atrial flutter with rapid ventricular response  Continue IV heparin gtt for CVA protection  Continue Cardizem gtt for rate control  Keep NPO for planned CHRIS/DCCV today per Dr Corley to restore NSR  Risks, benefits and treatment alternatives reviewed with patient  He agrees to proceed with procedure as planned  Will need eliquis for Cardio-embolic protection post CHRIS/DCCV.   Further recs to follow      Statin intolerance  Monitor    KEVEN (obstructive sleep apnea)  Continue nightly cpap therapy        VTE Risk Mitigation (From admission, onward)           Ordered     Reason for No Pharmacological VTE Prophylaxis  Once        Comments: On Heparin drip   Question:  Reasons:  Answer:  Physician Provided (leave comment)    03/31/24 2053     IP VTE HIGH RISK PATIENT  Once         03/31/24 2053     Place sequential compression device  Until discontinued         03/31/24 2053     heparin 25,000 units in dextrose 5% (100 units/ml) IV bolus from bag LOW INTENSITY nomogram - [facility]  As needed (PRN)        Question:  Heparin Infusion Adjustment (DO NOT MODIFY ANSWER)  Answer:  [email or web address]\epic\Images\Pharmacy\HeparinInfusions\heparin LOW INTENSITY  nomogram for [facility] [id number]    03/31/24 1905     heparin 25,000 units in dextrose 5% (100 units/ml) IV bolus from bag LOW INTENSITY nomogram - [facility]  As needed (PRN)        Question:  Heparin Infusion Adjustment (DO NOT MODIFY ANSWER)  Answer:  [email or web address]\epic\Images\Pharmacy\HeparinInfusions\heparin LOW INTENSITY nomogram for [facility] [id number]    03/31/24 1905     heparin 25,000 units in dextrose 5% 250 mL (100 units/mL) infusion LOW INTENSITY nomogram - [facility]  Continuous        Question:  Begin at (units/kg/hr)  Answer:  12    03/31/24 1905                    Thank you for your consult. I will follow-up with patient. Please contact us if you have any additional questions.    LOUISE TaoP-C  Cardiology   [facility] Tele[facility] ([facility])

## 2024-04-01 NOTE — HPI
Yung Mcconnell is a 68 year old male who presented to [facility] due to fatigue, palpitations, rapid heart beat, chest tightness and shortness of breath for at least 2 weeks. His current medical conditions include Asthma, COPD, GERD, KEVEN on CPAP, HTN, HLP, intolerant to statins. ED workup revealed Troponin 0.031>>0.022, , EKG revealed AFL 2:1 AV conduction. He was started on IV Cardizem gtt and IV heparin gtt. He was placed in observation under the care of hospital medicine. Cardiology consulted to assist with medical management. Chart reviewed, patient seen and examined in room with wife at bedside. He remains in AFL, V rates 120s with continued palps and shortness of breath with exertional activities. ECHO pending. Plan for CHRIS/DCCV to restore NSR today per Dr Corley. Denies chest pain on exam today. Remains comfortable at rest today. Further recs to follow.

## 2024-04-01 NOTE — ASSESSMENT & PLAN NOTE
Cardiology consulted per ED  Continue Cardizem drip at 15 milligrams/hour  Hydrating with IV fluids  Heparin drip, monitor PTT  Replete potassium and magnesium if indicated (goal K > 4 and Mg > 2)  Cardiac monitoring  Echo in a.m.  Initial troponin I 0.031, followed by repeat troponin 0.022  No chest pain at this time    4/1  S/p successful cardioversion  Heparin and Cardizem stopped  Started on amiodarone and metoprolol, eliquis, post-procure

## 2024-04-01 NOTE — PROGRESS NOTES
[facility] Medicine  Progress Note    Patient Name: Yung Mcconnell  MRN: [id number]  Patient Class: IP- Inpatient   Admission Date: 3/31/2024  Length of Stay: 0 days  Attending Physician: Ricardo Good MD  Primary Care Provider: Prasanna Gaxiola MD        Subjective:     Principal Problem:Atrial flutter with rapid ventricular response        HPI:  Patient is a 68-year-old male with past medical history of asthma, COPD, GERD, arthritis, obstructive sleep apnea on CPAP, hyperlipidemia with intolerance to statins, and seasonal allergies who presented to ED with complaints of rapid heart rate noted on apple watch, chest tightness, and shortness of breath.  He reports that he has had some shortness of breath over the past week, however he gets short of breath from time to time with his asthma/COPD said this was not out of the ordinary.  He states that he has never been evaluated by a cardiologist in the past.  He also reported feeling some lightheadedness.  He denies syncope, headache, dizziness, ear pain/fullness, visual changes, abdominal pain, nausea, vomiting, diarrhea, constipation, dysuria, fever, chills.  He reports that the chest tightness that occurred has resolved and he has no pain at this time.  He does not feel short of breath at rest.  Blood pressure stable, heart rate up to 130s.  Lab workup shows troponin 0.031 followed by repeat troponin 0.022, .  EKG shows atrial flutter with 2-1 AV conduction, ST and T-wave abnormality.  He was given IV Cardizem and started on Cardizem drip which is currently at 15 milligrams/hour as well as heparin drip.  Cardiology was consulted per ED, also recommended IV fluids and echo in a.[facility] Medicine was consulted for admission due to new onset atrial flutter with rapid ventricular rate.     a 68 y.o. male presenting to the ED for SOB over the past week with chest tightness and a rapid HB onsetting 2 hours pta. Pt has a hx of Afib and  a collapsed lung from 3 years ago. Symptoms are constant and moderate in severity. No mitigating or exacerbating factors reported. Associated sxs include lightheadedness, a headache, sore throat, and wheezing which subsided earlier this week. Patient denies any nausea, vomiting, diaphoresis, bloody stool, black tarry stool, and all other sxs at this time. No further complaints or concerns at this time.        Overview/Hospital Course:  4/1  Admitted for a-flutter/RVR  On cardizem and heparin drips on admission  Underwent CHRIS/DCCV this afternoon with Dr. Corley  Post-procedure, started on amiodarone, metoprolol  Heparin drip stopped, started on apixaban      Review of Systems   All other systems reviewed and are negative.    Objective:     Vital Signs (Most Recent):  Temp: 98.4 °F (36.9 °C) (04/01/24 1620)  Pulse: 61 (04/01/24 1640)  Resp: 19 (04/01/24 1640)  BP: (!) 113/58 (04/01/24 1650)  SpO2: 95 % (04/01/24 1640) Vital Signs (24h Range):  Temp:  [97.4 °F (36.3 °C)-98.4 °F (36.9 °C)] 98.4 °F (36.9 °C)  Pulse:  [] 61  Resp:  [15-27] 19  SpO2:  [91 %-100 %] 95 %  BP: (100-137)/(55-84) 113/58     Weight: 124.3 kg (274 lb)  Body mass index is 35.18 kg/m².    Intake/Output Summary (Last 24 hours) at 4/1/2024 1735  Last data filed at 4/1/2024 1614  Gross per 24 hour   Intake 700 ml   Output 1400 ml   Net -700 ml         Physical Exam  Vitals and nursing note reviewed.   Constitutional:       General: He is not in acute distress.     Appearance: Normal appearance. He is obese. He is ill-appearing.   Cardiovascular:      Rate and Rhythm: Tachycardia present. Rhythm irregular.      Heart sounds: No murmur heard.  Pulmonary:      Effort: Pulmonary effort is normal. No respiratory distress.      Breath sounds: Normal breath sounds. No wheezing.   Abdominal:      General: There is no distension.      Palpations: Abdomen is soft.      Tenderness: There is no abdominal tenderness.   Neurological:      Mental Status: He  is alert.             Significant Labs: All pertinent labs within the past 24 hours have been reviewed.  Recent Lab Results  (Last 5 results in the past 24 hours)        04/01/24  1625   04/01/24  1543   04/01/24  1542   04/01/24  1025   04/01/24  0803        Ao root annulus       3.44         Ascending aorta       3.27         Ao peak kinjal       0.92         Ao VTI       19.30         PTT         37.9  Comment: Refer to local heparin nomogram for intensity/dose specific   therapeutic   range.         AV valve area       3.06         PARISH by Velocity Ratio       3.15         AV mean gradient       2         AV index (prosthetic)       0.81         AV peak gradient       3         AV Velocity Ratio       0.84         BSA 2.55       2.55         Left Ventricle Relative Wall Thickness       0.50         FS       34         IVRT       95.15         IVSd       1.18         LA WIDTH       4.1         Left Atrium Major Axis       4.12         Left Atrium Minor Axis       5.14         LA size       3.29         LA volume       52.44         LA vol index       21.1         LVOT area       3.8         LV EDV BP       52.79         LV Diastolic Volume Index       21.29         LVIDd       3.55         LVIDs       2.34         LV mass       111.06         LV Mass Index       45         Left Ventricular Outflow Tract Mean Gradient       1.37         Left Ventricular Outflow Tract Mean Velocity       0.56         LVOT diameter       2.19         LVOT peak kinjal       0.77         LVOT stroke volume       59.11         LVOT peak VTI       15.70         LV ESV BP       18.89         LV Systolic Volume Index       7.6         QRS Duration   76   78           [facility] QTC Calculation   431   504           PV mean gradient       1         PV peak gradient       3         PV PEAK VELOCITY       0.92         Posterior Wall       0.89         RA Major Axis       4.83         Est. RA pres 3       3         RA Width       4.4         RVDD        4.02         RVOT peak kinjal       0.73         RVOT peak VTI       9.6         STJ       3.43         TAPSE       1.98         ZLVIDD       -13.15         ZLVIDS       -9.58                                Significant Imaging: I have reviewed all pertinent imaging results/findings within the past 24 hours.    X-Ray Chest AP Portable   ED Interpretation   No acute        Final Result      No acute abnormality.         Electronically signed by: Angel Gray   Date:    03/31/2024   Time:    17:12            Assessment/Plan:      * Atrial flutter with rapid ventricular response  Cardiology consulted per ED  Continue Cardizem drip at 15 milligrams/hour  Hydrating with IV fluids  Heparin drip, monitor PTT  Replete potassium and magnesium if indicated (goal K > 4 and Mg > 2)  Cardiac monitoring  Echo in a.m.  Initial troponin I 0.031, followed by repeat troponin 0.022  No chest pain at this time    4/1  S/p successful cardioversion  Heparin and Cardizem stopped  Started on amiodarone and metoprolol, eliquis, post-procure    Statin intolerance  Check lipid panel in a.m.  Per previous notes, positive statin induced myopathy      Seasonal allergic rhinitis  Continue Flonase      Asthma with COPD  Do not feel that patient is having acute exacerbation at this time  Hold albuterol, use Xopenex nebulizer treatments instead  Continue Trelegy  Chest x-ray reviewed with no acute abnormality      KEVEN (obstructive sleep apnea)  Continue nightly CPAP        VTE Risk Mitigation (From admission, onward)           Ordered     apixaban tablet 5 mg  2 times daily         04/01/24 1735     Reason for No Pharmacological VTE Prophylaxis  Once        Comments: On Heparin drip   Question:  Reasons:  Answer:  Physician Provided (leave comment)    03/31/24 2053     IP VTE HIGH RISK PATIENT  Once         03/31/24 2053     Place sequential compression device  Until discontinued         03/31/24 2053                    Discharge Planning   MIKALA:       Code Status: Full Code   Is the patient medically ready for discharge?:     Reason for patient still in hospital (select all that apply): Patient trending condition, Treatment, Consult recommendations, and Pending disposition  Discharge Plan A: Home with family                  Ricardo Good MD  Department of Hospital Medicine   [facility] - Tele[facility] ([facility])

## 2024-04-01 NOTE — HOSPITAL COURSE
4/1  Admitted for a-flutter/RVR  On cardizem and heparin drips on admission  Underwent CHRIS/DCCV this afternoon with Dr. Corley  Post-procedure, started on amiodarone, metoprolol  Heparin drip stopped, started on apixaban    4/2  NAEON,in sinus rhythm  Denies complaints  Stable for discharge home  Prescribed amiodarone, metoprolol and apixaban  F/U with cardiology in 1-2 weeks

## 2024-04-01 NOTE — ASSESSMENT & PLAN NOTE
Do not feel that patient is having acute exacerbation at this time  Hold albuterol, use Xopenex nebulizer treatments instead  Continue Trelegy  Chest x-ray reviewed with no acute abnormality

## 2024-04-01 NOTE — TRANSFER OF CARE
"Anesthesia Transfer of Care Note    Patient: Yung Mcconnell    Procedure(s) Performed: Procedure(s) (LRB):  Transesophageal echo (CHRIS) intra-procedure log documentation (N/A)    Patient location: Other: cvru    Anesthesia Type: MAC    Post pain: adequate analgesia    Post assessment: no apparent anesthetic complications and tolerated procedure well    Post vital signs: stable    Level of consciousness: sedated    Nausea/Vomiting: no nausea/vomiting    Complications: none    Transfer of care protocol was followed    Last vitals: Visit Vitals  /66 (BP Location: Left arm, Patient Position: Lying)   Pulse (!) 127   Temp 36.7 °C (98 °F) (Oral)   Resp 18   Ht 6' 2" (1.88 m)   Wt 124.3 kg (274 lb)   SpO2 (!) 94%   BMI 35.18 kg/m²     "

## 2024-04-01 NOTE — ASSESSMENT & PLAN NOTE
Cardiology consulted per ED  Continue Cardizem drip at 15 milligrams/hour  Hydrating with IV fluids  Heparin drip, monitor PTT  Replete potassium and magnesium if indicated (goal K > 4 and Mg > 2)  Cardiac monitoring  Echo in a.m.  Initial troponin I 0.031, followed by repeat troponin 0.022  No chest pain at this time

## 2024-04-01 NOTE — PHARMACY MED REC
"Admission Medication History     The home medication history was taken by Denis Storm.    You may go to "Admission" then "Reconcile Home Medications" tabs to review and/or act upon these items.     The home medication list has been updated by the Pharmacy department.   Please read ALL comments highlighted in yellow.   Please address this information as you see fit.    Feel free to contact us if you have any questions or require assistance.      The medications listed below were removed from the home medication list. Please reorder if appropriate:  Patient reports no longer taking the following medication(s):  AZELEX 15% GEL  FLEXERIL 5MG  ZYRTEC 10MG  MONODOX 100MG  PREDNISONE 20MG  WEGOVY 0.25MG/0.5ML    Medications listed below were obtained from: Patient/family and Analytic software- [employer]  (Not in a hospital admission)      Denis Storm  [id number]    Current Outpatient Medications on File Prior to Encounter   Medication Sig Dispense Refill Last Dose    albuterol (PROVENTIL) 2.5 mg /3 mL (0.083 %) nebulizer solution Take 3 mLs (2.5 mg total) by nebulization every 4 to 6 hours as needed for Wheezing or Shortness of Breath. 360 mL 11 3/31/2024    albuterol (PROVENTIL/VENTOLIN HFA) 90 mcg/actuation inhaler Inhale 2 puffs into the lungs every 4 (four) hours as needed for Wheezing or Shortness of Breath. 54 g 3 3/30/2024    fluticasone propionate (FLONASE) 50 mcg/actuation nasal spray 2 sprays (100 mcg total) by Each Nostril route once daily. 48 g 3 Past Week    fluticasone-umeclidin-vilanter (TRELEGY ELLIPTA) 200-62.5-25 mcg inhaler Inhale 1 puff into the lungs once daily. Wash out mouth after use 180 each 3 Past Week    tadalafiL (CIALIS) 20 MG Tab TAKE 1 TABLET DAILY AS NEEDED 24 tablet 2    .          "

## 2024-04-01 NOTE — INTERVAL H&P NOTE
The patient has been examined and the H&P has been reviewed:    I concur with the findings and no changes have occurred since H&P was written.    Anesthesia/Surgery risks, benefits and alternative options discussed and understood by patient/family.          Active Hospital Problems    Diagnosis  POA    *Atrial flutter with rapid ventricular response [I48.92]  Yes    Statin intolerance [Z78.9]  Yes    Seasonal allergic rhinitis [J30.2]  Yes    Asthma with COPD [J44.89]  Yes    KEVEN (obstructive sleep apnea) [G47.33]  Yes      Resolved Hospital Problems   No resolved problems to display.

## 2024-04-01 NOTE — SUBJECTIVE & OBJECTIVE
Past Medical History:   Diagnosis Date    Allergy     Asthma, chronic 7/9/2013    Colon polyps     COPD (chronic obstructive pulmonary disease)     GERD (gastroesophageal reflux disease)     High cholesterol     Osteoarthritis of both knees 7/9/2013    Sleep apnea        Past Surgical History:   Procedure Laterality Date    CHOLECYSTECTOMY      COLONOSCOPY N/A 10/23/2020    Procedure: COLONOSCOPY;  Surgeon: Sae Valentine MD;  Location: [facility];  Service: Endoscopy;  Laterality: N/A;    DIAGNOSTIC LAPAROSCOPY N/A 6/21/2018    Procedure: LAPAROSCOPY, DIAGNOSTIC;  Surgeon: Casper Martinez MD;  Location: [facility] OR;  Service: General;  Laterality: N/A;    LUNG SURGERY Right     benign mass    TOTAL KNEE ARTHROPLASTY Left     UMBILICAL HERNIA REPAIR N/A 6/21/2018    Procedure: REPAIR, HERNIA, UMBILICAL, AGE 5 YEARS OR OLDER;  Surgeon: Casper Martinez MD;  Location: [facility];  Service: General;  Laterality: N/A;       Review of patient's allergies indicates:   Allergen Reactions    Zithromax [azithromycin] Palpitations    Aspirin Other (See Comments)     Other reaction(s): Difficulty breathing    Lipitor [atorvastatin] Other (See Comments)     Leg cramps/hand cramps       No current facility-administered medications on file prior to encounter.     Current Outpatient Medications on File Prior to Encounter   Medication Sig    albuterol (PROVENTIL) 2.5 mg /3 mL (0.083 %) nebulizer solution Take 3 mLs (2.5 mg total) by nebulization every 4 to 6 hours as needed for Wheezing or Shortness of Breath.    albuterol (PROVENTIL/VENTOLIN HFA) 90 mcg/actuation inhaler Inhale 2 puffs into the lungs every 4 (four) hours as needed for Wheezing or Shortness of Breath.    fluticasone propionate (FLONASE) 50 mcg/actuation nasal spray 2 sprays (100 mcg total) by Each Nostril route once daily.    fluticasone-umeclidin-vilanter (TRELEGY ELLIPTA) 200-62.5-25 mcg inhaler Inhale 1 puff into the lungs once daily. Wash out mouth after use     tadalafiL (CIALIS) 20 MG Tab TAKE 1 TABLET DAILY AS NEEDED    [DISCONTINUED] azelaic acid (AZELEX) 15 % gel Apply 1 application topically 2 (two) times a day.    [DISCONTINUED] cetirizine (ZYRTEC) 10 MG tablet Take 10 mg by mouth once daily.    [DISCONTINUED] cyclobenzaprine (FLEXERIL) 5 MG tablet 1 Tablet Oral Three times a day as needed.    [DISCONTINUED] diclofenac 1.3 % PT12 Apply 1 patch topically every 12 (twelve) hours as needed.    [DISCONTINUED] doxycycline (MONODOX) 100 MG capsule Take 1 capsule (100 mg total) by mouth every 12 (twelve) hours.    [DISCONTINUED] GLUCOSAMINE HCL/CHONDR ALFONSO A NA (OSTEO BI-FLEX ORAL) Take 1 tablet by mouth once daily.     [DISCONTINUED] metroNIDAZOLE (METROGEL) 0.75 % gel Apply topically 2 (two) times daily.    [DISCONTINUED] multivitamin (THERAGRAN) per tablet 1 Tablet Oral Every day    [DISCONTINUED] predniSONE (DELTASONE) 20 MG tablet Prednisone 60 mg/ day for 3 days, 40 mg/day for 3 days,20 mg/ day for 3 days, (1/2 tablet )10 mg a day for 3 days.    [DISCONTINUED] semaglutide, weight loss, (WEGOVY) 0.25 mg/0.5 mL PnIj Inject 0.5 mg into the skin once a week.     Family History       Problem Relation (Age of Onset)    Aneurysm Father    Cancer Mother    Colon polyps Father    Diabetes Mother    Heart disease Father    Hypertension Mother          Tobacco Use    Smoking status: Never    Smokeless tobacco: Never   Substance and Sexual Activity    Alcohol use: Never    Drug use: No    Sexual activity: Yes     Partners: Female     Review of Systems   Constitutional: Negative.  Negative for chills, diaphoresis, fatigue and fever.   HENT: Negative.  Negative for postnasal drip and sore throat.    Eyes: Negative.  Negative for visual disturbance.   Respiratory:  Positive for chest tightness and shortness of breath. Negative for cough and wheezing.    Cardiovascular:  Positive for chest pain, palpitations and leg swelling.   Gastrointestinal: Negative.  Negative for abdominal  distention, abdominal pain, constipation, diarrhea, nausea and vomiting.   Endocrine: Negative.    Genitourinary: Negative.    Musculoskeletal: Negative.    Skin: Negative.    Allergic/Immunologic: Positive for environmental allergies.   Neurological:  Positive for light-headedness and headaches. Negative for dizziness, seizures, syncope, facial asymmetry, speech difficulty, weakness and numbness.   Hematological: Negative.    Psychiatric/Behavioral: Negative.       Objective:     Vital Signs (Most Recent):  Temp: 97.9 °F (36.6 °C) (03/31/24 1639)  Pulse: 97 (03/31/24 2030)  Resp: (!) 22 (03/31/24 2030)  BP: 120/77 (03/31/24 2030)  SpO2: 97 % (03/31/24 2030) Vital Signs (24h Range):  Temp:  [97.9 °F (36.6 °C)] 97.9 °F (36.6 °C)  Pulse:  [] 97  Resp:  [15-22] 22  SpO2:  [94 %-97 %] 97 %  BP: (106-139)/(67-84) 120/77     Weight: 125.8 kg (277 lb 5.4 oz)  Body mass index is 35.61 kg/m².     Physical Exam  Vitals reviewed.   Constitutional:       General: He is not in acute distress.     Appearance: He is not ill-appearing, toxic-appearing or diaphoretic.   HENT:      Head: Normocephalic.      Nose: Nose normal.      Mouth/Throat:      Mouth: Mucous membranes are moist.      Pharynx: Oropharynx is clear.   Eyes:      Extraocular Movements: Extraocular movements intact.      Pupils: Pupils are equal, round, and reactive to light.   Cardiovascular:      Rate and Rhythm: Tachycardia present. Rhythm irregular.      Pulses: Normal pulses.      Heart sounds: Normal heart sounds.   Pulmonary:      Effort: Pulmonary effort is normal. No respiratory distress.      Breath sounds: Normal breath sounds. No rales.      Comments: Mild expiratory wheezing  Chest:      Chest wall: No tenderness.   Abdominal:      General: Bowel sounds are normal. There is no distension.      Palpations: Abdomen is soft.      Tenderness: There is no abdominal tenderness. There is no guarding.   Musculoskeletal:         General: Normal range of  motion.      Cervical back: Normal range of motion and neck supple.      Right lower leg: Edema present.      Left lower leg: Edema present.   Skin:     General: Skin is warm and dry.      Capillary Refill: Capillary refill takes less than 2 seconds.   Neurological:      General: No focal deficit present.      Mental Status: He is alert and oriented to person, place, and time.   Psychiatric:         Mood and Affect: Mood normal.         Behavior: Behavior normal.         Thought Content: Thought content normal.         Judgment: Judgment normal.              CRANIAL NERVES     CN III, IV, VI   Pupils are equal, round, and reactive to light.       Significant Labs: All pertinent labs within the past 24 hours have been reviewed.  Recent Lab Results         03/31/24  1953   03/31/24  1737   03/31/24  1718        Albumin   3.8         ALP   75         ALT   21         Anion Gap   13         PTT   26.3  Comment: Refer to local heparin nomogram for intensity/dose specific   therapeutic   range.           AST   20         Baso #     0.06       Basophil %     0.6       BILIRUBIN TOTAL   0.5  Comment: For infants and newborns, interpretation of results should be based  on gestational age, weight and in agreement with clinical  observations.    Premature Infant recommended reference ranges:  Up to 24 hours.............<8.0 mg/dL  Up to 48 hours............<12.0 mg/dL  3-5 days..................<15.0 mg/dL  6-29 days.................<15.0 mg/dL           BNP     193  Comment: Values of less than 100 pg/ml are consistent with non-CHF populations.       BUN   16         Calcium   9.2         Chloride   109         CO2   19         Creatinine   0.9         Differential Method     Automated       eGFR   >60         Eos #     0.2       Eos %     2.0       Glucose   115         Gran # (ANC)     4.5       Gran %     48.1       Hematocrit     44.2       Hemoglobin     15.9       Immature Grans (Abs)     0.04  Comment: Mild elevation  in immature granulocytes is non specific and   can be seen in a variety of conditions including stress response,   acute inflammation, trauma and pregnancy. Correlation with other   laboratory and clinical findings is essential.         Immature Granulocytes     0.4       INR   0.9  Comment: Coumadin Therapy:  2.0 - 3.0 for INR for all indicators except mechanical heart valves  and antiphospholipid syndromes which should use 2.5 - 3.5.           Lymph #     3.7       Lymph %     39.2       MCH     31.4       MCHC     36.0       MCV     87       Mono #     0.9       Mono %     9.7       MPV     9.4       nRBC     0       Platelet Count     256       Potassium   3.6         PROTEIN TOTAL   7.0         PT   11.0         RBC     5.07       RDW     13.8       Sodium   141         Troponin I 0.022  Comment: The reference interval for Troponin I represents the 99th percentile   cutoff   for our facility and is consistent with 3rd generation assay   performance.     0.031  Comment: The reference interval for Troponin I represents the 99th percentile   cutoff   for our facility and is consistent with 3rd generation assay   performance.           WBC     9.30             EKG reviewed, atrial flutter with 2-1 AV conduction, ST and T-wave abnormality    Bedside cardiac monitor personally reviewed, atrial flutter heart rate fluctuating between 100-120    Significant Imaging: I have reviewed all pertinent imaging results/findings within the past 24 hours.    CXR reviewed, lungs clear, no acute abnormality

## 2024-04-01 NOTE — PLAN OF CARE
Pt transferred to room 225 via stretcher/bed. VSS.   Recovery complete post CHRIS. Pt has no complaints of pain or N/V.   IV x2 clean, dry and intact. No redness, swelling, or bleeding.   Patient placed on cardiac monitor in room, and IV NS and heparin running per order. Sites verified with nurse, and handoff report given to Cain Kc RN

## 2024-04-02 ENCOUNTER — TELEPHONE (OUTPATIENT)
Dept: CARDIOLOGY | Facility: CLINIC | Age: 69
End: 2024-04-02
Payer: MEDICARE

## 2024-04-02 VITALS
OXYGEN SATURATION: 99 % | BODY MASS INDEX: 36.3 KG/M2 | WEIGHT: 282.88 LBS | RESPIRATION RATE: 18 BRPM | HEIGHT: 74 IN | TEMPERATURE: 98 F | DIASTOLIC BLOOD PRESSURE: 61 MMHG | HEART RATE: 69 BPM | SYSTOLIC BLOOD PRESSURE: 111 MMHG

## 2024-04-02 LAB
ALBUMIN SERPL BCP-MCNC: 3.3 G/DL (ref 3.5–5.2)
ALP SERPL-CCNC: 69 U/L (ref 55–135)
ALT SERPL W/O P-5'-P-CCNC: 21 U/L (ref 10–44)
ANION GAP SERPL CALC-SCNC: 10 MMOL/L (ref 8–16)
AST SERPL-CCNC: 17 U/L (ref 10–40)
BASOPHILS # BLD AUTO: 0.06 K/UL (ref 0–0.2)
BASOPHILS NFR BLD: 0.8 % (ref 0–1.9)
BILIRUB SERPL-MCNC: 0.6 MG/DL (ref 0.1–1)
BUN SERPL-MCNC: 11 MG/DL (ref 8–23)
CALCIUM SERPL-MCNC: 9 MG/DL (ref 8.7–10.5)
CHLORIDE SERPL-SCNC: 106 MMOL/L (ref 95–110)
CO2 SERPL-SCNC: 23 MMOL/L (ref 23–29)
CREAT SERPL-MCNC: 0.8 MG/DL (ref 0.5–1.4)
DIFFERENTIAL METHOD BLD: ABNORMAL
EOSINOPHIL # BLD AUTO: 0.3 K/UL (ref 0–0.5)
EOSINOPHIL NFR BLD: 4.2 % (ref 0–8)
ERYTHROCYTE [DISTWIDTH] IN BLOOD BY AUTOMATED COUNT: 14.2 % (ref 11.5–14.5)
EST. GFR  (NO RACE VARIABLE): >60 ML/MIN/1.73 M^2
GLUCOSE SERPL-MCNC: 104 MG/DL (ref 70–110)
HCT VFR BLD AUTO: 40.7 % (ref 40–54)
HGB BLD-MCNC: 14.2 G/DL (ref 14–18)
IMM GRANULOCYTES # BLD AUTO: 0.03 K/UL (ref 0–0.04)
IMM GRANULOCYTES NFR BLD AUTO: 0.4 % (ref 0–0.5)
LYMPHOCYTES # BLD AUTO: 3.3 K/UL (ref 1–4.8)
LYMPHOCYTES NFR BLD: 43.3 % (ref 18–48)
MCH RBC QN AUTO: 31.3 PG (ref 27–31)
MCHC RBC AUTO-ENTMCNC: 34.9 G/DL (ref 32–36)
MCV RBC AUTO: 90 FL (ref 82–98)
MONOCYTES # BLD AUTO: 0.9 K/UL (ref 0.3–1)
MONOCYTES NFR BLD: 11.8 % (ref 4–15)
NEUTROPHILS # BLD AUTO: 3 K/UL (ref 1.8–7.7)
NEUTROPHILS NFR BLD: 39.5 % (ref 38–73)
NRBC BLD-RTO: 0 /100 WBC
OHS QRS DURATION: 98 MS
OHS QTC CALCULATION: 454 MS
PLATELET # BLD AUTO: 202 K/UL (ref 150–450)
PMV BLD AUTO: 9.4 FL (ref 9.2–12.9)
POTASSIUM SERPL-SCNC: 3.5 MMOL/L (ref 3.5–5.1)
PROT SERPL-MCNC: 6.1 G/DL (ref 6–8.4)
RBC # BLD AUTO: 4.53 M/UL (ref 4.6–6.2)
SODIUM SERPL-SCNC: 139 MMOL/L (ref 136–145)
WBC # BLD AUTO: 7.55 K/UL (ref 3.9–12.7)

## 2024-04-02 PROCEDURE — 94640 AIRWAY INHALATION TREATMENT: CPT

## 2024-04-02 PROCEDURE — 25000003 PHARM REV CODE 250: Performed by: INTERNAL MEDICINE

## 2024-04-02 PROCEDURE — 94761 N-INVAS EAR/PLS OXIMETRY MLT: CPT

## 2024-04-02 PROCEDURE — 80053 COMPREHEN METABOLIC PANEL: CPT | Performed by: NURSE PRACTITIONER

## 2024-04-02 PROCEDURE — 99233 SBSQ HOSP IP/OBS HIGH 50: CPT | Mod: 25,,, | Performed by: INTERNAL MEDICINE

## 2024-04-02 PROCEDURE — 25000003 PHARM REV CODE 250: Performed by: HOSPITALIST

## 2024-04-02 PROCEDURE — 25000242 PHARM REV CODE 250 ALT 637 W/ HCPCS: Performed by: NURSE PRACTITIONER

## 2024-04-02 PROCEDURE — A4216 STERILE WATER/SALINE, 10 ML: HCPCS | Performed by: NURSE PRACTITIONER

## 2024-04-02 PROCEDURE — 93005 ELECTROCARDIOGRAM TRACING: CPT

## 2024-04-02 PROCEDURE — 93010 ELECTROCARDIOGRAM REPORT: CPT | Mod: ,,, | Performed by: INTERNAL MEDICINE

## 2024-04-02 PROCEDURE — 25000003 PHARM REV CODE 250: Performed by: NURSE PRACTITIONER

## 2024-04-02 PROCEDURE — 36415 COLL VENOUS BLD VENIPUNCTURE: CPT | Performed by: NURSE PRACTITIONER

## 2024-04-02 PROCEDURE — 25000242 PHARM REV CODE 250 ALT 637 W/ HCPCS: Performed by: INTERNAL MEDICINE

## 2024-04-02 PROCEDURE — 85025 COMPLETE CBC W/AUTO DIFF WBC: CPT | Performed by: NURSE PRACTITIONER

## 2024-04-02 RX ORDER — METOPROLOL SUCCINATE 25 MG/1
25 TABLET, EXTENDED RELEASE ORAL DAILY
Qty: 30 TABLET | Refills: 11 | Status: SHIPPED | OUTPATIENT
Start: 2024-04-03 | End: 2025-04-03

## 2024-04-02 RX ORDER — AMIODARONE HYDROCHLORIDE 200 MG/1
200 TABLET ORAL 2 TIMES DAILY
Qty: 60 TABLET | Refills: 11 | Status: ON HOLD | OUTPATIENT
Start: 2024-04-02 | End: 2024-05-09 | Stop reason: HOSPADM

## 2024-04-02 RX ADMIN — AMIODARONE HYDROCHLORIDE 200 MG: 200 TABLET ORAL at 08:04

## 2024-04-02 RX ADMIN — METOPROLOL SUCCINATE 25 MG: 25 TABLET, EXTENDED RELEASE ORAL at 08:04

## 2024-04-02 RX ADMIN — ARFORMOTEROL TARTRATE 15 MCG: 15 SOLUTION RESPIRATORY (INHALATION) at 07:04

## 2024-04-02 RX ADMIN — IPRATROPIUM BROMIDE 0.5 MG: 0.5 SOLUTION RESPIRATORY (INHALATION) at 07:04

## 2024-04-02 RX ADMIN — LEVALBUTEROL HYDROCHLORIDE 1.25 MG: 0.63 SOLUTION RESPIRATORY (INHALATION) at 12:04

## 2024-04-02 RX ADMIN — APIXABAN 5 MG: 2.5 TABLET, FILM COATED ORAL at 08:04

## 2024-04-02 RX ADMIN — Medication 3 ML: at 05:04

## 2024-04-02 RX ADMIN — LEVALBUTEROL HYDROCHLORIDE 1.25 MG: 0.63 SOLUTION RESPIRATORY (INHALATION) at 07:04

## 2024-04-02 RX ADMIN — IPRATROPIUM BROMIDE 0.5 MG: 0.5 SOLUTION RESPIRATORY (INHALATION) at 12:04

## 2024-04-02 RX ADMIN — BUDESONIDE 0.5 MG: 0.5 INHALANT ORAL at 07:04

## 2024-04-02 RX ADMIN — FLUTICASONE PROPIONATE 100 MCG: 50 SPRAY, METERED NASAL at 08:04

## 2024-04-02 NOTE — TELEPHONE ENCOUNTER
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 Wife Ms Casey has been made aware of appt times and dates. kA

## 2024-04-02 NOTE — SUBJECTIVE & OBJECTIVE
Interval History: no acute issues noted o/n. Remains in SR. Needs follow up in CV and EP clinic after discharge.     Review of Systems   Constitutional: Positive for malaise/fatigue.   HENT:  Negative for hearing loss and hoarse voice.    Eyes:  Negative for blurred vision and visual disturbance.   Cardiovascular:  Negative for chest pain, claudication, dyspnea on exertion, irregular heartbeat, leg swelling, near-syncope, orthopnea, palpitations, paroxysmal nocturnal dyspnea and syncope.   Respiratory:  Negative for cough, hemoptysis, shortness of breath, sleep disturbances due to breathing, snoring and wheezing.    Endocrine: Negative for cold intolerance and heat intolerance.   Hematologic/Lymphatic: Does not bruise/bleed easily.   Skin:  Negative for color change, dry skin and nail changes.   Musculoskeletal:  Positive for arthritis and back pain. Negative for joint pain and myalgias.   Gastrointestinal:  Negative for bloating, abdominal pain, constipation, nausea and vomiting.   Genitourinary:  Negative for dysuria, flank pain, hematuria and hesitancy.   Neurological:  Negative for headaches, light-headedness, loss of balance, numbness, paresthesias and weakness.   Psychiatric/Behavioral:  Negative for altered mental status.    Allergic/Immunologic: Negative for environmental allergies.     Objective:     Vital Signs (Most Recent):  Temp: 97.7 °F (36.5 °C) (04/02/24 0735)  Pulse: 69 (04/02/24 0735)  Resp: 18 (04/02/24 0735)  BP: 111/61 (04/02/24 0735)  SpO2: 99 % (04/02/24 0735) Vital Signs (24h Range):  Temp:  [97.7 °F (36.5 °C)-98.4 °F (36.9 °C)] 97.7 °F (36.5 °C)  Pulse:  [] 69  Resp:  [16-27] 18  SpO2:  [93 %-99 %] 99 %  BP: (102-150)/(58-68) 111/61     Weight: 128.3 kg (282 lb 13.6 oz)  Body mass index is 36.32 kg/m².     SpO2: 99 %         Intake/Output Summary (Last 24 hours) at 4/2/2024 1205  Last data filed at 4/1/2024 1614  Gross per 24 hour   Intake 200 ml   Output 900 ml   Net -700 ml        Lines/Drains/Airways       Peripheral Intravenous Line  Duration                  Peripheral IV - Single Lumen 03/31/24 2000 20 G Left Antecubital 1 day                       Physical Exam  Vitals and nursing note reviewed.   Constitutional:       General: He is not in acute distress.     Appearance: Normal appearance. He is well-developed. He is not ill-appearing.   HENT:      Head: Normocephalic and atraumatic.      Nose: Nose normal.      Mouth/Throat:      Mouth: Mucous membranes are moist.   Eyes:      Pupils: Pupils are equal, round, and reactive to light.   Neck:      Thyroid: No thyromegaly.      Vascular: No JVD.      Trachea: No tracheal deviation.   Cardiovascular:      Rate and Rhythm: Normal rate and regular rhythm.      Chest Wall: PMI is not displaced.      Pulses: Intact distal pulses.           Radial pulses are 2+ on the right side and 2+ on the left side.        Dorsalis pedis pulses are 2+ on the right side and 2+ on the left side.      Heart sounds: S1 normal and S2 normal. Heart sounds not distant. No murmur heard.     Comments: Remains in SR  Pulmonary:      Effort: Pulmonary effort is normal. No respiratory distress.      Breath sounds: Normal breath sounds. No wheezing.   Abdominal:      General: Bowel sounds are normal. There is no distension.      Palpations: Abdomen is soft.      Tenderness: There is no abdominal tenderness.   Musculoskeletal:         General: No swelling. Normal range of motion.      Cervical back: Full passive range of motion without pain, normal range of motion and neck supple.      Right lower leg: No edema.      Left lower leg: No edema.      Right ankle: No swelling.      Left ankle: No swelling.   Skin:     General: Skin is warm and dry.      Capillary Refill: Capillary refill takes less than 2 seconds.      Nails: There is no clubbing.   Neurological:      General: No focal deficit present.      Mental Status: He is alert and oriented to person, place, and  time.      Motor: No weakness.   Psychiatric:         Speech: Speech normal.         Behavior: Behavior normal.         Thought Content: Thought content normal.         Judgment: Judgment normal.            Significant Labs: BMP:   Recent Labs   Lab 03/31/24  1737 03/31/24 2059 04/01/24 0428 04/02/24  0543   *  --  119* 104     --  141 139   K 3.6  --  3.5 3.5     --  107 106   CO2 19*  --  23 23   BUN 16  --  12 11   CREATININE 0.9  --  0.8 0.8   CALCIUM 9.2  --  8.8 9.0   MG  --  1.8  --   --    , CBC   Recent Labs   Lab 03/31/24 1718 04/01/24 0428 04/02/24  0543   WBC 9.30 8.55 7.55   HGB 15.9 15.5 14.2   HCT 44.2 43.8 40.7    224 202   , and All pertinent lab results from the last 24 hours have been reviewed.    Significant Imaging: Echocardiogram: Transthoracic echo (TTE) complete (Cupid Only):   Results for orders placed or performed during the hospital encounter of 03/31/24   Echo   Result Value Ref Range    Left Atrium Major Axis 4.12 cm    Left Atrium Minor Axis 5.14 cm    RA Major Axis 4.83 cm    LV Diastolic Volume 52.79 mL    LV Systolic Volume 18.89 mL    PV mean gradient 1 mmHg    RVOT peak VTI 9.6 cm    RVOT peak kinjal 0.73 m/s    Ao VTI 19.30 cm    Ao peak kinjal 0.92 m/s    LVOT peak VTI 15.70 cm    LVOT peak kinjal 0.77 m/s    LVOT diameter 2.19 cm    IVRT 95.15 msec    PV peak gradient 3 mmHg    AV mean gradient 2 mmHg    TAPSE 1.98 cm    RVDD 4.02 cm    LA size 3.29 cm    Ascending aorta 3.27 cm    STJ 3.43 cm    LVIDs 2.34 2.1 - 4.0 cm    Ao root annulus 3.44 cm    Posterior Wall 0.89 0.6 - 1.1 cm    IVS 1.18 (A) 0.6 - 1.1 cm    LVIDd 3.55 3.5 - 6.0 cm    PV PEAK VELOCITY 0.92 m/s    Left Ventricular Outflow Tract Mean Gradient 1.37 mmHg    Left Ventricular Outflow Tract Mean Velocity 0.56 cm/s    FS 34 28 - 44 %    LV mass 111.06 g    ZLVIDD -13.15     ZLVIDS -9.58     Left Ventricle Relative Wall Thickness 0.50 cm    AV valve area 3.06 cm²    AV Velocity Ratio 0.84     AV  index (prosthetic) 0.81     LVOT area 3.8 cm2    LVOT stroke volume 59.11 cm3    AV peak gradient 3 mmHg    LV Systolic Volume Index 7.6 mL/m2    LV Diastolic Volume Index 21.29 mL/m2    LV Mass Index 45 g/m2    PARISH by Velocity Ratio 3.15 cm²    BSA 2.55 m2    LA Volume Index 21.1 mL/m2    LA volume 52.44 cm3    LA WIDTH 4.1 cm    RA Width 4.4 cm    Est. RA pres 3 mmHg    Narrative      Left Ventricle: There is normal systolic function with a visually   estimated ejection fraction of 60 - 65%. Unable to assess diastolic   function due to atrial flutter    Right Ventricle: Normal right ventricular cavity size. Wall thickness   is normal. Right ventricle wall motion  is normal. Systolic function is   normal.    IVC/SVC: Normal venous pressure at 3 mmHg.

## 2024-04-02 NOTE — HOSPITAL COURSE
4/2/2024--Patient seen and examined in room, remains in SR 60s at time of exam. S/p CHRIS/DCCV yesterday with restoration of NSR. Continue Amio 200 mg BID, BB, Eliquis 5 mg BID. Can follow up in EP clinic after discharge to discuss AFL ablation. Needs Follow up in CV clinic as well.

## 2024-04-02 NOTE — ASSESSMENT & PLAN NOTE
Continue IV heparin gtt for CVA protection  Continue Cardizem gtt for rate control  Keep NPO for planned CHRIS/DCCV today per Dr Corley to restore NSR  Risks, benefits and treatment alternatives reviewed with patient  He agrees to proceed with procedure as planned  Will need eliquis for Cardio-embolic protection post CHRIS/DCCV.   Further recs to follow    4/2/2024  -Remains in NSR post Cardioversion  -Continue Eliquis 5 mg BID for cardio-embolic protection  -Continue BB, amio 200 mg BID  -Needs follow up with Dr Corley post discharge  -EP clinic follow up after discharge to discuss AFL ablation.   -Clinic will call to arrange

## 2024-04-02 NOTE — PLAN OF CARE
[facility] - Telemetry (Hospital)  Discharge Final Note    Primary Care Provider: Prasanna Gaxiola MD    Expected Discharge Date: 4/2/2024    Final Discharge Note (most recent)       Final Note - 04/02/24 1216          Final Note    Assessment Type Final Discharge Note     Anticipated Discharge Disposition Home or Self Care     Hospital Resources/Appts/Education Provided Appointments scheduled and added to AVS        Post-Acute Status    Coverage [employer]     Discharge Delays None known at this time                     Important Message from Medicare             Contact Info       Prasanna Gaxiola MD   Specialty: Family Medicine   Relationship: PCP - General    [address] [place] [id number]   Phone: [phone]       Next Steps: Follow up    Instructions: As needed    Simran Corley MD   Specialty: Interventional Cardiology, Cardiology    [address]  [place] [address]   Phone: [phone]       Next Steps: Schedule an appointment as soon as possible for a visit in 1 week(s)    Instructions: Hospital discharge follow up          DC Disposition: home with family  Family Notified: Patient at bedside  Transportation: personal transportation    Patient had no equipment or placement needs from .     Patient has PCP hospital follow up with Prasanna Gaxiola MD, on 4/5/2024 at 4 pm.

## 2024-04-02 NOTE — DISCHARGE SUMMARY
[facility] ([facility] Medicine  Discharge Summary      Patient Name: Yung Mcconnell  MRN: [id number]  [facility]: [id number]  Patient Class: IP- Inpatient  Admission Date: 3/31/2024  Hospital Length of Stay: 1 days  Discharge Date and Time:  04/02/2024 11:24 AM  Attending Physician: Ricardo Good MD   Discharging Provider: Ricardo Good MD  Primary Care Provider: Prasanna Gaxiola MD    Primary Care Team: Networked reference to record PCT     HPI:   Patient is a 68-year-old male with past medical history of asthma, COPD, GERD, arthritis, obstructive sleep apnea on CPAP, hyperlipidemia with intolerance to statins, and seasonal allergies who presented to ED with complaints of rapid heart rate noted on apple watch, chest tightness, and shortness of breath.  He reports that he has had some shortness of breath over the past week, however he gets short of breath from time to time with his asthma/COPD said this was not out of the ordinary.  He states that he has never been evaluated by a cardiologist in the past.  He also reported feeling some lightheadedness.  He denies syncope, headache, dizziness, ear pain/fullness, visual changes, abdominal pain, nausea, vomiting, diarrhea, constipation, dysuria, fever, chills.  He reports that the chest tightness that occurred has resolved and he has no pain at this time.  He does not feel short of breath at rest.  Blood pressure stable, heart rate up to 130s.  Lab workup shows troponin 0.031 followed by repeat troponin 0.022, .  EKG shows atrial flutter with 2-1 AV conduction, ST and T-wave abnormality.  He was given IV Cardizem and started on Cardizem drip which is currently at 15 milligrams/hour as well as heparin drip.  Cardiology was consulted per ED, also recommended IV fluids and echo in a.m. hospital Medicine was consulted for admission due to new onset atrial flutter with rapid ventricular rate.     a 68 y.o. male presenting to the ED for SOB over the past  week with chest tightness and a rapid HB onsetting 2 hours pta. Pt has a hx of Afib and a collapsed lung from 3 years ago. Symptoms are constant and moderate in severity. No mitigating or exacerbating factors reported. Associated sxs include lightheadedness, a headache, sore throat, and wheezing which subsided earlier this week. Patient denies any nausea, vomiting, diaphoresis, bloody stool, black tarry stool, and all other sxs at this time. No further complaints or concerns at this time.        Procedure(s) (LRB):  Transesophageal echo (CHRIS) intra-procedure log documentation (N/A)      Hospital Course:   4/1  Admitted for a-flutter/RVR  On cardizem and heparin drips on admission  Underwent CHRIS/DCCV this afternoon with Dr. Corley  Post-procedure, started on amiodarone, metoprolol  Heparin drip stopped, started on apixaban    4/2  NAEON,in sinus rhythm  Denies complaints  Stable for discharge home  Prescribed amiodarone, metoprolol and apixaban  F/U with cardiology in 1-2 weeks     Goals of Care Treatment Preferences:  Code Status: Full Code      Consults:   Consults (From admission, onward)          Status Ordering Provider     Inpatient consult to Cardiology  Once        Provider:  Yesica Saba MD    Completed [place] SONI            No new Assessment & Plan notes have been filed under this hospital service since the last note was generated.  Service: Hospital Medicine    Final Active Diagnoses:    Diagnosis Date Noted POA    PRINCIPAL PROBLEM:  Atrial flutter with rapid ventricular response [I48.92] 03/31/2024 Yes    Statin intolerance [Z78.9] 12/16/2019 Yes    Seasonal allergic rhinitis [J30.2] 07/11/2017 Yes    Asthma with COPD [J44.89] 01/03/2017 Yes    KEVEN (obstructive sleep apnea) [G47.33] 07/22/2013 Yes      Problems Resolved During this Admission:       Discharged Condition: stable    Disposition: Home or Self Care    Follow Up:   Follow-up Information       Prasanna Gaxiola MD Follow up.     Specialty: Family Medicine  Why: As needed  Contact information:  [address] [facility] [address]  [phone]               Simran Corley MD. Schedule an appointment as soon as possible for a visit in 1 week(s).    Specialties: Interventional Cardiology, Cardiology  Why: Hospital discharge follow up  Contact information:  [address]  [place] [address]  [phone]                           Patient Instructions:      ACCEPT - Ambulatory referral/consult to Cardiac Electrophysiology   Standing Status: Future   Referral Priority: Routine Referral Type: Consultation   Referral Reason: Specialty Services Required   Requested Specialty: Cardiology   Number of Visits Requested: 1     Activity as tolerated       Significant Diagnostic Studies: Labs: All labs within the past 24 hours have been reviewed    Pending Diagnostic Studies:       Procedure Component Value Units Date/Time    Transesophageal echo (CHRIS) with possible cardioversion [[id number]]     Order Status: Sent Lab Status: No result            Medications:  Reconciled Home Medications:      Medication List        START taking these medications      amiodarone 200 MG Tab  Commonly known as: PACERONE  Take 1 tablet (200 mg total) by mouth 2 (two) times daily.     apixaban 5 mg Tab  Commonly known as: ELIQUIS  Take 1 tablet (5 mg total) by mouth 2 (two) times daily.     metoprolol succinate 25 MG 24 hr tablet  Commonly known as: TOPROL-XL  Take 1 tablet (25 mg total) by mouth once daily.  Start taking on: April 3, 2024            CONTINUE taking these medications      * albuterol 2.5 mg /3 mL (0.083 %) nebulizer solution  Commonly known as: PROVENTIL  Take 3 mLs (2.5 mg total) by nebulization every 4 to 6 hours as needed for Wheezing or Shortness of Breath.     * albuterol 90 mcg/actuation inhaler  Commonly known as: PROVENTIL/VENTOLIN HFA  Inhale 2 puffs into the lungs every 4 (four) hours as needed for Wheezing or Shortness of  Breath.     fluticasone propionate 50 mcg/actuation nasal spray  Commonly known as: FLONASE  2 sprays (100 mcg total) by Each Nostril route once daily.     tadalafiL 20 MG Tab  Commonly known as: CIALIS  TAKE 1 TABLET DAILY AS NEEDED     TRELEGY ELLIPTA 200-62.5-25 mcg inhaler  Generic drug: fluticasone-umeclidin-vilanter  Inhale 1 puff into the lungs once daily. Wash out mouth after use           * This list has 2 medication(s) that are the same as other medications prescribed for you. Read the directions carefully, and ask your doctor or other care provider to review them with you.                  Indwelling Lines/Drains at time of discharge:   Lines/Drains/Airways       None                   Time spent on the discharge of patient: 40 minutes         Ricardo Good MD  Department of Hospital Medicine  [facility] ([facility])

## 2024-04-02 NOTE — PROGRESS NOTES
[facility] ([facility])  Cardiology  Progress Note    Patient Name: Yung Mcconnell  MRN: [id number]  Admission Date: 3/31/2024  Hospital Length of Stay: 1 days  Code Status: Full Code   Attending Physician: Ricardo Good MD   Primary Care Physician: Prasanna Gaxiola MD  Expected Discharge Date: 4/2/2024  Principal Problem:Atrial flutter with rapid ventricular response    Subjective:   HPI  Yung Mcconnell is a 68 year old male who presented to [facility] due to fatigue, palpitations, rapid heart beat, chest tightness and shortness of breath for at least 2 weeks. His current medical conditions include Asthma, COPD, GERD, KEVEN on CPAP, HTN, HLP, intolerant to statins. ED workup revealed Troponin 0.031>>0.022, , EKG revealed AFL 2:1 AV conduction. He was started on IV Cardizem gtt and IV heparin gtt. He was placed in observation under the care of hospital medicine. Cardiology consulted to assist with medical management. Chart reviewed, patient seen and examined in room with wife at bedside. He remains in AFL, V rates 120s with continued palps and shortness of breath with exertional activities. ECHO pending. Plan for CHRIS/DCCV to restore NSR today per Dr Corley. Denies chest pain on exam today. Remains comfortable at rest today. Further recs to follow.     Hospital Course:   4/2/2024--Patient seen and examined in room, remains in SR 60s at time of exam. S/p CHRIS/DCCV yesterday with restoration of NSR. Continue Amio 200 mg BID, BB, Eliquis 5 mg BID. Can follow up in EP clinic after discharge to discuss AFL ablation. Needs Follow up in CV clinic as well.     Interval History: no acute issues noted o/n. Remains in SR. Needs follow up in CV and EP clinic after discharge.     Review of Systems   Constitutional: Positive for malaise/fatigue.   HENT:  Negative for hearing loss and hoarse voice.    Eyes:  Negative for blurred vision and visual disturbance.   Cardiovascular:  Negative for chest pain, claudication, dyspnea  on exertion, irregular heartbeat, leg swelling, near-syncope, orthopnea, palpitations, paroxysmal nocturnal dyspnea and syncope.   Respiratory:  Negative for cough, hemoptysis, shortness of breath, sleep disturbances due to breathing, snoring and wheezing.    Endocrine: Negative for cold intolerance and heat intolerance.   Hematologic/Lymphatic: Does not bruise/bleed easily.   Skin:  Negative for color change, dry skin and nail changes.   Musculoskeletal:  Positive for arthritis and back pain. Negative for joint pain and myalgias.   Gastrointestinal:  Negative for bloating, abdominal pain, constipation, nausea and vomiting.   Genitourinary:  Negative for dysuria, flank pain, hematuria and hesitancy.   Neurological:  Negative for headaches, light-headedness, loss of balance, numbness, paresthesias and weakness.   Psychiatric/Behavioral:  Negative for altered mental status.    Allergic/Immunologic: Negative for environmental allergies.     Objective:     Vital Signs (Most Recent):  Temp: 97.7 °F (36.5 °C) (04/02/24 0735)  Pulse: 69 (04/02/24 0735)  Resp: 18 (04/02/24 0735)  BP: 111/61 (04/02/24 0735)  SpO2: 99 % (04/02/24 0735) Vital Signs (24h Range):  Temp:  [97.7 °F (36.5 °C)-98.4 °F (36.9 °C)] 97.7 °F (36.5 °C)  Pulse:  [] 69  Resp:  [16-27] 18  SpO2:  [93 %-99 %] 99 %  BP: (102-150)/(58-68) 111/61     Weight: 128.3 kg (282 lb 13.6 oz)  Body mass index is 36.32 kg/m².     SpO2: 99 %         Intake/Output Summary (Last 24 hours) at 4/2/2024 1205  Last data filed at 4/1/2024 1614  Gross per 24 hour   Intake 200 ml   Output 900 ml   Net -700 ml       Lines/Drains/Airways       Peripheral Intravenous Line  Duration                  Peripheral IV - Single Lumen 03/31/24 2000 20 G Left Antecubital 1 day                       Physical Exam  Vitals and nursing note reviewed.   Constitutional:       General: He is not in acute distress.     Appearance: Normal appearance. He is well-developed. He is not ill-appearing.    HENT:      Head: Normocephalic and atraumatic.      Nose: Nose normal.      Mouth/Throat:      Mouth: Mucous membranes are moist.   Eyes:      Pupils: Pupils are equal, round, and reactive to light.   Neck:      Thyroid: No thyromegaly.      Vascular: No JVD.      Trachea: No tracheal deviation.   Cardiovascular:      Rate and Rhythm: Normal rate and regular rhythm.      Chest Wall: PMI is not displaced.      Pulses: Intact distal pulses.           Radial pulses are 2+ on the right side and 2+ on the left side.        Dorsalis pedis pulses are 2+ on the right side and 2+ on the left side.      Heart sounds: S1 normal and S2 normal. Heart sounds not distant. No murmur heard.     Comments: Remains in SR  Pulmonary:      Effort: Pulmonary effort is normal. No respiratory distress.      Breath sounds: Normal breath sounds. No wheezing.   Abdominal:      General: Bowel sounds are normal. There is no distension.      Palpations: Abdomen is soft.      Tenderness: There is no abdominal tenderness.   Musculoskeletal:         General: No swelling. Normal range of motion.      Cervical back: Full passive range of motion without pain, normal range of motion and neck supple.      Right lower leg: No edema.      Left lower leg: No edema.      Right ankle: No swelling.      Left ankle: No swelling.   Skin:     General: Skin is warm and dry.      Capillary Refill: Capillary refill takes less than 2 seconds.      Nails: There is no clubbing.   Neurological:      General: No focal deficit present.      Mental Status: He is alert and oriented to person, place, and time.      Motor: No weakness.   Psychiatric:         Speech: Speech normal.         Behavior: Behavior normal.         Thought Content: Thought content normal.         Judgment: Judgment normal.            Significant Labs: BMP:   Recent Labs   Lab 03/31/24  1737 03/31/24  2059 04/01/24  0428 04/02/24  0543   *  --  119* 104     --  141 139   K 3.6  --  3.5  3.5     --  107 106   CO2 19*  --  23 23   BUN 16  --  12 11   CREATININE 0.9  --  0.8 0.8   CALCIUM 9.2  --  8.8 9.0   MG  --  1.8  --   --    , CBC   Recent Labs   Lab 03/31/24  1718 04/01/24  0428 04/02/24  0543   WBC 9.30 8.55 7.55   HGB 15.9 15.5 14.2   HCT 44.2 43.8 40.7    224 202   , and All pertinent lab results from the last 24 hours have been reviewed.    Significant Imaging: Echocardiogram: Transthoracic echo (TTE) complete (C[facility] Only):   Results for orders placed or performed during the hospital encounter of 03/31/24   Echo   Result Value Ref Range    Left Atrium Major Axis 4.12 cm    Left Atrium Minor Axis 5.14 cm    RA Major Axis 4.83 cm    LV Diastolic Volume 52.79 mL    LV Systolic Volume 18.89 mL    PV mean gradient 1 mmHg    RVOT peak VTI 9.6 cm    RVOT peak kinjal 0.73 m/s    Ao VTI 19.30 cm    Ao peak kinjal 0.92 m/s    LVOT peak VTI 15.70 cm    LVOT peak kinjal 0.77 m/s    LVOT diameter 2.19 cm    IVRT 95.15 msec    PV peak gradient 3 mmHg    AV mean gradient 2 mmHg    TAPSE 1.98 cm    RVDD 4.02 cm    LA size 3.29 cm    Ascending aorta 3.27 cm    STJ 3.43 cm    LVIDs 2.34 2.1 - 4.0 cm    Ao root annulus 3.44 cm    Posterior Wall 0.89 0.6 - 1.1 cm    IVS 1.18 (A) 0.6 - 1.1 cm    LVIDd 3.55 3.5 - 6.0 cm    PV PEAK VELOCITY 0.92 m/s    Left Ventricular Outflow Tract Mean Gradient 1.37 mmHg    Left Ventricular Outflow Tract Mean Velocity 0.56 cm/s    FS 34 28 - 44 %    LV mass 111.06 g    ZLVIDD -13.15     ZLVIDS -9.58     Left Ventricle Relative Wall Thickness 0.50 cm    AV valve area 3.06 cm²    AV Velocity Ratio 0.84     AV index (prosthetic) 0.81     LVOT area 3.8 cm2    LVOT stroke volume 59.11 cm3    AV peak gradient 3 mmHg    LV Systolic Volume Index 7.6 mL/m2    LV Diastolic Volume Index 21.29 mL/m2    LV Mass Index 45 g/m2    PARISH by Velocity Ratio 3.15 cm²    BSA 2.55 m2    LA Volume Index 21.1 mL/m2    LA volume 52.44 cm3    LA WIDTH 4.1 cm    RA Width 4.4 cm    Est. RA pres 3 mmHg     Narrative      Left Ventricle: There is normal systolic function with a visually   estimated ejection fraction of 60 - 65%. Unable to assess diastolic   function due to atrial flutter    Right Ventricle: Normal right ventricular cavity size. Wall thickness   is normal. Right ventricle wall motion  is normal. Systolic function is   normal.    IVC/SVC: Normal venous pressure at 3 mmHg.       Assessment and Plan:       * Atrial flutter with rapid ventricular response  Continue IV heparin gtt for CVA protection  Continue Cardizem gtt for rate control  Keep NPO for planned CHRIS/DCCV today per Dr Corley to restore NSR  Risks, benefits and treatment alternatives reviewed with patient  He agrees to proceed with procedure as planned  Will need eliquis for Cardio-embolic protection post CHRIS/DCCV.   Further recs to follow    4/2/2024  -Remains in NSR post Cardioversion  -Continue Eliquis 5 mg BID for cardio-embolic protection  -Continue BB, amio 200 mg BID  -Needs follow up with Dr Corley post discharge  -EP clinic follow up after discharge to discuss AFL ablation.   -Clinic will call to arrange      Statin intolerance  Monitor    KEVEN (obstructive sleep apnea)  Continue nightly cpap therapy        VTE Risk Mitigation (From admission, onward)           Ordered     apixaban tablet 5 mg  2 times daily         04/01/24 1735     Reason for No Pharmacological VTE Prophylaxis  Once        Comments: On Heparin drip   Question:  Reasons:  Answer:  Physician Provided (leave comment)    03/31/24 2053     IP VTE HIGH RISK PATIENT  Once         03/31/24 2053     Place sequential compression device  Until discontinued         03/31/24 2053                    SILVANO Tao  Cardiology  [facility] ([facility])

## 2024-04-03 ENCOUNTER — PATIENT MESSAGE (OUTPATIENT)
Dept: PULMONOLOGY | Facility: CLINIC | Age: 69
End: 2024-04-03
Payer: MEDICARE

## 2024-04-05 ENCOUNTER — OFFICE VISIT (OUTPATIENT)
Dept: FAMILY MEDICINE | Facility: CLINIC | Age: 69
End: 2024-04-05
Payer: MEDICARE

## 2024-04-05 VITALS
DIASTOLIC BLOOD PRESSURE: 70 MMHG | SYSTOLIC BLOOD PRESSURE: 128 MMHG | OXYGEN SATURATION: 95 % | BODY MASS INDEX: 35.77 KG/M2 | HEART RATE: 69 BPM | WEIGHT: 278.75 LBS | HEIGHT: 74 IN | RESPIRATION RATE: 18 BRPM | TEMPERATURE: 100 F

## 2024-04-05 DIAGNOSIS — G47.33 OSA (OBSTRUCTIVE SLEEP APNEA): ICD-10-CM

## 2024-04-05 DIAGNOSIS — G72.0 STATIN MYOPATHY: ICD-10-CM

## 2024-04-05 DIAGNOSIS — I48.92 ATRIAL FLUTTER WITH RAPID VENTRICULAR RESPONSE: Primary | ICD-10-CM

## 2024-04-05 DIAGNOSIS — E78.5 HYPERLIPIDEMIA, UNSPECIFIED HYPERLIPIDEMIA TYPE: ICD-10-CM

## 2024-04-05 DIAGNOSIS — T46.6X5A STATIN MYOPATHY: ICD-10-CM

## 2024-04-05 DIAGNOSIS — J44.89 ASTHMA WITH COPD: ICD-10-CM

## 2024-04-05 PROCEDURE — 3288F FALL RISK ASSESSMENT DOCD: CPT | Mod: CPTII,S$GLB,, | Performed by: FAMILY MEDICINE

## 2024-04-05 PROCEDURE — 3078F DIAST BP <80 MM HG: CPT | Mod: CPTII,S$GLB,, | Performed by: FAMILY MEDICINE

## 2024-04-05 PROCEDURE — 1101F PT FALLS ASSESS-DOCD LE1/YR: CPT | Mod: CPTII,S$GLB,, | Performed by: FAMILY MEDICINE

## 2024-04-05 PROCEDURE — 1111F DSCHRG MED/CURRENT MED MERGE: CPT | Mod: CPTII,S$GLB,, | Performed by: FAMILY MEDICINE

## 2024-04-05 PROCEDURE — 99214 OFFICE O/P EST MOD 30 MIN: CPT | Mod: S$GLB,,, | Performed by: FAMILY MEDICINE

## 2024-04-05 PROCEDURE — 3074F SYST BP LT 130 MM HG: CPT | Mod: CPTII,S$GLB,, | Performed by: FAMILY MEDICINE

## 2024-04-05 PROCEDURE — 1159F MED LIST DOCD IN RCRD: CPT | Mod: CPTII,S$GLB,, | Performed by: FAMILY MEDICINE

## 2024-04-05 PROCEDURE — 99999 PR PBB SHADOW E&M-EST. PATIENT-LVL III: CPT | Mod: PBBFAC,,, | Performed by: FAMILY MEDICINE

## 2024-04-05 PROCEDURE — 1126F AMNT PAIN NOTED NONE PRSNT: CPT | Mod: CPTII,S$GLB,, | Performed by: FAMILY MEDICINE

## 2024-04-05 PROCEDURE — 3008F BODY MASS INDEX DOCD: CPT | Mod: CPTII,S$GLB,, | Performed by: FAMILY MEDICINE

## 2024-04-05 NOTE — PROGRESS NOTES
Subjective:       Patient ID: Yung Mcconnell is a 68 y.o. male.    Chief Complaint: Post-op Problem      HPI Comments:       Current Outpatient Medications:     albuterol (PROVENTIL) 2.5 mg /3 mL (0.083 %) nebulizer solution, Take 3 mLs (2.5 mg total) by nebulization every 4 to 6 hours as needed for Wheezing or Shortness of Breath., Disp: 360 mL, Rfl: 11    albuterol (PROVENTIL/VENTOLIN HFA) 90 mcg/actuation inhaler, Inhale 2 puffs into the lungs every 4 (four) hours as needed for Wheezing or Shortness of Breath., Disp: 54 g, Rfl: 3    amiodarone (PACERONE) 200 MG Tab, Take 1 tablet (200 mg total) by mouth 2 (two) times daily., Disp: 60 tablet, Rfl: 11    apixaban (ELIQUIS) 5 mg Tab, Take 1 tablet (5 mg total) by mouth 2 (two) times daily., Disp: 60 tablet, Rfl: 5    fluticasone propionate (FLONASE) 50 mcg/actuation nasal spray, 2 sprays (100 mcg total) by Each Nostril route once daily., Disp: 48 g, Rfl: 3    fluticasone-umeclidin-vilanter (TRELEGY ELLIPTA) 200-62.5-25 mcg inhaler, Inhale 1 puff into the lungs once daily. Wash out mouth after use, Disp: 180 each, Rfl: 3    metoprolol succinate (TOPROL-XL) 25 MG 24 hr tablet, Take 1 tablet (25 mg total) by mouth once daily., Disp: 30 tablet, Rfl: 11    tadalafiL (CIALIS) 20 MG Tab, TAKE 1 TABLET DAILY AS NEEDED, Disp: 24 tablet, Rfl: 2      Transitional Care Note    Family and/or Caretaker present at visit?  No.  Diagnostic tests reviewed/disposition: No diagnosic tests pending after this hospitalization.  Disease/illness education:  Atrial flutter  Home health/community services discussion/referrals: Patient does not have home health established from hospital visit.  They do not need home health.  If needed, we will set up home health for the patient.   Establishment or re-establishment of referral orders for community resources: No other necessary community resources.   Discussion with other health care providers: No discussion with other health care providers  "necessary.                "HPI:   Patient is a 68-year-old male with past medical history of asthma, COPD, GERD, arthritis, obstructive sleep apnea on CPAP, hyperlipidemia with intolerance to statins, and seasonal allergies who presented to ED with complaints of rapid heart rate noted on apple watch, chest tightness, and shortness of breath.  He reports that he has had some shortness of breath over the past week, however he gets short of breath from time to time with his asthma/COPD said this was not out of the ordinary.  He states that he has never been evaluated by a cardiologist in the past.  He also reported feeling some lightheadedness.  He denies syncope, headache, dizziness, ear pain/fullness, visual changes, abdominal pain, nausea, vomiting, diarrhea, constipation, dysuria, fever, chills.  He reports that the chest tightness that occurred has resolved and he has no pain at this time.  He does not feel short of breath at rest.  Blood pressure stable, heart rate up to 130s.  Lab workup shows troponin 0.031 followed by repeat troponin 0.022, .  EKG shows atrial flutter with 2-1 AV conduction, ST and T-wave abnormality.  He was given IV Cardizem and started on Cardizem drip which is currently at 15 milligrams/hour as well as heparin drip.  Cardiology was consulted per ED, also recommended IV fluids and echo in a.m. hospital Medicine was consulted for admission due to new onset atrial flutter with rapid ventricular rate.      a 68 y.o. male presenting to the ED for SOB over the past week with chest tightness and a rapid HB onsetting 2 hours pta. Pt has a hx of Afib and a collapsed lung from 3 years ago. Symptoms are constant and moderate in severity. No mitigating or exacerbating factors reported. Associated sxs include lightheadedness, a headache, sore throat, and wheezing which subsided earlier this week. Patient denies any nausea, vomiting, diaphoresis, bloody stool, black tarry stool, and all other sxs " "at this time. No further complaints or concerns at this time.         Procedure(s) (LRB):  Transesophageal echo (CHRIS) intra-procedure log documentation (N/A)       Hospital Course:   4/1  Admitted for a-flutter/RVR  On cardizem and heparin drips on admission  Underwent CHRIS/DCCV this afternoon with Dr. Corley  Post-procedure, started on amiodarone, metoprolol  Heparin drip stopped, started on apixaban     4/2  NAEON,in sinus rhythm  Denies complaints  Stable for discharge home  Prescribed amiodarone, metoprolol and apixaban  F/U with cardiology in 1-2 weeks"     He has been home 3 days now.  Doing well.  No symptoms.  Follow-up in a few weeks for arrhythmia Clinic and possible ablation.  No problems with the amiodarone and metoprolol.    Stop Trelegy because it was not working very good.  Has been using his albuterol a bit more lately because of allergy symptoms.      Compliant with CPAP         Review of Systems   Constitutional:  Negative for activity change, appetite change and fever.   HENT:  Negative for sore throat.    Respiratory:  Negative for cough and shortness of breath.    Cardiovascular:  Negative for chest pain.   Gastrointestinal:  Negative for abdominal pain, diarrhea and nausea.   Genitourinary:  Negative for difficulty urinating.   Musculoskeletal:  Negative for arthralgias and myalgias.   Neurological:  Negative for dizziness and headaches.       Objective:      Vitals:    04/05/24 1533   BP: 128/70   Pulse: 69   Resp: 18   Temp: 99.8 °F (37.7 °C)   TempSrc: Tympanic   SpO2: 95%   Weight: 126.5 kg (278 lb 12.4 oz)   Height: 6' 2" (1.88 m)   PainSc: 0-No pain     Physical Exam  Vitals and nursing note reviewed.   Constitutional:       General: He is not in acute distress.     Appearance: He is well-developed. He is not diaphoretic.   HENT:      Head: Normocephalic.   Neck:      Thyroid: No thyromegaly.   Cardiovascular:      Rate and Rhythm: Normal rate and regular rhythm.      Heart sounds: " Normal heart sounds. No murmur heard.  Pulmonary:      Effort: Pulmonary effort is normal.      Breath sounds: Normal breath sounds. No wheezing or rales.   Abdominal:      General: There is no distension.      Palpations: Abdomen is soft.   Musculoskeletal:      Cervical back: Neck supple.      Right lower leg: No edema.      Left lower leg: No edema.   Lymphadenopathy:      Cervical: No cervical adenopathy.   Skin:     General: Skin is warm and dry.   Neurological:      Mental Status: He is alert and oriented to person, place, and time.   Psychiatric:         Mood and Affect: Mood normal.         Behavior: Behavior normal.         Thought Content: Thought content normal.         Judgment: Judgment normal.         Assessment:       1. Atrial flutter with rapid ventricular response    2. Statin myopathy    3. Hyperlipidemia, unspecified hyperlipidemia type    4. Asthma with COPD    5. KEVEN (obstructive sleep apnea)        Plan:   Atrial flutter with rapid ventricular response  Comments:  Status post cardioversion.  On amiodarone and beta-blocker.  Ablation follow-up later    Statin myopathy  Comments:  He is interested in getting on Reclast    Hyperlipidemia, unspecified hyperlipidemia type  Comments:      Asthma with COPD  Comments:  increased use of albuterol recently.  Stopped  Trelegy due to poor efficacy and increase cost    KEVEN (obstructive sleep apnea)  Comments:  Regular compliance with CPAP

## 2024-04-11 DIAGNOSIS — Z00.00 ROUTINE ADULT HEALTH MAINTENANCE: ICD-10-CM

## 2024-04-11 DIAGNOSIS — Z76.89 ENCOUNTER TO ESTABLISH CARE: Primary | ICD-10-CM

## 2024-04-15 ENCOUNTER — PATIENT OUTREACH (OUTPATIENT)
Dept: ADMINISTRATIVE | Facility: OTHER | Age: 69
End: 2024-04-15
Payer: MEDICARE

## 2024-04-15 ENCOUNTER — TELEPHONE (OUTPATIENT)
Dept: ADMINISTRATIVE | Facility: CLINIC | Age: 69
End: 2024-04-15
Payer: MEDICARE

## 2024-04-15 NOTE — PROGRESS NOTES
CHW - Initial Contact    This Community Health Worker completed OR updated the Social Determinant of Health questionnaire with patient via telephone today.    Pt identified barriers of most importance are: Requesting help to pay for CPAP supplies. I'm going to see if I can find a kel to help.    Referrals to community agencies completed with patient/caregiver consent outside of [facility] include: yes  Referrals were put through [facility] - no:   Support and Services: Goods or Supplies  Other information discussed the patient needs / wants help with: SDOH   Follow up required: yes  Follow-up Outreach - Due: 4/23/2024

## 2024-04-15 NOTE — PROGRESS NOTES
Requesting help to pay for CPAP supplies. I'm going to see if I can find a kel to help. I will follow on [facility] platform.

## 2024-04-18 ENCOUNTER — PATIENT MESSAGE (OUTPATIENT)
Dept: PULMONOLOGY | Facility: CLINIC | Age: 69
End: 2024-04-18

## 2024-04-18 ENCOUNTER — OFFICE VISIT (OUTPATIENT)
Dept: PULMONOLOGY | Facility: CLINIC | Age: 69
End: 2024-04-18
Payer: MEDICARE

## 2024-04-18 ENCOUNTER — CLINICAL SUPPORT (OUTPATIENT)
Dept: PULMONOLOGY | Facility: CLINIC | Age: 69
End: 2024-04-18
Payer: MEDICARE

## 2024-04-18 ENCOUNTER — TELEPHONE (OUTPATIENT)
Dept: PULMONOLOGY | Facility: CLINIC | Age: 69
End: 2024-04-18

## 2024-04-18 VITALS
BODY MASS INDEX: 35.59 KG/M2 | DIASTOLIC BLOOD PRESSURE: 80 MMHG | RESPIRATION RATE: 18 BRPM | HEART RATE: 55 BPM | OXYGEN SATURATION: 95 % | SYSTOLIC BLOOD PRESSURE: 132 MMHG | WEIGHT: 277.31 LBS | HEIGHT: 74 IN

## 2024-04-18 DIAGNOSIS — J44.89 ASTHMA WITH COPD: ICD-10-CM

## 2024-04-18 DIAGNOSIS — I48.92 ATRIAL FLUTTER WITH RAPID VENTRICULAR RESPONSE: Primary | ICD-10-CM

## 2024-04-18 DIAGNOSIS — J44.89 ASTHMA WITH COPD: Primary | ICD-10-CM

## 2024-04-18 DIAGNOSIS — G47.34 NOCTURNAL HYPOXEMIA: ICD-10-CM

## 2024-04-18 DIAGNOSIS — J45.40 ASTHMA, CHRONIC, MODERATE PERSISTENT, UNCOMPLICATED: ICD-10-CM

## 2024-04-18 DIAGNOSIS — J45.20 MILD INTERMITTENT ASTHMA, UNCOMPLICATED: ICD-10-CM

## 2024-04-18 DIAGNOSIS — J30.1 SEASONAL ALLERGIC RHINITIS DUE TO POLLEN: ICD-10-CM

## 2024-04-18 DIAGNOSIS — G47.33 OSA ON CPAP: Primary | ICD-10-CM

## 2024-04-18 DIAGNOSIS — J45.31 MILD PERSISTENT ASTHMA WITH ACUTE EXACERBATION: ICD-10-CM

## 2024-04-18 DIAGNOSIS — G47.33 OSA ON CPAP: ICD-10-CM

## 2024-04-18 PROCEDURE — 3288F FALL RISK ASSESSMENT DOCD: CPT | Mod: CPTII,S$GLB,, | Performed by: INTERNAL MEDICINE

## 2024-04-18 PROCEDURE — 3075F SYST BP GE 130 - 139MM HG: CPT | Mod: CPTII,S$GLB,, | Performed by: INTERNAL MEDICINE

## 2024-04-18 PROCEDURE — 95012 NITRIC OXIDE EXP GAS DETER: CPT | Mod: S$GLB,,, | Performed by: INTERNAL MEDICINE

## 2024-04-18 PROCEDURE — 94762 N-INVAS EAR/PLS OXIMTRY CONT: CPT | Mod: 59,S$GLB,, | Performed by: INTERNAL MEDICINE

## 2024-04-18 PROCEDURE — 99999 PR PBB SHADOW E&M-EST. PATIENT-LVL IV: CPT | Mod: PBBFAC,,, | Performed by: INTERNAL MEDICINE

## 2024-04-18 PROCEDURE — 99999 PR PBB SHADOW E&M-EST. PATIENT-LVL I: CPT | Mod: PBBFAC,,,

## 2024-04-18 PROCEDURE — 1111F DSCHRG MED/CURRENT MED MERGE: CPT | Mod: CPTII,S$GLB,, | Performed by: INTERNAL MEDICINE

## 2024-04-18 PROCEDURE — 1101F PT FALLS ASSESS-DOCD LE1/YR: CPT | Mod: CPTII,S$GLB,, | Performed by: INTERNAL MEDICINE

## 2024-04-18 PROCEDURE — 99214 OFFICE O/P EST MOD 30 MIN: CPT | Mod: 25,S$GLB,, | Performed by: INTERNAL MEDICINE

## 2024-04-18 PROCEDURE — 3079F DIAST BP 80-89 MM HG: CPT | Mod: CPTII,S$GLB,, | Performed by: INTERNAL MEDICINE

## 2024-04-18 PROCEDURE — 3008F BODY MASS INDEX DOCD: CPT | Mod: CPTII,S$GLB,, | Performed by: INTERNAL MEDICINE

## 2024-04-18 RX ORDER — PREDNISONE 20 MG/1
TABLET ORAL
Qty: 20 TABLET | Refills: 0 | Status: SHIPPED | OUTPATIENT
Start: 2024-04-18 | End: 2024-05-02

## 2024-04-18 RX ORDER — LEVALBUTEROL INHALATION SOLUTION 1.25 MG/3ML
1 SOLUTION RESPIRATORY (INHALATION)
Qty: 288 EACH | Refills: 11 | Status: SHIPPED | OUTPATIENT
Start: 2024-04-18 | End: 2024-05-02 | Stop reason: SDUPTHER

## 2024-04-18 RX ORDER — LEVALBUTEROL INHALATION SOLUTION 1.25 MG/3ML
1 SOLUTION RESPIRATORY (INHALATION)
Qty: 288 EACH | Refills: 11 | Status: SHIPPED | OUTPATIENT
Start: 2024-04-18 | End: 2024-04-18 | Stop reason: SDUPTHER

## 2024-04-18 RX ORDER — DOXYCYCLINE 100 MG/1
100 CAPSULE ORAL EVERY 12 HOURS
Qty: 20 CAPSULE | Refills: 0 | Status: SHIPPED | OUTPATIENT
Start: 2024-04-18 | End: 2024-05-02 | Stop reason: ALTCHOICE

## 2024-04-18 RX ORDER — FLUTICASONE PROPIONATE AND SALMETEROL 250; 50 UG/1; UG/1
1 POWDER RESPIRATORY (INHALATION) 2 TIMES DAILY
Qty: 60 EACH | Refills: 3 | Status: SHIPPED | OUTPATIENT
Start: 2024-04-18 | End: 2024-04-18 | Stop reason: SINTOL

## 2024-04-18 RX ORDER — METOPROLOL TARTRATE 25 MG/1
25 TABLET, FILM COATED ORAL DAILY
COMMUNITY
End: 2024-04-19

## 2024-04-18 NOTE — PROGRESS NOTES
Subjective:   Patient ID:  Yung Mcconnell is a 68 y.o. male who presents for evaluation of No chief complaint on file.      HPI    Yung Mcconnell is a 68 year old male who presents to Arrhythmia clinic for AFL follow up. He was recently admitted to [facility] due AFL/RVR and underwent CHRIS/DCCV with restoration to NSR.     His current medical conditions include HLP, KEVEN, COPD, AFL on Eliquis/Amio.    EKG- Sinus bradycardia, HR 54 QTc 440 ms.     Using cpap nightly.    Denies chest pain or anginal equivalents. No shortness of breath, STEPHENSON or palpitations. Denies orthopnea, PND or abdominal bloating. Reports regular walking without any issues lately. NO leg swelling or claudications. No recent falls, syncope or near syncopal events. Reports compliance with medications and dietary restrictions. NO CNS complaints to suggest TIA or CVA today. No signs of abnormal bleeding on eliquis.      Past Medical History:   Diagnosis Date    Allergy     Asthma, chronic 7/9/2013    Colon polyps     COPD (chronic obstructive pulmonary disease)     GERD (gastroesophageal reflux disease)     High cholesterol     Osteoarthritis of both knees 7/9/2013    Sleep apnea        Past Surgical History:   Procedure Laterality Date    CHOLECYSTECTOMY      COLONOSCOPY N/A 10/23/2020    Procedure: COLONOSCOPY;  Surgeon: Sae Valentine MD;  Location: [facility] ENDO;  Service: Endoscopy;  Laterality: N/A;    DIAGNOSTIC LAPAROSCOPY N/A 6/21/2018    Procedure: LAPAROSCOPY, DIAGNOSTIC;  Surgeon: Casper Martinez MD;  Location: [facility] OR;  Service: General;  Laterality: N/A;    LUNG SURGERY Right     benign mass    TOTAL KNEE ARTHROPLASTY Left     TRANSESOPHAGEAL ECHOCARDIOGRAM WITH POSSIBLE CARDIOVERSION (CHRIS W/ POSS CARDIOVERSION) N/A 4/1/2024    Procedure: Transesophageal echo (CHRIS) intra-procedure log documentation;  Surgeon: Simran Corley MD;  Location: [facility] CATH LAB;  Service: Cardiology;  Laterality: N/A;    UMBILICAL HERNIA REPAIR N/A 6/21/2018     Procedure: REPAIR, HERNIA, UMBILICAL, AGE 5 YEARS OR OLDER;  Surgeon: Casper Martinez MD;  Location: [facility] OR;  Service: General;  Laterality: N/A;       Social History     Tobacco Use    Smoking status: Never     Passive exposure: Past    Smokeless tobacco: Never   Substance Use Topics    Alcohol use: Never    Drug use: No       Family History   Problem Relation Name Age of Onset    Hypertension Mother      Diabetes Mother      Cancer Mother          Breast Ca     Heart disease Father          CAD , CHF     Aneurysm Father      Colon polyps Father      Melanoma Neg Hx      Eczema Neg Hx      Lupus Neg Hx      Psoriasis Neg Hx         Wt Readings from Last 3 Encounters:   04/19/24 126.1 kg (278 lb)   04/18/24 125.8 kg (277 lb 5.4 oz)   04/05/24 126.5 kg (278 lb 12.4 oz)     Temp Readings from Last 3 Encounters:   04/05/24 99.8 °F (37.7 °C) (Tympanic)   04/02/24 97.7 °F (36.5 °C) (Axillary)   07/05/23 98.9 °F (37.2 °C) (Tympanic)     BP Readings from Last 3 Encounters:   04/19/24 (!) 140/70   04/18/24 132/80   04/05/24 128/70     Pulse Readings from Last 3 Encounters:   04/19/24 (!) 53   04/18/24 (!) 55   04/05/24 69       Current Outpatient Medications on File Prior to Visit   Medication Sig Dispense Refill    amiodarone (PACERONE) 200 MG Tab Take 1 tablet (200 mg total) by mouth 2 (two) times daily. 60 tablet 11    apixaban (ELIQUIS) 5 mg Tab Take 1 tablet (5 mg total) by mouth 2 (two) times daily. 60 tablet 5    budesonide-glycopyr-formoterol 160-9-4.8 mcg/actuation HFAA Inhale 2 puffs into the lungs 2 (two) times daily. Wash out mouth after use. 10.7 g 11    doxycycline (VIBRAMYCIN) 100 MG Cap Take 1 capsule (100 mg total) by mouth every 12 (twelve) hours. 20 capsule 0    fluticasone propionate (FLONASE) 50 mcg/actuation nasal spray 2 sprays (100 mcg total) by Each Nostril route once daily. 48 g 3    metoprolol succinate (TOPROL-XL) 25 MG 24 hr tablet Take 1 tablet (25 mg total) by mouth once daily. 30 tablet 11     predniSONE (DELTASONE) 20 MG tablet Prednisone 60 mg/ day for 3 days, 40 mg/day for 3 days,20 mg/ day for 3 days, (1/2 tablet )10 mg a day for 3 days. 20 tablet 0    tadalafiL (CIALIS) 20 MG Tab TAKE 1 TABLET DAILY AS NEEDED 24 tablet 2    [DISCONTINUED] apixaban (ELIQUIS) 5 mg Tab Take 5 mg by mouth 2 (two) times daily.      [DISCONTINUED] metoprolol tartrate (LOPRESSOR) 25 MG tablet Take 25 mg by mouth once daily.      albuterol (PROVENTIL/VENTOLIN HFA) 90 mcg/actuation inhaler Inhale 2 puffs into the lungs every 4 (four) hours as needed for Wheezing or Shortness of Breath. 54 g 3    levalbuterol (XOPENEX) 1.25 mg/3 mL nebulizer solution Take 3 mLs (1.25 mg total) by nebulization every 6 to 8 hours as needed for Wheezing or Shortness of Breath. (Patient not taking: Reported on 4/19/2024) 288 each 11     No current facility-administered medications on file prior to visit.       No cardiac monitor results found for the past 12 months    Results for orders placed during the hospital encounter of 03/31/24    Echo    Interpretation Summary    Left Ventricle: There is normal systolic function with a visually estimated ejection fraction of 60 - 65%. Unable to assess diastolic function due to atrial flutter    Right Ventricle: Normal right ventricular cavity size. Wall thickness is normal. Right ventricle wall motion  is normal. Systolic function is normal.    IVC/SVC: Normal venous pressure at 3 mmHg.    No results found for this or any previous visit.        Results for orders placed or performed during the hospital encounter of 03/31/24   EKG 12-lead    Collection Time: 04/02/24  8:29 AM   Result Value Ref Range    QRS Duration 98 ms    OHS QTC Calculation 454 ms    Narrative    Test Reason : I48.91,    Vent. Rate : 066 BPM     Atrial Rate : 066 BPM     P-R Int : 164 ms          QRS Dur : 098 ms      QT Int : 434 ms       P-R-T Axes : 070 027 -07 degrees     QTc Int : 454 ms    Normal sinus rhythm  Normal ECG  When  compared with ECG of 01-APR-2024 15:43,  Sinus rhythm has replaced Atrial flutter  Vent. rate has decreased BY  60 BPM  Questionable change in QRS duration  Confirmed by BRIAN ALAS MD ([id number]) on 4/2/2024 2:26:36 PM    Referred By: LYNN   SELF           Confirmed By:BRIAN ALAS MD         Review of Systems   Constitutional: Positive for malaise/fatigue.   HENT:  Negative for hearing loss and hoarse voice.    Eyes:  Negative for blurred vision and visual disturbance.   Cardiovascular:  Negative for chest pain, claudication, dyspnea on exertion, irregular heartbeat, leg swelling, near-syncope, orthopnea, palpitations, paroxysmal nocturnal dyspnea and syncope.   Respiratory:  Negative for cough, hemoptysis, shortness of breath, sleep disturbances due to breathing, snoring and wheezing.    Endocrine: Negative for cold intolerance and heat intolerance.   Hematologic/Lymphatic: Does not bruise/bleed easily.   Skin:  Negative for color change, dry skin and nail changes.   Musculoskeletal:  Positive for arthritis and back pain.   Gastrointestinal:  Negative for bloating, abdominal pain, constipation, nausea and vomiting.   Genitourinary:  Negative for dysuria, flank pain, hematuria and hesitancy.   Neurological:  Negative for headaches, light-headedness, loss of balance, numbness, paresthesias and weakness.   Psychiatric/Behavioral:  Negative for altered mental status.          Objective:BP (!) 140/70 (BP Location: Left arm, Patient Position: Sitting)   Pulse (!) 53   Wt 126.1 kg (278 lb)   SpO2 97%   BMI 35.69 kg/m²      Physical Exam  Vitals and nursing note reviewed.   Constitutional:       General: He is not in acute distress.     Appearance: Normal appearance. He is well-developed. He is obese. He is not ill-appearing.   HENT:      Head: Normocephalic and atraumatic.      Nose: Nose normal.      Mouth/Throat:      Mouth: Mucous membranes are moist.   Eyes:      Pupils: Pupils are equal, round, and  reactive to light.   Neck:      Thyroid: No thyromegaly.      Vascular: No JVD.      Trachea: No tracheal deviation.   Cardiovascular:      Rate and Rhythm: Normal rate and regular rhythm.      Chest Wall: PMI is not displaced.      Pulses: Intact distal pulses.           Radial pulses are 2+ on the right side and 2+ on the left side.        Dorsalis pedis pulses are 2+ on the right side and 2+ on the left side.      Heart sounds: S1 normal and S2 normal. Heart sounds not distant. No murmur heard.  Pulmonary:      Effort: Pulmonary effort is normal. No respiratory distress.      Breath sounds: Normal breath sounds. No wheezing.   Abdominal:      General: Bowel sounds are normal. There is no distension.      Palpations: Abdomen is soft.      Tenderness: There is no abdominal tenderness.   Musculoskeletal:         General: No swelling. Normal range of motion.      Cervical back: Full passive range of motion without pain, normal range of motion and neck supple.      Right lower leg: No edema.      Left lower leg: No edema.      Right ankle: No swelling.      Left ankle: No swelling.   Skin:     General: Skin is warm and dry.      Capillary Refill: Capillary refill takes less than 2 seconds.      Nails: There is no clubbing.   Neurological:      General: No focal deficit present.      Mental Status: He is alert and oriented to person, place, and time.      Motor: No weakness.   Psychiatric:         Speech: Speech normal.         Behavior: Behavior normal.         Thought Content: Thought content normal.         Judgment: Judgment normal.         Lab Results   Component Value Date    CHOL 171 04/01/2024    CHOL 177 12/08/2023    CHOL 186 07/22/2022     Lab Results   Component Value Date    HDL 44 04/01/2024    HDL 37 (L) 12/08/2023    HDL 41 07/22/2022     Lab Results   Component Value Date    LDLCALC 108.0 04/01/2024    LDLCALC 123.8 12/08/2023    LDLCALC 125.8 07/22/2022     Lab Results   Component Value Date    TRIG  95 04/01/2024    TRIG 81 12/08/2023    TRIG 96 07/22/2022     Lab Results   Component Value Date    CHOLHDL 25.7 04/01/2024    CHOLHDL 20.9 12/08/2023    CHOLHDL 22.0 07/22/2022       Chemistry        Component Value Date/Time     04/02/2024 0543    K 3.5 04/02/2024 0543     04/02/2024 0543    CO2 23 04/02/2024 0543    BUN 11 04/02/2024 0543    CREATININE 0.8 04/02/2024 0543     04/02/2024 0543        Component Value Date/Time    CALCIUM 9.0 04/02/2024 0543    ALKPHOS 69 04/02/2024 0543    AST 17 04/02/2024 0543    ALT 21 04/02/2024 0543    BILITOT 0.6 04/02/2024 0543    ESTGFRAFRICA >60.0 07/22/2022 1055    EGFRNONAA >60.0 07/22/2022 1055          Lab Results   Component Value Date    TSH 0.938 07/22/2022     Lab Results   Component Value Date    INR 0.9 03/31/2024    INR 1.0 11/30/2020    INR 1.0 08/26/2016     Lab Results   Component Value Date    WBC 7.55 04/02/2024    HGB 14.2 04/02/2024    HCT 40.7 04/02/2024    MCV 90 04/02/2024     04/02/2024          Assessment:      1. Mixed hyperlipidemia    2. Statin myopathy    3. Statin intolerance    4. Atrial flutter with rapid ventricular response    5. Elevated troponin    6. KEVEN (obstructive sleep apnea)    7. Diplopia        Plan:     Proceed with CTI ablation per Dr Bahena  Risks benefits and treatment alternatives reviewed  Consent signed and appropriately witnessed  Hibiclens provided today in office    Pre procedure labs completed today    Continue eliquis for cardio-embolic protection  Dash diet 2 gm sodium restriction  Continue amio 200 mg BID for now    RTC post ablation with EKG    Nicole May, FNP-C [facility]

## 2024-04-18 NOTE — H&P (VIEW-ONLY)
Subjective:   Patient ID:  Yung Mcconnell is a 68 y.o. male who presents for evaluation of No chief complaint on file.      HPI    Yung Mcconnell is a 68 year old male who presents to Arrhythmia clinic for AFL follow up. He was recently admitted to [facility] due AFL/RVR and underwent CHRIS/DCCV with restoration to NSR.     His current medical conditions include HLP, KEVEN, COPD, AFL on Eliquis/Amio.    EKG- Sinus bradycardia, HR 54 QTc 440 ms.     Using cpap nightly.    Denies chest pain or anginal equivalents. No shortness of breath, STEPHENSON or palpitations. Denies orthopnea, PND or abdominal bloating. Reports regular walking without any issues lately. NO leg swelling or claudications. No recent falls, syncope or near syncopal events. Reports compliance with medications and dietary restrictions. NO CNS complaints to suggest TIA or CVA today. No signs of abnormal bleeding on eliquis.      Past Medical History:   Diagnosis Date    Allergy     Asthma, chronic 7/9/2013    Colon polyps     COPD (chronic obstructive pulmonary disease)     GERD (gastroesophageal reflux disease)     High cholesterol     Osteoarthritis of both knees 7/9/2013    Sleep apnea        Past Surgical History:   Procedure Laterality Date    CHOLECYSTECTOMY      COLONOSCOPY N/A 10/23/2020    Procedure: COLONOSCOPY;  Surgeon: Sae Valentine MD;  Location: [facility] ENDO;  Service: Endoscopy;  Laterality: N/A;    DIAGNOSTIC LAPAROSCOPY N/A 6/21/2018    Procedure: LAPAROSCOPY, DIAGNOSTIC;  Surgeon: Casper Martinez MD;  Location: [facility] OR;  Service: General;  Laterality: N/A;    LUNG SURGERY Right     benign mass    TOTAL KNEE ARTHROPLASTY Left     TRANSESOPHAGEAL ECHOCARDIOGRAM WITH POSSIBLE CARDIOVERSION (CHRIS W/ POSS CARDIOVERSION) N/A 4/1/2024    Procedure: Transesophageal echo (CHRIS) intra-procedure log documentation;  Surgeon: Simran Corley MD;  Location: [facility] CATH LAB;  Service: Cardiology;  Laterality: N/A;    UMBILICAL HERNIA REPAIR N/A 6/21/2018     Procedure: REPAIR, HERNIA, UMBILICAL, AGE 5 YEARS OR OLDER;  Surgeon: Casper Martinez MD;  Location: [facility] OR;  Service: General;  Laterality: N/A;       Social History     Tobacco Use    Smoking status: Never     Passive exposure: Past    Smokeless tobacco: Never   Substance Use Topics    Alcohol use: Never    Drug use: No       Family History   Problem Relation Name Age of Onset    Hypertension Mother      Diabetes Mother      Cancer Mother          Breast Ca     Heart disease Father          CAD , CHF     Aneurysm Father      Colon polyps Father      Melanoma Neg Hx      Eczema Neg Hx      Lupus Neg Hx      Psoriasis Neg Hx         Wt Readings from Last 3 Encounters:   04/19/24 126.1 kg (278 lb)   04/18/24 125.8 kg (277 lb 5.4 oz)   04/05/24 126.5 kg (278 lb 12.4 oz)     Temp Readings from Last 3 Encounters:   04/05/24 99.8 °F (37.7 °C) (Tympanic)   04/02/24 97.7 °F (36.5 °C) (Axillary)   07/05/23 98.9 °F (37.2 °C) (Tympanic)     BP Readings from Last 3 Encounters:   04/19/24 (!) 140/70   04/18/24 132/80   04/05/24 128/70     Pulse Readings from Last 3 Encounters:   04/19/24 (!) 53   04/18/24 (!) 55   04/05/24 69       Current Outpatient Medications on File Prior to Visit   Medication Sig Dispense Refill    amiodarone (PACERONE) 200 MG Tab Take 1 tablet (200 mg total) by mouth 2 (two) times daily. 60 tablet 11    apixaban (ELIQUIS) 5 mg Tab Take 1 tablet (5 mg total) by mouth 2 (two) times daily. 60 tablet 5    budesonide-glycopyr-formoterol 160-9-4.8 mcg/actuation HFAA Inhale 2 puffs into the lungs 2 (two) times daily. Wash out mouth after use. 10.7 g 11    doxycycline (VIBRAMYCIN) 100 MG Cap Take 1 capsule (100 mg total) by mouth every 12 (twelve) hours. 20 capsule 0    fluticasone propionate (FLONASE) 50 mcg/actuation nasal spray 2 sprays (100 mcg total) by Each Nostril route once daily. 48 g 3    metoprolol succinate (TOPROL-XL) 25 MG 24 hr tablet Take 1 tablet (25 mg total) by mouth once daily. 30 tablet 11     predniSONE (DELTASONE) 20 MG tablet Prednisone 60 mg/ day for 3 days, 40 mg/day for 3 days,20 mg/ day for 3 days, (1/2 tablet )10 mg a day for 3 days. 20 tablet 0    tadalafiL (CIALIS) 20 MG Tab TAKE 1 TABLET DAILY AS NEEDED 24 tablet 2    [DISCONTINUED] apixaban (ELIQUIS) 5 mg Tab Take 5 mg by mouth 2 (two) times daily.      [DISCONTINUED] metoprolol tartrate (LOPRESSOR) 25 MG tablet Take 25 mg by mouth once daily.      albuterol (PROVENTIL/VENTOLIN HFA) 90 mcg/actuation inhaler Inhale 2 puffs into the lungs every 4 (four) hours as needed for Wheezing or Shortness of Breath. 54 g 3    levalbuterol (XOPENEX) 1.25 mg/3 mL nebulizer solution Take 3 mLs (1.25 mg total) by nebulization every 6 to 8 hours as needed for Wheezing or Shortness of Breath. (Patient not taking: Reported on 4/19/2024) 288 each 11     No current facility-administered medications on file prior to visit.       No cardiac monitor results found for the past 12 months    Results for orders placed during the hospital encounter of 03/31/24    Echo    Interpretation Summary    Left Ventricle: There is normal systolic function with a visually estimated ejection fraction of 60 - 65%. Unable to assess diastolic function due to atrial flutter    Right Ventricle: Normal right ventricular cavity size. Wall thickness is normal. Right ventricle wall motion  is normal. Systolic function is normal.    IVC/SVC: Normal venous pressure at 3 mmHg.    No results found for this or any previous visit.        Results for orders placed or performed during the hospital encounter of 03/31/24   EKG 12-lead    Collection Time: 04/02/24  8:29 AM   Result Value Ref Range    QRS Duration 98 ms    OHS QTC Calculation 454 ms    Narrative    Test Reason : I48.91,    Vent. Rate : 066 BPM     Atrial Rate : 066 BPM     P-R Int : 164 ms          QRS Dur : 098 ms      QT Int : 434 ms       P-R-T Axes : 070 027 -07 degrees     QTc Int : 454 ms    Normal sinus rhythm  Normal ECG  When  compared with ECG of 01-APR-2024 15:43,  Sinus rhythm has replaced Atrial flutter  Vent. rate has decreased BY  60 BPM  Questionable change in QRS duration  Confirmed by BRIAN ALAS MD ([id number]) on 4/2/2024 2:26:36 PM    Referred By: LYNN   SELF           Confirmed By:BRIAN ALAS MD         Review of Systems   Constitutional: Positive for malaise/fatigue.   HENT:  Negative for hearing loss and hoarse voice.    Eyes:  Negative for blurred vision and visual disturbance.   Cardiovascular:  Negative for chest pain, claudication, dyspnea on exertion, irregular heartbeat, leg swelling, near-syncope, orthopnea, palpitations, paroxysmal nocturnal dyspnea and syncope.   Respiratory:  Negative for cough, hemoptysis, shortness of breath, sleep disturbances due to breathing, snoring and wheezing.    Endocrine: Negative for cold intolerance and heat intolerance.   Hematologic/Lymphatic: Does not bruise/bleed easily.   Skin:  Negative for color change, dry skin and nail changes.   Musculoskeletal:  Positive for arthritis and back pain.   Gastrointestinal:  Negative for bloating, abdominal pain, constipation, nausea and vomiting.   Genitourinary:  Negative for dysuria, flank pain, hematuria and hesitancy.   Neurological:  Negative for headaches, light-headedness, loss of balance, numbness, paresthesias and weakness.   Psychiatric/Behavioral:  Negative for altered mental status.          Objective:BP (!) 140/70 (BP Location: Left arm, Patient Position: Sitting)   Pulse (!) 53   Wt 126.1 kg (278 lb)   SpO2 97%   BMI 35.69 kg/m²      Physical Exam  Vitals and nursing note reviewed.   Constitutional:       General: He is not in acute distress.     Appearance: Normal appearance. He is well-developed. He is obese. He is not ill-appearing.   HENT:      Head: Normocephalic and atraumatic.      Nose: Nose normal.      Mouth/Throat:      Mouth: Mucous membranes are moist.   Eyes:      Pupils: Pupils are equal, round, and  reactive to light.   Neck:      Thyroid: No thyromegaly.      Vascular: No JVD.      Trachea: No tracheal deviation.   Cardiovascular:      Rate and Rhythm: Normal rate and regular rhythm.      Chest Wall: PMI is not displaced.      Pulses: Intact distal pulses.           Radial pulses are 2+ on the right side and 2+ on the left side.        Dorsalis pedis pulses are 2+ on the right side and 2+ on the left side.      Heart sounds: S1 normal and S2 normal. Heart sounds not distant. No murmur heard.  Pulmonary:      Effort: Pulmonary effort is normal. No respiratory distress.      Breath sounds: Normal breath sounds. No wheezing.   Abdominal:      General: Bowel sounds are normal. There is no distension.      Palpations: Abdomen is soft.      Tenderness: There is no abdominal tenderness.   Musculoskeletal:         General: No swelling. Normal range of motion.      Cervical back: Full passive range of motion without pain, normal range of motion and neck supple.      Right lower leg: No edema.      Left lower leg: No edema.      Right ankle: No swelling.      Left ankle: No swelling.   Skin:     General: Skin is warm and dry.      Capillary Refill: Capillary refill takes less than 2 seconds.      Nails: There is no clubbing.   Neurological:      General: No focal deficit present.      Mental Status: He is alert and oriented to person, place, and time.      Motor: No weakness.   Psychiatric:         Speech: Speech normal.         Behavior: Behavior normal.         Thought Content: Thought content normal.         Judgment: Judgment normal.         Lab Results   Component Value Date    CHOL 171 04/01/2024    CHOL 177 12/08/2023    CHOL 186 07/22/2022     Lab Results   Component Value Date    HDL 44 04/01/2024    HDL 37 (L) 12/08/2023    HDL 41 07/22/2022     Lab Results   Component Value Date    LDLCALC 108.0 04/01/2024    LDLCALC 123.8 12/08/2023    LDLCALC 125.8 07/22/2022     Lab Results   Component Value Date    TRIG  95 04/01/2024    TRIG 81 12/08/2023    TRIG 96 07/22/2022     Lab Results   Component Value Date    CHOLHDL 25.7 04/01/2024    CHOLHDL 20.9 12/08/2023    CHOLHDL 22.0 07/22/2022       Chemistry        Component Value Date/Time     04/02/2024 0543    K 3.5 04/02/2024 0543     04/02/2024 0543    CO2 23 04/02/2024 0543    BUN 11 04/02/2024 0543    CREATININE 0.8 04/02/2024 0543     04/02/2024 0543        Component Value Date/Time    CALCIUM 9.0 04/02/2024 0543    ALKPHOS 69 04/02/2024 0543    AST 17 04/02/2024 0543    ALT 21 04/02/2024 0543    BILITOT 0.6 04/02/2024 0543    ESTGFRAFRICA >60.0 07/22/2022 1055    EGFRNONAA >60.0 07/22/2022 1055          Lab Results   Component Value Date    TSH 0.938 07/22/2022     Lab Results   Component Value Date    INR 0.9 03/31/2024    INR 1.0 11/30/2020    INR 1.0 08/26/2016     Lab Results   Component Value Date    WBC 7.55 04/02/2024    HGB 14.2 04/02/2024    HCT 40.7 04/02/2024    MCV 90 04/02/2024     04/02/2024          Assessment:      1. Mixed hyperlipidemia    2. Statin myopathy    3. Statin intolerance    4. Atrial flutter with rapid ventricular response    5. Elevated troponin    6. KEVEN (obstructive sleep apnea)    7. Diplopia        Plan:     Proceed with CTI ablation per Dr Bahena  Risks benefits and treatment alternatives reviewed  Consent signed and appropriately witnessed  Hibiclens provided today in office    Pre procedure labs completed today    Continue eliquis for cardio-embolic protection  Dash diet 2 gm sodium restriction  Continue amio 200 mg BID for now    RTC post ablation with EKG    Nicole May, FNP-C [facility]

## 2024-04-19 ENCOUNTER — OFFICE VISIT (OUTPATIENT)
Dept: CARDIOLOGY | Facility: CLINIC | Age: 69
End: 2024-04-19
Payer: MEDICARE

## 2024-04-19 ENCOUNTER — HOSPITAL ENCOUNTER (OUTPATIENT)
Dept: CARDIOLOGY | Facility: HOSPITAL | Age: 69
Discharge: HOME | End: 2024-04-19
Payer: MEDICARE

## 2024-04-19 VITALS
WEIGHT: 278 LBS | OXYGEN SATURATION: 97 % | DIASTOLIC BLOOD PRESSURE: 70 MMHG | HEART RATE: 53 BPM | BODY MASS INDEX: 35.69 KG/M2 | SYSTOLIC BLOOD PRESSURE: 140 MMHG

## 2024-04-19 DIAGNOSIS — R79.89 ELEVATED TROPONIN: ICD-10-CM

## 2024-04-19 DIAGNOSIS — G72.0 STATIN MYOPATHY: ICD-10-CM

## 2024-04-19 DIAGNOSIS — I48.92 ATRIAL FLUTTER WITH RAPID VENTRICULAR RESPONSE: ICD-10-CM

## 2024-04-19 DIAGNOSIS — E78.2 MIXED HYPERLIPIDEMIA: Primary | ICD-10-CM

## 2024-04-19 DIAGNOSIS — G47.33 OSA (OBSTRUCTIVE SLEEP APNEA): ICD-10-CM

## 2024-04-19 DIAGNOSIS — H53.2 DIPLOPIA: ICD-10-CM

## 2024-04-19 DIAGNOSIS — Z78.9 STATIN INTOLERANCE: ICD-10-CM

## 2024-04-19 DIAGNOSIS — T46.6X5A STATIN MYOPATHY: ICD-10-CM

## 2024-04-19 LAB
OHS QRS DURATION: 84 MS
OHS QTC CALCULATION: 440 MS

## 2024-04-19 PROCEDURE — 99999 PR PBB SHADOW E&M-EST. PATIENT-LVL III: CPT | Mod: PBBFAC,,, | Performed by: NURSE PRACTITIONER

## 2024-04-19 PROCEDURE — 99215 OFFICE O/P EST HI 40 MIN: CPT | Mod: S$GLB,,, | Performed by: NURSE PRACTITIONER

## 2024-04-19 PROCEDURE — 93010 ELECTROCARDIOGRAM REPORT: CPT | Mod: ,,, | Performed by: INTERNAL MEDICINE

## 2024-04-19 PROCEDURE — 3078F DIAST BP <80 MM HG: CPT | Mod: CPTII,S$GLB,, | Performed by: NURSE PRACTITIONER

## 2024-04-19 PROCEDURE — 93005 ELECTROCARDIOGRAM TRACING: CPT

## 2024-04-19 PROCEDURE — 1111F DSCHRG MED/CURRENT MED MERGE: CPT | Mod: CPTII,S$GLB,, | Performed by: NURSE PRACTITIONER

## 2024-04-19 PROCEDURE — 3008F BODY MASS INDEX DOCD: CPT | Mod: CPTII,S$GLB,, | Performed by: NURSE PRACTITIONER

## 2024-04-19 PROCEDURE — 1159F MED LIST DOCD IN RCRD: CPT | Mod: CPTII,S$GLB,, | Performed by: NURSE PRACTITIONER

## 2024-04-19 PROCEDURE — 3077F SYST BP >= 140 MM HG: CPT | Mod: CPTII,S$GLB,, | Performed by: NURSE PRACTITIONER

## 2024-04-19 PROCEDURE — 3288F FALL RISK ASSESSMENT DOCD: CPT | Mod: CPTII,S$GLB,, | Performed by: NURSE PRACTITIONER

## 2024-04-19 PROCEDURE — 1101F PT FALLS ASSESS-DOCD LE1/YR: CPT | Mod: CPTII,S$GLB,, | Performed by: NURSE PRACTITIONER

## 2024-04-20 ENCOUNTER — PATIENT MESSAGE (OUTPATIENT)
Dept: CARDIOLOGY | Facility: CLINIC | Age: 69
End: 2024-04-20
Payer: MEDICARE

## 2024-04-22 ENCOUNTER — TELEPHONE (OUTPATIENT)
Dept: CARDIOLOGY | Facility: CLINIC | Age: 69
End: 2024-04-22
Payer: MEDICARE

## 2024-04-22 ENCOUNTER — PATIENT OUTREACH (OUTPATIENT)
Dept: ADMINISTRATIVE | Facility: OTHER | Age: 69
End: 2024-04-22
Payer: MEDICARE

## 2024-04-22 ENCOUNTER — PATIENT MESSAGE (OUTPATIENT)
Dept: PULMONOLOGY | Facility: CLINIC | Age: 69
End: 2024-04-22
Payer: MEDICARE

## 2024-04-22 DIAGNOSIS — J44.89 ASTHMA WITH COPD: ICD-10-CM

## 2024-04-22 DIAGNOSIS — G47.34 NOCTURNAL HYPOXEMIA: Primary | ICD-10-CM

## 2024-04-22 DIAGNOSIS — G47.33 OSA ON CPAP: ICD-10-CM

## 2024-04-22 NOTE — TELEPHONE ENCOUNTER
Orders for oxygen to be used at night submitted    Subjective:     Patient ID: Yung Mcconnell is a 68 y.o. male.    Chief Complaint:  Patient reports cardiac pulse was above 130 , chest was tight     HPI    He   presents for evaluation and treatment of A. Flutter  after being discharged from the hospital  3  weeks ago. Since discharge symptoms have gradually improving course since that time. Patient denies chest pain . Symptoms are aggravated by activity. Symptoms improve with rest.  Respiratory: positive for cough, dyspnea on exertion, sputum, and wheezing; Cardiovascular: positive for fatigue and palpitations.  Patient currently is not on home oxygen therapy..    MEDICAL RECORDS FROM THE HOSPITAL REVIEWED:  [facility]  [facility] Medicine  Discharge Summary        Patient Name: Yung Mcconnell  MRN: [id number]  ISAURA: [id number]  Patient Class: IP- Inpatient  Admission Date: 3/31/2024  Hospital Length of Stay: 1 days  Discharge Date and Time:  04/02/2024 11:24 AM  Attending Physician: Ricardo Good MD   Discharging Provider: Ricardo Good MD  Primary Care Provider: Prasanna Gaxiola MD     Primary Care Team: Networked reference to record PCT      HPI:   Patient is a 68-year-old male with past medical history of asthma, COPD, GERD, arthritis, obstructive sleep apnea on CPAP, hyperlipidemia with intolerance to statins, and seasonal allergies who presented to ED with complaints of rapid heart rate noted on apple watch, chest tightness, and shortness of breath.  He reports that he has had some shortness of breath over the past week, however he gets short of breath from time to time with his asthma/COPD said this was not out of the ordinary.  He states that he has never been evaluated by a cardiologist in the past.  He also reported feeling some lightheadedness.  He denies syncope, headache, dizziness, ear pain/fullness, visual changes, abdominal pain, nausea, vomiting, diarrhea, constipation, dysuria,  fever, chills.  He reports that the chest tightness that occurred has resolved and he has no pain at this time.  He does not feel short of breath at rest.  Blood pressure stable, heart rate up to 130s.  Lab workup shows troponin 0.031 followed by repeat troponin 0.022, .  EKG shows atrial flutter with 2-1 AV conduction, ST and T-wave abnormality.  He was given IV Cardizem and started on Cardizem drip which is currently at 15 milligrams/hour as well as heparin drip.  Cardiology was consulted per ED, also recommended IV fluids and echo in [facility] Medicine was consulted for admission due to new onset atrial flutter with rapid ventricular rate.      a 68 y.o. male presenting to the ED for SOB over the past week with chest tightness and a rapid HB onsetting 2 hours pta. Pt has a hx of Afib and a collapsed lung from 3 years ago. Symptoms are constant and moderate in severity. No mitigating or exacerbating factors reported. Associated sxs include lightheadedness, a headache, sore throat, and wheezing which subsided earlier this week. Patient denies any nausea, vomiting, diaphoresis, bloody stool, black tarry stool, and all other sxs at this time. No further complaints or concerns at this time.         Procedure(s) (LRB):  Transesophageal echo (CHRIS) intra-procedure log documentation (N/A)       Hospital Course:   4/1  Admitted for a-flutter/RVR  On cardizem and heparin drips on admission  Underwent CHRIS/DCCV this afternoon with Dr. Corley  Post-procedure, started on amiodarone, metoprolol  Heparin drip stopped, started on apixaban     4/2  NAEON,in sinus rhythm  Denies complaints  Stable for discharge home  Prescribed amiodarone, metoprolol and apixaban  F/U with cardiology in 1-2 weeks      Goals of Care Treatment Preferences:  Code Status: Full Code        Consults:   Consults (From admission, onward)            Status Ordering Provider       Inpatient consult to Cardiology  Once        Provider:  Jayant  Yesica BLANCHARD MD    Completed DALL[place] SONI                No new Assessment & Plan notes have been filed under this hospital service since the last note was generated.  Service: Hospital Medicine           Final Active Diagnoses:     Diagnosis Date Noted POA    PRINCIPAL PROBLEM:  Atrial flutter with rapid ventricular response [I48.92] 2024 Yes    Statin intolerance [Z78.9] 2019 Yes    Seasonal allergic rhinitis [J30.2] 2017 Yes    Asthma with COPD [J44.89] 2017 Yes    KEVEN (obstructive sleep apnea) [G47.33] 2013 Yes       Problems Resolved During this Admission:         Discharged Condition: stable     Disposition: Home or Self Care     Follow Up:    Follow-up Information         Prasanna Gaxiola MD Follow up.    Specialty: Family Medicine  Why: As needed  Contact information:  [address] [place] [id number]  [phone]                    Simran Corley MD. Schedule an appointment as soon as possible for a visit in 1 week(s).    Specialties: Interventional Cardiology, Cardiology  Why: Hospital discharge follow up  Contact information:  [id number] [place] [id number] [phone]                             Obstructive Sleep Apnea on Continuous Positive Airway Pressure:  Patient is using CPAP as prescribed and benefiting from therapy. Patient has complaints of dry mouth  03/10/2024 - 2024  Patient ID: [id number]  : 1955  Age: 68 years  Gender: Male  [facility] [address] [place]  [place], [address]  Compliance Report  Compliance  Payor Standard  Usage 03/10/2024 - 2024  Usage days 29/30 days (97%)  >= 4 hours 29 days (97%)  < 4 hours 0 days (0%)  Usage hours 207 hours 1 minutes  Average usage (total days) 6 hours 54 minutes  Average usage (days used) 7 hours 8 minutes  Median usage (days used) 6 hours 56 minutes  Total used hours (value since last reset - 2024) 3,929 hours  AirSense 11 AutoSet  Serial  number [id number]  Mode CPAP  Set pressure 11 cmH2O  EPR Fulltime  EPR level 3  Therapy  Leaks - L/min Median: 0.0 95th percentile: 10.3 Maximum: 34.1  Events per hour AI: 1.3 HI: 0.3 AHI: 1.6  Apnea Index Central: 0.4 Obstructive: 0.8 Unknown: 0.0  RERA Index 0.4  Cheyne-Haley respiration (average duration per night) 0 minutes (0%)  Usage - hours  Printed      History of asbestos exposure 1977- 1983 - [facility]      Asthma Follow-up  The patient has previously been evaluated here for asthma and presents for an asthma follow-up. The patient is currently having symptoms / an exacerbation. Current symptoms include dyspnea, non-productive cough, and wheezing. Symptoms have been present since several days ago and have been rapidly worsening. He denies chest tightness, dyspnea, productive cough, and wheezing. Associated symptoms include fatigue and poor exercise tolerance.  This episode appears to have been triggered by pollens. Treatments tried for the current exacerbation include inhaled corticosteroids, long-acting inhaled beta-adrenergic agonists, and short-acting inhaled beta-adrenergic agonists, which have provided some relief of symptoms. The patient has been having similar episodes for approximately many years.    Current Disease Severity  The patient is having daytime symptoms throughout the day. The patient is having daytime symptoms often 7 times per week. The patient is using short-acting beta agonists for symptom control several times per day. He has exacerbations requiring oral systemic corticosteroids 2 times per year. Current limitations in activity from asthma: unable to exercise. Number of days of school or work missed in the last month: not applicable. Number of urgent/emergent visit in last year: 0.  The patient is using a spacer with MDIs. His best peak flow rate is na. He is not monitoring peak flow rates at home.    Past Medical History:   Diagnosis Date    Allergy     Asthma, chronic 7/9/2013     Colon polyps     COPD (chronic obstructive pulmonary disease)     GERD (gastroesophageal reflux disease)     High cholesterol     Osteoarthritis of both knees 7/9/2013    Sleep apnea      Past Surgical History:   Procedure Laterality Date    CHOLECYSTECTOMY      COLONOSCOPY N/A 10/23/2020    Procedure: COLONOSCOPY;  Surgeon: Sae Valentine MD;  Location: [facility] ENDO;  Service: Endoscopy;  Laterality: N/A;    DIAGNOSTIC LAPAROSCOPY N/A 6/21/2018    Procedure: LAPAROSCOPY, DIAGNOSTIC;  Surgeon: Casper Martinez MD;  Location: [facility] OR;  Service: General;  Laterality: N/A;    LUNG SURGERY Right     benign mass    TOTAL KNEE ARTHROPLASTY Left     TRANSESOPHAGEAL ECHOCARDIOGRAM WITH POSSIBLE CARDIOVERSION (CHRIS W/ POSS CARDIOVERSION) N/A 4/1/2024    Procedure: Transesophageal echo (CHRIS) intra-procedure log documentation;  Surgeon: Simran Corley MD;  Location: [facility] CATH LAB;  Service: Cardiology;  Laterality: N/A;    UMBILICAL HERNIA REPAIR N/A 6/21/2018    Procedure: REPAIR, HERNIA, UMBILICAL, AGE 5 YEARS OR OLDER;  Surgeon: Casper Martinez MD;  Location: [facility] OR;  Service: General;  Laterality: N/A;     Review of patient's allergies indicates:   Allergen Reactions    Zithromax [azithromycin] Palpitations    Aspirin Other (See Comments)     Other reaction(s): Difficulty breathing    Lipitor [atorvastatin] Other (See Comments)     Leg cramps/hand cramps     Current Outpatient Medications on File Prior to Visit   Medication Sig Dispense Refill    albuterol (PROVENTIL/VENTOLIN HFA) 90 mcg/actuation inhaler Inhale 2 puffs into the lungs every 4 (four) hours as needed for Wheezing or Shortness of Breath. 54 g 3    amiodarone (PACERONE) 200 MG Tab Take 1 tablet (200 mg total) by mouth 2 (two) times daily. 60 tablet 11    apixaban (ELIQUIS) 5 mg Tab Take 1 tablet (5 mg total) by mouth 2 (two) times daily. 60 tablet 5    fluticasone propionate (FLONASE) 50 mcg/actuation nasal spray 2 sprays (100 mcg total) by Each  Nostril route once daily. 48 g 3    metoprolol succinate (TOPROL-XL) 25 MG 24 hr tablet Take 1 tablet (25 mg total) by mouth once daily. 30 tablet 11    tadalafiL (CIALIS) 20 MG Tab TAKE 1 TABLET DAILY AS NEEDED 24 tablet 2    [DISCONTINUED] apixaban (ELIQUIS) 5 mg Tab Take 5 mg by mouth 2 (two) times daily.      [DISCONTINUED] metoprolol tartrate (LOPRESSOR) 25 MG tablet Take 25 mg by mouth once daily.       No current facility-administered medications on file prior to visit.     Social History     Socioeconomic History    Marital status:    Occupational History     Employer: [employer]   Tobacco Use    Smoking status: Never     Passive exposure: Past    Smokeless tobacco: Never   Substance and Sexual Activity    Alcohol use: Never    Drug use: No    Sexual activity: Yes     Partners: Female     Social Determinants of Health     Financial Resource Strain: Medium Risk (4/15/2024)    Overall Financial Resource Strain (CARDIA)     Difficulty of Paying Living Expenses: Somewhat hard   Food Insecurity: No Food Insecurity (4/15/2024)    Hunger Vital Sign     Worried About Running Out of Food in the Last Year: Never true     Ran Out of Food in the Last Year: Never true   Transportation Needs: No Transportation Needs (4/15/2024)    PRAPARE - Transportation     Lack of Transportation (Medical): No     Lack of Transportation (Non-Medical): No   Physical Activity: Sufficiently Active (4/15/2024)    Exercise Vital Sign     Days of Exercise per Week: 3 days     Minutes of Exercise per Session: 50 min   Stress: No Stress Concern Present (4/15/2024)    [employer] [place] [employer] - Occupational Stress Questionnaire     Feeling of Stress : Only a little   Social Connections: Moderately Integrated (4/15/2024)    Social Connection and Isolation Panel [NHANES]     Frequency of Communication with Friends and Family: More than three times a week     Frequency of Social Gatherings with Friends and  "Family: Once a week     Attends Latter-day Services: More than 4 times per year     Active Member of Clubs or Organizations: No     Attends Club or Organization Meetings: Never     Marital Status:    Housing Stability: Low Risk  (4/15/2024)    Housing Stability Vital Sign     Unable to Pay for Housing in the Last Year: No     Number of Times Moved in the Last Year: 1     Homeless in the Last Year: No     Family History   Problem Relation Name Age of Onset    Hypertension Mother      Diabetes Mother      Cancer Mother          Breast Ca     Heart disease Father          CAD , CHF     Aneurysm Father      Colon polyps Father      Melanoma Neg Hx      Eczema Neg Hx      Lupus Neg Hx      Psoriasis Neg Hx         Review of Systems   Constitutional:  Positive for fatigue. Negative for fever.   HENT:  Positive for postnasal drip, rhinorrhea and congestion.    Eyes:  Negative for redness and itching.   Respiratory:  Positive for cough, sputum production, shortness of breath, dyspnea on extertion, use of rescue inhaler and Paroxysmal Nocturnal Dyspnea.    Cardiovascular:  Negative for chest pain, palpitations and leg swelling.   Genitourinary:  Negative for difficulty urinating and hematuria.   Endocrine:  Negative for cold intolerance and heat intolerance.    Skin:  Negative for rash.   Gastrointestinal:  Negative for nausea and abdominal pain.   Neurological:  Negative for dizziness, syncope, weakness and light-headedness.   Hematological:  Negative for adenopathy. Does not bruise/bleed easily.   Psychiatric/Behavioral:  Negative for sleep disturbance. The patient is not nervous/anxious.        Objective:      /80   Pulse (!) 55   Resp 18   Ht 6' 2" (1.88 m)   Wt 125.8 kg (277 lb 5.4 oz)   SpO2 95%   BMI 35.61 kg/m²   Physical Exam  Vitals and nursing note reviewed.   Constitutional:       Appearance: He is well-developed.   HENT:      Head: Normocephalic and atraumatic.      Nose: Congestion and " rhinorrhea present.   Eyes:      Conjunctiva/sclera: Conjunctivae normal.      Pupils: Pupils are equal, round, and reactive to light.   Neck:      Thyroid: No thyromegaly.      Vascular: No JVD.      Trachea: No tracheal deviation.   Cardiovascular:      Rate and Rhythm: Normal rate and regular rhythm.      Heart sounds: No murmur heard.  Pulmonary:      Breath sounds: Examination of the right-lower field reveals wheezing. Examination of the left-lower field reveals wheezing. Decreased breath sounds and wheezing present. No rhonchi or rales.   Abdominal:      General: Bowel sounds are normal.      Palpations: Abdomen is soft.   Musculoskeletal:         General: No tenderness. Normal range of motion.      Cervical back: Neck supple.   Lymphadenopathy:      Cervical: No cervical adenopathy.   Skin:     General: Skin is warm and dry.   Neurological:      Mental Status: He is alert and oriented to person, place, and time.       Personal Diagnostic Review  Chest x-ray: hyperinflation          4/19/2024     8:59 AM   Pulmonary Studies Review   SpO2 97 %   Weight 126.1 kg (278 lb)   BMI (Calculated) 35.7   Predicted Distance 300.92       Intra-Procedure Documentation    The estimated blood loss was none.    Successful CHRIS cardioversion  Transesophageal echo (CHRIS) with possible cardioversion    Left Ventricle: There is normal systolic function with a visually   estimated ejection fraction of 60 - 65%. Unable to assess diastolic   function due to atrial fibrillation.    Right Ventricle: Normal right ventricular cavity size. Wall thickness   is normal. Right ventricle wall motion  is normal. Systolic function is   normal.    Left Atrium: The left atrial appendage appears normal. There is no   thrombus in the cavity.    Mitral Valve: There is mild regurgitation.    Tricuspid Valve: There is mild regurgitation.    IVC/SVC: Normal venous pressure at 3 mmHg.  Echo    Left Ventricle: There is normal systolic function with a  "visually   estimated ejection fraction of 60 - 65%. Unable to assess diastolic   function due to atrial flutter    Right Ventricle: Normal right ventricular cavity size. Wall thickness   is normal. Right ventricle wall motion  is normal. Systolic function is   normal.    IVC/SVC: Normal venous pressure at 3 mmHg.      Office Spirometry Results:         4/19/2024     8:59 AM 4/18/2024    11:02 AM 4/5/2024     3:33 PM 4/2/2024     7:35 AM 4/2/2024     7:17 AM 4/2/2024     5:19 AM 4/2/2024    12:41 AM   Pulmonary Function Tests   SpO2 97 % 95 % 95 % 99 % 96 % 95 % 96 %   Height  6' 2" (1.88 m) 6' 2" (1.88 m)       Weight 126.1 kg (278 lb) 125.8 kg (277 lb 5.4 oz) 126.5 kg (278 lb 12.4 oz)       BMI (Calculated) 35.7 35.6 35.8             4/19/2024     8:59 AM   Pulmonary Studies Review   SpO2 97 %   Weight 126.1 kg (278 lb)   BMI (Calculated) 35.7   Predicted Distance 300.92         Assessment:            KEVEN on CPAP  -     PULSE OXIMETRY OVERNIGHT; Future; Expected date: 04/18/2024    Asthma with COPD  -     Discontinue: levalbuterol (XOPENEX) 1.25 mg/3 mL nebulizer solution; Take 3 mLs (1.25 mg total) by nebulization every 6 to 8 hours as needed for Wheezing or Shortness of Breath.  Dispense: 288 each; Refill: 11  -     Discontinue: fluticasone-salmeterol diskus inhaler 250-50 mcg; Inhale 1 puff into the lungs 2 (two) times daily. Controller  Dispense: 60 each; Refill: 3    Nocturnal hypoxemia  -     PULSE OXIMETRY OVERNIGHT; Future; Expected date: 04/18/2024    Asthma, chronic, moderate persistent, uncomplicated    Mild intermittent asthma, uncomplicated    Mild persistent asthma with acute exacerbation  -     doxycycline (VIBRAMYCIN) 100 MG Cap; Take 1 capsule (100 mg total) by mouth every 12 (twelve) hours.  Dispense: 20 capsule; Refill: 0  -     predniSONE (DELTASONE) 20 MG tablet; Prednisone 60 mg/ day for 3 days, 40 mg/day for 3 days,20 mg/ day for 3 days, (1/2 tablet )10 mg a day for 3 days.  Dispense: 20 " tablet; Refill: 0    Seasonal allergic rhinitis due to pollen            Outpatient Encounter Medications as of 4/18/2024   Medication Sig Dispense Refill    albuterol (PROVENTIL/VENTOLIN HFA) 90 mcg/actuation inhaler Inhale 2 puffs into the lungs every 4 (four) hours as needed for Wheezing or Shortness of Breath. 54 g 3    amiodarone (PACERONE) 200 MG Tab Take 1 tablet (200 mg total) by mouth 2 (two) times daily. 60 tablet 11    apixaban (ELIQUIS) 5 mg Tab Take 1 tablet (5 mg total) by mouth 2 (two) times daily. 60 tablet 5    doxycycline (VIBRAMYCIN) 100 MG Cap Take 1 capsule (100 mg total) by mouth every 12 (twelve) hours. 20 capsule 0    fluticasone propionate (FLONASE) 50 mcg/actuation nasal spray 2 sprays (100 mcg total) by Each Nostril route once daily. 48 g 3    metoprolol succinate (TOPROL-XL) 25 MG 24 hr tablet Take 1 tablet (25 mg total) by mouth once daily. 30 tablet 11    predniSONE (DELTASONE) 20 MG tablet Prednisone 60 mg/ day for 3 days, 40 mg/day for 3 days,20 mg/ day for 3 days, (1/2 tablet )10 mg a day for 3 days. 20 tablet 0    tadalafiL (CIALIS) 20 MG Tab TAKE 1 TABLET DAILY AS NEEDED 24 tablet 2    [DISCONTINUED] albuterol (PROVENTIL) 2.5 mg /3 mL (0.083 %) nebulizer solution Take 3 mLs (2.5 mg total) by nebulization every 4 to 6 hours as needed for Wheezing or Shortness of Breath. 360 mL 11    [DISCONTINUED] apixaban (ELIQUIS) 5 mg Tab Take 5 mg by mouth 2 (two) times daily.      [DISCONTINUED] azelaic acid (AZELEX) 15 % gel Apply 1 application topically 2 (two) times a day.      [DISCONTINUED] cetirizine (ZYRTEC) 10 MG tablet Take 10 mg by mouth once daily.      [DISCONTINUED] cyclobenzaprine (FLEXERIL) 5 MG tablet 1 Tablet Oral Three times a day as needed. 20 tablet 0    [DISCONTINUED] diclofenac 1.3 % PT12 Apply 1 patch topically every 12 (twelve) hours as needed. 30 patch 0    [DISCONTINUED] doxycycline (MONODOX) 100 MG capsule Take 1 capsule (100 mg total) by mouth every 12 (twelve)  hours. 20 capsule 1    [DISCONTINUED] fluticasone-salmeterol diskus inhaler 250-50 mcg Inhale 1 puff into the lungs 2 (two) times daily. Controller 60 each 3    [DISCONTINUED] fluticasone-umeclidin-vilanter (TRELEGY ELLIPTA) 200-62.5-25 mcg inhaler Inhale 1 puff into the lungs once daily. Wash out mouth after use (Patient not taking: Reported on 4/18/2024) 180 each 3    [DISCONTINUED] GLUCOSAMINE HCL/CHONDR ALFONSO A NA (OSTEO BI-FLEX ORAL) Take 1 tablet by mouth once daily.       [DISCONTINUED] levalbuterol (XOPENEX) 1.25 mg/3 mL nebulizer solution Take 3 mLs (1.25 mg total) by nebulization every 6 to 8 hours as needed for Wheezing or Shortness of Breath. 288 each 11    [DISCONTINUED] metoprolol tartrate (LOPRESSOR) 25 MG tablet Take 25 mg by mouth once daily.      [DISCONTINUED] metroNIDAZOLE (METROGEL) 0.75 % gel Apply topically 2 (two) times daily. 45 g 2    [DISCONTINUED] multivitamin (THERAGRAN) per tablet 1 Tablet Oral Every day      [DISCONTINUED] predniSONE (DELTASONE) 20 MG tablet Prednisone 60 mg/ day for 3 days, 40 mg/day for 3 days,20 mg/ day for 3 days, (1/2 tablet )10 mg a day for 3 days. 20 tablet 0    [DISCONTINUED] semaglutide, weight loss, (WEGOVY) 0.25 mg/0.5 mL PnIj Inject 0.5 mg into the skin once a week. 0.5 mL 8    [DISCONTINUED] acetaminophen tablet 650 mg       [DISCONTINUED] acetaminophen tablet 650 mg       [DISCONTINUED] aluminum-magnesium hydroxide-simethicone 200-200-20 mg/5 mL suspension 30 mL       [DISCONTINUED] amiodarone tablet 200 mg       [DISCONTINUED] apixaban tablet 5 mg       [DISCONTINUED] arformoteroL nebulizer solution 15 mcg       [DISCONTINUED] budesonide nebulizer solution 0.5 mg       [DISCONTINUED] dextrose 10% bolus 125 mL 125 mL       [DISCONTINUED] dextrose 10% bolus 250 mL 250 mL       [DISCONTINUED] dextrose 5 % and 0.45 % NaCl infusion       [DISCONTINUED] diltiaZEM 125 mg in dextrose 5% 125 mL infusion (non-titrating)       [DISCONTINUED] fluticasone propionate 50  mcg/actuation nasal spray 100 mcg       [DISCONTINUED] glucagon (human recombinant) injection 1 mg       [DISCONTINUED] glucose chewable tablet 16 g       [DISCONTINUED] glucose chewable tablet 24 g       [DISCONTINUED] heparin 25,000 units in dextrose 5% (100 units/ml) IV bolus from bag LOW INTENSITY nomogram - [facility]       [DISCONTINUED] heparin 25,000 units in dextrose 5% (100 units/ml) IV bolus from bag LOW INTENSITY nomogram - [facility]       [DISCONTINUED] heparin 25,000 units in dextrose 5% 250 mL (100 units/mL) infusion LOW INTENSITY nomogram - [facility]       [DISCONTINUED] ipratropium 0.02 % nebulizer solution 0.5 mg       [DISCONTINUED] levalbuterol nebulizer solution 1.2495 mg       [DISCONTINUED] melatonin tablet 6 mg       [DISCONTINUED] metoprolol injection 5 mg       [DISCONTINUED] metoprolol succinate (TOPROL-XL) 24 hr tablet 25 mg       [DISCONTINUED] naloxone 0.4 mg/mL injection 0.02 mg       [DISCONTINUED] ondansetron injection 4 mg       [DISCONTINUED] polyethylene glycol packet 17 g       [DISCONTINUED] promethazine tablet 25 mg       [DISCONTINUED] sodium chloride 0.9% flush 3 mL        No facility-administered encounter medications on file as of 4/18/2024.     Plan:       Requested Prescriptions     Signed Prescriptions Disp Refills    doxycycline (VIBRAMYCIN) 100 MG Cap 20 capsule 0     Sig: Take 1 capsule (100 mg total) by mouth every 12 (twelve) hours.    predniSONE (DELTASONE) 20 MG tablet 20 tablet 0     Sig: Prednisone 60 mg/ day for 3 days, 40 mg/day for 3 days,20 mg/ day for 3 days, (1/2 tablet )10 mg a day for 3 days.     Problem List Items Addressed This Visit       Seasonal allergic rhinitis due to pollen    Asthma with COPD     Other Visit Diagnoses       KEVEN on CPAP    -  Primary    Relevant Orders    PULSE OXIMETRY OVERNIGHT    Nocturnal hypoxemia        Relevant Orders    PULSE OXIMETRY OVERNIGHT    Asthma, chronic, moderate persistent, uncomplicated        Mild intermittent asthma,  uncomplicated        Mild persistent asthma with acute exacerbation        Relevant Medications    doxycycline (VIBRAMYCIN) 100 MG Cap    predniSONE (DELTASONE) 20 MG tablet               Follow up in about 3 months (around 7/18/2024) for althea - on return, Overnight O2 Sat ASAP.    MEDICAL DECISION MAKING: Moderate to high complexity.  Overall, the multiple problems listed are of moderate to high severity that may impact quality of life and activities of daily living. Side effects of medications, treatment plan as well as options and alternatives reviewed and discussed with patient. There was counseling of patient concerning these issues.    Total time spent in counseling and coordination of care - 30  minutes of total time spent on the encounter, which includes face to face time and non-face to face time preparing to see the patient (eg, review of tests), Obtaining and/or reviewing separately obtained history, Documenting clinical information in the electronic or other health record, Independently interpreting results (not separately reported) and communicating results to the patient/family/caregiver, or Care coordination (not separately reported).    Time was used in discussion of prognosis, risks, benefits of treatment, instructions and compliance with regimen . Discussion with other physicians and/or health care providers - home health or for use of durable medical equipment (oxygen, nebulizers, CPAP, BiPAP) occurred.

## 2024-04-22 NOTE — PROCEDURES
[facility]  [address] * [place] [address]  Telephone: [phone]  Test date: 24 Start: 24 21:44:03 Yung Mcconnell  Doctor: Dr. Lozano End: 24 03:40:47 [id number]  Oximetry: Summary Report  Comments: room air  Recording time: 05:56:44 Highest pulse: 82 Highest SpO2: 97%  Excluded samplin:01:04 Lowest pulse: 46 Lowest SpO2: 82%  Total valid samplin:55:40 Mean pulse: 56 Mean SpO2: 92.3%  1 S.D.: 3.6 1 S.D.: 1.7  Time with SpO2<90: 0:15:28, 4.3%  Time with SpO2<80: 0:00:00, 0.0%  Time with SpO2<70: 0:00:00, 0.0%  Time with SpO2<60: 0:00:00, 0.0%  Time with SpO2<89: 0:05:56, 1.7%  Time with SpO2 =>90: 5:40:12, 95.7%  Time with SpO2=>80 & <90: 0:15:28, 4.3%  Time with SpO2=>70 & <80: 0:00:00, 0.0%  Time with SpO2=>60 & <70: 0:00:00, 0.0%  The longest continuous time with saturation <=88 was 00:01:00, which started at  24 22:40:27.  A desaturation event was defined as a decrease of saturation by 4 or more.  No events were excluded due to artifact.  There were 11 desaturation events over 3 minutes duration.  There were 14 desaturation events of less than 3 minutes duration during which:  The mean high was 93.6%. The mean low was 87.1%.  The number of these events that were:  > 0 & <10 seconds: 0 > 0 seconds: 14  =>10 & <20 seconds: 0 =>10 seconds: 14  =>20 & <30 seconds: 2 =>20 seconds: 14  =>30 & <40 seconds: 2 =>30 seconds: 12  =>40 & <50 seconds: 2 =>40 seconds: 10  =>50 & <60 seconds: 0 =>50 seconds: 8  =>60 seconds: 8 =>60 seconds: 8  The mean length of desaturation events that were >=10 sec & <=3 mins was: 77.7 sec.  Desaturation event index (events >=10 sec per sampled hour): 2.4  Desaturation event index (events >= 0 sec per sampled hour): 2.4  © -    Overnight Oxygen Saturation Study:    INTERPRETATION:  Conditions of Test: Noted above in report    Interpretation of results of overnight oxygen saturation study.  This was a technically  adequate  study.  Overnight oxygen saturation study is abnormal with O2 saturation less than 89%.   Time with SpO2<89: 0:05:56, 1.7%  of the study period. Lowest oxygen saturation recorded was 82% .    Based on the above information patient meets criteria for oxygen prescription.  Clinical correlation suggested.  Rafal Lozano MD    Medicare Criteria Comments:   Overnight Oximetry test results suggest the patient does fall under Medicare Group 1 Criteria and would be eligible for oxygen prescription.   (Arterial oxygen saturation at or below 88% for at least 5 minutes taken during sleep on stable outpatient)    Details about Medicare Group Criteria coverage can be found at http://www.cms.hhs.gov/manuals/downloads/

## 2024-04-30 NOTE — PROGRESS NOTES
CHW - Follow Up    This Community Health Worker completed a follow up visit with patient via telephone today.  Pt/Caregiver reported: Pt states that he and his wife both need CPAP supplies and I called last week to ask which machine he had so I could find supplies. He stated today that resporonics will do.   Community Health Worker provided: I will call pt back with information for CPAP supplies.  Follow up required: yes  Follow-up Outreach - Due: 5/15/2024

## 2024-05-01 ENCOUNTER — PATIENT MESSAGE (OUTPATIENT)
Dept: PULMONOLOGY | Facility: CLINIC | Age: 69
End: 2024-05-01
Payer: MEDICARE

## 2024-05-01 ENCOUNTER — TELEPHONE (OUTPATIENT)
Dept: PULMONOLOGY | Facility: CLINIC | Age: 69
End: 2024-05-01
Payer: MEDICARE

## 2024-05-01 DIAGNOSIS — J44.89 ASTHMA WITH COPD: Primary | ICD-10-CM

## 2024-05-01 NOTE — TELEPHONE ENCOUNTER
Left vm for pt to call back to get permission to send Xopenex rx to [facility].  The med needs to be run through part B.

## 2024-05-02 DIAGNOSIS — J44.89 ASTHMA WITH COPD: ICD-10-CM

## 2024-05-02 RX ORDER — LEVALBUTEROL INHALATION SOLUTION 1.25 MG/3ML
1 SOLUTION RESPIRATORY (INHALATION)
Qty: 288 EACH | Refills: 11 | Status: SHIPPED | OUTPATIENT
Start: 2024-05-02 | End: 2024-05-02

## 2024-05-02 RX ORDER — LEVALBUTEROL INHALATION SOLUTION 1.25 MG/3ML
1 SOLUTION RESPIRATORY (INHALATION)
Qty: 270 ML | Refills: 11 | Status: SHIPPED | OUTPATIENT
Start: 2024-05-02 | End: 2024-05-15 | Stop reason: SDUPTHER

## 2024-05-02 NOTE — TELEPHONE ENCOUNTER
----- Message from Barbara Peter sent at 5/2/2024  9:19 AM CDT -----  Contact: self  [phone]  Pt called returning missed call regarding medication Xopenex. Please call back  [phone] thanks lay         [facility] - [place], [place] - [address] [place] [address]  Phone: [phone] Fax: [phone]      [facility] - [address] [address]  Phone: [phone] Fax: [phone]

## 2024-05-02 NOTE — TELEPHONE ENCOUNTER
Spoke to pt.  Discussed his Xopenex being declined.  He asked to have the rx sent to [facility].  If they can't run it through Part B, he wants it sent to [facility] in [place].

## 2024-05-06 ENCOUNTER — TELEPHONE (OUTPATIENT)
Dept: CARDIOLOGY | Facility: CLINIC | Age: 69
End: 2024-05-06
Payer: MEDICARE

## 2024-05-06 NOTE — TELEPHONE ENCOUNTER
Called patient to inform him of forms needed to be filled out for medication. He stated that the pharmacy told him that he doesn't qualify for patient assistance because he makes too much money. Please advise. Thanks.

## 2024-05-07 ENCOUNTER — TELEPHONE (OUTPATIENT)
Dept: PHARMACY | Facility: CLINIC | Age: 69
End: 2024-05-07
Payer: MEDICARE

## 2024-05-07 ENCOUNTER — TELEPHONE (OUTPATIENT)
Dept: CARDIOLOGY | Facility: CLINIC | Age: 69
End: 2024-05-07
Payer: MEDICARE

## 2024-05-07 NOTE — TELEPHONE ENCOUNTER
I spoke with Mr. Mcconnell he explained to me that he has a 3000.00 deductible.  I explained to the patient that he will not qualify for the program being his income is 14520.00 a year and the drug company requires the patient to spend 3% out of pocket of the household income on medications.  I did research and he may qualify for Xarelto at a discount of 89.00 a month.  I sent a message to the provider and I will contact the patient.

## 2024-05-07 NOTE — TELEPHONE ENCOUNTER
Spoke to patient he stated that he did  medication from pharmacy. He stated that he paid $330. Wants to know if prescription savings card will be available at next refill

## 2024-05-08 ENCOUNTER — ANESTHESIA EVENT (OUTPATIENT)
Dept: CARDIOLOGY | Facility: HOSPITAL | Age: 69
End: 2024-05-08
Payer: MEDICARE

## 2024-05-08 ENCOUNTER — PATIENT MESSAGE (OUTPATIENT)
Dept: CARDIOLOGY | Facility: CLINIC | Age: 69
End: 2024-05-08
Payer: MEDICARE

## 2024-05-08 DIAGNOSIS — I48.0 PAROXYSMAL ATRIAL FIBRILLATION: ICD-10-CM

## 2024-05-08 DIAGNOSIS — I48.92 ATRIAL FLUTTER, UNSPECIFIED TYPE: Primary | ICD-10-CM

## 2024-05-08 DIAGNOSIS — I48.92 ATRIAL FLUTTER WITH RAPID VENTRICULAR RESPONSE: Primary | ICD-10-CM

## 2024-05-08 DIAGNOSIS — I48.92 ATRIAL FLUTTER WITH RAPID VENTRICULAR RESPONSE: ICD-10-CM

## 2024-05-08 DIAGNOSIS — I48.92 ATRIAL FLUTTER: ICD-10-CM

## 2024-05-09 ENCOUNTER — HOSPITAL ENCOUNTER (OUTPATIENT)
Facility: HOSPITAL | Age: 69
Discharge: HOME | End: 2024-05-09
Attending: INTERNAL MEDICINE | Admitting: INTERNAL MEDICINE
Payer: MEDICARE

## 2024-05-09 ENCOUNTER — ANESTHESIA (OUTPATIENT)
Dept: CARDIOLOGY | Facility: HOSPITAL | Age: 69
End: 2024-05-09
Payer: MEDICARE

## 2024-05-09 ENCOUNTER — PATIENT OUTREACH (OUTPATIENT)
Dept: ADMINISTRATIVE | Facility: OTHER | Age: 69
End: 2024-05-09
Payer: MEDICARE

## 2024-05-09 DIAGNOSIS — I48.92 ATRIAL FLUTTER: ICD-10-CM

## 2024-05-09 DIAGNOSIS — I48.3 TYPICAL ATRIAL FLUTTER: Primary | ICD-10-CM

## 2024-05-09 DIAGNOSIS — I48.92 ATRIAL FLUTTER WITH RAPID VENTRICULAR RESPONSE: ICD-10-CM

## 2024-05-09 DIAGNOSIS — I48.0 PAROXYSMAL ATRIAL FIBRILLATION: ICD-10-CM

## 2024-05-09 DIAGNOSIS — I48.92 ATRIAL FLUTTER, UNSPECIFIED TYPE: ICD-10-CM

## 2024-05-09 PROCEDURE — 25000003 PHARM REV CODE 250: Performed by: INTERNAL MEDICINE

## 2024-05-09 PROCEDURE — 93653 COMPRE EP EVAL TX SVT: CPT | Performed by: INTERNAL MEDICINE

## 2024-05-09 PROCEDURE — 63600175 PHARM REV CODE 636 W HCPCS: Performed by: NURSE ANESTHETIST, CERTIFIED REGISTERED

## 2024-05-09 PROCEDURE — C1769 GUIDE WIRE: HCPCS | Performed by: INTERNAL MEDICINE

## 2024-05-09 PROCEDURE — C1733 CATH, EP, OTHR THAN COOL-TIP: HCPCS | Performed by: INTERNAL MEDICINE

## 2024-05-09 PROCEDURE — 99214 OFFICE O/P EST MOD 30 MIN: CPT | Mod: ,,, | Performed by: INTERNAL MEDICINE

## 2024-05-09 PROCEDURE — 63600175 PHARM REV CODE 636 W HCPCS: Mod: JZ,JG | Performed by: INTERNAL MEDICINE

## 2024-05-09 PROCEDURE — C1731 CATH, EP, 20 OR MORE ELEC: HCPCS | Performed by: INTERNAL MEDICINE

## 2024-05-09 PROCEDURE — 37000008 HC ANESTHESIA 1ST 15 MINUTES: Performed by: INTERNAL MEDICINE

## 2024-05-09 PROCEDURE — 93005 ELECTROCARDIOGRAM TRACING: CPT | Mod: 59

## 2024-05-09 PROCEDURE — 93653 COMPRE EP EVAL TX SVT: CPT | Mod: ,,, | Performed by: INTERNAL MEDICINE

## 2024-05-09 PROCEDURE — 25000003 PHARM REV CODE 250: Performed by: NURSE ANESTHETIST, CERTIFIED REGISTERED

## 2024-05-09 PROCEDURE — C1894 INTRO/SHEATH, NON-LASER: HCPCS | Performed by: INTERNAL MEDICINE

## 2024-05-09 PROCEDURE — 27201423 OPTIME MED/SURG SUP & DEVICES STERILE SUPPLY: Performed by: INTERNAL MEDICINE

## 2024-05-09 PROCEDURE — 80151 DRUG ASSAY AMIODARONE: CPT | Performed by: INTERNAL MEDICINE

## 2024-05-09 PROCEDURE — C1730 CATH, EP, 19 OR FEW ELECT: HCPCS | Performed by: INTERNAL MEDICINE

## 2024-05-09 PROCEDURE — 37000009 HC ANESTHESIA EA ADD 15 MINS: Performed by: INTERNAL MEDICINE

## 2024-05-09 PROCEDURE — 93010 ELECTROCARDIOGRAM REPORT: CPT | Mod: ,,, | Performed by: STUDENT IN AN ORGANIZED HEALTH CARE EDUCATION/TRAINING PROGRAM

## 2024-05-09 RX ORDER — HYDROCODONE BITARTRATE AND ACETAMINOPHEN 5; 325 MG/1; MG/1
1 TABLET ORAL EVERY 4 HOURS PRN
Status: DISCONTINUED | OUTPATIENT
Start: 2024-05-09 | End: 2024-05-09 | Stop reason: HOSPADM

## 2024-05-09 RX ORDER — PROPOFOL 10 MG/ML
VIAL (ML) INTRAVENOUS CONTINUOUS PRN
Status: DISCONTINUED | OUTPATIENT
Start: 2024-05-09 | End: 2024-05-09

## 2024-05-09 RX ORDER — BUPIVACAINE HYDROCHLORIDE 5 MG/ML
INJECTION, SOLUTION EPIDURAL; INTRACAUDAL
Status: DISCONTINUED | OUTPATIENT
Start: 2024-05-09 | End: 2024-05-09 | Stop reason: HOSPADM

## 2024-05-09 RX ORDER — MIDAZOLAM HYDROCHLORIDE 1 MG/ML
INJECTION, SOLUTION INTRAMUSCULAR; INTRAVENOUS
Status: DISCONTINUED | OUTPATIENT
Start: 2024-05-09 | End: 2024-05-09

## 2024-05-09 RX ORDER — ACETAMINOPHEN 325 MG/1
650 TABLET ORAL EVERY 4 HOURS PRN
Status: DISCONTINUED | OUTPATIENT
Start: 2024-05-09 | End: 2024-05-09 | Stop reason: HOSPADM

## 2024-05-09 RX ORDER — LIDOCAINE HYDROCHLORIDE 20 MG/ML
INJECTION, SOLUTION EPIDURAL; INFILTRATION; INTRACAUDAL; PERINEURAL
Status: DISCONTINUED | OUTPATIENT
Start: 2024-05-09 | End: 2024-05-09 | Stop reason: HOSPADM

## 2024-05-09 RX ADMIN — MIDAZOLAM HYDROCHLORIDE 2 MG: 1 INJECTION, SOLUTION INTRAMUSCULAR; INTRAVENOUS at 12:05

## 2024-05-09 RX ADMIN — PROPOFOL 100 MCG/KG/MIN: 10 INJECTION, EMULSION INTRAVENOUS at 12:05

## 2024-05-09 RX ADMIN — MIDAZOLAM HYDROCHLORIDE 2 MG: 1 INJECTION, SOLUTION INTRAMUSCULAR; INTRAVENOUS at 01:05

## 2024-05-09 RX ADMIN — SODIUM CHLORIDE: 0.9 INJECTION, SOLUTION INTRAVENOUS at 12:05

## 2024-05-09 NOTE — TRANSFER OF CARE
"Anesthesia Transfer of Care Note    Patient: Yung Mcconnell    Procedure(s) Performed: Procedure(s) (LRB):  Ablation atrial flutter/EPS typical vs atypical (Bilateral)    Patient location: Cath Lab    Anesthesia Type: MAC    Transport from OR: Transported from OR on room air with adequate spontaneous ventilation    Post pain: adequate analgesia    Post assessment: no apparent anesthetic complications and tolerated procedure well    Post vital signs: stable    Level of consciousness: responds to stimulation, awake and alert    Nausea/Vomiting: no nausea/vomiting    Complications: none    Transfer of care protocol was followedComments: Report given to Cath Lab RN at bedside. Hand off tool used. RN given opportunity to ask questions or clarify concerns. No Concerns verbalized. RN was asked if ready to assume care of patient. RN verbally confirmed. Pt. Left in stable condition. SV. Vital Signs Return to Near Baseline. No s/s of distress noted.       Last vitals: Visit Vitals  /74 (BP Location: Left arm, Patient Position: Lying)   Pulse (!) 52   Temp 36.7 °C (98.1 °F) (Temporal)   Resp 16   Ht 6' 2" (1.88 m)   Wt 126.1 kg (278 lb)   SpO2 95%   BMI 35.69 kg/m²     "

## 2024-05-09 NOTE — ANESTHESIA PREPROCEDURE EVALUATION
05/09/2024  Yung Mcconnell is a 68 y.o., male.      Pre-op Assessment    I have reviewed the Patient Summary Reports.    I have reviewed the NPO Status.   I have reviewed the Medications.     Review of Systems  Anesthesia Hx:  No problems with previous Anesthesia                Cardiovascular:         Dysrhythmias atrial fibrillation      hyperlipidemia   ECG has been reviewed.                          Pulmonary:   COPD, moderate Asthma mild   Sleep Apnea, CPAP                Hepatic/GI:     GERD, well controlled             Musculoskeletal:  Arthritis                   Physical Exam  General: Well nourished    Airway:  Mallampati: II   Mouth Opening: Normal  TM Distance: Normal  Tongue: Normal  Neck ROM: Normal ROM    Dental:  Intact    Chest/Lungs:  Normal Respiratory Rate    Heart:  Rate: Normal  Rhythm: Regular Rhythm    Anesthesia Plan  Type of Anesthesia, risks & benefits discussed:    Anesthesia Type: MAC  Intra-op Monitoring Plan: Standard ASA Monitors  Post Op Pain Control Plan: multimodal analgesia  Induction:  IV  Informed Consent: Informed consent signed with the Patient and all parties understand the risks and agree with anesthesia plan.  All questions answered.   ASA Score: 3  Day of Surgery Review of History & Physical: H&P Update referred to the surgeon/provider.    Ready For Surgery From Anesthesia Perspective.   .

## 2024-05-09 NOTE — DISCHARGE INSTRUCTIONS
"                                     ABLATION    1. DIET: It is advisable for you to follow a diet that limits the intake of salt, sugar, saturated fats and cholesterol.     2. FOR THE NEXT 24 HOURS:   For the next 8 hours, you should be watched by a responsible adult. This person should make sure your condition is not getting worse.   Don't drink any alcohol for the next 24 hours.  Don't drive, operate dangerous machinery, or make important business or personal decisions during the next 24 hours.  Your healthcare provider may tell you not to take any medicine by mouth for pain or sleep in the next 4 hours. These medicines may react with the medicines you were given in the hospital. This could cause a much stronger response than usual.       3. ACTIVITY:                                Day of discharge:             NO vigorous activity, lifting or straining                                                   Day after discharge:         Avoid heavy lifting (up to 10 lbs)                                                                        The day after discharge you may shower, but avoid tub baths for 5 days                                                                         Wash site gently with soap and water        NO powder or lotion to your procedure site.                 Before you shower, remove dressing, apply bandaid if desired                                                                                                                                                                            2nd day after discharge:  Resume normal activities                                                                         Exercise program as instructed      4. WOUND CARE: It is not unusual to have a small amount of bruising appear in the groin area. It is also common to have a tender "knot" develop beneath the skin at the puncture site in the groin. This is scar tissue only and is not a cause for concern or " "alarm. This tender knot may take several weeks to fully resolve. The bruise will usually spread over several days. If the lump gets bigger, call you doctor immediately.    5. DISCOMFORT: For general discomfort at the puncture site, you may take 1 or 2 Acetaminophen (Tylenol) tablets every 4 hours as needed. (Do not take more than 4000 mg a day)                 6. CALL YOUR HEALTHCARE PROVIDER IF YOU START TO HAVE THE FOLLOWING SYMPTOMS:        1. Problems with the affected leg: Pain, discomfort, loss of warmth, numbness or tingling                                                                                                                            2. Problems at the groin site: Bleeding, pain that is sudden/sharp/persistent,                   swelling at site or a change in "lump" size, increased redness or drainage at                     puncture site                                                               3. High fever (101 degrees or higher)       4.  Drowsiness that doesn't get better       5. Weakness or dizziness that doesn't get better            6. Repeated vomiting        7. GO TO  THE EMERGENCY ROOM OR CALL 911 IF YOU HAVE: Chest pains or discomforts not relieved with 3 nitroglycerin doses (sublingual tablets or spray), numbness or severe pain or if your foot or leg becomes cold or discolored or uncontrolled bleeding from site (apply direct pressure above site).                                      Post-op Heart Catheterization    1. DIET: It is advisable for you to follow a diet that limits the intake of salt, sugar, saturated fats and cholesterol.     2. FOR THE NEXT 24 HOURS:   For the next 8 hours, you should be watched by a responsible adult. This person should make sure your condition is not getting worse.   Don't drink any alcohol for the next 24 hours.  Don't drive, operate dangerous machinery, or make important business or personal decisions during the next 24 hours.  Your healthcare " "provider may tell you not to take any medicine by mouth for pain or sleep in the next 4 hours. These medicines may react with the medicines you were given in the hospital. This could cause a much stronger response than usual.       3. ACTIVITY:                                Day of discharge:             NO vigorous activity, lifting or straining                                                   Day after discharge:         Avoid heavy lifting (up to 10 lbs)                                                                        The day after discharge you may shower, but avoid tub baths for 5 days                                                                         Wash site gently with soap and water        NO powder or lotion to your procedure site.                 Before you shower, remove dressing, apply bandaid if desired                                                                                                                                                                            2nd day after discharge:  Resume normal activities                                                                         Exercise program as instructed      4. WOUND CARE: It is not unusual to have a small amount of bruising appear in the groin area. It is also common to have a tender "knot" develop beneath the skin at the puncture site in the groin. This is scar tissue only and is not a cause for concern or alarm. This tender knot may take several weeks to fully resolve. The bruise will usually spread over several days. If the lump gets bigger, call you doctor immediately.    5. DISCOMFORT: For general discomfort at the puncture site, you may take 1 or 2 Acetaminophen (Tylenol) tablets every 4 hours as needed. (Do not take more than 4000 mg a day)                 6. CALL YOUR HEALTHCARE PROVIDER IF YOU START TO HAVE THE FOLLOWING SYMPTOMS:        1. Problems with the affected leg: Pain, discomfort, loss of warmth, " "numbness or tingling                                                                                                                            2. Problems at the groin site: Bleeding, pain that is sudden/sharp/persistent,                   swelling at site or a change in "lump" size, increased redness or drainage at                     puncture site                                                               3. High fever (101 degrees or higher)       4.  Drowsiness that doesn't get better       5. Weakness or dizziness that doesn't get better            6. Repeated vomiting        7. GO TO  THE EMERGENCY ROOM OR CALL 911 IF YOU HAVE: Chest pains or discomforts not relieved with 3 nitroglycerin doses (sublingual tablets or spray), numbness or severe pain or if your foot or leg becomes cold or discolored or uncontrolled bleeding from site (apply direct pressure above site).  "

## 2024-05-09 NOTE — ANESTHESIA POSTPROCEDURE EVALUATION
Anesthesia Post Evaluation    Patient: Yung Mcconnell    Procedure(s) Performed: Procedure(s) (LRB):  Ablation atrial flutter/EPS typical vs atypical (Bilateral)    Final Anesthesia Type: MAC      Patient location during evaluation: Cath Lab  Patient participation: Yes- Able to Participate  Level of consciousness: awake and alert  Post-procedure vital signs: reviewed and stable  Pain management: adequate  Airway patency: patent  KEVEN mitigation strategies: Multimodal analgesia and Extubation and recovery carried out in lateral, semiupright, or other nonsupine position  PONV status at discharge: No PONV  Anesthetic complications: no      Cardiovascular status: hemodynamically stable, blood pressure returned to baseline and stable  Respiratory status: unassisted, spontaneous ventilation and room air  Hydration status: euvolemic  Follow-up not needed.  Comments: Report given to Cath Lab RN at bedside. Hand off tool used. RN given opportunity to ask questions or clarify concerns. No Concerns verbalized. RN was asked if ready to assume care of patient. RN verbally confirmed. Pt. left in stable condition. SV. Vital Signs WNL when compared to pt. baseline. No s/s of distress noted.             No case tracking events are documented in the log.

## 2024-05-09 NOTE — INTERVAL H&P NOTE
The patient has been examined and the H&P has been reviewed:    I concur with the findings and no changes have occurred since H&P was written.    Procedure risks, benefits and alternative options discussed and understood by patient/family.          Active Hospital Problems    Diagnosis  POA    Typical atrial flutter [I48.3]  Yes      Resolved Hospital Problems   No resolved problems to display.

## 2024-05-10 LAB
OHS QRS DURATION: 78 MS
OHS QTC CALCULATION: 454 MS

## 2024-05-12 NOTE — DISCHARGE SUMMARY
[facility] - Cath Lab (Hospital)  Discharge Note  Short Stay    Procedure(s) (LRB):  Ablation atrial flutter/EPS typical vs atypical (Bilateral)      OUTCOME: Patient tolerated treatment/procedure well without complication and is now ready for discharge.    DISPOSITION: Home or Self Care    FINAL DIAGNOSIS:  now sp RFA of atrial flutter.      FOLLOWUP: In clinic    DISCHARGE INSTRUCTIONS:    Discharge Procedure Orders   Diet general        TIME SPENT ON DISCHARGE:   20  minutes

## 2024-05-13 LAB
AMIODARONE SERPL-SCNC: 0.7 UG/ML (ref 1–3)
N-DESETHYL-AMIODARONE: 0.4 UG/ML

## 2024-05-14 NOTE — PROGRESS NOTES
CHW - Case Closure    This Community Health Worker spoke to patient via telephone today.   Pt/Caregiver reported: Pt has not received paperwork mailed out to him for CPAP supplies. Notified him that it is on the way. Pt is still working and makes to much money to qualify for help with Eliquis. States he pays 300/month. Looked and found no other resource due to income.   Pt/Caregiver denied any additional needs at this time and agrees with episode closure at this time.  Provided patient with Community Health Worker's contact information and encouraged him/her to contact this Community Health Worker if additional needs arise.        The left coronary artery was selectively engaged and injected. Multiple views of the injected vessel were taken.

## 2024-05-15 VITALS
HEIGHT: 74 IN | TEMPERATURE: 98 F | DIASTOLIC BLOOD PRESSURE: 62 MMHG | SYSTOLIC BLOOD PRESSURE: 122 MMHG | WEIGHT: 278 LBS | HEART RATE: 54 BPM | RESPIRATION RATE: 17 BRPM | OXYGEN SATURATION: 95 % | BODY MASS INDEX: 35.68 KG/M2

## 2024-05-15 DIAGNOSIS — J44.89 ASTHMA WITH COPD: ICD-10-CM

## 2024-05-15 RX ORDER — LEVALBUTEROL INHALATION SOLUTION 1.25 MG/3ML
1 SOLUTION RESPIRATORY (INHALATION)
Qty: 270 ML | Refills: 11 | Status: SHIPPED | OUTPATIENT
Start: 2024-05-15 | End: 2025-05-15

## 2024-05-16 ENCOUNTER — PATIENT MESSAGE (OUTPATIENT)
Dept: CARDIOLOGY | Facility: CLINIC | Age: 69
End: 2024-05-16
Payer: MEDICARE

## 2024-05-16 ENCOUNTER — HOSPITAL ENCOUNTER (OUTPATIENT)
Dept: CARDIOLOGY | Facility: HOSPITAL | Age: 69
Discharge: HOME | End: 2024-05-16
Attending: INTERNAL MEDICINE
Payer: MEDICARE

## 2024-05-16 DIAGNOSIS — I48.92 ATRIAL FLUTTER, UNSPECIFIED TYPE: ICD-10-CM

## 2024-05-16 LAB
OHS QRS DURATION: 84 MS
OHS QTC CALCULATION: 435 MS

## 2024-05-16 PROCEDURE — 93005 ELECTROCARDIOGRAM TRACING: CPT

## 2024-05-16 PROCEDURE — 93010 ELECTROCARDIOGRAM REPORT: CPT | Mod: ,,, | Performed by: INTERNAL MEDICINE

## 2024-05-17 DIAGNOSIS — Z78.9 STATIN INTOLERANCE: Primary | ICD-10-CM

## 2024-05-17 DIAGNOSIS — E78.2 MIXED HYPERLIPIDEMIA: ICD-10-CM

## 2024-05-27 ENCOUNTER — PATIENT MESSAGE (OUTPATIENT)
Dept: CARDIOLOGY | Facility: CLINIC | Age: 69
End: 2024-05-27
Payer: MEDICARE

## 2024-05-27 ENCOUNTER — TELEPHONE (OUTPATIENT)
Dept: PHARMACY | Facility: CLINIC | Age: 69
End: 2024-05-27
Payer: MEDICARE

## 2024-05-27 ENCOUNTER — TELEPHONE (OUTPATIENT)
Dept: CARDIOLOGY | Facility: CLINIC | Age: 69
End: 2024-05-27
Payer: MEDICARE

## 2024-05-27 DIAGNOSIS — I48.3 TYPICAL ATRIAL FLUTTER: Primary | ICD-10-CM

## 2024-05-27 NOTE — TELEPHONE ENCOUNTER
Rx sent to [facility] to assist with cost please call pharmacy and see if they can assist with cost.    Thanks  SILVANO Collier

## 2024-05-27 NOTE — TELEPHONE ENCOUNTER
We have reached out to Yung Mcconnell to inform him of the [employer]  application process for Eliquis and whats required to apply.  Yung Mcconnell did not answer. We have sent a letter to my chart.   Follow-up will be made in 5 business days.

## 2024-05-27 NOTE — TELEPHONE ENCOUNTER
Rx resent- due to set to no print.     Thanks.  SILVANO Collier        Called and informed patient that script for eliquis has been sent to requested pharmacy. Will also call in saving coupon to pharmacy because patient paid out of pocket last month. Heather

## 2024-05-27 NOTE — TELEPHONE ENCOUNTER
Called sai to give coupon code for eliquis, Martha at the pharmacy collected info from me. And stated that patient has already used a coupon savings card before and can't use another one. Please advise on how we should proceed. Heather    Rx resent- due to set to no print.     Thanks.  SILVANO Collier        Called and informed patient that script for eliquis has been sent to requested pharmacy. Will also call in saving coupon to pharmacy because patient paid out of pocket last month. Heather

## 2024-06-04 NOTE — TELEPHONE ENCOUNTER
Yung Mcconnell was scheduled on 05/27/2024 to provide the following documentation to initiate the application process.        Proof of household Income( such as social security statement, 1099 form, pension statement or 3 consecutive pay stubs  Copy of all Insurance cards( front and back)  Printout from your Insurance or Pharmacy that shows how much you have spent on prescriptions this year  Signed and dated HIPAA /Patient Information Forms         As of today, the documents have not been received.  Reminder letter sent. We are unable to move forward with application process until requested documentation is received. Requested documentation can be submitted by email or fax to the Pharmacy Patient Assistance Team.  Call [phone] with any follow-up questions.       Pharmacy Patient Assistance  [address] [place] [address]  [id number] [place] [id number]  Fax: [phone]  Email: [email or web address]

## 2024-06-04 NOTE — TELEPHONE ENCOUNTER
A 2nd attempt has been made to establish contact with Yung Mcconnell  via Portal message. The final contact attempt will be made in 5 business days

## 2024-06-07 ENCOUNTER — HOSPITAL ENCOUNTER (OUTPATIENT)
Dept: CARDIOLOGY | Facility: HOSPITAL | Age: 69
Discharge: HOME | End: 2024-06-07
Attending: INTERNAL MEDICINE
Payer: MEDICARE

## 2024-06-07 ENCOUNTER — PATIENT MESSAGE (OUTPATIENT)
Dept: CARDIOLOGY | Facility: CLINIC | Age: 69
End: 2024-06-07
Payer: MEDICARE

## 2024-06-07 ENCOUNTER — PATIENT OUTREACH (OUTPATIENT)
Dept: ADMINISTRATIVE | Facility: HOSPITAL | Age: 69
End: 2024-06-07
Payer: MEDICARE

## 2024-06-07 ENCOUNTER — PATIENT MESSAGE (OUTPATIENT)
Dept: ADMINISTRATIVE | Facility: HOSPITAL | Age: 69
End: 2024-06-07
Payer: MEDICARE

## 2024-06-07 DIAGNOSIS — Z12.11 COLON CANCER SCREENING: Primary | ICD-10-CM

## 2024-06-07 DIAGNOSIS — I48.92 ATRIAL FLUTTER, UNSPECIFIED TYPE: ICD-10-CM

## 2024-06-07 LAB
OHS QRS DURATION: 86 MS
OHS QTC CALCULATION: 430 MS

## 2024-06-07 PROCEDURE — 93005 ELECTROCARDIOGRAM TRACING: CPT

## 2024-06-07 PROCEDURE — 93010 ELECTROCARDIOGRAM REPORT: CPT | Mod: ,,, | Performed by: INTERNAL MEDICINE

## 2024-06-07 NOTE — TELEPHONE ENCOUNTER
We will plan to use a long term monitor in near future after your next visit to assess for recurrence of your arrhythmia. At that time, we can consider discontinuation of eliquis.     Please make sure for now he continues on eliquis.     Thanks  SILVANO Collier

## 2024-06-09 NOTE — PROGRESS NOTES
See comments below and call patient to discuss.   Please close encounter when done -- no need to route back to me.  Thanks  Amio level 0.7/0.4 - would expect amio effect to last @ 2 months  Keep same plan

## 2024-06-11 NOTE — TELEPHONE ENCOUNTER
Yung Mcconnell has not returned calls or messages regarding support for his Eliquis after multiple attempts to reach him over 10 business days. A final letter has been mailed to the patient to reach back out to Pharmacy Patient Assistance to initiate this process. Please instruct the patient to reach out to [phone] or [email or web address] if he is still in need of assistance.

## 2024-06-14 ENCOUNTER — HOSPITAL ENCOUNTER (OUTPATIENT)
Dept: PREADMISSION TESTING | Facility: HOSPITAL | Age: 69
Discharge: HOME | End: 2024-06-14
Attending: FAMILY MEDICINE
Payer: MEDICARE

## 2024-06-14 DIAGNOSIS — Z12.11 COLON CANCER SCREENING: Primary | ICD-10-CM

## 2024-06-14 RX ORDER — SODIUM, POTASSIUM,MAG SULFATES 17.5-3.13G
1 SOLUTION, RECONSTITUTED, ORAL ORAL DAILY
Qty: 1 KIT | Refills: 0 | Status: SHIPPED | OUTPATIENT
Start: 2024-06-14 | End: 2024-06-16

## 2024-06-16 ENCOUNTER — E-CONSULT (OUTPATIENT)
Dept: CARDIOLOGY | Facility: CLINIC | Age: 69
End: 2024-06-16
Payer: MEDICARE

## 2024-06-16 DIAGNOSIS — J44.1 ASTHMA EXACERBATION IN COPD: ICD-10-CM

## 2024-06-16 DIAGNOSIS — J45.901 ASTHMA EXACERBATION IN COPD: ICD-10-CM

## 2024-06-16 DIAGNOSIS — I48.3 TYPICAL ATRIAL FLUTTER: Primary | ICD-10-CM

## 2024-06-16 DIAGNOSIS — G47.33 OSA (OBSTRUCTIVE SLEEP APNEA): ICD-10-CM

## 2024-06-16 DIAGNOSIS — Z78.9 STATIN INTOLERANCE: ICD-10-CM

## 2024-06-16 NOTE — CONSULTS
[facility] - [facility]  Response for E-Consult     Patient Name: Yung Mcconnell  MRN: [id number]  Primary Care Provider: Prasanna Gaxiola MD   Requesting Provider: Kalli Rubin NP  Consults    Recommendation: Pt cleared for procedure/surgery at moderate CV risk . Pt can hold eliquis 48 hours before procedure.    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 Pt seen in CV clinic 4-24 with stable CV status. EKG NSR 60 bpm.Pt cleared for procedure/surgery at moderate CV risk . Pt can hold eliquis 48 hours before procedure.      Total time of Consultation: 10 minute    I did not speak to the requesting provider verbally about this.     *This eConsult is based on the clinical data available to me and is furnished without benefit of a physical examination. The eConsult will need to be interpreted in light of any clinical issues or changes in patient status not available to me at the time of filing this eConsults. Significant changes in patient condition or level of acuity should result in immediate formal consultation and reevaluation. Please alert me if you have further questions.    Thank you for this eConsult referral.     Jayant Patino MD  [facility]

## 2024-07-11 DIAGNOSIS — E78.2 MIXED HYPERLIPIDEMIA: Primary | ICD-10-CM

## 2024-07-11 NOTE — H&P (VIEW-ONLY)
Subjective:   Patient ID:  Yung Mcconnell is a 68 y.o. male who presents for evaluation of No chief complaint on file.      HPI    Yung Mcconnell is a 68 year old male who presents to Arrhythmia clinic for AFL follow up. He was recently admitted to [facility] due AFL/RVR and underwent CHRIS/DCCV with restoration to NSR.     His current medical conditions include HLP, KEVEN, COPD, AFL on Eliquis/Amio.    EKG- Sinus bradycardia, HR 54 QTc 440 ms.     Using cpap nightly.    Denies chest pain or anginal equivalents. No shortness of breath, STEPHENSON or palpitations. Denies orthopnea, PND or abdominal bloating. Reports regular walking without any issues lately. NO leg swelling or claudications. No recent falls, syncope or near syncopal events. Reports compliance with medications and dietary restrictions. NO CNS complaints to suggest TIA or CVA today. No signs of abnormal bleeding on eliquis.      7/12/2024 update    Yung Mcconnell returns for follow up after CTI ablation for AFL.     No longer taking Amiodarone since ablation.     Taking Repatha injections without any side effects. Does get short of breath while working in the yard and has to stop to take breaks.     Using cpap nightly. Denies chest pain or anginal equivalents. Denies orthopnea, PND or abdominal bloating. Reports regular walking without any issues lately. NO leg swelling or claudications. No recent falls, syncope or near syncopal events. Reports compliance with medications and dietary restrictions. NO CNS complaints to suggest TIA or CVA today. No signs of abnormal bleeding on Eliquis.     Has been staying active on a regular basis.     Interpretation Summary         Electrophysiology study with coronary sinus pacing.    Tachycardia induction and coronary sinus recording.    3D mapping performed with Ensite.    Catheter ablation of CTI-dependent flutter.    Successful CTI ablation.    The patient tolerated procedure well.    The patient left the lab in stable  condition.    There were no immediate complications.     I certify that I was present for the critical steps of the procedure including the diagnostic, surgical and/or interventional portions.      Procedure Log documented by Documenter: Veronique Yi RN and verified by Wilfredo Bahena MD.     Date: 5/9/2024  Time: 2:07 PM    Past Medical History:   Diagnosis Date    Allergy     Asthma, chronic 7/9/2013    Colon polyps     COPD (chronic obstructive pulmonary disease)     GERD (gastroesophageal reflux disease)     High cholesterol     Osteoarthritis of both knees 7/9/2013    Sleep apnea        Past Surgical History:   Procedure Laterality Date    ABLATION OF ARRHYTHMOGENIC FOCUS FOR ATRIAL FLUTTER Bilateral 5/9/2024    Procedure: Ablation atrial flutter/EPS typical vs atypical;  Surgeon: Wilfredo Bahena MD;  Location: [facility] CATH LAB;  Service: Cardiology;  Laterality: Bilateral;    CHOLECYSTECTOMY      COLONOSCOPY N/A 10/23/2020    Procedure: COLONOSCOPY;  Surgeon: Sae Valentine MD;  Location: [facility] ENDO;  Service: Endoscopy;  Laterality: N/A;    DIAGNOSTIC LAPAROSCOPY N/A 6/21/2018    Procedure: LAPAROSCOPY, DIAGNOSTIC;  Surgeon: Casper Martinez MD;  Location: [facility] OR;  Service: General;  Laterality: N/A;    LUNG SURGERY Right     benign mass    TOTAL KNEE ARTHROPLASTY Left     TRANSESOPHAGEAL ECHOCARDIOGRAM WITH POSSIBLE CARDIOVERSION (CHRIS W/ POSS CARDIOVERSION) N/A 4/1/2024    Procedure: Transesophageal echo (CHRIS) intra-procedure log documentation;  Surgeon: Simran Corley MD;  Location: [facility] CATH LAB;  Service: Cardiology;  Laterality: N/A;    UMBILICAL HERNIA REPAIR N/A 6/21/2018    Procedure: REPAIR, HERNIA, UMBILICAL, AGE 5 YEARS OR OLDER;  Surgeon: Casper Martinez MD;  Location: [facility] OR;  Service: General;  Laterality: N/A;       Social History     Tobacco Use    Smoking status: Never     Passive exposure: Past    Smokeless tobacco: Never   Substance Use Topics    Alcohol use: Never     Drug use: No       Family History   Problem Relation Name Age of Onset    Hypertension Mother      Diabetes Mother      Cancer Mother          Breast Ca     Heart disease Father          CAD , CHF     Aneurysm Father      Colon polyps Father      Melanoma Neg Hx      Eczema Neg Hx      Lupus Neg Hx      Psoriasis Neg Hx         Wt Readings from Last 3 Encounters:   07/12/24 122.8 kg (270 lb 13.4 oz)   05/09/24 126.1 kg (278 lb)   04/19/24 126.1 kg (278 lb)     Temp Readings from Last 3 Encounters:   05/09/24 97.7 °F (36.5 °C) (Temporal)   04/05/24 99.8 °F (37.7 °C) (Tympanic)   04/02/24 97.7 °F (36.5 °C) (Axillary)     BP Readings from Last 3 Encounters:   07/12/24 124/70   05/09/24 122/62   04/19/24 (!) 140/70     Pulse Readings from Last 3 Encounters:   07/12/24 61   05/09/24 (!) 54   04/19/24 (!) 53       Current Outpatient Medications on File Prior to Visit   Medication Sig Dispense Refill    albuterol (PROVENTIL/VENTOLIN HFA) 90 mcg/actuation inhaler Inhale 2 puffs into the lungs every 4 (four) hours as needed for Wheezing or Shortness of Breath. 54 g 3    apixaban (ELIQUIS) 5 mg Tab Take 1 tablet (5 mg total) by mouth 2 (two) times daily. 60 tablet 11    apixaban (ELIQUIS) 5 mg Tab Take 1 tablet (5 mg total) by mouth 2 (two) times daily. 60 tablet 11    budesonide-glycopyr-formoterol 160-9-4.8 mcg/actuation HFAA Inhale 2 puffs into the lungs 2 (two) times daily. Wash out mouth after use. 10.7 g 11    evolocumab (REPATHA SURECLICK) 140 mg/mL PnIj Inject 1 mL (140 mg total) into the skin every 14 (fourteen) days. 2 mL 11    fluticasone propionate (FLONASE) 50 mcg/actuation nasal spray 2 sprays (100 mcg total) by Each Nostril route once daily. 48 g 3    levalbuterol (XOPENEX) 1.25 mg/3 mL nebulizer solution Take 3 mLs (1.25 mg total) by nebulization every 6 to 8 hours as needed for Wheezing or Shortness of Breath. Rescue 270 mL 11    metoprolol succinate (TOPROL-XL) 25 MG 24 hr tablet Take 1 tablet (25 mg total)  by mouth once daily. 30 tablet 11    tadalafiL (CIALIS) 20 MG Tab TAKE 1 TABLET DAILY AS NEEDED 24 tablet 2     No current facility-administered medications on file prior to visit.       No cardiac monitor results found for the past 12 months    Results for orders placed during the hospital encounter of 03/31/24    Echo    Interpretation Summary    Left Ventricle: There is normal systolic function with a visually estimated ejection fraction of 60 - 65%. Unable to assess diastolic function due to atrial flutter    Right Ventricle: Normal right ventricular cavity size. Wall thickness is normal. Right ventricle wall motion  is normal. Systolic function is normal.    IVC/SVC: Normal venous pressure at 3 mmHg.    No results found for this or any previous visit.        Results for orders placed or performed during the hospital encounter of 07/12/24   EKG 12-lead    Collection Time: 07/12/24  7:47 AM   Result Value Ref Range    QRS Duration 80 ms    [facility] QTC Calculation 423 ms    Narrative    Test Reason : E78.2,    Vent. Rate : 059 BPM     Atrial Rate : 059 BPM     P-R Int : 126 ms          QRS Dur : 080 ms      QT Int : 428 ms       P-R-T Axes : 088 034 012 degrees     QTc Int : 423 ms    Sinus bradycardia  Otherwise normal ECG  When compared with ECG of 07-JUN-2024 11:05,  No significant change was found    Referred By: DA FRIEDMAN           Confirmed By:          Review of Systems   Constitutional: Positive for malaise/fatigue.   HENT:  Negative for hearing loss and hoarse voice.    Eyes:  Negative for blurred vision and visual disturbance.   Cardiovascular:  Positive for dyspnea on exertion. Negative for chest pain, claudication, irregular heartbeat, leg swelling, near-syncope, orthopnea, palpitations, paroxysmal nocturnal dyspnea and syncope.   Respiratory:  Negative for cough, hemoptysis, shortness of breath, sleep disturbances due to breathing, snoring and wheezing.    Endocrine: Negative for cold intolerance and  heat intolerance.   Hematologic/Lymphatic: Does not bruise/bleed easily.   Skin:  Negative for color change, dry skin and nail changes.   Musculoskeletal:  Positive for arthritis and back pain.   Gastrointestinal:  Negative for bloating, abdominal pain, constipation, nausea and vomiting.   Genitourinary:  Negative for dysuria, flank pain, hematuria and hesitancy.   Neurological:  Negative for headaches, light-headedness, loss of balance, numbness, paresthesias and weakness.   Psychiatric/Behavioral:  Negative for altered mental status.          Objective:/70 (BP Location: Left arm, Patient Position: Sitting)   Pulse 61   Wt 122.8 kg (270 lb 13.4 oz)   SpO2 96%   BMI 34.77 kg/m²      Physical Exam  Vitals and nursing note reviewed.   Constitutional:       General: He is not in acute distress.     Appearance: Normal appearance. He is well-developed. He is obese. He is not ill-appearing.   HENT:      Head: Normocephalic and atraumatic.      Nose: Nose normal.      Mouth/Throat:      Mouth: Mucous membranes are moist.   Eyes:      Pupils: Pupils are equal, round, and reactive to light.   Neck:      Thyroid: No thyromegaly.      Vascular: No JVD.      Trachea: No tracheal deviation.   Cardiovascular:      Rate and Rhythm: Normal rate and regular rhythm.      Chest Wall: PMI is not displaced.      Pulses: Intact distal pulses.           Radial pulses are 2+ on the right side and 2+ on the left side.        Dorsalis pedis pulses are 2+ on the right side and 2+ on the left side.      Heart sounds: S1 normal and S2 normal. Heart sounds not distant. No murmur heard.  Pulmonary:      Effort: Pulmonary effort is normal. No respiratory distress.      Breath sounds: Normal breath sounds. No wheezing.   Abdominal:      General: Bowel sounds are normal. There is no distension.      Palpations: Abdomen is soft.      Tenderness: There is no abdominal tenderness.   Musculoskeletal:         General: No swelling. Normal range  of motion.      Cervical back: Full passive range of motion without pain, normal range of motion and neck supple.      Right lower leg: No edema.      Left lower leg: No edema.      Right ankle: No swelling.      Left ankle: No swelling.   Skin:     General: Skin is warm and dry.      Capillary Refill: Capillary refill takes less than 2 seconds.      Nails: There is no clubbing.   Neurological:      General: No focal deficit present.      Mental Status: He is alert and oriented to person, place, and time.      Motor: No weakness.   Psychiatric:         Speech: Speech normal.         Behavior: Behavior normal.         Thought Content: Thought content normal.         Judgment: Judgment normal.         Lab Results   Component Value Date    CHOL 171 04/01/2024    CHOL 177 12/08/2023    CHOL 186 07/22/2022     Lab Results   Component Value Date    HDL 44 04/01/2024    HDL 37 (L) 12/08/2023    HDL 41 07/22/2022     Lab Results   Component Value Date    LDLCALC 108.0 04/01/2024    LDLCALC 123.8 12/08/2023    LDLCALC 125.8 07/22/2022     Lab Results   Component Value Date    TRIG 95 04/01/2024    TRIG 81 12/08/2023    TRIG 96 07/22/2022     Lab Results   Component Value Date    CHOLHDL 25.7 04/01/2024    CHOLHDL 20.9 12/08/2023    CHOLHDL 22.0 07/22/2022       Chemistry        Component Value Date/Time     04/19/2024 0955    K 4.3 04/19/2024 0955     04/19/2024 0955    CO2 23 04/19/2024 0955    BUN 14 04/19/2024 0955    CREATININE 1.0 04/19/2024 0955     (H) 04/19/2024 0955        Component Value Date/Time    CALCIUM 9.7 04/19/2024 0955    ALKPHOS 69 04/02/2024 0543    AST 17 04/02/2024 0543    ALT 21 04/02/2024 0543    BILITOT 0.6 04/02/2024 0543    ESTGFRAFRICA >60.0 07/22/2022 1055    EGFRNONAA >60.0 07/22/2022 1055          Lab Results   Component Value Date    TSH 0.840 04/19/2024     Lab Results   Component Value Date    INR 1.0 04/19/2024    INR 0.9 03/31/2024    INR 1.0 11/30/2020     Lab Results    Component Value Date    WBC 10.39 04/19/2024    HGB 15.6 04/19/2024    HCT 44.5 04/19/2024    MCV 89 04/19/2024     04/19/2024          Assessment:      1. Typical atrial flutter    2. Seasonal allergic rhinitis due to pollen    3. Mixed hyperlipidemia    4. Statin myopathy    5. Statin intolerance    6. KEVEN (obstructive sleep apnea)    7. Nocturnal hypoxemia          Plan:     Continue eliquis for cardio-embolic protection  Continue BB, Repatha  30 day cardiac event monitor to assess afl recurrence  Dash diet 2 gm sodium restriction  Profile BP at home  RTC in 4 months    SILVANO [facility]liersjam[facility]

## 2024-07-11 NOTE — PROGRESS NOTES
Subjective:   Patient ID:  Yung Mcconnell is a 68 y.o. male who presents for evaluation of No chief complaint on file.      HPI    Yung Mcconnell is a 68 year old male who presents to Arrhythmia clinic for AFL follow up. He was recently admitted to [facility] due AFL/RVR and underwent CHRIS/DCCV with restoration to NSR.     His current medical conditions include HLP, KEVEN, COPD, AFL on Eliquis/Amio.    EKG- Sinus bradycardia, HR 54 QTc 440 ms.     Using cpap nightly.    Denies chest pain or anginal equivalents. No shortness of breath, STEPHENSON or palpitations. Denies orthopnea, PND or abdominal bloating. Reports regular walking without any issues lately. NO leg swelling or claudications. No recent falls, syncope or near syncopal events. Reports compliance with medications and dietary restrictions. NO CNS complaints to suggest TIA or CVA today. No signs of abnormal bleeding on eliquis.      7/12/2024 update    Yung Mcconnell returns for follow up after CTI ablation for AFL.     No longer taking Amiodarone since ablation.     Taking Repatha injections without any side effects. Does get short of breath while working in the yard and has to stop to take breaks.     Using cpap nightly. Denies chest pain or anginal equivalents. Denies orthopnea, PND or abdominal bloating. Reports regular walking without any issues lately. NO leg swelling or claudications. No recent falls, syncope or near syncopal events. Reports compliance with medications and dietary restrictions. NO CNS complaints to suggest TIA or CVA today. No signs of abnormal bleeding on Eliquis.     Has been staying active on a regular basis.     Interpretation Summary         Electrophysiology study with coronary sinus pacing.    Tachycardia induction and coronary sinus recording.    3D mapping performed with Ensite.    Catheter ablation of CTI-dependent flutter.    Successful CTI ablation.    The patient tolerated procedure well.    The patient left the lab in stable  condition.    There were no immediate complications.     I certify that I was present for the critical steps of the procedure including the diagnostic, surgical and/or interventional portions.      Procedure Log documented by Documenter: Veronique Yi RN and verified by Wilfredo Bahena MD.     Date: 5/9/2024  Time: 2:07 PM    Past Medical History:   Diagnosis Date    Allergy     Asthma, chronic 7/9/2013    Colon polyps     COPD (chronic obstructive pulmonary disease)     GERD (gastroesophageal reflux disease)     High cholesterol     Osteoarthritis of both knees 7/9/2013    Sleep apnea        Past Surgical History:   Procedure Laterality Date    ABLATION OF ARRHYTHMOGENIC FOCUS FOR ATRIAL FLUTTER Bilateral 5/9/2024    Procedure: Ablation atrial flutter/EPS typical vs atypical;  Surgeon: Wilfredo Bahena MD;  Location: [facility] CATH LAB;  Service: Cardiology;  Laterality: Bilateral;    CHOLECYSTECTOMY      COLONOSCOPY N/A 10/23/2020    Procedure: COLONOSCOPY;  Surgeon: Sae Valentine MD;  Location: [facility] ENDO;  Service: Endoscopy;  Laterality: N/A;    DIAGNOSTIC LAPAROSCOPY N/A 6/21/2018    Procedure: LAPAROSCOPY, DIAGNOSTIC;  Surgeon: Casper Martinez MD;  Location: [facility] OR;  Service: General;  Laterality: N/A;    LUNG SURGERY Right     benign mass    TOTAL KNEE ARTHROPLASTY Left     TRANSESOPHAGEAL ECHOCARDIOGRAM WITH POSSIBLE CARDIOVERSION (CHRIS W/ POSS CARDIOVERSION) N/A 4/1/2024    Procedure: Transesophageal echo (CHRIS) intra-procedure log documentation;  Surgeon: Simran Corley MD;  Location: [facility] CATH LAB;  Service: Cardiology;  Laterality: N/A;    UMBILICAL HERNIA REPAIR N/A 6/21/2018    Procedure: REPAIR, HERNIA, UMBILICAL, AGE 5 YEARS OR OLDER;  Surgeon: Casper Martinez MD;  Location: [facility] OR;  Service: General;  Laterality: N/A;       Social History     Tobacco Use    Smoking status: Never     Passive exposure: Past    Smokeless tobacco: Never   Substance Use Topics    Alcohol use: Never     Drug use: No       Family History   Problem Relation Name Age of Onset    Hypertension Mother      Diabetes Mother      Cancer Mother          Breast Ca     Heart disease Father          CAD , CHF     Aneurysm Father      Colon polyps Father      Melanoma Neg Hx      Eczema Neg Hx      Lupus Neg Hx      Psoriasis Neg Hx         Wt Readings from Last 3 Encounters:   07/12/24 122.8 kg (270 lb 13.4 oz)   05/09/24 126.1 kg (278 lb)   04/19/24 126.1 kg (278 lb)     Temp Readings from Last 3 Encounters:   05/09/24 97.7 °F (36.5 °C) (Temporal)   04/05/24 99.8 °F (37.7 °C) (Tympanic)   04/02/24 97.7 °F (36.5 °C) (Axillary)     BP Readings from Last 3 Encounters:   07/12/24 124/70   05/09/24 122/62   04/19/24 (!) 140/70     Pulse Readings from Last 3 Encounters:   07/12/24 61   05/09/24 (!) 54   04/19/24 (!) 53       Current Outpatient Medications on File Prior to Visit   Medication Sig Dispense Refill    albuterol (PROVENTIL/VENTOLIN HFA) 90 mcg/actuation inhaler Inhale 2 puffs into the lungs every 4 (four) hours as needed for Wheezing or Shortness of Breath. 54 g 3    apixaban (ELIQUIS) 5 mg Tab Take 1 tablet (5 mg total) by mouth 2 (two) times daily. 60 tablet 11    apixaban (ELIQUIS) 5 mg Tab Take 1 tablet (5 mg total) by mouth 2 (two) times daily. 60 tablet 11    budesonide-glycopyr-formoterol 160-9-4.8 mcg/actuation HFAA Inhale 2 puffs into the lungs 2 (two) times daily. Wash out mouth after use. 10.7 g 11    evolocumab (REPATHA SURECLICK) 140 mg/mL PnIj Inject 1 mL (140 mg total) into the skin every 14 (fourteen) days. 2 mL 11    fluticasone propionate (FLONASE) 50 mcg/actuation nasal spray 2 sprays (100 mcg total) by Each Nostril route once daily. 48 g 3    levalbuterol (XOPENEX) 1.25 mg/3 mL nebulizer solution Take 3 mLs (1.25 mg total) by nebulization every 6 to 8 hours as needed for Wheezing or Shortness of Breath. Rescue 270 mL 11    metoprolol succinate (TOPROL-XL) 25 MG 24 hr tablet Take 1 tablet (25 mg total)  by mouth once daily. 30 tablet 11    tadalafiL (CIALIS) 20 MG Tab TAKE 1 TABLET DAILY AS NEEDED 24 tablet 2     No current facility-administered medications on file prior to visit.       No cardiac monitor results found for the past 12 months    Results for orders placed during the hospital encounter of 03/31/24    Echo    Interpretation Summary    Left Ventricle: There is normal systolic function with a visually estimated ejection fraction of 60 - 65%. Unable to assess diastolic function due to atrial flutter    Right Ventricle: Normal right ventricular cavity size. Wall thickness is normal. Right ventricle wall motion  is normal. Systolic function is normal.    IVC/SVC: Normal venous pressure at 3 mmHg.    No results found for this or any previous visit.        Results for orders placed or performed during the hospital encounter of 07/12/24   EKG 12-lead    Collection Time: 07/12/24  7:47 AM   Result Value Ref Range    QRS Duration 80 ms    [facility] QTC Calculation 423 ms    Narrative    Test Reason : E78.2,    Vent. Rate : 059 BPM     Atrial Rate : 059 BPM     P-R Int : 126 ms          QRS Dur : 080 ms      QT Int : 428 ms       P-R-T Axes : 088 034 012 degrees     QTc Int : 423 ms    Sinus bradycardia  Otherwise normal ECG  When compared with ECG of 07-JUN-2024 11:05,  No significant change was found    Referred By: DA FRIEDMAN           Confirmed By:          Review of Systems   Constitutional: Positive for malaise/fatigue.   HENT:  Negative for hearing loss and hoarse voice.    Eyes:  Negative for blurred vision and visual disturbance.   Cardiovascular:  Positive for dyspnea on exertion. Negative for chest pain, claudication, irregular heartbeat, leg swelling, near-syncope, orthopnea, palpitations, paroxysmal nocturnal dyspnea and syncope.   Respiratory:  Negative for cough, hemoptysis, shortness of breath, sleep disturbances due to breathing, snoring and wheezing.    Endocrine: Negative for cold intolerance and  heat intolerance.   Hematologic/Lymphatic: Does not bruise/bleed easily.   Skin:  Negative for color change, dry skin and nail changes.   Musculoskeletal:  Positive for arthritis and back pain.   Gastrointestinal:  Negative for bloating, abdominal pain, constipation, nausea and vomiting.   Genitourinary:  Negative for dysuria, flank pain, hematuria and hesitancy.   Neurological:  Negative for headaches, light-headedness, loss of balance, numbness, paresthesias and weakness.   Psychiatric/Behavioral:  Negative for altered mental status.          Objective:/70 (BP Location: Left arm, Patient Position: Sitting)   Pulse 61   Wt 122.8 kg (270 lb 13.4 oz)   SpO2 96%   BMI 34.77 kg/m²      Physical Exam  Vitals and nursing note reviewed.   Constitutional:       General: He is not in acute distress.     Appearance: Normal appearance. He is well-developed. He is obese. He is not ill-appearing.   HENT:      Head: Normocephalic and atraumatic.      Nose: Nose normal.      Mouth/Throat:      Mouth: Mucous membranes are moist.   Eyes:      Pupils: Pupils are equal, round, and reactive to light.   Neck:      Thyroid: No thyromegaly.      Vascular: No JVD.      Trachea: No tracheal deviation.   Cardiovascular:      Rate and Rhythm: Normal rate and regular rhythm.      Chest Wall: PMI is not displaced.      Pulses: Intact distal pulses.           Radial pulses are 2+ on the right side and 2+ on the left side.        Dorsalis pedis pulses are 2+ on the right side and 2+ on the left side.      Heart sounds: S1 normal and S2 normal. Heart sounds not distant. No murmur heard.  Pulmonary:      Effort: Pulmonary effort is normal. No respiratory distress.      Breath sounds: Normal breath sounds. No wheezing.   Abdominal:      General: Bowel sounds are normal. There is no distension.      Palpations: Abdomen is soft.      Tenderness: There is no abdominal tenderness.   Musculoskeletal:         General: No swelling. Normal range  of motion.      Cervical back: Full passive range of motion without pain, normal range of motion and neck supple.      Right lower leg: No edema.      Left lower leg: No edema.      Right ankle: No swelling.      Left ankle: No swelling.   Skin:     General: Skin is warm and dry.      Capillary Refill: Capillary refill takes less than 2 seconds.      Nails: There is no clubbing.   Neurological:      General: No focal deficit present.      Mental Status: He is alert and oriented to person, place, and time.      Motor: No weakness.   Psychiatric:         Speech: Speech normal.         Behavior: Behavior normal.         Thought Content: Thought content normal.         Judgment: Judgment normal.         Lab Results   Component Value Date    CHOL 171 04/01/2024    CHOL 177 12/08/2023    CHOL 186 07/22/2022     Lab Results   Component Value Date    HDL 44 04/01/2024    HDL 37 (L) 12/08/2023    HDL 41 07/22/2022     Lab Results   Component Value Date    LDLCALC 108.0 04/01/2024    LDLCALC 123.8 12/08/2023    LDLCALC 125.8 07/22/2022     Lab Results   Component Value Date    TRIG 95 04/01/2024    TRIG 81 12/08/2023    TRIG 96 07/22/2022     Lab Results   Component Value Date    CHOLHDL 25.7 04/01/2024    CHOLHDL 20.9 12/08/2023    CHOLHDL 22.0 07/22/2022       Chemistry        Component Value Date/Time     04/19/2024 0955    K 4.3 04/19/2024 0955     04/19/2024 0955    CO2 23 04/19/2024 0955    BUN 14 04/19/2024 0955    CREATININE 1.0 04/19/2024 0955     (H) 04/19/2024 0955        Component Value Date/Time    CALCIUM 9.7 04/19/2024 0955    ALKPHOS 69 04/02/2024 0543    AST 17 04/02/2024 0543    ALT 21 04/02/2024 0543    BILITOT 0.6 04/02/2024 0543    ESTGFRAFRICA >60.0 07/22/2022 1055    EGFRNONAA >60.0 07/22/2022 1055          Lab Results   Component Value Date    TSH 0.840 04/19/2024     Lab Results   Component Value Date    INR 1.0 04/19/2024    INR 0.9 03/31/2024    INR 1.0 11/30/2020     Lab Results    Component Value Date    WBC 10.39 04/19/2024    HGB 15.6 04/19/2024    HCT 44.5 04/19/2024    MCV 89 04/19/2024     04/19/2024          Assessment:      1. Typical atrial flutter    2. Seasonal allergic rhinitis due to pollen    3. Mixed hyperlipidemia    4. Statin myopathy    5. Statin intolerance    6. KEVEN (obstructive sleep apnea)    7. Nocturnal hypoxemia          Plan:     Continue eliquis for cardio-embolic protection  Continue BB, Repatha  30 day cardiac event monitor to assess afl recurrence  Dash diet 2 gm sodium restriction  Profile BP at home  RTC in 4 months    SILVANO [facility]liers[facility]       Statement Selected

## 2024-07-12 ENCOUNTER — OFFICE VISIT (OUTPATIENT)
Dept: CARDIOLOGY | Facility: CLINIC | Age: 69
End: 2024-07-12
Payer: MEDICARE

## 2024-07-12 ENCOUNTER — HOSPITAL ENCOUNTER (OUTPATIENT)
Dept: CARDIOLOGY | Facility: HOSPITAL | Age: 69
Discharge: HOME | End: 2024-07-12
Payer: MEDICARE

## 2024-07-12 VITALS
DIASTOLIC BLOOD PRESSURE: 70 MMHG | OXYGEN SATURATION: 96 % | WEIGHT: 270.81 LBS | SYSTOLIC BLOOD PRESSURE: 124 MMHG | BODY MASS INDEX: 34.77 KG/M2 | HEART RATE: 61 BPM

## 2024-07-12 DIAGNOSIS — Z78.9 STATIN INTOLERANCE: ICD-10-CM

## 2024-07-12 DIAGNOSIS — G47.33 OSA (OBSTRUCTIVE SLEEP APNEA): ICD-10-CM

## 2024-07-12 DIAGNOSIS — G47.34 NOCTURNAL HYPOXEMIA: ICD-10-CM

## 2024-07-12 DIAGNOSIS — G72.0 STATIN MYOPATHY: ICD-10-CM

## 2024-07-12 DIAGNOSIS — I48.3 TYPICAL ATRIAL FLUTTER: Primary | ICD-10-CM

## 2024-07-12 DIAGNOSIS — E78.2 MIXED HYPERLIPIDEMIA: ICD-10-CM

## 2024-07-12 DIAGNOSIS — J30.1 SEASONAL ALLERGIC RHINITIS DUE TO POLLEN: ICD-10-CM

## 2024-07-12 DIAGNOSIS — T46.6X5A STATIN MYOPATHY: ICD-10-CM

## 2024-07-12 LAB
OHS QRS DURATION: 80 MS
OHS QTC CALCULATION: 423 MS

## 2024-07-12 PROCEDURE — 93010 ELECTROCARDIOGRAM REPORT: CPT | Mod: ,,, | Performed by: INTERNAL MEDICINE

## 2024-07-12 PROCEDURE — 99999 PR PBB SHADOW E&M-EST. PATIENT-LVL III: CPT | Mod: PBBFAC,,, | Performed by: NURSE PRACTITIONER

## 2024-07-12 PROCEDURE — 93005 ELECTROCARDIOGRAM TRACING: CPT

## 2024-07-20 ENCOUNTER — PATIENT MESSAGE (OUTPATIENT)
Dept: GASTROENTEROLOGY | Facility: CLINIC | Age: 69
End: 2024-07-20
Payer: MEDICARE

## 2024-07-20 PROBLEM — Z86.0100 HISTORY OF COLON POLYPS: Status: ACTIVE | Noted: 2024-07-20

## 2024-07-20 PROBLEM — Z86.010 HISTORY OF COLON POLYPS: Status: ACTIVE | Noted: 2024-07-20

## 2024-07-22 ENCOUNTER — PATIENT MESSAGE (OUTPATIENT)
Dept: GASTROENTEROLOGY | Facility: HOSPITAL | Age: 69
End: 2024-07-22
Payer: MEDICARE

## 2024-07-22 ENCOUNTER — HOSPITAL ENCOUNTER (OUTPATIENT)
Facility: HOSPITAL | Age: 69
Discharge: HOME | End: 2024-07-22
Attending: INTERNAL MEDICINE | Admitting: INTERNAL MEDICINE
Payer: MEDICARE

## 2024-07-22 ENCOUNTER — ANESTHESIA (OUTPATIENT)
Dept: ENDOSCOPY | Facility: HOSPITAL | Age: 69
End: 2024-07-22
Payer: MEDICARE

## 2024-07-22 ENCOUNTER — ANESTHESIA EVENT (OUTPATIENT)
Dept: ENDOSCOPY | Facility: HOSPITAL | Age: 69
End: 2024-07-22
Payer: MEDICARE

## 2024-07-22 DIAGNOSIS — K57.30 DIVERTICULOSIS OF LARGE INTESTINE WITHOUT HEMORRHAGE: ICD-10-CM

## 2024-07-22 DIAGNOSIS — Z86.010 PERSONAL HISTORY OF COLONIC POLYPS: ICD-10-CM

## 2024-07-22 DIAGNOSIS — D12.2 ADENOMATOUS POLYP OF ASCENDING COLON: ICD-10-CM

## 2024-07-22 DIAGNOSIS — Z86.010 HISTORY OF COLON POLYPS: Primary | ICD-10-CM

## 2024-07-22 PROCEDURE — 27201089 HC SNARE, DISP (ANY): Performed by: INTERNAL MEDICINE

## 2024-07-22 PROCEDURE — 37000009 HC ANESTHESIA EA ADD 15 MINS: Performed by: INTERNAL MEDICINE

## 2024-07-22 PROCEDURE — 45385 COLONOSCOPY W/LESION REMOVAL: CPT | Mod: PT | Performed by: INTERNAL MEDICINE

## 2024-07-22 PROCEDURE — 25000003 PHARM REV CODE 250: Performed by: NURSE ANESTHETIST, CERTIFIED REGISTERED

## 2024-07-22 PROCEDURE — 45385 COLONOSCOPY W/LESION REMOVAL: CPT | Mod: PT,,, | Performed by: INTERNAL MEDICINE

## 2024-07-22 PROCEDURE — 63600175 PHARM REV CODE 636 W HCPCS: Performed by: NURSE ANESTHETIST, CERTIFIED REGISTERED

## 2024-07-22 PROCEDURE — 37000008 HC ANESTHESIA 1ST 15 MINUTES: Performed by: INTERNAL MEDICINE

## 2024-07-22 PROCEDURE — 88305 TISSUE EXAM BY PATHOLOGIST: CPT | Performed by: PATHOLOGY

## 2024-07-22 RX ORDER — PROPOFOL 10 MG/ML
VIAL (ML) INTRAVENOUS
Status: DISCONTINUED | OUTPATIENT
Start: 2024-07-22 | End: 2024-07-22

## 2024-07-22 RX ORDER — LIDOCAINE HYDROCHLORIDE 10 MG/ML
INJECTION, SOLUTION EPIDURAL; INFILTRATION; INTRACAUDAL; PERINEURAL
Status: DISCONTINUED | OUTPATIENT
Start: 2024-07-22 | End: 2024-07-22

## 2024-07-22 RX ORDER — SODIUM CHLORIDE, SODIUM LACTATE, POTASSIUM CHLORIDE, CALCIUM CHLORIDE 600; 310; 30; 20 MG/100ML; MG/100ML; MG/100ML; MG/100ML
INJECTION, SOLUTION INTRAVENOUS CONTINUOUS
Status: DISCONTINUED | OUTPATIENT
Start: 2024-07-22 | End: 2024-07-22 | Stop reason: HOSPADM

## 2024-07-22 RX ORDER — SODIUM CHLORIDE 0.9 % (FLUSH) 0.9 %
2 SYRINGE (ML) INJECTION
Status: DISCONTINUED | OUTPATIENT
Start: 2024-07-22 | End: 2024-07-22 | Stop reason: HOSPADM

## 2024-07-22 RX ADMIN — PROPOFOL 50 MG: 10 INJECTION, EMULSION INTRAVENOUS at 08:07

## 2024-07-22 RX ADMIN — LIDOCAINE HYDROCHLORIDE 50 MG: 10 SOLUTION INTRAVENOUS at 08:07

## 2024-07-22 RX ADMIN — SODIUM CHLORIDE, SODIUM LACTATE, POTASSIUM CHLORIDE, AND CALCIUM CHLORIDE: .6; .31; .03; .02 INJECTION, SOLUTION INTRAVENOUS at 08:07

## 2024-07-22 RX ADMIN — PROPOFOL 100 MG: 10 INJECTION, EMULSION INTRAVENOUS at 08:07

## 2024-07-22 NOTE — DISCHARGE INSTRUCTIONS
Dear Yung Mcconnell      If you develop fevers, severe abdominal pain, significant bleeding, nausea or vomiting, please contact us or come to our Emergency Department at [facility].  Our  contact number is [phone] available for emergencies or any question that you may have.      You may return to normal activities tomorrow.  Written discharge instructions were provided to you today and have them handy since you may not remember our conversation today.       If you take Aspirin, please resume taking it at your prior dose today. If we obtained biopsies or removed polyps during your procedure, we will contact you within 5 to 6 days with the results.      Thanks for trusting us with your healthcare needs.      Sincerely,     Wang Simmons M.D.        To rate your experience with Dr. Simmons, please click on the link below     [email or web address]

## 2024-07-22 NOTE — ANESTHESIA POSTPROCEDURE EVALUATION
Anesthesia Post Evaluation    Patient: Yung Mcconnell    Procedure(s) Performed: Procedure(s) (LRB):  COLONOSCOPY 6/16 econ eliquis 48 hr hold (N/A)    Final Anesthesia Type: MAC      Patient location during evaluation: GI PACU  Patient participation: Yes- Able to Participate  Level of consciousness: awake and alert  Post-procedure vital signs: reviewed and stable  Pain management: adequate  Airway patency: patent    PONV status at discharge: No PONV  Anesthetic complications: no      Cardiovascular status: blood pressure returned to baseline  Respiratory status: unassisted and spontaneous ventilation  Hydration status: euvolemic  Follow-up not needed.              Vitals Value Taken Time   /71 07/22/24 0852   Temp 36.7 °C (98 °F) 07/22/24 0852   Pulse 58 07/22/24 0852   Resp 18 07/22/24 0852   SpO2 95 % 07/22/24 0852         Event Time   Out of Recovery 08:57:44         Pain/Jim Score: Jim Score: 10 (7/22/2024  8:52 AM)

## 2024-07-22 NOTE — BRIEF OP NOTE
Endoscopy Discharge Summary      Admit Date: 7/22/2024    Discharge Date and Time:  7/22/2024 8:44 AM    Attending Physician: Wang Simmons MD     Discharge Physician: Wang Simmons MD     Principal Admitting Diagnoses: History of colon polyps         Discharge Diagnosis: The primary encounter diagnosis was History of colon polyps. Diagnoses of Personal history of colonic polyps, Diverticulosis of large intestine without hemorrhage, and Adenomatous polyp of ascending colon were also pertinent to this visit.     Discharged Condition: Good    Indication for Admission: History of colon polyps     Hospital Course: Patient was admitted for an inpatient procedure and tolerated the procedure well with no complications.    Significant Diagnostic Studies:  COLON    Pathology (if any):  Specimen (24h ago, onward)       Start     Ordered    07/22/24 0834  Specimen to Pathology, Surgery Gastrointestinal tract  Once        Comments: Pre-op Diagnosis: Colon cancer screening [Z12.11]Procedure(s):COLONOSCOPY 6/16 econ eliquis 48 hr hold Name of specimens: 1. Colon polypectomy     References:    Click here for ordering Quick Tip   Question Answer Comment   Procedure Type: Gastrointestinal tract    Specimen Class: Routine/Screening    Release to patient Immediate        07/22/24 0837                    Disposition: Home.    Bleeding: None    No Implants         Follow Up/Patient Instructions:   Current Discharge Medication List        CONTINUE these medications which have NOT CHANGED    Details   albuterol (PROVENTIL/VENTOLIN HFA) 90 mcg/actuation inhaler Inhale 2 puffs into the lungs every 4 (four) hours as needed for Wheezing or Shortness of Breath.  Qty: 54 g, Refills: 3    Comments: Dispense formulary  Associated Diagnoses: Asthma, chronic, moderate persistent, uncomplicated; Mild intermittent asthma, uncomplicated      evolocumab (REPATHA SURECLICK) 140 mg/mL PnIj Inject 1 mL (140 mg total) into the skin every 14 (fourteen)  days.  Qty: 2 mL, Refills: 11    Associated Diagnoses: Statin intolerance; Mixed hyperlipidemia      fluticasone propionate (FLONASE) 50 mcg/actuation nasal spray 2 sprays (100 mcg total) by Each Nostril route once daily.  Qty: 48 g, Refills: 3    Associated Diagnoses: Seasonal allergic rhinitis due to other allergic trigger      metoprolol succinate (TOPROL-XL) 25 MG 24 hr tablet Take 1 tablet (25 mg total) by mouth once daily.  Qty: 30 tablet, Refills: 11    Comments: .      tadalafiL (CIALIS) 20 MG Tab TAKE 1 TABLET DAILY AS NEEDED  Qty: 24 tablet, Refills: 2    Associated Diagnoses: Erectile dysfunction, unspecified erectile dysfunction type      !! apixaban (ELIQUIS) 5 mg Tab Take 1 tablet (5 mg total) by mouth 2 (two) times daily.  Qty: 60 tablet, Refills: 11    Associated Diagnoses: Typical atrial flutter      !! apixaban (ELIQUIS) 5 mg Tab Take 1 tablet (5 mg total) by mouth 2 (two) times daily.  Qty: 60 tablet, Refills: 11      budesonide-glycopyr-formoterol 160-9-4.8 mcg/actuation HFAA Inhale 2 puffs into the lungs 2 (two) times daily. Wash out mouth after use.  Qty: 10.7 g, Refills: 11    Associated Diagnoses: Asthma with COPD      levalbuterol (XOPENEX) 1.25 mg/3 mL nebulizer solution Take 3 mLs (1.25 mg total) by nebulization every 6 to 8 hours as needed for Wheezing or Shortness of Breath. Rescue  Qty: 270 mL, Refills: 11    Comments: Failed use of albuterol due to side effects.ChronicObstructivePulmonaryDiseaseCode:[id number]  Bill under medicare part A  Associated Diagnoses: Asthma with COPD       !! - Potential duplicate medications found. Please discuss with provider.          Discharge Procedure Orders   Diet general     No dressing needed     Call MD for:  temperature >100.4     Call MD for:  persistent nausea and vomiting     Call MD for:  severe uncontrolled pain     Call MD for:  difficulty breathing, headache or visual disturbances     Call MD for:  redness, tenderness, or  signs of infection (pain, swelling, redness, odor or green/yellow discharge around incision site)     Call MD for:  hives     Call MD for:  persistent dizziness or light-headedness        Follow-up Information       Prasanna Gaxiola MD Follow up.    Specialty: Family Medicine  Why: As needed  Contact information:  [address] [facility] [address] [phone]

## 2024-07-22 NOTE — TRANSFER OF CARE
"Anesthesia Transfer of Care Note    Patient: Yung Mcconnell    Procedure(s) Performed: Procedure(s) (LRB):  COLONOSCOPY 6/16 econ eliquis 48 hr hold (N/A)    Patient location: GI    Anesthesia Type: MAC    Transport from OR: Transported from OR on room air with adequate spontaneous ventilation    Post pain: adequate analgesia    Post assessment: no apparent anesthetic complications    Post vital signs: stable    Level of consciousness: sedated    Nausea/Vomiting: no nausea/vomiting    Complications: none    Transfer of care protocol was followed      Last vitals: Visit Vitals  /74 (BP Location: Left arm, Patient Position: Lying)   Pulse (!) 57   Temp 37 °C (98.6 °F) (Temporal)   Resp 16   Ht 6' 2" (1.88 m)   Wt 118.4 kg (261 lb)   SpO2 (!) 94%   BMI 33.51 kg/m²     "

## 2024-07-22 NOTE — ANESTHESIA PREPROCEDURE EVALUATION
07/22/2024  Yung Mcconnell is a 68 y.o., male.      Pre-op Assessment    I have reviewed the Patient Summary Reports.    I have reviewed the NPO Status.   I have reviewed the Medications.     Review of Systems  Anesthesia Hx:  No problems with previous Anesthesia                Cardiovascular:         Dysrhythmias       hyperlipidemia   ECG has been reviewed.                          Pulmonary:   COPD, mild Asthma mild   Sleep Apnea, CPAP                Hepatic/GI:  Bowel Prep.   GERD, well controlled             Endocrine:        Obesity / BMI > 30      Physical Exam  General: Well nourished    Airway:  Mallampati: II   Mouth Opening: Normal  TM Distance: Normal  Tongue: Normal  Neck ROM: Normal ROM    Dental:  Intact    Chest/Lungs:  Normal Respiratory Rate    Heart:  Rate: Normal  Rhythm: Regular Rhythm    Anesthesia Plan  Type of Anesthesia, risks & benefits discussed:    Anesthesia Type: MAC  Intra-op Monitoring Plan: Standard ASA Monitors  Post Op Pain Control Plan: multimodal analgesia  Induction:  IV  Informed Consent: Informed consent signed with the Patient and all parties understand the risks and agree with anesthesia plan.  All questions answered.   ASA Score: 2  Day of Surgery Review of History & Physical: H&P Update referred to the surgeon/provider.    Ready For Surgery From Anesthesia Perspective.   .

## 2024-07-22 NOTE — PROVATION PATIENT INSTRUCTIONS
Discharge Summary/Instructions after an Endoscopic Procedure  Patient Name: Yung Mcconnell  Patient MRN: [id number]  Patient YOB: 1955  Monday, July 22, 2024 Wang Simmons MD  Dear patient,  As a result of recent federal legislation (The Federal Cures Act), you may   receive lab or pathology results from your procedure in your [facility]   account before your physician is able to contact you. Your physician or   their representative will relay the results to you with their   recommendations at their soonest availability.  Thank you,  RESTRICTIONS:  During your procedure today, you received medications for sedation.  These   medications may affect your judgment, balance and coordination.  Therefore,   for 24 hours, you have the following restrictions:   - DO NOT drive a car, operate machinery, make legal/financial decisions,   sign important papers or drink alcohol.    ACTIVITY:  Today: no heavy lifting, straining or running due to procedural   sedation/anesthesia.  The following day: return to full activity including work.  DIET:  Eat and drink normally unless instructed otherwise.     TREATMENT FOR COMMON SIDE EFFECTS:  - Mild abdominal pain, nausea, belching, bloating or excessive gas:  rest,   eat lightly and use a heating pad.  - Sore Throat: treat with throat lozenges and/or gargle with warm salt   water.  - Because air was used during the procedure, expelling large amounts of air   from your rectum or belching is normal.  - If a bowel prep was taken, you may not have a bowel movement for 1-3 days.    This is normal.  SYMPTOMS TO WATCH FOR AND REPORT TO YOUR PHYSICIAN:  1. Abdominal pain or bloating, other than gas cramps.  2. Chest pain.  3. Back pain.  4. Signs of infection such as: chills or fever occurring within 24 hours   after the procedure.  5. Rectal bleeding, which would show as bright red, maroon, or black stools.   (A tablespoon of blood from the rectum is not serious, especially if    hemorrhoids are present.)  6. Vomiting.  7. Weakness or dizziness.  GO DIRECTLY TO THE NEAREST EMERGENCY ROOM IF YOU HAVE ANY OF THE FOLLOWING:      Difficulty breathing              Chills and/or fever over 101 F   Persistent vomiting and/or vomiting blood   Severe abdominal pain   Severe chest pain   Black, tarry stools   Bleeding- more than one tablespoon   Any other symptom or condition that you feel may need urgent attention  Your doctor recommends these additional instructions:  If any biopsies were taken, your doctors clinic will contact you in 1 to 2   weeks with any results.  - The patient will be observed post-procedure, until all discharge criteria   are met.   - Discharge patient to home (ambulatory).   - Patient has a contact number available for emergencies.  The signs and   symptoms of potential delayed complications were discussed with the   patient.  Return to normal activities tomorrow.  Written discharge   instructions were provided to the patient.   - Resume previous diet.   - Continue present medications.   - Await pathology results.   - Repeat colonoscopy in 5 years for surveillance.   - Return to referring physician as previously scheduled.  For questions, problems or results please call your physician Wang Simmons MD at Work:  [phone]  If you have any questions about the above instructions, call the GI   department at [phone] or call the endoscopy unit at [phone]   from 7am until 3 pm.  [facility], EMERGENCY ROOM PHONE NUMBER:   [phone]  IF A COMPLICATION OR EMERGENCY SITUATION ARISES AND YOU ARE UNABLE TO REACH   YOUR PHYSICIAN - GO DIRECTLY TO THE EMERGENCY ROOM.  I have read or have had read to me these discharge instructions for my   procedure and have received a written copy.  I understand these   instructions and will follow-up with my physician if I have any questions.     __________________________________        _____________________________________  Nurse Signature                                          Patient/Designated   Responsible Party Signature  Wang Simmons MD  7/22/2024 8:42:09 AM  This report has been verified and signed electronically.  Dear patient,  As a result of recent federal legislation (The Federal Cures Act), you may   receive lab or pathology results from your procedure in your [facility]   account before your physician is able to contact you. Your physician or   their representative will relay the results to you with their   recommendations at their soonest availability.  Thank you,  [employer]ATION

## 2024-07-22 NOTE — PLAN OF CARE
Dr Simmons at bedside to update patient on results and recs. AVS and post procedure/sedation precautions discussed. Verbalized understanding.

## 2024-07-22 NOTE — INTERVAL H&P NOTE
"The patient has been examined and the H&P has been reviewed:    I concur with the findings and no changes have occurred since H&P was written.    Procedure risks, benefits and alternative options discussed and understood by patient/family.    Vitals:    07/22/24 0724   BP: 130/74   BP Location: Left arm   Patient Position: Lying   Pulse: (!) 57   Resp: 16   Temp: 98.6 °F (37 °C)   TempSrc: Temporal   SpO2: (!) 94%   Weight: 118.4 kg (261 lb)   Height: 6' 2" (1.88 m)           Active Hospital Problems    Diagnosis  POA    *History of colon polyps [Z86.010]  Not Applicable     Last Colonoscopy in 2020.         Resolved Hospital Problems   No resolved problems to display.     "

## 2024-07-23 VITALS
HEIGHT: 74 IN | WEIGHT: 261 LBS | BODY MASS INDEX: 33.5 KG/M2 | OXYGEN SATURATION: 95 % | DIASTOLIC BLOOD PRESSURE: 71 MMHG | TEMPERATURE: 98 F | RESPIRATION RATE: 18 BRPM | HEART RATE: 58 BPM | SYSTOLIC BLOOD PRESSURE: 111 MMHG

## 2024-07-23 LAB
FINAL PATHOLOGIC DIAGNOSIS: NORMAL
GROSS: NORMAL
Lab: NORMAL

## 2024-07-24 ENCOUNTER — PATIENT MESSAGE (OUTPATIENT)
Dept: GASTROENTEROLOGY | Facility: CLINIC | Age: 69
End: 2024-07-24
Payer: MEDICARE

## 2024-07-24 NOTE — PROGRESS NOTES
"Subject: Follow-up and Recommendations After Your Colonoscopy    Dear Yung Mcconnell      I trust this message finds you well. Firstly, I want to extend my gratitude for choosing [facility] for your recent colonoscopy, which took place recently. It was a pleasure to be able to provide you with our care.    During the colonoscopy, we identified polyps in your colon. I'd like to reassure you that these polyps were completely removed during the procedure, and they were subsequently sent to our pathologist for detailed microscopic examination. The pathology results have revealed that the colon polyps were classified as "adenomas."    I want to assure you that no immediate further action is required at this point. Adenomas are a type of polyp that, while not cancerous, do carry a higher risk of developing into colon cancer over time. It's important to note that adenomatous polyps are the most common type of neoplastic polyps, comprising about two-thirds of all colonic polyps. The majority of colorectal cancers originate from adenomas, but the progression of adenomas to cancer occurs in only a small fraction of cases (5 percent or less). Studies suggest that the transition from adenomas to cancer takes approximately 7 to 10 years, with a higher risk associated with advanced adenomas, also known as tubular adenomas.    Given these findings and to ensure your ongoing health and well-being, I recommend a repeat colonoscopy in 5 years for continued monitoring and close surveillance. The timing of follow-up colonoscopies is determined by factors such as the number, size, and microscopic characteristics of the polyps, as well as any additional risk factors.    If you have any questions or concerns, please don't hesitate to reach out. You can contact me through [facility] or by calling [phone] and asking for [facility]. I do my best to respond to messages twice a day, usually " within 48 hours. However, if you believe your matter is urgent, please do not hesitate to contact our department or your local .      Thank you once again for choosing [facility]. We look forward to continuing to provide you with exceptional care.    Sincerely,    Wang Simmons M.D.  [facility]  Contact: [phone]

## 2024-07-26 ENCOUNTER — HOSPITAL ENCOUNTER (OUTPATIENT)
Dept: CARDIOLOGY | Facility: HOSPITAL | Age: 69
Discharge: HOME | End: 2024-07-26
Attending: NURSE PRACTITIONER
Payer: MEDICARE

## 2024-07-26 DIAGNOSIS — I48.3 TYPICAL ATRIAL FLUTTER: ICD-10-CM

## 2024-07-26 PROCEDURE — 93271 ECG/MONITORING AND ANALYSIS: CPT

## 2024-08-03 ENCOUNTER — PATIENT MESSAGE (OUTPATIENT)
Dept: CARDIOLOGY | Facility: CLINIC | Age: 69
End: 2024-08-03
Payer: MEDICARE

## 2024-08-20 ENCOUNTER — PATIENT MESSAGE (OUTPATIENT)
Dept: CARDIOLOGY | Facility: CLINIC | Age: 69
End: 2024-08-20
Payer: MEDICARE

## 2024-08-20 ENCOUNTER — PATIENT MESSAGE (OUTPATIENT)
Dept: PULMONOLOGY | Facility: CLINIC | Age: 69
End: 2024-08-20
Payer: MEDICARE

## 2024-08-20 DIAGNOSIS — J44.89 ASTHMA WITH COPD: Primary | ICD-10-CM

## 2024-08-23 ENCOUNTER — HOSPITAL ENCOUNTER (OUTPATIENT)
Dept: RADIOLOGY | Facility: HOSPITAL | Age: 69
Discharge: HOME | End: 2024-08-23
Attending: INTERNAL MEDICINE
Payer: MEDICARE

## 2024-08-23 ENCOUNTER — CLINICAL SUPPORT (OUTPATIENT)
Dept: PULMONOLOGY | Facility: CLINIC | Age: 69
End: 2024-08-23
Payer: MEDICARE

## 2024-08-23 ENCOUNTER — OFFICE VISIT (OUTPATIENT)
Dept: PULMONOLOGY | Facility: CLINIC | Age: 69
End: 2024-08-23
Payer: MEDICARE

## 2024-08-23 VITALS
WEIGHT: 278.25 LBS | BODY MASS INDEX: 35.71 KG/M2 | RESPIRATION RATE: 20 BRPM | HEART RATE: 68 BPM | HEIGHT: 74 IN | SYSTOLIC BLOOD PRESSURE: 132 MMHG | OXYGEN SATURATION: 95 % | DIASTOLIC BLOOD PRESSURE: 70 MMHG

## 2024-08-23 DIAGNOSIS — J44.89 ASTHMA WITH COPD: ICD-10-CM

## 2024-08-23 DIAGNOSIS — J45.909 CHRONIC ASTHMA WITHOUT COMPLICATION, UNSPECIFIED ASTHMA SEVERITY, UNSPECIFIED WHETHER PERSISTENT: ICD-10-CM

## 2024-08-23 DIAGNOSIS — G47.33 OSA (OBSTRUCTIVE SLEEP APNEA): ICD-10-CM

## 2024-08-23 DIAGNOSIS — J45.40 ASTHMA, CHRONIC, MODERATE PERSISTENT, UNCOMPLICATED: ICD-10-CM

## 2024-08-23 DIAGNOSIS — R09.02 EXERCISE HYPOXEMIA: Primary | ICD-10-CM

## 2024-08-23 DIAGNOSIS — J30.89 SEASONAL ALLERGIC RHINITIS DUE TO OTHER ALLERGIC TRIGGER: ICD-10-CM

## 2024-08-23 DIAGNOSIS — J30.1 SEASONAL ALLERGIC RHINITIS DUE TO POLLEN: ICD-10-CM

## 2024-08-23 DIAGNOSIS — G47.34 NOCTURNAL HYPOXEMIA: ICD-10-CM

## 2024-08-23 DIAGNOSIS — J45.20 MILD INTERMITTENT ASTHMA, UNCOMPLICATED: ICD-10-CM

## 2024-08-23 LAB
FEF 25 75 CHG: 21.9 %
FEF 25 75 LLN: 1.58
FEF 25 75 POST REF: 68.9 %
FEF 25 75 PRE REF: 56.6 %
FEF 25 75 REF: 3.29
FET100 CHG: -1 %
FEV1 CHG: 2.8 %
FEV1 FVC CHG: 4.6 %
FEV1 FVC LLN: 62
FEV1 FVC POST REF: 92.5 %
FEV1 FVC PRE REF: 88.4 %
FEV1 FVC REF: 75
FEV1 LLN: 2.59
FEV1 POST REF: 97.2 %
FEV1 PRE REF: 94.6 %
FEV1 REF: 3.6
FVC CHG: -1.8 %
FVC LLN: 3.57
FVC POST REF: 104.5 %
FVC PRE REF: 106.4 %
FVC REF: 4.82
PEF CHG: -3.5 %
PEF LLN: 6.6
PEF POST REF: 92.3 %
PEF PRE REF: 95.6 %
PEF REF: 9.19
POST FEF 25 75: 2.27 L/S (ref 1.58–5)
POST FET 100: 13.68 SEC
POST FEV1 FVC: 69.51 % (ref 61.75–88.57)
POST FEV1: 3.5 L (ref 2.59–4.61)
POST FVC: 5.03 L (ref 3.57–6.07)
POST PEF: 8.48 L/S (ref 6.6–11.78)
PRE FEF 25 75: 1.86 L/S (ref 1.58–5)
PRE FET 100: 13.82 SEC
PRE FEV1 FVC: 66.44 % (ref 61.75–88.57)
PRE FEV1: 3.4 L (ref 2.59–4.61)
PRE FVC: 5.13 L (ref 3.57–6.07)
PRE PEF: 8.79 L/S (ref 6.6–11.78)

## 2024-08-23 PROCEDURE — 71046 X-RAY EXAM CHEST 2 VIEWS: CPT | Mod: TC

## 2024-08-23 PROCEDURE — 71046 X-RAY EXAM CHEST 2 VIEWS: CPT | Mod: 26,,, | Performed by: RADIOLOGY

## 2024-08-23 PROCEDURE — 99999 PR PBB SHADOW E&M-EST. PATIENT-LVL III: CPT | Mod: PBBFAC,,, | Performed by: INTERNAL MEDICINE

## 2024-08-23 RX ORDER — ALBUTEROL SULFATE 90 UG/1
2 INHALANT RESPIRATORY (INHALATION) EVERY 4 HOURS PRN
Qty: 54 G | Refills: 3 | Status: SHIPPED | OUTPATIENT
Start: 2024-08-23

## 2024-08-23 RX ORDER — BUDESONIDE AND FORMOTEROL FUMARATE DIHYDRATE 160; 4.5 UG/1; UG/1
2 AEROSOL RESPIRATORY (INHALATION) EVERY 12 HOURS
Qty: 10.2 G | Refills: 11 | Status: SHIPPED | OUTPATIENT
Start: 2024-08-23 | End: 2025-08-23

## 2024-08-23 NOTE — PROGRESS NOTES
Subjective:     Patient ID: Yung Mcconnell is a 69 y.o. male.    Chief Complaint:  Overall improved    HPI  69 y.o. with asthma, Obstructive Sleep Apnea. Shortness of breath and dyspnea improved with cardioversion and ablation in 5/2024    Obstructive Sleep Apnea on Continuous Positive Airway Pressure:  Patient is using CPAP on oxygen as prescribed and benefiting from therapy. Patient has complaints of none   Compliance Report  Compliance  Payor Standard  Usage 03/10/2024 - 04/08/2024  Usage days 29/30 days (97%)  >= 4 hours 29 days (97%)  < 4 hours 0 days (0%)  Usage hours 207 hours 1 minutes  Average usage (total days) 6 hours 54 minutes  Average usage (days used) 7 hours 8 minutes  Median usage (days used) 6 hours 56 minutes  Total used hours (value since last reset - 04/08/2024) 3,929 hours  AirSense 11 AutoSet  Serial number [id number]  Mode CPAP  Set pressure 11 cmH2O  EPR Fulltime  EPR level 3  Therapy  Leaks - L/min Median: 0.0 95th percentile: 10.3 Maximum: 34.1  Events per hour AI: 1.3 HI: 0.3 AHI: 1.6  Apnea Index Central: 0.4 Obstructive: 0.8 Unknown: 0.0  RERA Index 0.4  Cheyne-Haley respiration (average duration per night) 0 minutes (0%)  Usage - hours  Printed on      Asthma Follow-up  The patient has previously been evaluated here for asthma and presents for an asthma follow-up. The patient is currently having symptoms / an exacerbation. Current symptoms include dyspnea, non-productive cough, and wheezing. Symptoms have been present since several days ago and have been rapidly worsening. He denies chest tightness, dyspnea, productive cough, and wheezing. Associated symptoms include fatigue and poor exercise tolerance.  This episode appears to have been triggered by pollens. Treatments tried for the current exacerbation include inhaled corticosteroids, long-acting inhaled beta-adrenergic agonists, and short-acting inhaled beta-adrenergic agonists, which have provided some relief of symptoms. The  patient has been having similar episodes for approximately  many  years.     Current Disease Severity  The patient is having daytime symptoms throughout the day. The patient is having daytime symptoms often 7 times per week. The patient is using short-acting beta agonists for symptom control several times per day. He has exacerbations requiring oral systemic corticosteroids 2 times per year. Current limitations in activity from asthma:  unable to exercise . Number of days of school or work missed in the last month: not applicable. Number of urgent/emergent visit in last year: 0.  The patient is using a spacer with MDIs. His best peak flow rate is na. He is not monitoring peak flow rates at home.    Past Medical History:   Diagnosis Date    Allergy     Asthma, chronic 07/09/2013    Atrial flutter     Colon polyps     COPD (chronic obstructive pulmonary disease)     GERD (gastroesophageal reflux disease)     High cholesterol     Osteoarthritis of both knees 07/09/2013    Sleep apnea      Past Surgical History:   Procedure Laterality Date    ABLATION OF ARRHYTHMOGENIC FOCUS FOR ATRIAL FLUTTER Bilateral 5/9/2024    Procedure: Ablation atrial flutter/EPS typical vs atypical;  Surgeon: Wilfredo Bahena MD;  Location: [facility] CATH LAB;  Service: Cardiology;  Laterality: Bilateral;    CHOLECYSTECTOMY      COLONOSCOPY N/A 10/23/2020    Procedure: COLONOSCOPY;  Surgeon: Sae Valentine MD;  Location: [facility] ENDO;  Service: Endoscopy;  Laterality: N/A;    COLONOSCOPY N/A 7/22/2024    Procedure: COLONOSCOPY 6/16 econ eliquis 48 hr hold;  Surgeon: Wang Simmons MD;  Location: [facility] ENDO;  Service: Endoscopy;  Laterality: N/A;    DIAGNOSTIC LAPAROSCOPY N/A 6/21/2018    Procedure: LAPAROSCOPY, DIAGNOSTIC;  Surgeon: Casper Martinez MD;  Location: [facility] OR;  Service: General;  Laterality: N/A;    LUNG SURGERY Right     benign mass    TOTAL KNEE ARTHROPLASTY Left     TRANSESOPHAGEAL ECHOCARDIOGRAM WITH POSSIBLE CARDIOVERSION  (CHRIS W/ POSS CARDIOVERSION) N/A 4/1/2024    Procedure: Transesophageal echo (CHRIS) intra-procedure log documentation;  Surgeon: Simran Corley MD;  Location: [facility] CATH LAB;  Service: Cardiology;  Laterality: N/A;    UMBILICAL HERNIA REPAIR N/A 6/21/2018    Procedure: REPAIR, HERNIA, UMBILICAL, AGE 5 YEARS OR OLDER;  Surgeon: Casper Martinez MD;  Location: [facility] OR;  Service: General;  Laterality: N/A;     Review of patient's allergies indicates:   Allergen Reactions    Zithromax [azithromycin] Palpitations    Aspirin Other (See Comments)     Other reaction(s): Difficulty breathing    Lipitor [atorvastatin] Other (See Comments)     Leg cramps/hand cramps     Current Outpatient Medications on File Prior to Visit   Medication Sig Dispense Refill    apixaban (ELIQUIS) 5 mg Tab Take 1 tablet (5 mg total) by mouth 2 (two) times daily. 60 tablet 11    apixaban (ELIQUIS) 5 mg Tab Take 1 tablet (5 mg total) by mouth 2 (two) times daily. 60 tablet 11    evolocumab (REPATHA SURECLICK) 140 mg/mL PnIj Inject 1 mL (140 mg total) into the skin every 14 (fourteen) days. 2 mL 11    fluticasone propionate (FLONASE) 50 mcg/actuation nasal spray 2 sprays (100 mcg total) by Each Nostril route once daily. 48 g 3    metoprolol succinate (TOPROL-XL) 25 MG 24 hr tablet Take 1 tablet (25 mg total) by mouth once daily. 30 tablet 11    tadalafiL (CIALIS) 20 MG Tab TAKE 1 TABLET DAILY AS NEEDED 24 tablet 2    [DISCONTINUED] albuterol (PROVENTIL/VENTOLIN HFA) 90 mcg/actuation inhaler Inhale 2 puffs into the lungs every 4 (four) hours as needed for Wheezing or Shortness of Breath. 54 g 3    [DISCONTINUED] budesonide-glycopyr-formoterol 160-9-4.8 mcg/actuation HFAA Inhale 2 puffs into the lungs 2 (two) times daily. Wash out mouth after use. 10.7 g 11    [DISCONTINUED] levalbuterol (XOPENEX) 1.25 mg/3 mL nebulizer solution Take 3 mLs (1.25 mg total) by nebulization every 6 to 8 hours as needed for Wheezing or Shortness of Breath. Rescue 270 mL  11     No current facility-administered medications on file prior to visit.     Social History     Socioeconomic History    Marital status:    Occupational History     Employer: [employer]   Tobacco Use    Smoking status: Never     Passive exposure: Past    Smokeless tobacco: Never   Substance and Sexual Activity    Alcohol use: Yes     Comment: once or twice a year    Drug use: No    Sexual activity: Yes     Partners: Female     Social Determinants of Health     Financial Resource Strain: Medium Risk (6/12/2024)    Overall Financial Resource Strain (CARDIA)     Difficulty of Paying Living Expenses: Somewhat hard   Food Insecurity: Food Insecurity Present (6/12/2024)    Hunger Vital Sign     Worried About Running Out of Food in the Last Year: Sometimes true     Ran Out of Food in the Last Year: Sometimes true   Transportation Needs: No Transportation Needs (4/15/2024)    PRAPARE - Transportation     Lack of Transportation (Medical): No     Lack of Transportation (Non-Medical): No   Physical Activity: Sufficiently Active (6/12/2024)    Exercise Vital Sign     Days of Exercise per Week: 3 days     Minutes of Exercise per Session: 60 min   Stress: No Stress Concern Present (6/12/2024)    [employer] [place] [employer] - Occupational Stress Questionnaire     Feeling of Stress : Only a little   Housing Stability: Low Risk  (6/12/2024)    Housing Stability Vital Sign     Unable to Pay for Housing in the Last Year: No     Homeless in the Last Year: No     Family History   Problem Relation Name Age of Onset    Hypertension Mother      Diabetes Mother      Cancer Mother          Breast Ca     Heart disease Father          CAD , CHF     Aneurysm Father      Colon polyps Father      Melanoma Neg Hx      Eczema Neg Hx      Lupus Neg Hx      Psoriasis Neg Hx         Review of Systems   Constitutional:  Negative for fever and fatigue.   HENT:  Negative for postnasal drip and rhinorrhea.    Eyes:   "Negative for redness and itching.   Respiratory:  Positive for dyspnea on extertion. Negative for cough, shortness of breath, wheezing and Paroxysmal Nocturnal Dyspnea.    Cardiovascular:  Negative for chest pain.   Genitourinary:  Negative for difficulty urinating and hematuria.   Endocrine:  Negative for polyphagia, cold intolerance and heat intolerance.    Musculoskeletal:  Negative for arthralgias.   Skin:  Negative for rash.   Gastrointestinal:  Negative for nausea, vomiting, abdominal pain and abdominal distention.   Neurological:  Negative for dizziness and headaches.   Hematological:  Negative for adenopathy. Does not bruise/bleed easily and no excessive bruising.   Psychiatric/Behavioral:  The patient is not nervous/anxious.        Objective:      /70   Pulse 68   Resp 20   Ht 6' 2" (1.88 m)   Wt 126.2 kg (278 lb 3.5 oz)   SpO2 95% Comment: 2 liters  BMI 35.72 kg/m²   Physical Exam  Vitals and nursing note reviewed.   Constitutional:       Appearance: He is well-developed. He is obese.   HENT:      Head: Normocephalic and atraumatic.      Nose: Nose normal.   Eyes:      Conjunctiva/sclera: Conjunctivae normal.      Pupils: Pupils are equal, round, and reactive to light.   Neck:      Thyroid: No thyromegaly.      Vascular: No JVD.      Trachea: No tracheal deviation.   Cardiovascular:      Rate and Rhythm: Normal rate and regular rhythm.      Heart sounds: Normal heart sounds.   Pulmonary:      Effort: Pulmonary effort is normal.      Breath sounds: Normal breath sounds.   Abdominal:      Palpations: Abdomen is soft.   Musculoskeletal:         General: Normal range of motion.      Cervical back: Neck supple.   Lymphadenopathy:      Cervical: No cervical adenopathy.   Skin:     General: Skin is warm and dry.   Neurological:      Mental Status: He is alert and oriented to person, place, and time.   Psychiatric:         Mood and Affect: Mood normal.         Behavior: Behavior normal.       Personal " "Diagnostic Review  Chest x-ray: hyperinflation stable        8/23/2024    10:55 AM   Pulmonary Studies Review   SpO2 95 %   Height 6' 2" (1.88 m)   Weight 126.2 kg (278 lb 3.5 oz)   BMI (Calculated) 35.7   Predicted Distance 293.98   Predicted Distance Meters (Calculated) 535.63 meters       X-Ray Chest PA And Lateral  Narrative: EXAM:   XR CHEST PA AND LATERAL    CLINICAL HISTORY: Chronic obstructive pulmonary disease.    COMPARISON: 11/09/2023.    TECHNIQUE: PA and lateral views    FINDINGS:  Stable right apical fibrosis with associated volume loss.  Trace. No pneumothorax.  The cardiac silhouette size and contour is within normal limits.  Impression: Stable chest radiograph.    Finalized on: 8/23/2024 10:59 AM By:  LO Garcia MD, MD  BR[facility]# [id number]      2024-08-23 11:01:34.103    [facility]      Office Spirometry Results:         8/23/2024    10:55 AM 7/22/2024     8:52 AM 7/22/2024     8:42 AM 7/22/2024     7:24 AM 7/12/2024     8:14 AM 5/9/2024     5:15 PM 5/9/2024     3:30 PM   Pulmonary Function Tests   SpO2 95 % 95 % 91 % 94 % 96 % 95 % 99 %   Height 6' 2" (1.88 m)   6' 2" (1.88 m)      Weight 126.2 kg (278 lb 3.5 oz)   118.4 kg (261 lb) 122.8 kg (270 lb 13.4 oz)     BMI (Calculated) 35.7   33.5            8/23/2024    10:55 AM   Pulmonary Studies Review   SpO2 95 %   Height 6' 2" (1.88 m)   Weight 126.2 kg (278 lb 3.5 oz)   BMI (Calculated) 35.7   Predicted Distance 293.98   Predicted Distance Meters (Calculated) 535.63 meters           No results found for this or any previous visit (from the past 336 hour(s)).    Assessment:            Exercise hypoxemia  -     Stress test, pulmonary; Future; Expected date: 08/23/2024    Asthma, chronic, moderate persistent, uncomplicated    Mild intermittent asthma, uncomplicated  -     budesonide-formoterol 160-4.5 mcg (SYMBICORT) 160-4.5 mcg/actuation HFAA; Inhale 2 puffs into the lungs every 12 (twelve) hours. Controller.Wash out mouth after use  Dispense: 10.2 g; Refill: " 11  -     albuterol (PROVENTIL/VENTOLIN HFA) 90 mcg/actuation inhaler; Inhale 2 puffs into the lungs every 4 (four) hours as needed for Wheezing or Shortness of Breath.  Dispense: 54 g; Refill: 3    Chronic asthma without complication, unspecified asthma severity, unspecified whether persistent    Seasonal allergic rhinitis due to pollen    KEVEN (obstructive sleep apnea)    Asthma with COPD    Seasonal allergic rhinitis due to other allergic trigger    Nocturnal hypoxemia          Outpatient Encounter Medications as of 8/23/2024   Medication Sig Dispense Refill    apixaban (ELIQUIS) 5 mg Tab Take 1 tablet (5 mg total) by mouth 2 (two) times daily. 60 tablet 11    apixaban (ELIQUIS) 5 mg Tab Take 1 tablet (5 mg total) by mouth 2 (two) times daily. 60 tablet 11    evolocumab (REPATHA SURECLICK) 140 mg/mL PnIj Inject 1 mL (140 mg total) into the skin every 14 (fourteen) days. 2 mL 11    fluticasone propionate (FLONASE) 50 mcg/actuation nasal spray 2 sprays (100 mcg total) by Each Nostril route once daily. 48 g 3    metoprolol succinate (TOPROL-XL) 25 MG 24 hr tablet Take 1 tablet (25 mg total) by mouth once daily. 30 tablet 11    tadalafiL (CIALIS) 20 MG Tab TAKE 1 TABLET DAILY AS NEEDED 24 tablet 2    [DISCONTINUED] albuterol (PROVENTIL/VENTOLIN HFA) 90 mcg/actuation inhaler Inhale 2 puffs into the lungs every 4 (four) hours as needed for Wheezing or Shortness of Breath. 54 g 3    albuterol (PROVENTIL/VENTOLIN HFA) 90 mcg/actuation inhaler Inhale 2 puffs into the lungs every 4 (four) hours as needed for Wheezing or Shortness of Breath. 54 g 3    budesonide-formoterol 160-4.5 mcg (SYMBICORT) 160-4.5 mcg/actuation HFAA Inhale 2 puffs into the lungs every 12 (twelve) hours. Controller.Wash out mouth after use 10.2 g 11    [DISCONTINUED] budesonide-glycopyr-formoterol 160-9-4.8 mcg/actuation HFAA Inhale 2 puffs into the lungs 2 (two) times daily. Wash out mouth after use. 10.7 g 11    [DISCONTINUED] levalbuterol (XOPENEX)  1.25 mg/3 mL nebulizer solution Take 3 mLs (1.25 mg total) by nebulization every 6 to 8 hours as needed for Wheezing or Shortness of Breath. Rescue 270 mL 11     No facility-administered encounter medications on file as of 8/23/2024.     Plan:       Requested Prescriptions     Signed Prescriptions Disp Refills    budesonide-formoterol 160-4.5 mcg (SYMBICORT) 160-4.5 mcg/actuation HFAA 10.2 g 11     Sig: Inhale 2 puffs into the lungs every 12 (twelve) hours. Controller.Wash out mouth after use    albuterol (PROVENTIL/VENTOLIN HFA) 90 mcg/actuation inhaler 54 g 3     Sig: Inhale 2 puffs into the lungs every 4 (four) hours as needed for Wheezing or Shortness of Breath.     Problem List Items Addressed This Visit       Asthma, chronic    Seasonal allergic rhinitis due to pollen    KEVEN (obstructive sleep apnea)    Asthma with COPD    Nocturnal hypoxemia    Seasonal allergic rhinitis     Other Visit Diagnoses       Exercise hypoxemia    -  Primary    Relevant Orders    Stress test, pulmonary    Asthma, chronic, moderate persistent, uncomplicated        Mild intermittent asthma, uncomplicated        Relevant Medications    budesonide-formoterol 160-4.5 mcg (SYMBICORT) 160-4.5 mcg/actuation HFAA    albuterol (PROVENTIL/VENTOLIN HFA) 90 mcg/actuation inhaler               Follow up in about 7 months (around 3/23/2025) for Review progress, 6 min walk - on return.    MEDICAL DECISION MAKING: Moderate to high complexity.  Overall, the multiple problems listed are of moderate to high severity that may impact quality of life and activities of daily living. Side effects of medications, treatment plan as well as options and alternatives reviewed and discussed with patient. There was counseling of patient concerning these issues.    Total time spent in counseling and coordination of care - 32  minutes of total time spent on the encounter, which includes face to face time and non-face to face time preparing to see the patient (eg,  review of tests), Obtaining and/or reviewing separately obtained history, Documenting clinical information in the electronic or other health record, Independently interpreting results (not separately reported) and communicating results to the patient/family/caregiver, or Care coordination (not separately reported).    Time was used in discussion of prognosis, risks, benefits of treatment, instructions and compliance with regimen . Discussion with other physicians and/or health care providers - home health or for use of durable medical equipment (oxygen, nebulizers, CPAP, BiPAP) occurred.

## 2024-09-03 ENCOUNTER — PATIENT MESSAGE (OUTPATIENT)
Dept: CARDIOLOGY | Facility: CLINIC | Age: 69
End: 2024-09-03
Payer: MEDICARE

## 2024-09-11 NOTE — DISCHARGE SUMMARY
[facility]  Discharge Note       Description of Procedure: Lumbar Epidural Steroid Injection under Fluoroscopic Guidance    Procedure Date: 3/1/2017    Admit Date: 3/1/2017  Discharge Date: 3/1/2017     Attending Physician: Sean Campos   Discharge Provider: Sean Campos    Preoperative Diagnosis:   Active Hospital Problems    Diagnosis  POA    Lumbar radiculopathy [M54.16]  Yes      Resolved Hospital Problems    Diagnosis Date Resolved POA   No resolved problems to display.     Postoperative Diagnosis:   Active Hospital Problems    Diagnosis  POA    Lumbar radiculopathy [M54.16]  Yes      Resolved Hospital Problems    Diagnosis Date Resolved POA   No resolved problems to display.       Discharged Condition: Stable    Hospital Course: Patient was admitted for an outpatient procedure. The procedure was tolerated well with no complications.    Final Diagnoses: Same as principal problem.    Disposition: Home, self-care.    Follow up/Patient Instructions:  Follow-up in clinic in 2-3 weeks or as needed if not having pain or any pain related issues.    Medications: No medications were prescribed today. The patient was advised to resume normal medication regimen without change.  Specific information was provided regarding restarting any anticoagulants.    Discharge Procedure Orders (must include Diet, Follow-up, Activity):  Light activity for the remainder of the day, resume normal activity tomorrow. Resume normal diet. Follow-up in clinic in 2-3 weeks or as needed.    
None known

## 2024-10-15 ENCOUNTER — HOSPITAL ENCOUNTER (EMERGENCY)
Facility: HOSPITAL | Age: 69
Discharge: HOME | End: 2024-10-15
Attending: EMERGENCY MEDICINE
Payer: MEDICARE

## 2024-10-15 VITALS
HEIGHT: 74 IN | DIASTOLIC BLOOD PRESSURE: 74 MMHG | SYSTOLIC BLOOD PRESSURE: 141 MMHG | BODY MASS INDEX: 34.99 KG/M2 | TEMPERATURE: 98 F | WEIGHT: 272.63 LBS | OXYGEN SATURATION: 95 % | RESPIRATION RATE: 18 BRPM | HEART RATE: 68 BPM

## 2024-10-15 DIAGNOSIS — M79.606 LEG PAIN: ICD-10-CM

## 2024-10-15 DIAGNOSIS — S86.811A STRAIN OF RIGHT CALF MUSCLE: Primary | ICD-10-CM

## 2024-10-15 LAB
ALBUMIN SERPL BCP-MCNC: 4.2 G/DL (ref 3.5–5.2)
ALP SERPL-CCNC: 79 U/L (ref 55–135)
ALT SERPL W/O P-5'-P-CCNC: 22 U/L (ref 10–44)
ANION GAP SERPL CALC-SCNC: 12 MMOL/L (ref 8–16)
AST SERPL-CCNC: 21 U/L (ref 10–40)
BASOPHILS # BLD AUTO: 0.06 K/UL (ref 0–0.2)
BASOPHILS NFR BLD: 0.9 % (ref 0–1.9)
BILIRUB SERPL-MCNC: 0.7 MG/DL (ref 0.1–1)
BUN SERPL-MCNC: 20 MG/DL (ref 8–23)
CALCIUM SERPL-MCNC: 9.9 MG/DL (ref 8.7–10.5)
CHLORIDE SERPL-SCNC: 109 MMOL/L (ref 95–110)
CO2 SERPL-SCNC: 22 MMOL/L (ref 23–29)
CREAT SERPL-MCNC: 0.9 MG/DL (ref 0.5–1.4)
DIFFERENTIAL METHOD BLD: ABNORMAL
EOSINOPHIL # BLD AUTO: 0.3 K/UL (ref 0–0.5)
EOSINOPHIL NFR BLD: 3.9 % (ref 0–8)
ERYTHROCYTE [DISTWIDTH] IN BLOOD BY AUTOMATED COUNT: 14.1 % (ref 11.5–14.5)
EST. GFR  (NO RACE VARIABLE): >60 ML/MIN/1.73 M^2
GLUCOSE SERPL-MCNC: 98 MG/DL (ref 70–110)
HCT VFR BLD AUTO: 43.9 % (ref 40–54)
HGB BLD-MCNC: 15.3 G/DL (ref 14–18)
IMM GRANULOCYTES # BLD AUTO: 0.02 K/UL (ref 0–0.04)
IMM GRANULOCYTES NFR BLD AUTO: 0.3 % (ref 0–0.5)
INR PPP: 0.9 (ref 0.8–1.2)
LYMPHOCYTES # BLD AUTO: 3.1 K/UL (ref 1–4.8)
LYMPHOCYTES NFR BLD: 46.3 % (ref 18–48)
MCH RBC QN AUTO: 31.2 PG (ref 27–31)
MCHC RBC AUTO-ENTMCNC: 34.9 G/DL (ref 32–36)
MCV RBC AUTO: 89 FL (ref 82–98)
MONOCYTES # BLD AUTO: 0.5 K/UL (ref 0.3–1)
MONOCYTES NFR BLD: 7.1 % (ref 4–15)
NEUTROPHILS # BLD AUTO: 2.8 K/UL (ref 1.8–7.7)
NEUTROPHILS NFR BLD: 41.5 % (ref 38–73)
NRBC BLD-RTO: 0 /100 WBC
PLATELET # BLD AUTO: 201 K/UL (ref 150–450)
PMV BLD AUTO: 9.6 FL (ref 9.2–12.9)
POTASSIUM SERPL-SCNC: 3.8 MMOL/L (ref 3.5–5.1)
PROT SERPL-MCNC: 7.4 G/DL (ref 6–8.4)
PROTHROMBIN TIME: 10.8 SEC (ref 9–12.5)
RBC # BLD AUTO: 4.91 M/UL (ref 4.6–6.2)
SODIUM SERPL-SCNC: 143 MMOL/L (ref 136–145)
WBC # BLD AUTO: 6.72 K/UL (ref 3.9–12.7)

## 2024-10-15 PROCEDURE — 99284 EMERGENCY DEPT VISIT MOD MDM: CPT | Mod: 25

## 2024-10-15 PROCEDURE — 85610 PROTHROMBIN TIME: CPT | Performed by: NURSE PRACTITIONER

## 2024-10-15 PROCEDURE — 80053 COMPREHEN METABOLIC PANEL: CPT | Performed by: NURSE PRACTITIONER

## 2024-10-15 PROCEDURE — 85025 COMPLETE CBC W/AUTO DIFF WBC: CPT | Performed by: NURSE PRACTITIONER

## 2024-10-15 RX ORDER — NAPROXEN 500 MG/1
500 TABLET ORAL 2 TIMES DAILY WITH MEALS
Qty: 20 TABLET | Refills: 0 | Status: SHIPPED | OUTPATIENT
Start: 2024-10-15

## 2024-10-15 NOTE — ED PROVIDER NOTES
"SCRIBE #1 NOTE: I, Mariela Inna, am scribing for, and in the presence of, Myron Lerma Jr., MD. I have scribed the entire note.       History     Chief Complaint   Patient presents with    Leg Pain     Pt c/o right calf pain x 4 hours and he started to notice a red streak appear about an hour ago. Right knee appears swollen and red. Pt reports starting Eliquis after ablation in May but he stopped taking them in August. (-) chest pain, SOB, N/V/D, fever, or chills      Review of patient's allergies indicates:   Allergen Reactions    Zithromax [azithromycin] Palpitations    Aspirin Other (See Comments)     Other reaction(s): Difficulty breathing    Lipitor [atorvastatin] Other (See Comments)     Leg cramps/hand cramps         History of Present Illness     HPI    10/15/2024, 5:22 PM  History obtained from the patient      History of Present Illness: Yung Mcconnell is a 69 y.o. male patient with a PMHx of COPD, high cholesterol, and atrial flutter who presents to the Emergency Department for evaluation of right calf pain which onset this morning. Pt reports the pain "feeling like a charley horse." Pt reports having a elevated truck and when he got to work, he started feeling pain. Symptoms are constant and moderate in severity. No mitigating or exacerbating factors reported. No associated sxs. Patient denies any chest pain, SOB, and all other sxs at this time. No prior Tx provided. Pt reports pain is starting to subside in ED. No further complaints or concerns at this time.       Arrival mode: Personal vehicle  PCP: Prasanna Gaxiola MD        Past Medical History:  Past Medical History:   Diagnosis Date    Allergy     Asthma, chronic 07/09/2013    Atrial flutter     Colon polyps     COPD (chronic obstructive pulmonary disease)     GERD (gastroesophageal reflux disease)     High cholesterol     Osteoarthritis of both knees 07/09/2013    Sleep apnea        Past Surgical History:  Past Surgical History:   Procedure " Laterality Date    ABLATION OF ARRHYTHMOGENIC FOCUS FOR ATRIAL FLUTTER Bilateral 5/9/2024    Procedure: Ablation atrial flutter/EPS typical vs atypical;  Surgeon: Wilfredo Bahena MD;  Location: [facility] CATH LAB;  Service: Cardiology;  Laterality: Bilateral;    CHOLECYSTECTOMY      COLONOSCOPY N/A 10/23/2020    Procedure: COLONOSCOPY;  Surgeon: Sae Valentine MD;  Location: [facility] ENDO;  Service: Endoscopy;  Laterality: N/A;    COLONOSCOPY N/A 7/22/2024    Procedure: COLONOSCOPY 6/16 econ eliquis 48 hr hold;  Surgeon: Wang Simmons MD;  Location: [facility] ENDO;  Service: Endoscopy;  Laterality: N/A;    DIAGNOSTIC LAPAROSCOPY N/A 6/21/2018    Procedure: LAPAROSCOPY, DIAGNOSTIC;  Surgeon: Casper Martinez MD;  Location: [facility] OR;  Service: General;  Laterality: N/A;    LUNG SURGERY Right     benign mass    TOTAL KNEE ARTHROPLASTY Left     TRANSESOPHAGEAL ECHOCARDIOGRAM WITH POSSIBLE CARDIOVERSION (CHRIS W/ POSS CARDIOVERSION) N/A 4/1/2024    Procedure: Transesophageal echo (CHRIS) intra-procedure log documentation;  Surgeon: Simran Corley MD;  Location: [facility] CATH LAB;  Service: Cardiology;  Laterality: N/A;    UMBILICAL HERNIA REPAIR N/A 6/21/2018    Procedure: REPAIR, HERNIA, UMBILICAL, AGE 5 YEARS OR OLDER;  Surgeon: Casper Martinez MD;  Location: [facility] OR;  Service: General;  Laterality: N/A;         Family History:  Family History   Problem Relation Name Age of Onset    Hypertension Mother      Diabetes Mother      Cancer Mother          Breast Ca     Heart disease Father          CAD , CHF     Aneurysm Father      Colon polyps Father      Melanoma Neg Hx      Eczema Neg Hx      Lupus Neg Hx      Psoriasis Neg Hx         Social History:  Social History     Tobacco Use    Smoking status: Never     Passive exposure: Past    Smokeless tobacco: Never   Substance and Sexual Activity    Alcohol use: Yes     Comment: once or twice a year    Drug use: No    Sexual activity: Yes     Partners: Female        Review of  Systems     Review of Systems   Constitutional:  Negative for fever.   HENT:  Negative for sore throat.    Respiratory:  Negative for shortness of breath.    Cardiovascular:  Negative for chest pain.   Gastrointestinal:  Negative for nausea.   Genitourinary:  Negative for dysuria.   Musculoskeletal:  Positive for myalgias (Right calf). Negative for back pain.   Skin:  Negative for rash.   Neurological:  Negative for weakness.   Hematological:  Does not bruise/bleed easily.   All other systems reviewed and are negative.     Physical Exam     Initial Vitals [10/15/24 1327]   BP Pulse Resp Temp SpO2   (!) 141/74 68 18 98 °F (36.7 °C) 95 %      MAP       --          Physical Exam  Nursing Notes and Vital Signs Reviewed.  Constitutional: Patient is in no acute distress. Well-developed and well-nourished.  Head: Atraumatic. Normocephalic.  Eyes: PERRL. EOM intact. Conjunctivae are not pale. No scleral icterus.  ENT: Mucous membranes are moist. Oropharynx is clear and symmetric.    Neck: Supple. Full ROM. No lymphadenopathy.  Cardiovascular: Regular rate. Regular rhythm. No murmurs, rubs, or gallops. Distal pulses are 2+ and symmetric.  Pulmonary/Chest: No respiratory distress. Clear to auscultation bilaterally. No wheezing or rales.  Abdominal: Soft and non-distended.  There is no tenderness.  No rebound, guarding, or rigidity. Good bowel sounds.  Genitourinary: No CVA tenderness  Musculoskeletal: Moves all extremities. No obvious deformities. No edema. Tenderness to right proximal calf muscle. Nerve distally intact. Tendon intact. 2+ DP bilaterally.  Skin: Warm and dry.  Neurological:  Alert, awake, and appropriate.  Normal speech.  No acute focal neurological deficits are appreciated.  Psychiatric: Normal affect. Good eye contact. Appropriate in content.     ED Course   Procedures  ED Vital Signs:  Vitals:    10/15/24 1327   BP: (!) 141/74   Pulse: 68   Resp: 18   Temp: 98 °F (36.7 °C)   TempSrc: Oral   SpO2: 95%  "  Weight: 123.7 kg (272 lb 9.6 oz)   Height: 6' 2" (1.88 m)       Abnormal Lab Results:  Labs Reviewed   CBC W/ AUTO DIFFERENTIAL - Abnormal       Result Value    WBC 6.72      RBC 4.91      Hemoglobin 15.3      Hematocrit 43.9      MCV 89      MCH 31.2 (*)     MCHC 34.9      RDW 14.1      Platelets 201      MPV 9.6      Immature Granulocytes 0.3      Gran # (ANC) 2.8      Immature Grans (Abs) 0.02      Lymph # 3.1      Mono # 0.5      Eos # 0.3      Baso # 0.06      nRBC 0      Gran % 41.5      Lymph % 46.3      Mono % 7.1      Eosinophil % 3.9      Basophil % 0.9      Differential Method Automated     COMPREHENSIVE METABOLIC PANEL - Abnormal    Sodium 143      Potassium 3.8      Chloride 109      CO2 22 (*)     Glucose 98      BUN 20      Creatinine 0.9      Calcium 9.9      Total Protein 7.4      Albumin 4.2      Total Bilirubin 0.7      Alkaline Phosphatase 79      AST 21      ALT 22      eGFR >60      Anion Gap 12     PROTIME-INR    Prothrombin Time 10.8      INR 0.9          All Lab Results:  Results for orders placed or performed during the hospital encounter of 10/15/24   CBC auto differential    Collection Time: 10/15/24  2:34 PM   Result Value Ref Range    WBC 6.72 3.90 - 12.70 K/uL    RBC 4.91 4.60 - 6.20 M/uL    Hemoglobin 15.3 14.0 - 18.0 g/dL    Hematocrit 43.9 40.0 - 54.0 %    MCV 89 82 - 98 fL    MCH 31.2 (H) 27.0 - 31.0 pg    MCHC 34.9 32.0 - 36.0 g/dL    RDW 14.1 11.5 - 14.5 %    Platelets 201 150 - 450 K/uL    MPV 9.6 9.2 - 12.9 fL    Immature Granulocytes 0.3 0.0 - 0.5 %    Gran # (ANC) 2.8 1.8 - 7.7 K/uL    Immature Grans (Abs) 0.02 0.00 - 0.04 K/uL    Lymph # 3.1 1.0 - 4.8 K/uL    Mono # 0.5 0.3 - 1.0 K/uL    Eos # 0.3 0.0 - 0.5 K/uL    Baso # 0.06 0.00 - 0.20 K/uL    nRBC 0 0 /100 WBC    Gran % 41.5 38.0 - 73.0 %    Lymph % 46.3 18.0 - 48.0 %    Mono % 7.1 4.0 - 15.0 %    Eosinophil % 3.9 0.0 - 8.0 %    Basophil % 0.9 0.0 - 1.9 %    Differential Method Automated    Comprehensive Metabolic " Panel    Collection Time: 10/15/24  2:34 PM   Result Value Ref Range    Sodium 143 136 - 145 mmol/L    Potassium 3.8 3.5 - 5.1 mmol/L    Chloride 109 95 - 110 mmol/L    CO2 22 (L) 23 - 29 mmol/L    Glucose 98 70 - 110 mg/dL    BUN 20 8 - 23 mg/dL    Creatinine 0.9 0.5 - 1.4 mg/dL    Calcium 9.9 8.7 - 10.5 mg/dL    Total Protein 7.4 6.0 - 8.4 g/dL    Albumin 4.2 3.5 - 5.2 g/dL    Total Bilirubin 0.7 0.1 - 1.0 mg/dL    Alkaline Phosphatase 79 55 - 135 U/L    AST 21 10 - 40 U/L    ALT 22 10 - 44 U/L    eGFR >60 >60 mL/min/1.73 m^2    Anion Gap 12 8 - 16 mmol/L   Protime-INR    Collection Time: 10/15/24  2:34 PM   Result Value Ref Range    Prothrombin Time 10.8 9.0 - 12.5 sec    INR 0.9 0.8 - 1.2       Imaging Results:  Imaging Results              US Lower Extremity Veins Right (Final result)  Result time 10/15/24 15:57:32      Final result by Joseline Pinedo MD (10/15/24 15:57:32)                   Impression:      No evidence of deep venous thrombosis in the right lower extremity.      Electronically signed by: Joseline Pinedo  Date:    10/15/2024  Time:    15:57               Narrative:    EXAMINATION:  US LOWER EXTREMITY VEINS RIGHT    CLINICAL HISTORY:  Pain in leg, unspecified    TECHNIQUE:  Duplex and color flow Doppler evaluation and graded compression of the right lower extremity veins was performed.    COMPARISON:  None    FINDINGS:  Right thigh veins: The common femoral, femoral, popliteal, upper greater saphenous, and deep femoral veins are patent and free of thrombus. The veins are normally compressible and have normal phasic flow and augmentation response.    Right calf veins: The visualized calf veins are patent.    Contralateral CFV: The contralateral (left) common femoral vein is patent and free of thrombus.    Miscellaneous: None                                            The Emergency Provider reviewed the vital signs and test results, which are outlined above.     ED Discussion     6:31 PM:  Reassessed pt at this time. Discussed with pt all pertinent ED information and results. Discussed pt dx and plan of tx. Gave pt all f/u and return to the ED instructions. All questions and concerns were addressed at this time. Pt expresses understanding of information and instructions, and is comfortable with plan to discharge. Pt is stable for discharge.    I discussed with patient and/or family/caretaker that evaluation in the ED does not suggest any emergent or life threatening medical conditions requiring immediate intervention beyond what was provided in the ED, and I believe patient is safe for discharge.  Regardless, an unremarkable evaluation in the ED does not preclude the development or presence of a serious of life threatening condition. As such, patient was instructed to return immediately for any worsening or change in current symptoms.    Regarding CONTUSIONS/STRAINS, I advised patient to: REST the injured area or use it less than usual; apply ICE to decrease swelling and pain and help prevent tissue damage; use COMPRESSION with an elastic bandage to support the area and decrease swelling; ELEVATE injured body part above the level of the heart to help decrease pain and swelling; AVOID using massage or massage to acute injuries as it may slow healing of the area; AVOID drinking alcohol as it may slow healing of the injury; and avoid stretching injured muscles. Advised patient to return to the emergency department or contact primary care provider if: having trouble moving injured area; notice tingling or numbness in or near the injured area; extremity below the bruise gets cold or turns pale; a new lump develops in the injured area; symptoms do not improve with treatment after 4 to 5 days; there is any questions or concerns about the condition or treatment plan.        Medical Decision Making  Amount and/or Complexity of Data Reviewed  Labs: ordered. Decision-making details documented in ED  Course.  Radiology: ordered. Decision-making details documented in ED Course.    Risk  OTC drugs.  Prescription drug management.  Parenteral controlled substances.  Risk Details: OTC drugs, prescription drugs and controlled substances considered.  Due to patient's symptoms improving and pain controlled pain medications ordered appropriately.  Differential diagnosis: Electrolyte abnormality, strains, sprain, fracture, syncope, sepsis, infection, arrhythmia, or dehydration, or dvt                  ED Medication(s):  Medications - No data to display    Discharge Medication List as of 10/15/2024  6:32 PM        START taking these medications    Details   naproxen (NAPROSYN) 500 MG tablet Take 1 tablet (500 mg total) by mouth 2 (two) times daily with meals., Starting Tue 10/15/2024, Print              Follow-up Information       Prasanna Gaxiola MD. Schedule an appointment as soon as possible for a visit in 1 week.    Specialty: Family Medicine  Contact information:  [address] [place] [id number]  [phone]               [facility] - Emergency Dept..    Specialty: Emergency Medicine  Why: As needed, If symptoms worsen  Contact information:  [id number] [place] [id number] [phone]                               Scribe Attestation:   Scribe #1: I performed the above scribed service and the documentation accurately describes the services I performed. I attest to the accuracy of the note.     Attending:   Physician Attestation Statement for Scribe #1: I, Myron Lerma Jr., MD, personally performed the services described in this documentation, as scribed by Mariela Keith, in my presence, and it is both accurate and complete.           Clinical Impression       ICD-10-CM ICD-9-CM   1. Strain of right calf muscle  S86.811A 844.8   2. Leg pain  M79.606 729.5       Disposition:   Disposition: Discharged  Condition: Stable       Myron Lerma Jr., MD  10/16/24 [id number]

## 2024-10-15 NOTE — FIRST PROVIDER EVALUATION
Emergency Department TeleTriage Encounter Note      CHIEF COMPLAINT    Chief Complaint   Patient presents with    Leg Pain     Pt c/o right calf pain x 4 hours and he started to notice a red streak appear about an hour ago. Right knee appears swollen and red. Pt reports starting Eliquis after ablation in May but he stopped taking them in August. (-) chest pain, SOB, N/V/D, fever, or chills        VITAL SIGNS   Initial Vitals [10/15/24 1327]   BP Pulse Resp Temp SpO2   (!) 141/74 68 18 98 °F (36.7 °C) 95 %      MAP       --            ALLERGIES    Review of patient's allergies indicates:   Allergen Reactions    Zithromax [azithromycin] Palpitations    Aspirin Other (See Comments)     Other reaction(s): Difficulty breathing    Lipitor [atorvastatin] Other (See Comments)     Leg cramps/hand cramps       PROVIDER TRIAGE NOTE  Pain in right calf while walking this morning and pain has continued throughout the day. Noticed a red streak on the side of his knee as well. Was on Eliquis after he had an ablation for atrial fib but has been off this medication for approximately 1 1/2 months.     Limited physical exam via telehealth: The patient is awake, alert, answering questions appropriately and is not in respiratory distress.  As the Teletriage provider, I performed an initial assessment and ordered appropriate labs and imaging studies, if any, to facilitate the patient's care once placed in the ED. Once a room is available, care and a full evaluation will be completed by an alternate ED provider.  Any additional orders and the final disposition will be determined by that provider.  All imaging and labs will not be followed-up by the Teletriage Team, including myself.          ORDERS  Labs Reviewed - No data to display    ED Orders (720h ago, onward)      Start Ordered     Status Ordering Provider    10/15/24 1427 10/15/24 1426  Protime-INR  STAT         Ordered RUKHSANA GREENE    10/15/24 1426 10/15/24 1426  Saline lock  IV  Once         Ordered RUKHSANA GREENE    10/15/24 1426 10/15/24 1426  CBC auto differential  STAT         Ordered RUKHSANA GREENE    10/15/24 1426 10/15/24 1426  Comprehensive Metabolic Panel  STAT         Ordered RUKHSANA GREENE    10/15/24 1426 10/15/24 1426  US Lower Extremity Veins Right  1 time imaging         Ordered RUKHSANA GREENE              Virtual Visit Note: The provider triage portion of this emergency department evaluation and documentation was performed via Doc[employer], a HIPAA-compliant telemedicine application, in concert with a tele-presenter in the room. A face to face patient evaluation with one of my colleagues will occur once the patient is placed in an emergency department room.      DISCLAIMER: This note was prepared with [employer] voice recognition transcription software. Garbled syntax, mangled pronouns, and other bizarre constructions may be attributed to that software system.

## 2024-11-11 ENCOUNTER — OFFICE VISIT (OUTPATIENT)
Dept: CARDIOLOGY | Facility: CLINIC | Age: 69
End: 2024-11-11
Payer: MEDICARE

## 2024-11-11 VITALS
SYSTOLIC BLOOD PRESSURE: 130 MMHG | DIASTOLIC BLOOD PRESSURE: 82 MMHG | WEIGHT: 278.56 LBS | OXYGEN SATURATION: 97 % | HEIGHT: 74 IN | HEART RATE: 56 BPM | BODY MASS INDEX: 35.75 KG/M2

## 2024-11-11 DIAGNOSIS — T46.6X5A STATIN MYOPATHY: Primary | ICD-10-CM

## 2024-11-11 DIAGNOSIS — G47.33 OSA (OBSTRUCTIVE SLEEP APNEA): ICD-10-CM

## 2024-11-11 DIAGNOSIS — E78.2 MIXED HYPERLIPIDEMIA: ICD-10-CM

## 2024-11-11 DIAGNOSIS — Z78.9 STATIN INTOLERANCE: ICD-10-CM

## 2024-11-11 DIAGNOSIS — I25.10 CORONARY ARTERY DISEASE INVOLVING NATIVE CORONARY ARTERY OF NATIVE HEART WITHOUT ANGINA PECTORIS: ICD-10-CM

## 2024-11-11 DIAGNOSIS — G72.0 STATIN MYOPATHY: Primary | ICD-10-CM

## 2024-11-11 DIAGNOSIS — E66.01 SEVERE OBESITY (BMI 35.0-35.9 WITH COMORBIDITY): ICD-10-CM

## 2024-11-11 DIAGNOSIS — I48.3 TYPICAL ATRIAL FLUTTER: ICD-10-CM

## 2024-11-11 PROCEDURE — 3075F SYST BP GE 130 - 139MM HG: CPT | Mod: CPTII,S$GLB,, | Performed by: NURSE PRACTITIONER

## 2024-11-11 PROCEDURE — 3288F FALL RISK ASSESSMENT DOCD: CPT | Mod: CPTII,S$GLB,, | Performed by: NURSE PRACTITIONER

## 2024-11-11 PROCEDURE — 3079F DIAST BP 80-89 MM HG: CPT | Mod: CPTII,S$GLB,, | Performed by: NURSE PRACTITIONER

## 2024-11-11 PROCEDURE — 1126F AMNT PAIN NOTED NONE PRSNT: CPT | Mod: CPTII,S$GLB,, | Performed by: NURSE PRACTITIONER

## 2024-11-11 PROCEDURE — 99214 OFFICE O/P EST MOD 30 MIN: CPT | Mod: S$GLB,,, | Performed by: NURSE PRACTITIONER

## 2024-11-11 PROCEDURE — 1101F PT FALLS ASSESS-DOCD LE1/YR: CPT | Mod: CPTII,S$GLB,, | Performed by: NURSE PRACTITIONER

## 2024-11-11 PROCEDURE — 99999 PR PBB SHADOW E&M-EST. PATIENT-LVL II: CPT | Mod: PBBFAC,,, | Performed by: NURSE PRACTITIONER

## 2024-11-11 PROCEDURE — 3008F BODY MASS INDEX DOCD: CPT | Mod: CPTII,S$GLB,, | Performed by: NURSE PRACTITIONER

## 2024-11-11 RX ORDER — SEMAGLUTIDE 0.25 MG/.5ML
0.25 INJECTION, SOLUTION SUBCUTANEOUS
Qty: 2 ML | Refills: 3 | Status: SHIPPED | OUTPATIENT
Start: 2024-11-11

## 2024-11-11 RX ORDER — SEMAGLUTIDE 0.25 MG/.5ML
0.25 INJECTION, SOLUTION SUBCUTANEOUS
Qty: 2 ML | Refills: 3 | Status: SHIPPED | OUTPATIENT
Start: 2024-11-11 | End: 2024-11-11 | Stop reason: SDUPTHER

## 2024-11-11 RX ORDER — CELECOXIB 200 MG/1
200 CAPSULE ORAL 2 TIMES DAILY
COMMUNITY
Start: 2024-10-24

## 2024-11-11 NOTE — PROGRESS NOTES
Subjective:   Patient ID:  Yung Mcconnell is a 69 y.o. male who presents for evaluation of No chief complaint on file.      HPI    Yung Mcconnell is a 68 year old male who presents to Arrhythmia clinic for AFL follow up. He was recently admitted to [facility] due AFL/RVR and underwent CHRIS/DCCV with restoration to NSR.     His current medical conditions include HLP, KEVEN, COPD, AFL on Eliquis/Amio.    EKG- Sinus bradycardia, HR 54 QTc 440 ms.     Using cpap nightly.    Denies chest pain or anginal equivalents. No shortness of breath, STEPHENSON or palpitations. Denies orthopnea, PND or abdominal bloating. Reports regular walking without any issues lately. NO leg swelling or claudications. No recent falls, syncope or near syncopal events. Reports compliance with medications and dietary restrictions. NO CNS complaints to suggest TIA or CVA today. No signs of abnormal bleeding on eliquis.      7/12/2024 update    Yung Mcconnell returns for follow up after CTI ablation for AFL.     No longer taking Amiodarone since ablation.     Taking Repatha injections without any side effects. Does get short of breath while working in the yard and has to stop to take breaks.     Using cpap nightly. Denies chest pain or anginal equivalents. Denies orthopnea, PND or abdominal bloating. Reports regular walking without any issues lately. NO leg swelling or claudications. No recent falls, syncope or near syncopal events. Reports compliance with medications and dietary restrictions. NO CNS complaints to suggest TIA or CVA today. No signs of abnormal bleeding on Eliquis.     Has been staying active on a regular basis.     Interpretation Summary         Electrophysiology study with coronary sinus pacing.    Tachycardia induction and coronary sinus recording.    3D mapping performed with Ensite.    Catheter ablation of CTI-dependent flutter.    Successful CTI ablation.    The patient tolerated procedure well.    The patient left the lab in stable  condition.    There were no immediate complications.     I certify that I was present for the critical steps of the procedure including the diagnostic, surgical and/or interventional portions.      Procedure Log documented by Documenter: Veronique Yi RN and verified by Wilfredo Bahena MD.     Date: 5/9/2024  Time: 2:07 PM      11/11/2024 update    Yung Mcconnell returns for follow up.   No recurrence of atrial flutter  Has stopped eliquis since September.     Doing well CV wise today.   Still struggling to lose weight to normal weight despite diet and exercise.     Denies chest pain or anginal equivalents. No shortness of breath, STEPHENSON or palpitations. Denies orthopnea, PND or abdominal bloating. Reports regular walking without any issues lately. NO leg swelling or claudications. No recent falls, syncope or near syncopal events. Reports compliance with medications and dietary restrictions. NO CNS complaints to suggest TIA or CVA today. No signs of abnormal bleeding on eliquis.     Past Medical History:   Diagnosis Date    Allergy     Asthma, chronic 07/09/2013    Atrial flutter     Colon polyps     COPD (chronic obstructive pulmonary disease)     GERD (gastroesophageal reflux disease)     High cholesterol     Osteoarthritis of both knees 07/09/2013    Sleep apnea        Past Surgical History:   Procedure Laterality Date    ABLATION OF ARRHYTHMOGENIC FOCUS FOR ATRIAL FLUTTER Bilateral 5/9/2024    Procedure: Ablation atrial flutter/EPS typical vs atypical;  Surgeon: Wilfredo Bahena MD;  Location: [facility] CATH LAB;  Service: Cardiology;  Laterality: Bilateral;    CHOLECYSTECTOMY      COLONOSCOPY N/A 10/23/2020    Procedure: COLONOSCOPY;  Surgeon: Sae Valentine MD;  Location: [facility] ENDO;  Service: Endoscopy;  Laterality: N/A;    COLONOSCOPY N/A 7/22/2024    Procedure: COLONOSCOPY 6/16 econ eliquis 48 hr hold;  Surgeon: Wang Simmons MD;  Location: [facility] ENDO;  Service: Endoscopy;  Laterality: N/A;     DIAGNOSTIC LAPAROSCOPY N/A 6/21/2018    Procedure: LAPAROSCOPY, DIAGNOSTIC;  Surgeon: Casper Martinez MD;  Location: [facility] OR;  Service: General;  Laterality: N/A;    LUNG SURGERY Right     benign mass    TOTAL KNEE ARTHROPLASTY Left     TRANSESOPHAGEAL ECHOCARDIOGRAM WITH POSSIBLE CARDIOVERSION (CHRIS W/ POSS CARDIOVERSION) N/A 4/1/2024    Procedure: Transesophageal echo (CHRIS) intra-procedure log documentation;  Surgeon: Simran Corley MD;  Location: [facility] CATH LAB;  Service: Cardiology;  Laterality: N/A;    UMBILICAL HERNIA REPAIR N/A 6/21/2018    Procedure: REPAIR, HERNIA, UMBILICAL, AGE 5 YEARS OR OLDER;  Surgeon: Casper Martinez MD;  Location: [facility] OR;  Service: General;  Laterality: N/A;       Social History     Tobacco Use    Smoking status: Never     Passive exposure: Past    Smokeless tobacco: Never   Substance Use Topics    Alcohol use: Yes     Comment: once or twice a year    Drug use: No       Family History   Problem Relation Name Age of Onset    Hypertension Mother      Diabetes Mother      Cancer Mother          Breast Ca     Heart disease Father          CAD , CHF     Aneurysm Father      Colon polyps Father      Melanoma Neg Hx      Eczema Neg Hx      Lupus Neg Hx      Psoriasis Neg Hx         Wt Readings from Last 3 Encounters:   11/11/24 126.4 kg (278 lb 8.8 oz)   10/15/24 123.7 kg (272 lb 9.6 oz)   08/23/24 126.2 kg (278 lb 3.5 oz)     Temp Readings from Last 3 Encounters:   10/15/24 98 °F (36.7 °C) (Oral)   07/22/24 98 °F (36.7 °C)   05/09/24 97.7 °F (36.5 °C) (Temporal)     BP Readings from Last 3 Encounters:   11/11/24 130/82   10/15/24 (!) 141/74   08/23/24 132/70     Pulse Readings from Last 3 Encounters:   11/11/24 (!) 56   10/15/24 68   08/23/24 68       Current Outpatient Medications on File Prior to Visit   Medication Sig Dispense Refill    albuterol (PROVENTIL/VENTOLIN HFA) 90 mcg/actuation inhaler Inhale 2 puffs into the lungs every 4 (four) hours as needed for Wheezing or  Shortness of Breath. 54 g 3    budesonide-formoterol 160-4.5 mcg (SYMBICORT) 160-4.5 mcg/actuation HFAA Inhale 2 puffs into the lungs every 12 (twelve) hours. Controller.Wash out mouth after use 10.2 g 11    celecoxib (CELEBREX) 200 MG capsule Take 200 mg by mouth 2 (two) times daily.      evolocumab (REPATHA SURECLICK) 140 mg/mL PnIj Inject 1 mL (140 mg total) into the skin every 14 (fourteen) days. 2 mL 11    fluticasone propionate (FLONASE) 50 mcg/actuation nasal spray 2 sprays (100 mcg total) by Each Nostril route once daily. 48 g 3    metoprolol succinate (TOPROL-XL) 25 MG 24 hr tablet Take 1 tablet (25 mg total) by mouth once daily. 30 tablet 11    tadalafiL (CIALIS) 20 MG Tab TAKE 1 TABLET DAILY AS NEEDED 24 tablet 2    naproxen (NAPROSYN) 500 MG tablet Take 1 tablet (500 mg total) by mouth 2 (two) times daily with meals. (Patient not taking: Reported on 11/11/2024) 20 tablet 0    [DISCONTINUED] apixaban (ELIQUIS) 5 mg Tab Take 1 tablet (5 mg total) by mouth 2 (two) times daily. (Patient not taking: Reported on 11/11/2024) 60 tablet 11    [DISCONTINUED] apixaban (ELIQUIS) 5 mg Tab Take 1 tablet (5 mg total) by mouth 2 (two) times daily. (Patient not taking: Reported on 11/11/2024) 60 tablet 11     No current facility-administered medications on file prior to visit.       Cardiac event monitor    Result Date: 8/31/2024  The patient was monitored for a total of 27d 21h, underlying rhythm is   Sinus.   The minimum heart rate was 42 bpm; the maximum 153 bpm; the average 64   bpm.   0 % of Atrial fibrillation/Atrial flutter with longest episode of 0 ms.   The total burden of AV Block present was 0 % [Complete Heart Block: 0 %;   Advanced (High Grade): 0 %; 2nd Degree, Mobitz II: 0 %; 2nd Degree, Mobitz   I: 0 %].  There were 0 pauses, the longest pause was 0 ms at --.   Total count of Ventricular Tachycardia (VT): 1 episode(s). Longest VT: 4   beats on Day 12 / 04:11:23 pm. Fastest VT: 128 bpm on Day 12 / 04:11:23    pm.   30 supraventricular episodes were found. Longest SVT Episode 27 beats,   Fastest  bpm   There were a total of 439 PVCs with 3 morphologies and 6 couplets. Overall   PVC Inland at 0.02 %   There were a total of 0 Other Beats. There were 0 total number of paced   beats.   There were a total of 8617 PSVCs with 1 morphologies and 180 couplets.   Overall PSVC Inland at 0.33 %   There is a total of 0 patient events.         Results for orders placed during the hospital encounter of 03/31/24    Echo    Interpretation Summary    Left Ventricle: There is normal systolic function with a visually estimated ejection fraction of 60 - 65%. Unable to assess diastolic function due to atrial flutter    Right Ventricle: Normal right ventricular cavity size. Wall thickness is normal. Right ventricle wall motion  is normal. Systolic function is normal.    IVC/SVC: Normal venous pressure at 3 mmHg.    No results found for this or any previous visit.        Results for orders placed or performed during the hospital encounter of 07/12/24   EKG 12-lead    Collection Time: 07/12/24  7:47 AM   Result Value Ref Range    QRS Duration 80 ms    OHS QTC Calculation 423 ms    Narrative    Test Reason : E78.2,    Vent. Rate : 059 BPM     Atrial Rate : 059 BPM     P-R Int : 126 ms          QRS Dur : 080 ms      QT Int : 428 ms       P-R-T Axes : 088 034 012 degrees     QTc Int : 423 ms    Sinus bradycardia  Otherwise normal ECG  When compared with ECG of 07-JUN-2024 11:05,  No significant change was found  Confirmed by JOEL YANG MD ([id number]) on 7/12/2024 3:54:48 PM    Referred By: DA FRIEDMAN           Confirmed By:JOEL YANG MD         Review of Systems   Constitutional: Positive for malaise/fatigue.   HENT:  Negative for hearing loss and hoarse voice.    Eyes:  Negative for blurred vision and visual disturbance.   Cardiovascular:  Positive for dyspnea on exertion. Negative for chest pain, claudication, irregular heartbeat, leg  "swelling, near-syncope, orthopnea, palpitations, paroxysmal nocturnal dyspnea and syncope.   Respiratory:  Negative for cough, hemoptysis, shortness of breath, sleep disturbances due to breathing, snoring and wheezing.    Endocrine: Negative for cold intolerance and heat intolerance.   Hematologic/Lymphatic: Does not bruise/bleed easily.   Skin:  Negative for color change, dry skin and nail changes.   Musculoskeletal:  Positive for arthritis and back pain.   Gastrointestinal:  Negative for bloating, abdominal pain, constipation, nausea and vomiting.   Genitourinary:  Negative for dysuria, flank pain, hematuria and hesitancy.   Neurological:  Negative for headaches, light-headedness, loss of balance, numbness, paresthesias and weakness.   Psychiatric/Behavioral:  Negative for altered mental status.          Objective:/82 (BP Location: Right arm, Patient Position: Sitting)   Pulse (!) 56   Ht 6' 2" (1.88 m)   Wt 126.4 kg (278 lb 8.8 oz)   SpO2 97%   BMI 35.76 kg/m²      Physical Exam  Vitals and nursing note reviewed.   Constitutional:       General: He is not in acute distress.     Appearance: Normal appearance. He is well-developed. He is obese. He is not ill-appearing.   HENT:      Head: Normocephalic and atraumatic.      Nose: Nose normal.      Mouth/Throat:      Mouth: Mucous membranes are moist.   Eyes:      Pupils: Pupils are equal, round, and reactive to light.   Neck:      Thyroid: No thyromegaly.      Vascular: No JVD.      Trachea: No tracheal deviation.   Cardiovascular:      Rate and Rhythm: Normal rate and regular rhythm.      Chest Wall: PMI is not displaced.      Pulses: Intact distal pulses.           Radial pulses are 2+ on the right side and 2+ on the left side.        Dorsalis pedis pulses are 2+ on the right side and 2+ on the left side.      Heart sounds: S1 normal and S2 normal. Heart sounds not distant. No murmur heard.  Pulmonary:      Effort: Pulmonary effort is normal. No " respiratory distress.      Breath sounds: Normal breath sounds. No wheezing.   Abdominal:      General: Bowel sounds are normal. There is no distension.      Palpations: Abdomen is soft.      Tenderness: There is no abdominal tenderness.   Musculoskeletal:         General: No swelling. Normal range of motion.      Cervical back: Full passive range of motion without pain, normal range of motion and neck supple.      Right lower leg: No edema.      Left lower leg: No edema.      Right ankle: No swelling.      Left ankle: No swelling.   Skin:     General: Skin is warm and dry.      Capillary Refill: Capillary refill takes less than 2 seconds.      Nails: There is no clubbing.   Neurological:      General: No focal deficit present.      Mental Status: He is alert and oriented to person, place, and time.      Motor: No weakness.   Psychiatric:         Speech: Speech normal.         Behavior: Behavior normal.         Thought Content: Thought content normal.         Judgment: Judgment normal.         Lab Results   Component Value Date    CHOL 171 04/01/2024    CHOL 177 12/08/2023    CHOL 186 07/22/2022     Lab Results   Component Value Date    HDL 44 04/01/2024    HDL 37 (L) 12/08/2023    HDL 41 07/22/2022     Lab Results   Component Value Date    LDLCALC 108.0 04/01/2024    LDLCALC 123.8 12/08/2023    LDLCALC 125.8 07/22/2022     Lab Results   Component Value Date    TRIG 95 04/01/2024    TRIG 81 12/08/2023    TRIG 96 07/22/2022     Lab Results   Component Value Date    CHOLHDL 25.7 04/01/2024    CHOLHDL 20.9 12/08/2023    CHOLHDL 22.0 07/22/2022       Chemistry        Component Value Date/Time     10/15/2024 1434    K 3.8 10/15/2024 1434     10/15/2024 1434    CO2 22 (L) 10/15/2024 1434    BUN 20 10/15/2024 1434    CREATININE 0.9 10/15/2024 1434    GLU 98 10/15/2024 1434        Component Value Date/Time    CALCIUM 9.9 10/15/2024 1434    ALKPHOS 79 10/15/2024 1434    AST 21 10/15/2024 1434    ALT 22 10/15/2024  1434    BILITOT 0.7 10/15/2024 1434    ESTGFRAFRICA >60.0 07/22/2022 1055    EGFRNONAA >60.0 07/22/2022 1055          Lab Results   Component Value Date    TSH 0.840 04/19/2024     Lab Results   Component Value Date    INR 0.9 10/15/2024    INR 1.0 04/19/2024    INR 0.9 03/31/2024     Lab Results   Component Value Date    WBC 6.72 10/15/2024    HGB 15.3 10/15/2024    HCT 43.9 10/15/2024    MCV 89 10/15/2024     10/15/2024          Assessment:      1. Statin myopathy    2. Severe obesity (BMI 35.0-35.9 with comorbidity)    3. Typical atrial flutter    4. Coronary artery disease involving native coronary artery of native heart without angina pectoris    5. Mixed hyperlipidemia    6. KEVEN (obstructive sleep apnea)    7. Statin intolerance            Plan:     Continue BB  Encourage regular physical exercise as tolerated  Weight loss to normal range- weight loss of 10% of current body weight would assist to decrease risk factor for recurrent KEVEN and AFL issues.   Add Wegovy 0.25 mg inj weekly given expanded indication for CAD + severe obesity + KEVEN, h/o AFL  Discussed importance of adequate hydration, protein forward meals, daily walking if initiated on wegovy  RTC In 6m or sooner if needed.       Nicole May, FNP-C [facility]

## 2024-11-22 ENCOUNTER — HOSPITAL ENCOUNTER (OUTPATIENT)
Dept: RADIOLOGY | Facility: HOSPITAL | Age: 69
Discharge: HOME | End: 2024-11-22
Attending: FAMILY MEDICINE
Payer: MEDICARE

## 2024-11-22 ENCOUNTER — OFFICE VISIT (OUTPATIENT)
Dept: FAMILY MEDICINE | Facility: CLINIC | Age: 69
End: 2024-11-22
Payer: MEDICARE

## 2024-11-22 VITALS
DIASTOLIC BLOOD PRESSURE: 74 MMHG | HEART RATE: 62 BPM | BODY MASS INDEX: 35.88 KG/M2 | TEMPERATURE: 98 F | WEIGHT: 279.44 LBS | OXYGEN SATURATION: 95 % | SYSTOLIC BLOOD PRESSURE: 132 MMHG

## 2024-11-22 DIAGNOSIS — Z01.818 PREOP EXAMINATION: Primary | ICD-10-CM

## 2024-11-22 DIAGNOSIS — R79.1 ABNORMAL COAGULATION PROFILE: ICD-10-CM

## 2024-11-22 DIAGNOSIS — G89.29 CHRONIC PAIN OF LEFT KNEE: ICD-10-CM

## 2024-11-22 DIAGNOSIS — G72.0 STATIN MYOPATHY: ICD-10-CM

## 2024-11-22 DIAGNOSIS — E78.5 HYPERLIPIDEMIA, UNSPECIFIED HYPERLIPIDEMIA TYPE: ICD-10-CM

## 2024-11-22 DIAGNOSIS — G47.33 OSA (OBSTRUCTIVE SLEEP APNEA): ICD-10-CM

## 2024-11-22 DIAGNOSIS — E66.01 SEVERE OBESITY (BMI 35.0-35.9 WITH COMORBIDITY): ICD-10-CM

## 2024-11-22 DIAGNOSIS — R79.9 ABNORMAL FINDING OF BLOOD CHEMISTRY, UNSPECIFIED: ICD-10-CM

## 2024-11-22 DIAGNOSIS — T46.6X5A STATIN MYOPATHY: ICD-10-CM

## 2024-11-22 DIAGNOSIS — J44.89 ASTHMA WITH COPD: ICD-10-CM

## 2024-11-22 DIAGNOSIS — I48.92 ATRIAL FLUTTER WITH RAPID VENTRICULAR RESPONSE: ICD-10-CM

## 2024-11-22 DIAGNOSIS — M25.562 CHRONIC PAIN OF LEFT KNEE: ICD-10-CM

## 2024-11-22 PROCEDURE — 87081 CULTURE SCREEN ONLY: CPT | Performed by: FAMILY MEDICINE

## 2024-11-22 PROCEDURE — 99999 PR PBB SHADOW E&M-EST. PATIENT-LVL III: CPT | Mod: PBBFAC,,, | Performed by: FAMILY MEDICINE

## 2024-11-22 PROCEDURE — 71046 X-RAY EXAM CHEST 2 VIEWS: CPT | Mod: TC,PO

## 2024-11-22 PROCEDURE — 71046 X-RAY EXAM CHEST 2 VIEWS: CPT | Mod: 26,,, | Performed by: RADIOLOGY

## 2024-11-22 RX ORDER — APIXABAN 5 MG/1
5 TABLET, FILM COATED ORAL 2 TIMES DAILY
COMMUNITY
Start: 2024-11-16

## 2024-11-22 NOTE — PROGRESS NOTES
Subjective:       Patient ID: Yung Mcconnell is a 69 y.o. male.    Chief Complaint: Pre-op Exam      HPI Comments:       Current Outpatient Medications:     albuterol (PROVENTIL/VENTOLIN HFA) 90 mcg/actuation inhaler, Inhale 2 puffs into the lungs every 4 (four) hours as needed for Wheezing or Shortness of Breath., Disp: 54 g, Rfl: 3    budesonide-formoterol 160-4.5 mcg (SYMBICORT) 160-4.5 mcg/actuation HFAA, Inhale 2 puffs into the lungs every 12 (twelve) hours. Controller.Wash out mouth after use, Disp: 10.2 g, Rfl: 11    celecoxib (CELEBREX) 200 MG capsule, Take 200 mg by mouth 2 (two) times daily., Disp: , Rfl:     ELIQUIS 5 mg Tab, Take 5 mg by mouth 2 (two) times daily., Disp: , Rfl:     evolocumab (REPATHA SURECLICK) 140 mg/mL PnIj, Inject 1 mL (140 mg total) into the skin every 14 (fourteen) days., Disp: 2 mL, Rfl: 11    fluticasone propionate (FLONASE) 50 mcg/actuation nasal spray, 2 sprays (100 mcg total) by Each Nostril route once daily., Disp: 48 g, Rfl: 3    metoprolol succinate (TOPROL-XL) 25 MG 24 hr tablet, Take 1 tablet (25 mg total) by mouth once daily., Disp: 30 tablet, Rfl: 11    naproxen (NAPROSYN) 500 MG tablet, Take 1 tablet (500 mg total) by mouth 2 (two) times daily with meals., Disp: 20 tablet, Rfl: 0    semaglutide, weight loss, (WEGOVY) 0.25 mg/0.5 mL PnIj, Inject 0.25 mg into the skin every 7 days., Disp: 2 mL, Rfl: 3    tadalafiL (CIALIS) 20 MG Tab, TAKE 1 TABLET DAILY AS NEEDED, Disp: 24 tablet, Rfl: 2      Last visit by me about 7 months ago.  He is here for preoperative clearance.      Knee replacement on 12/18.  Dr. Sacha Tee.  At least 1 year of knee pain.  Multiple attempts at injections.  Pain getting worse.    Past medical history: Asthma.  Doing much better with the addition of Symbicort and oxygen at night.  Sleep apnea.  With CPAP use regularly.  Cervical spondylosis, ED, GERD, lumbar radiculopathy, mixed hyperlipidemia history of colon polyps, away of both knees, severe  obesity, atrial flutter.      Recently had a successful cardiac ablation for atrial flutter.      Past surgical history:  Left knee arthroplasty 3 years ago.  Lungs surgery, cholecystectomy, umbilical hernia repair      Review of Systems   Constitutional:  Negative for activity change, appetite change and fever.   HENT:  Negative for sore throat.    Respiratory:  Negative for cough and shortness of breath.    Cardiovascular:  Negative for chest pain.   Gastrointestinal:  Negative for abdominal pain, diarrhea and nausea.   Genitourinary:  Negative for difficulty urinating.   Musculoskeletal:  Positive for arthralgias. Negative for myalgias.   Neurological:  Negative for dizziness and headaches.       Objective:      Vitals:    11/22/24 0835   BP: 132/74   Pulse: 62   Temp: 98.2 °F (36.8 °C)   TempSrc: Tympanic   SpO2: 95%   Weight: 126.8 kg (279 lb 6.9 oz)   PainSc: 0-No pain     Physical Exam  Vitals and nursing note reviewed.   Constitutional:       General: He is not in acute distress.     Appearance: He is well-developed. He is not diaphoretic.   HENT:      Head: Normocephalic.      Mouth/Throat:      Pharynx: No oropharyngeal exudate.   Neck:      Thyroid: No thyromegaly.   Cardiovascular:      Rate and Rhythm: Normal rate and regular rhythm.      Heart sounds: Normal heart sounds. No murmur heard.  Pulmonary:      Effort: Pulmonary effort is normal.      Breath sounds: Normal breath sounds. No wheezing or rales.   Abdominal:      General: There is no distension.      Palpations: Abdomen is soft. There is no hepatomegaly, splenomegaly or mass.      Tenderness: There is no abdominal tenderness.   Musculoskeletal:      Cervical back: Neck supple.      Right lower leg: No edema.      Left lower leg: No edema.   Lymphadenopathy:      Cervical: No cervical adenopathy.   Skin:     General: Skin is warm and dry.   Neurological:      Mental Status: He is alert and oriented to person, place, and time.   Psychiatric:          Mood and Affect: Mood normal.         Behavior: Behavior normal.         Thought Content: Thought content normal.         Judgment: Judgment normal.         Assessment:       1. Preop examination    2. Atrial flutter with rapid ventricular response    3. Chronic pain of left knee    4. Asthma with COPD    5. Hyperlipidemia, unspecified hyperlipidemia type    6. Statin myopathy    7. KEVEN (obstructive sleep apnea)    8. Severe obesity (BMI 35.0-35.9 with comorbidity)        Plan:   Preop examination  Comments:  Previously cleared by Cardiology.  X-ray and labs today.  Orders:  -     X-Ray Chest PA And Lateral; Future; Expected date: 11/22/2024  -     CBC Auto Differential; Future; Expected date: 11/22/2024  -     Comprehensive Metabolic Panel; Future; Expected date: 11/22/2024    Atrial flutter with rapid ventricular response  Comments:  Status post successful ablation    Chronic pain of left knee  Comments:  Planned total knee replacement next month    Asthma with COPD  Comments:  Much improved with Symbicort    Hyperlipidemia, unspecified hyperlipidemia type  Comments:      Statin myopathy  Comments:  On Repatha    KEVEN (obstructive sleep apnea)  Comments:  Regularly uses CPAP    Severe obesity (BMI 35.0-35.9 with comorbidity)

## 2024-11-23 LAB
OHS QRS DURATION: 90 MS
OHS QTC CALCULATION: 429 MS

## 2024-11-25 ENCOUNTER — TELEPHONE (OUTPATIENT)
Dept: FAMILY MEDICINE | Facility: CLINIC | Age: 69
End: 2024-11-25
Payer: MEDICARE

## 2024-11-25 NOTE — TELEPHONE ENCOUNTER
Pre-op paper work was completed and faxed over to [facility] at [phone] fax was confirmed. Faxed on 11/25/2024 X-Ray, Pre op note, labs required.

## 2024-12-04 ENCOUNTER — PATIENT MESSAGE (OUTPATIENT)
Dept: CARDIOLOGY | Facility: CLINIC | Age: 69
End: 2024-12-04
Payer: MEDICARE

## 2024-12-04 NOTE — TELEPHONE ENCOUNTER
I called Dr Zamora office. [facility] for Cassidy to return call or fax over clearance form for provider to sign.

## 2024-12-05 ENCOUNTER — TELEPHONE (OUTPATIENT)
Dept: CARDIOLOGY | Facility: CLINIC | Age: 69
End: 2024-12-05
Payer: MEDICARE

## 2024-12-05 NOTE — TELEPHONE ENCOUNTER
Can proceed with surgery at moderate CV risk    Ensure dose of BB on AM of surgery    Thanks  SILVANO Collier          ----- Message from Nurse Benitez sent at 12/5/2024 10:59 AM CST -----    ----- Message -----  From: Eli Samson LPN  Sent: 12/5/2024  10:54 AM CST  To: Radha Osuna Staff    Please advise      PATRICK Dr. Nweton Malloy is requesting a cardiac clearance for PT to have Right TKA . They sent a request stating they just need a written clearance.      Thank you  Dayanara Samson LPN

## 2024-12-11 ENCOUNTER — PATIENT MESSAGE (OUTPATIENT)
Dept: CARDIOLOGY | Facility: CLINIC | Age: 69
End: 2024-12-11
Payer: MEDICARE

## 2024-12-11 ENCOUNTER — TELEPHONE (OUTPATIENT)
Dept: CARDIOLOGY | Facility: CLINIC | Age: 69
End: 2024-12-11
Payer: MEDICARE

## 2024-12-11 ENCOUNTER — PATIENT MESSAGE (OUTPATIENT)
Dept: ADMINISTRATIVE | Facility: OTHER | Age: 69
End: 2024-12-11
Payer: MEDICARE

## 2024-12-11 NOTE — TELEPHONE ENCOUNTER
I called Dr Leo's office and spoke with Cassidy. Clearance has already been received and nothing further needed at this time.       ----- Message from Edwige sent at 12/11/2024  8:50 AM CST -----  Name of Who is Calling:MARNIE SHAH [[id number]]        What is the request in detail:Pt called requesting for  to give him a call back. Pt stated that he's having knee surgery and need Dr. Garcia approval to be sent out to Dr. Leo. To contact Dr. Felipa Benjamin is the person to reach out to ([facility] number [phone])        Can the clinic reply by [facility]:No        What Number to Call Back if not in [facility]:Telephone Information:  [place]          [phone]

## 2025-01-16 ENCOUNTER — OFFICE VISIT (OUTPATIENT)
Dept: URGENT CARE | Facility: CLINIC | Age: 70
End: 2025-01-16
Payer: MEDICARE

## 2025-01-16 VITALS
OXYGEN SATURATION: 98 % | RESPIRATION RATE: 20 BRPM | BODY MASS INDEX: 35.81 KG/M2 | WEIGHT: 279 LBS | HEIGHT: 74 IN | HEART RATE: 74 BPM | DIASTOLIC BLOOD PRESSURE: 67 MMHG | TEMPERATURE: 99 F | SYSTOLIC BLOOD PRESSURE: 139 MMHG

## 2025-01-16 DIAGNOSIS — J06.9 VIRAL URI WITH COUGH: Primary | ICD-10-CM

## 2025-01-16 DIAGNOSIS — R68.89 FLU-LIKE SYMPTOMS: ICD-10-CM

## 2025-01-16 LAB
CTP QC/QA: YES
CTP QC/QA: YES
POC MOLECULAR INFLUENZA A AGN: NEGATIVE
POC MOLECULAR INFLUENZA B AGN: NEGATIVE
SARS-COV-2 AG RESP QL IA.RAPID: NEGATIVE

## 2025-01-16 PROCEDURE — 87811 SARS-COV-2 COVID19 W/OPTIC: CPT | Mod: QW,S$GLB,, | Performed by: PHYSICIAN ASSISTANT

## 2025-01-16 PROCEDURE — 87502 INFLUENZA DNA AMP PROBE: CPT | Mod: QW,S$GLB,, | Performed by: PHYSICIAN ASSISTANT

## 2025-01-16 PROCEDURE — 99214 OFFICE O/P EST MOD 30 MIN: CPT | Mod: S$GLB,,, | Performed by: PHYSICIAN ASSISTANT

## 2025-01-16 RX ORDER — FLUTICASONE PROPIONATE 50 MCG
1 SPRAY, SUSPENSION (ML) NASAL 2 TIMES DAILY
Qty: 9.9 ML | Refills: 0 | Status: SHIPPED | OUTPATIENT
Start: 2025-01-16 | End: 2025-01-23

## 2025-01-16 RX ORDER — PROMETHAZINE HYDROCHLORIDE AND DEXTROMETHORPHAN HYDROBROMIDE 6.25; 15 MG/5ML; MG/5ML
5 SYRUP ORAL EVERY 6 HOURS PRN
Qty: 140 ML | Refills: 0 | Status: SHIPPED | OUTPATIENT
Start: 2025-01-16 | End: 2025-01-23

## 2025-01-16 RX ORDER — OSELTAMIVIR PHOSPHATE 75 MG/1
75 CAPSULE ORAL 2 TIMES DAILY
Qty: 10 CAPSULE | Refills: 0 | Status: SHIPPED | OUTPATIENT
Start: 2025-01-16 | End: 2025-01-21

## 2025-01-16 NOTE — PATIENT INSTRUCTIONS
You have been diagnosed with flu-like viral illness. Your test was negative for flu, but I suspect false negative.  You are contagious for usually the first 4 days of symptoms and for 24 hours after your last fever.  Please drink plenty of fluids.  Please get plenty of rest.  Please return here or go to the Emergency Department for any concerns or worsening of condition.  If an antiviral prescription such as Tamiflu or Xofluza has been discussed and if prescribed, please take to completion unless you cannot tolerate the side effects.   Take tylenol (acetominophen) for fever, chills or body aches every 4 hours. do not exceed 4000 mg/ day.  Promethazine DM every 4-6 hours as needed for cough - may cause drowsiness.  Regular MUCINEX (GUAFENISIN) to break up congestion.   Nasal saline and Flonase for nasal congestion.  Inhaler or albuterol as prescribed and every 4-6 hours as needed for wheezing.   Follow up with your doctor for any persistent new fever > 4 days, or persistent sinus pressure/congestion not improving > 10 days.You need to be seen urgently if you develop any chest pain or shortness of breath not relieved by breathing treatments.

## 2025-01-16 NOTE — PROGRESS NOTES
"Subjective:      Patient ID: Yugn Mcconnell is a 69 y.o. male.    Vitals:  height is 6' 2" (1.88 m) and weight is 126.6 kg (279 lb). His tympanic temperature is 99.3 °F (37.4 °C). His blood pressure is 139/67 and his pulse is 74. His respiration is 20 and oxygen saturation is 98%.     Chief Complaint: Fever    Patient presents with sudden onset fever body aches cough nasal congestion and productive cough. Hx of copd and asthma. Symptoms started yesterday. So achy it feels like when he had the flu. No CP or SOB    Fever   This is a new problem. The current episode started 1 day ago. The problem occurs constantly. The problem has been unchanged. He has not experienced a heat injury.The maximum temperature noted was 100 to 100.9 F. The temperature was taken using a tympanic thermometer. Associated symptoms include congestion, coughing, headaches and muscle aches. Pertinent negatives include no abdominal pain, diarrhea, ear pain, nausea, not playful when afebrile, rash, sleepiness, sore throat, urinary pain, vomiting or wheezing. He has tried acetaminophen (albuterol tx) for the symptoms. The treatment provided mild relief.       Constitution: Positive for chills, sweating, fatigue and fever.   HENT:  Positive for congestion, postnasal drip and sinus pressure. Negative for ear pain, foreign body in nose and sore throat.    Respiratory:  Positive for cough and sputum production. Negative for shortness of breath and wheezing.    Gastrointestinal:  Negative for abdominal pain, nausea, vomiting and diarrhea.   Skin:  Negative for rash.   Neurological:  Positive for headaches.      Objective:     Physical Exam   Constitutional: He is oriented to person, place, and time. He appears well-developed. He is cooperative.  Non-toxic appearance. He appears ill. No distress.   HENT:   Head: Normocephalic and atraumatic.   Ears:   Right Ear: Hearing, tympanic membrane, external ear and ear canal normal.   Left Ear: Hearing, tympanic " membrane, external ear and ear canal normal.   Nose: Rhinorrhea and congestion present. No mucosal edema or nasal deformity. No epistaxis. Right sinus exhibits no maxillary sinus tenderness and no frontal sinus tenderness. Left sinus exhibits no maxillary sinus tenderness and no frontal sinus tenderness.   Mouth/Throat: Uvula is midline, oropharynx is clear and moist and mucous membranes are normal. No trismus in the jaw. Normal dentition. No uvula swelling. No oropharyngeal exudate, posterior oropharyngeal edema or posterior oropharyngeal erythema.   Eyes: Conjunctivae and lids are normal. No scleral icterus.   Neck: Trachea normal and phonation normal. Neck supple. No edema present. No erythema present. No neck rigidity present.   Cardiovascular: Normal rate, regular rhythm, normal heart sounds and normal pulses.   Pulmonary/Chest: Effort normal and breath sounds normal. No respiratory distress. He has no decreased breath sounds. He has no wheezes. He has no rhonchi. He has no rales.   Abdominal: Normal appearance.   Musculoskeletal: Normal range of motion.         General: No deformity. Normal range of motion.   Neurological: He is alert and oriented to person, place, and time. He exhibits normal muscle tone. Coordination normal.   Skin: Skin is warm, dry, intact, not diaphoretic and not pale.   Psychiatric: His speech is normal and behavior is normal. Judgment and thought content normal.   Nursing note and vitals reviewed.    Results for orders placed or performed in visit on 01/16/25   POCT Influenza A/B MOLECULAR    Collection Time: 01/16/25  9:37 AM   Result Value Ref Range    POC Molecular Influenza A Ag Negative Negative    POC Molecular Influenza B Ag Negative Negative     Acceptable Yes    SARS Coronavirus 2 Antigen, POCT Manual Read    Collection Time: 01/16/25  9:49 AM   Result Value Ref Range    SARS Coronavirus 2 Antigen Negative Negative     Acceptable Yes         Assessment:     1. Viral URI with cough    2. Flu-like symptoms        Plan:       Viral URI with cough  -     POCT Influenza A/B MOLECULAR  -     SARS Coronavirus 2 Antigen, POCT Manual Read  -     oseltamivir (TAMIFLU) 75 MG capsule; Take 1 capsule (75 mg total) by mouth 2 (two) times daily. for 5 days  Dispense: 10 capsule; Refill: 0  -     promethazine-dextromethorphan (PROMETHAZINE-DM) 6.25-15 mg/5 mL Syrp; Take 5 mLs by mouth every 6 (six) hours as needed (cough).  Dispense: 140 mL; Refill: 0  -     fluticasone propionate (FLONASE) 50 mcg/actuation nasal spray; 1 spray (50 mcg total) by Each Nostril route 2 (two) times a day. Dispense 1 bottle for 7 days  Dispense: 9.9 mL; Refill: 0    Flu-like symptoms  -     oseltamivir (TAMIFLU) 75 MG capsule; Take 1 capsule (75 mg total) by mouth 2 (two) times daily. for 5 days  Dispense: 10 capsule; Refill: 0    Lyric Ward PA-C  [facility]       Patient Instructions   You have been diagnosed with flu-like viral illness. Your test was negative for flu, but I suspect false negative.  You are contagious for usually the first 4 days of symptoms and for 24 hours after your last fever.  Please drink plenty of fluids.  Please get plenty of rest.  Please return here or go to the Emergency Department for any concerns or worsening of condition.  If an antiviral prescription such as Tamiflu or Xofluza has been discussed and if prescribed, please take to completion unless you cannot tolerate the side effects.   Take tylenol (acetominophen) for fever, chills or body aches every 4 hours. do not exceed 4000 mg/ day.  Promethazine DM every 4-6 hours as needed for cough - may cause drowsiness.  Regular MUCINEX (GUAFENISIN) to break up congestion.   Nasal saline and Flonase for nasal congestion.  Inhaler or albuterol as prescribed and every 4-6 hours as needed for wheezing.   Follow up with your doctor for any persistent new fever > 4 days, or persistent sinus  pressure/congestion not improving > 10 days.You need to be seen urgently if you develop any chest pain or shortness of breath not relieved by breathing treatments.             Medical Decision Making:   Clinical Tests:   Lab Tests: Ordered and Reviewed       <> Summary of Lab: Neg flu and covid    Urgent Care Management:  Suspect false neg flu based on history of sudden onset viral symptoms with fever. Will tx with tamiflu due to comorbidities and higher risk patient. Rx antitussives, flonase, rec mucinex and q4 nebs

## 2025-01-16 NOTE — LETTER
January 16, 2025      [facility]  [address], [address]  [facility] [address]  Phone: [phone]  Fax: [phone]       Patient: Yung Mcconnell   YOB: 1955  Date of Visit: 01/16/2025    To Whom It May Concern:    Stephane Mcconnell  was at [facility] on 01/16/2025. The patient may return to work/school on 1/21/25 with restrictions to return if fever free for 24 hours. If you have any questions or concerns, or if I can be of further assistance, please do not hesitate to contact me.    Sincerely,    Lyric Ward PA-C  [facility]

## 2025-02-22 DIAGNOSIS — N52.9 ERECTILE DYSFUNCTION, UNSPECIFIED ERECTILE DYSFUNCTION TYPE: ICD-10-CM

## 2025-02-22 NOTE — TELEPHONE ENCOUNTER
No care due was identified.  [facility] Embedded Care Due Messages. Reference number: [id number].   2/22/2025 11:46:28 AM CST

## 2025-02-22 NOTE — TELEPHONE ENCOUNTER
No care due was identified.  [facility] Embedded Care Due Messages. Reference number: [id number].   2/22/2025 10:36:10 AM CST

## 2025-02-23 RX ORDER — TADALAFIL 20 MG/1
TABLET ORAL
Qty: 24 TABLET | Refills: 2 | Status: SHIPPED | OUTPATIENT
Start: 2025-02-23

## 2025-02-23 NOTE — TELEPHONE ENCOUNTER
Refill Decision Note   Yung Jayesh  is requesting a refill authorization.  Brief Assessment and Rationale for Refill:  Approve     Medication Therapy Plan:       Medication Reconciliation Completed: No   Comments:     No Care Gaps recommended.     Note composed:10:14 AM 02/23/2025

## 2025-02-24 RX ORDER — TADALAFIL 20 MG/1
TABLET ORAL
Qty: 24 TABLET | Refills: 2 | OUTPATIENT
Start: 2025-02-24

## 2025-02-24 NOTE — TELEPHONE ENCOUNTER
Refill Decision Note   Yung Mcconnell  is requesting a refill authorization.  Brief Assessment and Rationale for Refill:  Quick Discontinue     Medication Therapy Plan: E-Prescribing Status: Receipt confirmed by pharmacy (2/23/2025 10:21 AM CST)      Comments:     Note composed:2:49 PM 02/24/2025

## 2025-03-20 ENCOUNTER — TELEPHONE (OUTPATIENT)
Dept: PULMONOLOGY | Facility: CLINIC | Age: 70
End: 2025-03-20
Payer: MEDICARE

## 2025-03-20 DIAGNOSIS — J44.89 ASTHMA WITH COPD: Primary | ICD-10-CM

## 2025-03-20 NOTE — PROGRESS NOTES
Subjective:   Patient ID:  Yung Mcconnell is a 69 y.o. male who presents for evaluation of Follow-up      HPI    Yung Mcconnell is a 68 year old male who presents to Arrhythmia clinic for AFL follow up. He was recently admitted to [facility] due AFL/RVR and underwent CHRIS/DCCV with restoration to NSR.     His current medical conditions include HLP, KEVEN, COPD, AFL on Eliquis/Amio.    EKG- Sinus bradycardia, HR 54 QTc 440 ms.     Using cpap nightly.    Denies chest pain or anginal equivalents. No shortness of breath, STEPHENSON or palpitations. Denies orthopnea, PND or abdominal bloating. Reports regular walking without any issues lately. NO leg swelling or claudications. No recent falls, syncope or near syncopal events. Reports compliance with medications and dietary restrictions. NO CNS complaints to suggest TIA or CVA today. No signs of abnormal bleeding on eliquis.      7/12/2024 update    Yung Mcconnell returns for follow up after CTI ablation for AFL.     No longer taking Amiodarone since ablation.     Taking Repatha injections without any side effects. Does get short of breath while working in the yard and has to stop to take breaks.     Using cpap nightly. Denies chest pain or anginal equivalents. Denies orthopnea, PND or abdominal bloating. Reports regular walking without any issues lately. NO leg swelling or claudications. No recent falls, syncope or near syncopal events. Reports compliance with medications and dietary restrictions. NO CNS complaints to suggest TIA or CVA today. No signs of abnormal bleeding on Eliquis.     Has been staying active on a regular basis.     Interpretation Summary         Electrophysiology study with coronary sinus pacing.    Tachycardia induction and coronary sinus recording.    3D mapping performed with Ensite.    Catheter ablation of CTI-dependent flutter.    Successful CTI ablation.    The patient tolerated procedure well.    The patient left the lab in stable condition.    There were  no immediate complications.     I certify that I was present for the critical steps of the procedure including the diagnostic, surgical and/or interventional portions.      Procedure Log documented by Documenter: Veronique Yi RN and verified by Wilfredo Bahena MD.     Date: 5/9/2024  Time: 2:07 PM      11/11/2024 update    Yung Mcconnell returns for follow up.   No recurrence of atrial flutter  Has stopped eliquis since September.     Doing well CV wise today.   Still struggling to lose weight to normal weight despite diet and exercise.     Denies chest pain or anginal equivalents. No shortness of breath, STEPHENSON or palpitations. Denies orthopnea, PND or abdominal bloating. Reports regular walking without any issues lately. NO leg swelling or claudications. No recent falls, syncope or near syncopal events. Reports compliance with medications and dietary restrictions. NO CNS complaints to suggest TIA or CVA today. No signs of abnormal bleeding on eliquis.     3/21/2025      Yung Mcconnell returns today to discuss lipid results. He had testing at outside labs- no testing available for review today.     Patient has been taking semaglutide injections from weight loss clinic, [facility] with noted weight loss of 20 lbs since Jan 2025. He has severely reduced his caloric intake as well. He reports life altering fatigue which prevents him from going on walks or doing much activities. 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     Discussed importance of adequate caloric intake for energy. Encoruaged protein shake daily to increase overall protein intake.     Can obtain labs for eval of fatigue today after clinic visit.     He does endorse some chest tightness in the afternoons which resolves spontaneously.     BP well controlled today in office.     Insurance denied Wegovy due to no documented CAD or PAD    Denies any dizziness, LH, syncope or near syncopal  events.     No leg swelling or claudication symptoms.       Past Medical History:   Diagnosis Date    Allergy     Asthma, chronic 07/09/2013    Atrial flutter     Colon polyps     COPD (chronic obstructive pulmonary disease)     GERD (gastroesophageal reflux disease)     High cholesterol     Osteoarthritis of both knees 07/09/2013    Sleep apnea        Past Surgical History:   Procedure Laterality Date    ABLATION OF ARRHYTHMOGENIC FOCUS FOR ATRIAL FLUTTER Bilateral 05/09/2024    Procedure: Ablation atrial flutter/EPS typical vs atypical;  Surgeon: Wilfredo Bahena MD;  Location: [facility] CATH LAB;  Service: Cardiology;  Laterality: Bilateral;    CHOLECYSTECTOMY      COLONOSCOPY N/A 10/23/2020    Procedure: COLONOSCOPY;  Surgeon: Sae Valentine MD;  Location: [facility] ENDO;  Service: Endoscopy;  Laterality: N/A;    COLONOSCOPY N/A 07/22/2024    Procedure: COLONOSCOPY 6/16 econ eliquis 48 hr hold;  Surgeon: Wang Simmons MD;  Location: [facility] ENDO;  Service: Endoscopy;  Laterality: N/A;    DIAGNOSTIC LAPAROSCOPY N/A 06/21/2018    Procedure: LAPAROSCOPY, DIAGNOSTIC;  Surgeon: Casper Martinez MD;  Location: [facility] OR;  Service: General;  Laterality: N/A;    KNEE SURGERY Right 2025    LUNG SURGERY Right     benign mass    TOTAL KNEE ARTHROPLASTY Left     TRANSESOPHAGEAL ECHOCARDIOGRAM WITH POSSIBLE CARDIOVERSION (CHRIS W/ POSS CARDIOVERSION) N/A 04/01/2024    Procedure: Transesophageal echo (CHRIS) intra-procedure log documentation;  Surgeon: Simran Corley MD;  Location: [facility] CATH LAB;  Service: Cardiology;  Laterality: N/A;    UMBILICAL HERNIA REPAIR N/A 06/21/2018    Procedure: REPAIR, HERNIA, UMBILICAL, AGE 5 YEARS OR OLDER;  Surgeon: Casper Martinez MD;  Location: [facility] OR;  Service: General;  Laterality: N/A;       Social History     Tobacco Use    Smoking status: Never     Passive exposure: Past    Smokeless tobacco: Never   Substance Use Topics    Alcohol use: Yes     Comment: once or twice a year    Drug use:  No       Family History   Problem Relation Name Age of Onset    Hypertension Mother      Diabetes Mother      Cancer Mother          Breast Ca     Heart disease Father          CAD , CHF     Aneurysm Father      Colon polyps Father      Melanoma Neg Hx      Eczema Neg Hx      Lupus Neg Hx      Psoriasis Neg Hx         Wt Readings from Last 3 Encounters:   03/21/25 117.9 kg (259 lb 14.8 oz)   01/16/25 126.6 kg (279 lb)   11/22/24 126.8 kg (279 lb 6.9 oz)     Temp Readings from Last 3 Encounters:   01/16/25 99.3 °F (37.4 °C) (Tympanic)   11/22/24 98.2 °F (36.8 °C) (Tympanic)   10/15/24 98 °F (36.7 °C) (Oral)     BP Readings from Last 3 Encounters:   03/21/25 112/80   01/16/25 139/67   11/22/24 132/74     Pulse Readings from Last 3 Encounters:   03/21/25 75   01/16/25 74   11/22/24 62       Current Outpatient Medications on File Prior to Visit   Medication Sig Dispense Refill    albuterol (PROVENTIL/VENTOLIN HFA) 90 mcg/actuation inhaler Inhale 2 puffs into the lungs every 4 (four) hours as needed for Wheezing or Shortness of Breath. 54 g 3    budesonide-formoterol 160-4.5 mcg (SYMBICORT) 160-4.5 mcg/actuation HFAA Inhale 2 puffs into the lungs every 12 (twelve) hours. Controller.Wash out mouth after use 10.2 g 11    evolocumab (REPATHA SURECLICK) 140 mg/mL PnIj Inject 1 mL (140 mg total) into the skin every 14 (fourteen) days. 2 mL 11    fluticasone propionate (FLONASE) 50 mcg/actuation nasal spray 2 sprays (100 mcg total) by Each Nostril route once daily. 48 g 3    semaglutide, weight loss, (WEGOVY) 0.25 mg/0.5 mL PnIj Inject 0.25 mg into the skin every 7 days. (Patient taking differently: Inject 0.75 mg into the skin every 7 days.) 2 mL 3    tadalafiL (CIALIS) 20 MG Tab TAKE ONE TABLET BY MOUTH ONCE A DAY AS NEEDED 24 tablet 2    [DISCONTINUED] metoprolol succinate (TOPROL-XL) 25 MG 24 hr tablet Take 1 tablet (25 mg total) by mouth once daily. 30 tablet 11    celecoxib (CELEBREX) 200 MG capsule Take 200 mg by mouth  2 (two) times daily. (Patient not taking: Reported on 3/21/2025)      naproxen (NAPROSYN) 500 MG tablet Take 1 tablet (500 mg total) by mouth 2 (two) times daily with meals. (Patient not taking: Reported on 3/21/2025) 20 tablet 0    [DISCONTINUED] ELIQUIS 5 mg Tab Take 5 mg by mouth 2 (two) times daily. (Patient not taking: Reported on 3/21/2025)       No current facility-administered medications on file prior to visit.       Cardiac event monitor  Result Date: 8/31/2024  The patient was monitored for a total of 27d 21h, underlying rhythm is   Sinus.   The minimum heart rate was 42 bpm; the maximum 153 bpm; the average 64   bpm.   0 % of Atrial fibrillation/Atrial flutter with longest episode of 0 ms.   The total burden of AV Block present was 0 % [Complete Heart Block: 0 %;   Advanced (High Grade): 0 %; 2nd Degree, Mobitz II: 0 %; 2nd Degree, Mobitz   I: 0 %].  There were 0 pauses, the longest pause was 0 ms at --.   Total count of Ventricular Tachycardia (VT): 1 episode(s). Longest VT: 4   beats on Day 12 / 04:11:23 pm. Fastest VT: 128 bpm on Day 12 / 04:11:23   pm.   30 supraventricular episodes were found. Longest SVT Episode 27 beats,   Fastest  bpm   There were a total of 439 PVCs with 3 morphologies and 6 couplets. Overall   PVC [place] at 0.02 %   There were a total of 0 Other Beats. There were 0 total number of paced   beats.   There were a total of 8617 PSVCs with 1 morphologies and 180 couplets.   Overall PSVC [place] at 0.33 %   There is a total of 0 patient events.          Results for orders placed during the hospital encounter of 03/31/24    Echo    Interpretation Summary    Left Ventricle: There is normal systolic function with a visually estimated ejection fraction of 60 - 65%. Unable to assess diastolic function due to atrial flutter    Right Ventricle: Normal right ventricular cavity size. Wall thickness is normal. Right ventricle wall motion  is normal. Systolic function is normal.     IVC/SVC: Normal venous pressure at 3 mmHg.    No results found for this or any previous visit.        Results for orders placed or performed in visit on 11/22/24   EKG 12-lead    Collection Time: 11/22/24 10:36 AM   Result Value Ref Range    QRS Duration 90 ms    [facility] QTC Calculation 429 ms    Narrative    Test Reason : Z01.818,    Vent. Rate :  51 BPM     Atrial Rate :  51 BPM     P-R Int : 156 ms          QRS Dur :  90 ms      QT Int : 466 ms       P-R-T Axes :  62  29  12 degrees    QTcB Int : 429 ms    Sinus bradycardia  Otherwise normal ECG  When compared with ECG of 12-Jul-2024 07:47,  No significant change was found  Confirmed by Simran Corley ([id number]) on 11/25/2024 8:39:22 AM    Referred By: MD PARTIDA           Confirmed By: Simran Corley         Review of Systems   Constitutional: Positive for malaise/fatigue.   HENT:  Negative for hearing loss and hoarse voice.    Eyes:  Negative for blurred vision and visual disturbance.   Cardiovascular:  Positive for palpitations. Negative for chest pain, claudication, dyspnea on exertion, irregular heartbeat, leg swelling, near-syncope, orthopnea, paroxysmal nocturnal dyspnea and syncope.   Respiratory:  Negative for cough, hemoptysis, shortness of breath, sleep disturbances due to breathing, snoring and wheezing.    Endocrine: Negative for cold intolerance and heat intolerance.   Hematologic/Lymphatic: Does not bruise/bleed easily.   Skin:  Negative for color change, dry skin and nail changes.   Musculoskeletal:  Positive for arthritis and back pain.   Gastrointestinal:  Negative for bloating, abdominal pain, constipation, nausea and vomiting.   Genitourinary:  Negative for dysuria, flank pain, hematuria and hesitancy.   Neurological:  Positive for weakness. Negative for headaches, light-headedness, loss of balance, numbness and paresthesias.   Psychiatric/Behavioral:  Negative for altered mental status.          Objective:/80 (BP Location: Right arm,  "Patient Position: Sitting)   Pulse 75   Ht 6' 2" (1.88 m)   Wt 117.9 kg (259 lb 14.8 oz)   SpO2 98%   BMI 33.37 kg/m²      Physical Exam  Vitals and nursing note reviewed.   Constitutional:       General: He is not in acute distress.     Appearance: Normal appearance. He is well-developed. He is obese. He is not ill-appearing.   HENT:      Head: Normocephalic and atraumatic.      Nose: Nose normal.      Mouth/Throat:      Mouth: Mucous membranes are moist.   Eyes:      Pupils: Pupils are equal, round, and reactive to light.   Neck:      Thyroid: No thyromegaly.      Vascular: No JVD.      Trachea: No tracheal deviation.   Cardiovascular:      Rate and Rhythm: Normal rate and regular rhythm.      Chest Wall: PMI is not displaced.      Pulses: Intact distal pulses.           Radial pulses are 2+ on the right side and 2+ on the left side.        Dorsalis pedis pulses are 2+ on the right side and 2+ on the left side.      Heart sounds: S1 normal and S2 normal. Heart sounds not distant. No murmur heard.  Pulmonary:      Effort: Pulmonary effort is normal. No respiratory distress.      Breath sounds: Normal breath sounds. No wheezing.   Abdominal:      General: Bowel sounds are normal. There is no distension.      Palpations: Abdomen is soft.      Tenderness: There is no abdominal tenderness.   Musculoskeletal:         General: No swelling. Normal range of motion.      Cervical back: Full passive range of motion without pain, normal range of motion and neck supple.      Right lower leg: No edema.      Left lower leg: No edema.      Right ankle: No swelling.      Left ankle: No swelling.   Skin:     General: Skin is warm and dry.      Capillary Refill: Capillary refill takes less than 2 seconds.      Nails: There is no clubbing.   Neurological:      General: No focal deficit present.      Mental Status: He is alert and oriented to person, place, and time.      Motor: No weakness.   Psychiatric:         Speech: Speech " normal.         Behavior: Behavior normal.         Thought Content: Thought content normal.         Judgment: Judgment normal.         Lab Results   Component Value Date    CHOL 171 04/01/2024    CHOL 177 12/08/2023    CHOL 186 07/22/2022     Lab Results   Component Value Date    HDL 44 04/01/2024    HDL 37 (L) 12/08/2023    HDL 41 07/22/2022     Lab Results   Component Value Date    LDLCALC 108.0 04/01/2024    LDLCALC 123.8 12/08/2023    LDLCALC 125.8 07/22/2022     Lab Results   Component Value Date    TRIG 95 04/01/2024    TRIG 81 12/08/2023    TRIG 96 07/22/2022     Lab Results   Component Value Date    CHOLHDL 25.7 04/01/2024    CHOLHDL 20.9 12/08/2023    CHOLHDL 22.0 07/22/2022       Chemistry        Component Value Date/Time     11/22/2024 0944    K 3.8 11/22/2024 0944     11/22/2024 0944    CO2 25 11/22/2024 0944    BUN 15 11/22/2024 0944    CREATININE 0.8 11/22/2024 0944     11/22/2024 0944        Component Value Date/Time    CALCIUM 9.6 11/22/2024 0944    ALKPHOS 77 11/22/2024 0944    AST 20 11/22/2024 0944    ALT 20 11/22/2024 0944    BILITOT 0.8 11/22/2024 0944    ESTGFRAFRICA >60.0 07/22/2022 1055    EGFRNONAA >60.0 07/22/2022 1055          Lab Results   Component Value Date    TSH 0.840 04/19/2024     Lab Results   Component Value Date    INR 1.0 11/22/2024    INR 0.9 10/15/2024    INR 1.0 04/19/2024     Lab Results   Component Value Date    WBC 6.63 11/22/2024    HGB 15.0 11/22/2024    HCT 43.1 11/22/2024    MCV 90 11/22/2024     11/22/2024          Assessment:      1. Fatigue, unspecified type    2. Iron deficiency anemia due to chronic blood loss    3. Typical atrial flutter    4. Mixed hyperlipidemia    5. Severe obesity (BMI 35.0-35.9 with comorbidity)    6. KEVEN (obstructive sleep apnea)    7. Nocturnal hypoxemia            Plan:     Typical Atrial Flutter  -s/p ablation, no recurrence  -increase Toprol XL 25 mg BID  -monitor for future issues    2. Fatigue  -Obtain CBC,  Iron/TIBC, Ferritin, TSH  -increase daily caloric intake  -daily walking encouraged    3. Severe Obesity  -taking compounded semaglutide via weight loss clinic  -lost 20 pounds in last 2 months  -needs regular physical exercise  -increase protein intake to avoid muscle loss with weight loss efforts    4. KEVEN on CPAP, nocturnal hypoxemia  -continue cpap with supplemental oxygen  -f/u with PULM as scheduled    Call with results and further recs if needed.     Nicole May, FNP-C [facility]

## 2025-03-20 NOTE — TELEPHONE ENCOUNTER
----- Message from Lesa sent at 3/20/2025  1:46 PM CDT -----  Contact: Yung Calderon is calling in regards to getting his appts on pulmonary function test scheduled and a xray.please call pt back at [phone]

## 2025-03-21 ENCOUNTER — OFFICE VISIT (OUTPATIENT)
Dept: CARDIOLOGY | Facility: CLINIC | Age: 70
End: 2025-03-21
Payer: MEDICARE

## 2025-03-21 ENCOUNTER — LAB VISIT (OUTPATIENT)
Dept: LAB | Facility: HOSPITAL | Age: 70
End: 2025-03-21
Attending: NURSE PRACTITIONER
Payer: MEDICARE

## 2025-03-21 VITALS
SYSTOLIC BLOOD PRESSURE: 112 MMHG | WEIGHT: 259.94 LBS | HEART RATE: 75 BPM | HEIGHT: 74 IN | OXYGEN SATURATION: 98 % | BODY MASS INDEX: 33.36 KG/M2 | DIASTOLIC BLOOD PRESSURE: 80 MMHG

## 2025-03-21 DIAGNOSIS — R53.83 FATIGUE, UNSPECIFIED TYPE: ICD-10-CM

## 2025-03-21 DIAGNOSIS — E78.2 MIXED HYPERLIPIDEMIA: ICD-10-CM

## 2025-03-21 DIAGNOSIS — E66.01 SEVERE OBESITY (BMI 35.0-35.9 WITH COMORBIDITY): ICD-10-CM

## 2025-03-21 DIAGNOSIS — I48.3 TYPICAL ATRIAL FLUTTER: ICD-10-CM

## 2025-03-21 DIAGNOSIS — G47.33 OSA (OBSTRUCTIVE SLEEP APNEA): ICD-10-CM

## 2025-03-21 DIAGNOSIS — D50.0 IRON DEFICIENCY ANEMIA DUE TO CHRONIC BLOOD LOSS: ICD-10-CM

## 2025-03-21 DIAGNOSIS — R53.83 FATIGUE, UNSPECIFIED TYPE: Primary | ICD-10-CM

## 2025-03-21 DIAGNOSIS — G47.34 NOCTURNAL HYPOXEMIA: ICD-10-CM

## 2025-03-21 DIAGNOSIS — I25.10 CORONARY ARTERY DISEASE INVOLVING NATIVE CORONARY ARTERY OF NATIVE HEART WITHOUT ANGINA PECTORIS: ICD-10-CM

## 2025-03-21 LAB
ALBUMIN SERPL BCP-MCNC: 4.1 G/DL (ref 3.5–5.2)
ALP SERPL-CCNC: 90 U/L (ref 40–150)
ALT SERPL W/O P-5'-P-CCNC: 22 U/L (ref 10–44)
ANION GAP SERPL CALC-SCNC: 9 MMOL/L (ref 8–16)
AST SERPL-CCNC: 21 U/L (ref 10–40)
BILIRUB SERPL-MCNC: 0.7 MG/DL (ref 0.1–1)
BUN SERPL-MCNC: 18 MG/DL (ref 8–23)
CALCIUM SERPL-MCNC: 9.6 MG/DL (ref 8.7–10.5)
CHLORIDE SERPL-SCNC: 106 MMOL/L (ref 95–110)
CHOLEST SERPL-MCNC: 89 MG/DL (ref 120–199)
CHOLEST/HDLC SERPL: 2.2 {RATIO} (ref 2–5)
CO2 SERPL-SCNC: 26 MMOL/L (ref 23–29)
CREAT SERPL-MCNC: 0.8 MG/DL (ref 0.5–1.4)
EST. GFR  (NO RACE VARIABLE): >60 ML/MIN/1.73 M^2
FERRITIN SERPL-MCNC: 470 NG/ML (ref 20–300)
GLUCOSE SERPL-MCNC: 101 MG/DL (ref 70–110)
HDLC SERPL-MCNC: 40 MG/DL (ref 40–75)
HDLC SERPL: 44.9 % (ref 20–50)
IRON SERPL-MCNC: 75 UG/DL (ref 45–160)
LDLC SERPL CALC-MCNC: 28.6 MG/DL (ref 63–159)
NONHDLC SERPL-MCNC: 49 MG/DL
POTASSIUM SERPL-SCNC: 4.4 MMOL/L (ref 3.5–5.1)
PROT SERPL-MCNC: 7.3 G/DL (ref 6–8.4)
SATURATED IRON: 20 % (ref 20–50)
SODIUM SERPL-SCNC: 141 MMOL/L (ref 136–145)
TOTAL IRON BINDING CAPACITY: 367 UG/DL (ref 250–450)
TRANSFERRIN SERPL-MCNC: 248 MG/DL (ref 200–375)
TRIGL SERPL-MCNC: 102 MG/DL (ref 30–150)
TSH SERPL DL<=0.005 MIU/L-ACNC: 0.51 UIU/ML (ref 0.4–4)

## 2025-03-21 PROCEDURE — 99999 PR PBB SHADOW E&M-EST. PATIENT-LVL III: CPT | Mod: PBBFAC,,, | Performed by: NURSE PRACTITIONER

## 2025-03-21 PROCEDURE — 84443 ASSAY THYROID STIM HORMONE: CPT | Performed by: NURSE PRACTITIONER

## 2025-03-21 PROCEDURE — 83540 ASSAY OF IRON: CPT | Performed by: NURSE PRACTITIONER

## 2025-03-21 PROCEDURE — 36415 COLL VENOUS BLD VENIPUNCTURE: CPT | Performed by: NURSE PRACTITIONER

## 2025-03-21 PROCEDURE — 82728 ASSAY OF FERRITIN: CPT | Performed by: NURSE PRACTITIONER

## 2025-03-21 PROCEDURE — 80053 COMPREHEN METABOLIC PANEL: CPT | Performed by: NURSE PRACTITIONER

## 2025-03-21 PROCEDURE — 80061 LIPID PANEL: CPT | Performed by: NURSE PRACTITIONER

## 2025-03-21 RX ORDER — METOPROLOL SUCCINATE 25 MG/1
25 TABLET, EXTENDED RELEASE ORAL 2 TIMES DAILY
Qty: 60 TABLET | Refills: 11 | Status: SHIPPED | OUTPATIENT
Start: 2025-03-21 | End: 2026-03-21

## 2025-03-21 RX ORDER — SEMAGLUTIDE 0.25 MG/.5ML
0.75 INJECTION, SOLUTION SUBCUTANEOUS
Start: 2025-03-21

## 2025-03-23 ENCOUNTER — PATIENT MESSAGE (OUTPATIENT)
Dept: CARDIOLOGY | Facility: CLINIC | Age: 70
End: 2025-03-23
Payer: MEDICARE

## 2025-03-24 ENCOUNTER — TELEPHONE (OUTPATIENT)
Dept: CARDIOLOGY | Facility: CLINIC | Age: 70
End: 2025-03-24

## 2025-03-24 ENCOUNTER — RESULTS FOLLOW-UP (OUTPATIENT)
Dept: CARDIOLOGY | Facility: CLINIC | Age: 70
End: 2025-03-24

## 2025-03-24 NOTE — TELEPHONE ENCOUNTER
Please call patient      Iron studies normal  TSH stable  Lipids have improved drastically with Repatha  NO source for his fatigue  Recommend to discuss with PCP regarding disabling fatigue symptoms     Thanks  Thao    Called patient to advise, spoke to patient verbalized understanding patient had additional questions and concerns wanting to know about if his ferritin levels were okay       Ferritin is a nonspecific marker can be related to inflammation  Would discuss further with pcp     Thanks    Spoke to patient verbalized understanding

## 2025-03-24 NOTE — TELEPHONE ENCOUNTER
Please call patient      Iron studies normal  TSH stable  Lipids have improved drastically with Repatha  NO source for his fatigue  Recommend to discuss with PCP regarding disabling fatigue symptoms     Jesu Osuna    Called patient to advise, spoke to patient verbalized understanding patient had additional questions and concerns wanting to know about if his ferritin levels were okay

## 2025-04-04 ENCOUNTER — CLINICAL SUPPORT (OUTPATIENT)
Dept: PULMONOLOGY | Facility: CLINIC | Age: 70
End: 2025-04-04
Attending: INTERNAL MEDICINE
Payer: MEDICARE

## 2025-04-04 ENCOUNTER — HOSPITAL ENCOUNTER (OUTPATIENT)
Dept: RADIOLOGY | Facility: HOSPITAL | Age: 70
Discharge: HOME | End: 2025-04-04
Attending: INTERNAL MEDICINE
Payer: MEDICARE

## 2025-04-04 VITALS
SYSTOLIC BLOOD PRESSURE: 132 MMHG | BODY MASS INDEX: 33.24 KG/M2 | HEART RATE: 73 BPM | RESPIRATION RATE: 18 BRPM | OXYGEN SATURATION: 95 % | DIASTOLIC BLOOD PRESSURE: 80 MMHG | HEIGHT: 74 IN | WEIGHT: 259 LBS

## 2025-04-04 DIAGNOSIS — J44.89 ASTHMA WITH COPD: ICD-10-CM

## 2025-04-04 DIAGNOSIS — J45.20 MILD INTERMITTENT ASTHMA, UNCOMPLICATED: ICD-10-CM

## 2025-04-04 DIAGNOSIS — J30.89 SEASONAL ALLERGIC RHINITIS DUE TO OTHER ALLERGIC TRIGGER: ICD-10-CM

## 2025-04-04 DIAGNOSIS — J30.1 SEASONAL ALLERGIC RHINITIS DUE TO POLLEN: Primary | ICD-10-CM

## 2025-04-04 DIAGNOSIS — G47.33 OSA (OBSTRUCTIVE SLEEP APNEA): ICD-10-CM

## 2025-04-04 LAB
BRPFT: NORMAL
FEF 25 75 CHG: 20.5 %
FEF 25 75 LLN: 1.58
FEF 25 75 POST REF: 68.7 %
FEF 25 75 PRE REF: 57 %
FEF 25 75 REF: 3.29
FET100 CHG: -9.7 %
FEV1 CHG: 3.2 %
FEV1 FVC CHG: 4.3 %
FEV1 FVC LLN: 62
FEV1 FVC POST REF: 91.4 %
FEV1 FVC PRE REF: 87.6 %
FEV1 FVC REF: 75
FEV1 LLN: 2.56
FEV1 POST REF: 101.8 %
FEV1 PRE REF: 98.7 %
FEV1 REF: 3.57
FVC CHG: -1.1 %
FVC LLN: 3.54
FVC POST REF: 110.6 %
FVC PRE REF: 111.8 %
FVC REF: 4.79
PEF CHG: 8.8 %
PEF LLN: 6.6
PEF POST REF: 86.9 %
PEF PRE REF: 79.9 %
PEF REF: 9.19
POST FEF 25 75: 2.26 L/S (ref 1.58–5)
POST FET 100: 14.29 SEC
POST FEV1 FVC: 68.61 % (ref 61.58–88.57)
POST FEV1: 3.64 L (ref 2.56–4.58)
POST FVC: 5.3 L (ref 3.54–6.04)
POST PEF: 7.99 L/S (ref 6.6–11.78)
PRE FEF 25 75: 1.88 L/S (ref 1.58–5)
PRE FET 100: 15.82 SEC
PRE FEV1 FVC: 65.76 % (ref 61.58–88.57)
PRE FEV1: 3.52 L (ref 2.56–4.58)
PRE FVC: 5.36 L (ref 3.54–6.04)
PRE PEF: 7.34 L/S (ref 6.6–11.78)

## 2025-04-04 PROCEDURE — 71046 X-RAY EXAM CHEST 2 VIEWS: CPT | Mod: 26,,, | Performed by: RADIOLOGY

## 2025-04-04 PROCEDURE — 99999 PR PBB SHADOW E&M-EST. PATIENT-LVL III: CPT | Mod: PBBFAC,,, | Performed by: INTERNAL MEDICINE

## 2025-04-04 PROCEDURE — 71046 X-RAY EXAM CHEST 2 VIEWS: CPT | Mod: TC

## 2025-04-04 RX ORDER — FLUTICASONE PROPIONATE 50 MCG
2 SPRAY, SUSPENSION (ML) NASAL DAILY
Qty: 48 G | Refills: 3 | Status: SHIPPED | OUTPATIENT
Start: 2025-04-04

## 2025-04-04 RX ORDER — ALBUTEROL SULFATE 90 UG/1
2 INHALANT RESPIRATORY (INHALATION) EVERY 4 HOURS PRN
Qty: 54 G | Refills: 3 | Status: SHIPPED | OUTPATIENT
Start: 2025-04-04

## 2025-04-04 RX ORDER — BUDESONIDE AND FORMOTEROL FUMARATE DIHYDRATE 160; 4.5 UG/1; UG/1
2 AEROSOL RESPIRATORY (INHALATION) EVERY 12 HOURS
Qty: 10.2 G | Refills: 11 | Status: SHIPPED | OUTPATIENT
Start: 2025-04-04 | End: 2026-04-04

## 2025-04-04 NOTE — PROGRESS NOTES
Subjective:     Patient ID: Yung Mcconnell is a 69 y.o. male.    Chief Complaint:  Overall improved    HPI  69 y.o. with asthma, Obstructive Sleep Apnea. Shortness of breath and dyspnea improved with cardioversion and ablation in 5/2024    Obstructive Sleep Apnea on Continuous Positive Airway Pressure:  Patient is using CPAP on oxygen as prescribed and benefiting from therapy. Patient has complaints of none   Compliance Report  Compliance  Payor Standard  Usage 03/10/2024 - 04/08/2024  Usage days 29/30 days (97%)  >= 4 hours 29 days (97%)  < 4 hours 0 days (0%)  Usage hours 207 hours 1 minutes  Average usage (total days) 6 hours 54 minutes  Average usage (days used) 7 hours 8 minutes  Median usage (days used) 6 hours 56 minutes  Total used hours (value since last reset - 04/08/2024) 3,929 hours  AirSense 11 AutoSet  Serial number [id number]  Mode CPAP  Set pressure 11 cmH2O  EPR Fulltime  EPR level 3  Therapy  Leaks - L/min Median: 0.0 95th percentile: 10.3 Maximum: 34.1  Events per hour AI: 1.3 HI: 0.3 AHI: 1.6  Apnea Index Central: 0.4 Obstructive: 0.8 Unknown: 0.0  RERA Index 0.4  Cheyne-Haley respiration (average duration per night) 0 minutes (0%)  Usage - hours  Printed on      Asthma Follow-up  The patient has previously been evaluated here for asthma and presents for an asthma follow-up. The patient is currently having symptoms / an exacerbation. Current symptoms include dyspnea, non-productive cough, and wheezing. Symptoms have been present since several days ago and have been rapidly worsening. He denies chest tightness, dyspnea, productive cough, and wheezing. Associated symptoms include fatigue and poor exercise tolerance.  This episode appears to have been triggered by pollens. Treatments tried for the current exacerbation include inhaled corticosteroids, long-acting inhaled beta-adrenergic agonists, and short-acting inhaled beta-adrenergic agonists, which have provided some relief of symptoms. The  patient has been having similar episodes for approximately  many  years.     Current Disease Severity  The patient is having daytime symptoms throughout the day. The patient is having daytime symptoms often 7 times per week. The patient is using short-acting beta agonists for symptom control several times per day. He has exacerbations requiring oral systemic corticosteroids 2 times per year. Current limitations in activity from asthma:  unable to exercise . Number of days of school or work missed in the last month: not applicable. Number of urgent/emergent visit in last year: 0.  The patient is using a spacer with MDIs. His best peak flow rate is na. He is not monitoring peak flow rates at home.    Past Medical History:   Diagnosis Date    Allergy     Asthma, chronic 07/09/2013    Atrial flutter     Colon polyps     COPD (chronic obstructive pulmonary disease)     GERD (gastroesophageal reflux disease)     High cholesterol     History of knee replacement 12/18/2024    [facility]  Dr Leo    Osteoarthritis of both knees 07/09/2013    Sleep apnea      Past Surgical History:   Procedure Laterality Date    ABLATION OF ARRHYTHMOGENIC FOCUS FOR ATRIAL FLUTTER Bilateral 05/09/2024    Procedure: Ablation atrial flutter/EPS typical vs atypical;  Surgeon: Wilfredo Bahena MD;  Location: [facility] CATH LAB;  Service: Cardiology;  Laterality: Bilateral;    CHOLECYSTECTOMY      COLONOSCOPY N/A 10/23/2020    Procedure: COLONOSCOPY;  Surgeon: Sae Valentine MD;  Location: [facility] ENDO;  Service: Endoscopy;  Laterality: N/A;    COLONOSCOPY N/A 07/22/2024    Procedure: COLONOSCOPY 6/16 econ eliquis 48 hr hold;  Surgeon: Wang Simmons MD;  Location: [facility] ENDO;  Service: Endoscopy;  Laterality: N/A;    DIAGNOSTIC LAPAROSCOPY N/A 06/21/2018    Procedure: LAPAROSCOPY, DIAGNOSTIC;  Surgeon: Casper Martinez MD;  Location: [facility] OR;  Service: General;  Laterality: N/A;    KNEE SURGERY Right 2025    LUNG SURGERY  Right     benign mass    TOTAL KNEE ARTHROPLASTY Left     TRANSESOPHAGEAL ECHOCARDIOGRAM WITH POSSIBLE CARDIOVERSION (CHRIS W/ POSS CARDIOVERSION) N/A 04/01/2024    Procedure: Transesophageal echo (CHRIS) intra-procedure log documentation;  Surgeon: Simran Corley MD;  Location: [facility] CATH LAB;  Service: Cardiology;  Laterality: N/A;    UMBILICAL HERNIA REPAIR N/A 06/21/2018    Procedure: REPAIR, HERNIA, UMBILICAL, AGE 5 YEARS OR OLDER;  Surgeon: Casper Martinez MD;  Location: [facility] OR;  Service: General;  Laterality: N/A;     Review of patient's allergies indicates:   Allergen Reactions    Zithromax [azithromycin] Palpitations    Aspirin Other (See Comments)     Other reaction(s): Difficulty breathing    Lipitor [atorvastatin] Other (See Comments)     Leg cramps/hand cramps     Current Outpatient Medications on File Prior to Visit   Medication Sig Dispense Refill    evolocumab (REPATHA SURECLICK) 140 mg/mL PnIj Inject 1 mL (140 mg total) into the skin every 14 (fourteen) days. 2 mL 11    metoprolol succinate (TOPROL-XL) 25 MG 24 hr tablet Take 1 tablet (25 mg total) by mouth 2 (two) times daily. 60 tablet 11    semaglutide, weight loss, (WEGOVY) 0.25 mg/0.5 mL PnIj Inject 0.75 mg into the skin every 7 days.      tadalafiL (CIALIS) 20 MG Tab TAKE ONE TABLET BY MOUTH ONCE A DAY AS NEEDED 24 tablet 2    [DISCONTINUED] albuterol (PROVENTIL/VENTOLIN HFA) 90 mcg/actuation inhaler Inhale 2 puffs into the lungs every 4 (four) hours as needed for Wheezing or Shortness of Breath. 54 g 3    [DISCONTINUED] budesonide-formoterol 160-4.5 mcg (SYMBICORT) 160-4.5 mcg/actuation HFAA Inhale 2 puffs into the lungs every 12 (twelve) hours. Controller.Wash out mouth after use 10.2 g 11    [DISCONTINUED] fluticasone propionate (FLONASE) 50 mcg/actuation nasal spray 2 sprays (100 mcg total) by Each Nostril route once daily. 48 g 3     No current facility-administered medications on file prior to visit.     Social History      Socioeconomic History    Marital status:    Occupational History     Employer: [employer]   Tobacco Use    Smoking status: Never     Passive exposure: Past    Smokeless tobacco: Never   Substance and Sexual Activity    Alcohol use: Yes     Comment: once or twice a year    Drug use: No    Sexual activity: Yes     Partners: Female     Social Drivers of Health     Financial Resource Strain: Low Risk  (3/20/2025)    Overall Financial Resource Strain (CARDIA)     Difficulty of Paying Living Expenses: Not very hard   Food Insecurity: No Food Insecurity (3/20/2025)    Hunger Vital Sign     Worried About Running Out of Food in the Last Year: Never true     Ran Out of Food in the Last Year: Never true   Transportation Needs: No Transportation Needs (3/20/2025)    PRAPARE - Transportation     Lack of Transportation (Medical): No     Lack of Transportation (Non-Medical): No   Physical Activity: Insufficiently Active (3/20/2025)    Exercise Vital Sign     Days of Exercise per Week: 3 days     Minutes of Exercise per Session: 30 min   Stress: No Stress Concern Present (3/20/2025)    [employer] [place] [employer] - Occupational Stress Questionnaire     Feeling of Stress : Only a little   Housing Stability: Low Risk  (3/20/2025)    Housing Stability Vital Sign     Unable to Pay for Housing in the Last Year: No     Number of Times Moved in the Last Year: 0     Homeless in the Last Year: No     Family History   Problem Relation Name Age of Onset    Hypertension Mother      Diabetes Mother      Cancer Mother          Breast Ca     Heart disease Father          CAD , CHF     Aneurysm Father      Colon polyps Father      Melanoma Neg Hx      Eczema Neg Hx      Lupus Neg Hx      Psoriasis Neg Hx         Review of Systems   Constitutional:  Negative for fever and fatigue.   HENT:  Negative for postnasal drip and rhinorrhea.    Eyes:  Negative for redness and itching.   Respiratory:  Positive for  "dyspnea on extertion. Negative for cough, shortness of breath, wheezing and Paroxysmal Nocturnal Dyspnea.    Cardiovascular:  Negative for chest pain.   Genitourinary:  Negative for difficulty urinating and hematuria.   Endocrine:  Negative for polyphagia, cold intolerance and heat intolerance.    Musculoskeletal:  Negative for arthralgias.   Skin:  Negative for rash.   Gastrointestinal:  Negative for nausea, vomiting, abdominal pain and abdominal distention.   Neurological:  Negative for dizziness and headaches.   Hematological:  Negative for adenopathy. Does not bruise/bleed easily and no excessive bruising.   Psychiatric/Behavioral:  The patient is not nervous/anxious.        Objective:      /80 (Patient Position: Sitting)   Pulse 73   Resp 18   Ht 6' 2" (1.88 m)   Wt 117.5 kg (259 lb)   SpO2 95%   BMI 33.25 kg/m²   Physical Exam  Vitals and nursing note reviewed.   Constitutional:       Appearance: He is well-developed. He is obese.   HENT:      Head: Normocephalic and atraumatic.      Nose: Nose normal.   Eyes:      Conjunctiva/sclera: Conjunctivae normal.      Pupils: Pupils are equal, round, and reactive to light.   Neck:      Thyroid: No thyromegaly.      Vascular: No JVD.      Trachea: No tracheal deviation.   Cardiovascular:      Rate and Rhythm: Normal rate and regular rhythm.      Heart sounds: Normal heart sounds.   Pulmonary:      Effort: Pulmonary effort is normal.      Breath sounds: Normal breath sounds.   Abdominal:      Palpations: Abdomen is soft.   Musculoskeletal:         General: Normal range of motion.      Cervical back: Neck supple.   Lymphadenopathy:      Cervical: No cervical adenopathy.   Skin:     General: Skin is warm and dry.   Neurological:      Mental Status: He is alert and oriented to person, place, and time.   Psychiatric:         Mood and Affect: Mood normal.         Behavior: Behavior normal.       Personal Diagnostic Review  Chest x-ray: hyperinflation stable       " " 4/4/2025     3:25 PM   Pulmonary Studies Review   SpO2 95 %   Height 6' 2" (1.88 m)   Weight 117.5 kg (259 lb)   BMI (Calculated) 33.2   Predicted Distance 308.01   Predicted Distance Meters (Calculated) 550.97 meters       X-Ray Chest PA And Lateral  Narrative: EXAM: XR CHEST PA AND LATERAL    CLINICAL HISTORY:  COPD    COMPARISON:11/22/2024    FINDINGS: The heart size is normal in size. Scarring in the left lung base and right pulmonary apex where there may be some suture material.  Impression:  No significant change from 11/22/2024.    Finalized on: 4/4/2025 3:22 PM By:  Lobito Harvey MD  [facility]# [id number]      2025-04-04 15:24:[id number]     [facility]      Office Spirometry Results:         4/4/2025     3:25 PM 3/21/2025     8:51 AM 1/16/2025     9:19 AM 11/22/2024     8:35 AM 11/11/2024     9:08 AM 10/15/2024     1:27 PM 8/23/2024    10:55 AM   Pulmonary Function Tests   SpO2 95 % 98 % 98 % 95 % 97 % 95 % 95 %   Height 6' 2" (1.88 m) 6' 2" (1.88 m) 6' 2" (1.88 m)  6' 2" (1.88 m) 6' 2" (1.88 m) 6' 2" (1.88 m)   Weight 117.5 kg (259 lb) 117.9 kg (259 lb 14.8 oz) 126.6 kg (279 lb) 126.8 kg (279 lb 6.9 oz) 126.4 kg (278 lb 8.8 oz) 123.7 kg (272 lb 9.6 oz) 126.2 kg (278 lb 3.5 oz)   BMI (Calculated) 33.2 33.4 35.8  35.7 35 35.7         4/4/2025     3:25 PM   Pulmonary Studies Review   SpO2 95 %   Height 6' 2" (1.88 m)   Weight 117.5 kg (259 lb)   BMI (Calculated) 33.2   Predicted Distance 308.01   Predicted Distance Meters (Calculated) 550.97 meters           No results found for this or any previous visit (from the past 2 weeks).    Assessment:            Seasonal allergic rhinitis due to pollen    KEVEN (obstructive sleep apnea)  -     CPAP/BIPAP SUPPLIES    Asthma with COPD  -     Six Minute Walk Test to qualify for Home Oxygen; Future    Mild intermittent asthma, uncomplicated  -     budesonide-formoterol 160-4.5 mcg (SYMBICORT) 160-4.5 mcg/actuation HFAA; Inhale 2 puffs into the lungs every 12 (twelve) hours. " Controller.Wash out mouth after use  Dispense: 10.2 g; Refill: 11  -     albuterol (PROVENTIL/VENTOLIN HFA) 90 mcg/actuation inhaler; Inhale 2 puffs into the lungs every 4 (four) hours as needed for Wheezing or Shortness of Breath.  Dispense: 54 g; Refill: 3    Seasonal allergic rhinitis due to other allergic trigger  -     fluticasone propionate (FLONASE) 50 mcg/actuation nasal spray; 2 sprays (100 mcg total) by Each Nostril route once daily.  Dispense: 48 g; Refill: 3          Outpatient Encounter Medications as of 4/4/2025   Medication Sig Dispense Refill    evolocumab (REPATHA SURECLICK) 140 mg/mL PnIj Inject 1 mL (140 mg total) into the skin every 14 (fourteen) days. 2 mL 11    metoprolol succinate (TOPROL-XL) 25 MG 24 hr tablet Take 1 tablet (25 mg total) by mouth 2 (two) times daily. 60 tablet 11    semaglutide, weight loss, (WEGOVY) 0.25 mg/0.5 mL PnIj Inject 0.75 mg into the skin every 7 days.      tadalafiL (CIALIS) 20 MG Tab TAKE ONE TABLET BY MOUTH ONCE A DAY AS NEEDED 24 tablet 2    [DISCONTINUED] albuterol (PROVENTIL/VENTOLIN HFA) 90 mcg/actuation inhaler Inhale 2 puffs into the lungs every 4 (four) hours as needed for Wheezing or Shortness of Breath. 54 g 3    [DISCONTINUED] budesonide-formoterol 160-4.5 mcg (SYMBICORT) 160-4.5 mcg/actuation HFAA Inhale 2 puffs into the lungs every 12 (twelve) hours. Controller.Wash out mouth after use 10.2 g 11    [DISCONTINUED] fluticasone propionate (FLONASE) 50 mcg/actuation nasal spray 2 sprays (100 mcg total) by Each Nostril route once daily. 48 g 3    albuterol (PROVENTIL/VENTOLIN HFA) 90 mcg/actuation inhaler Inhale 2 puffs into the lungs every 4 (four) hours as needed for Wheezing or Shortness of Breath. 54 g 3    budesonide-formoterol 160-4.5 mcg (SYMBICORT) 160-4.5 mcg/actuation HFAA Inhale 2 puffs into the lungs every 12 (twelve) hours. Controller.Wash out mouth after use 10.2 g 11    fluticasone propionate (FLONASE) 50 mcg/actuation nasal spray 2 sprays  (100 mcg total) by Each Nostril route once daily. 48 g 3    [DISCONTINUED] celecoxib (CELEBREX) 200 MG capsule Take 200 mg by mouth 2 (two) times daily. (Patient not taking: Reported on 3/21/2025)      [DISCONTINUED] ELIQUIS 5 mg Tab Take 5 mg by mouth 2 (two) times daily. (Patient not taking: Reported on 3/21/2025)      [DISCONTINUED] metoprolol succinate (TOPROL-XL) 25 MG 24 hr tablet Take 1 tablet (25 mg total) by mouth once daily. 30 tablet 11    [DISCONTINUED] naproxen (NAPROSYN) 500 MG tablet Take 1 tablet (500 mg total) by mouth 2 (two) times daily with meals. (Patient not taking: Reported on 3/21/2025) 20 tablet 0    [DISCONTINUED] semaglutide, weight loss, (WEGOVY) 0.25 mg/0.5 mL PnIj Inject 0.25 mg into the skin every 7 days. (Patient taking differently: Inject 0.75 mg into the skin every 7 days.) 2 mL 3     No facility-administered encounter medications on file as of 2025.     Plan:       Requested Prescriptions     Signed Prescriptions Disp Refills    budesonide-formoterol 160-4.5 mcg (SYMBICORT) 160-4.5 mcg/actuation HFAA 10.2 g 11     Sig: Inhale 2 puffs into the lungs every 12 (twelve) hours. Controller.Wash out mouth after use    fluticasone propionate (FLONASE) 50 mcg/actuation nasal spray 48 g 3     Si sprays (100 mcg total) by Each Nostril route once daily.    albuterol (PROVENTIL/VENTOLIN HFA) 90 mcg/actuation inhaler 54 g 3     Sig: Inhale 2 puffs into the lungs every 4 (four) hours as needed for Wheezing or Shortness of Breath.     Problem List Items Addressed This Visit       Seasonal allergic rhinitis due to pollen - Primary    KEVEN (obstructive sleep apnea)    Relevant Orders    CPAP/BIPAP SUPPLIES    Asthma with COPD    Relevant Orders    Six Minute Walk Test to qualify for Home Oxygen    Seasonal allergic rhinitis    Relevant Medications    fluticasone propionate (FLONASE) 50 mcg/actuation nasal spray     Other Visit Diagnoses         Mild intermittent asthma, uncomplicated         Relevant Medications    budesonide-formoterol 160-4.5 mcg (SYMBICORT) 160-4.5 mcg/actuation HFAA    albuterol (PROVENTIL/VENTOLIN HFA) 90 mcg/actuation inhaler               Follow up in about 6 months (around 10/4/2025) for 6 min walk - on return.    MEDICAL DECISION MAKING: Moderate to high complexity.  Overall, the multiple problems listed are of moderate to high severity that may impact quality of life and activities of daily living. Side effects of medications, treatment plan as well as options and alternatives reviewed and discussed with patient. There was counseling of patient concerning these issues.    Total time spent in counseling and coordination of care - 32  minutes of total time spent on the encounter, which includes face to face time and non-face to face time preparing to see the patient (eg, review of tests), Obtaining and/or reviewing separately obtained history, Documenting clinical information in the electronic or other health record, Independently interpreting results (not separately reported) and communicating results to the patient/family/caregiver, or Care coordination (not separately reported).    Time was used in discussion of prognosis, risks, benefits of treatment, instructions and compliance with regimen . Discussion with other physicians and/or health care providers - home health or for use of durable medical equipment (oxygen, nebulizers, CPAP, BiPAP) occurred.

## 2025-04-25 DIAGNOSIS — E78.2 MIXED HYPERLIPIDEMIA: ICD-10-CM

## 2025-04-25 DIAGNOSIS — Z78.9 STATIN INTOLERANCE: ICD-10-CM

## 2025-04-25 RX ORDER — EVOLOCUMAB 140 MG/ML
140 INJECTION, SOLUTION SUBCUTANEOUS
Qty: 2 ML | Refills: 11 | Status: ACTIVE | OUTPATIENT
Start: 2025-04-25

## 2025-07-08 ENCOUNTER — OFFICE VISIT (OUTPATIENT)
Dept: FAMILY MEDICINE | Facility: CLINIC | Age: 70
End: 2025-07-08
Payer: MEDICARE

## 2025-07-08 ENCOUNTER — LAB VISIT (OUTPATIENT)
Dept: LAB | Facility: HOSPITAL | Age: 70
End: 2025-07-08
Attending: FAMILY MEDICINE
Payer: MEDICARE

## 2025-07-08 VITALS
HEART RATE: 74 BPM | WEIGHT: 245.81 LBS | DIASTOLIC BLOOD PRESSURE: 84 MMHG | HEIGHT: 74 IN | RESPIRATION RATE: 16 BRPM | BODY MASS INDEX: 31.55 KG/M2 | OXYGEN SATURATION: 95 % | TEMPERATURE: 98 F | SYSTOLIC BLOOD PRESSURE: 122 MMHG

## 2025-07-08 DIAGNOSIS — R19.7 DIARRHEA, UNSPECIFIED TYPE: ICD-10-CM

## 2025-07-08 DIAGNOSIS — G72.0 STATIN MYOPATHY: ICD-10-CM

## 2025-07-08 DIAGNOSIS — E78.5 HYPERLIPIDEMIA, UNSPECIFIED HYPERLIPIDEMIA TYPE: ICD-10-CM

## 2025-07-08 DIAGNOSIS — Z12.5 SCREENING FOR PROSTATE CANCER: ICD-10-CM

## 2025-07-08 DIAGNOSIS — G47.33 OSA (OBSTRUCTIVE SLEEP APNEA): ICD-10-CM

## 2025-07-08 DIAGNOSIS — T46.6X5A STATIN MYOPATHY: ICD-10-CM

## 2025-07-08 DIAGNOSIS — I48.92 ATRIAL FLUTTER WITH RAPID VENTRICULAR RESPONSE: ICD-10-CM

## 2025-07-08 DIAGNOSIS — E66.01 SEVERE OBESITY (BMI 35.0-35.9 WITH COMORBIDITY): Primary | ICD-10-CM

## 2025-07-08 DIAGNOSIS — J44.89 ASTHMA WITH COPD: ICD-10-CM

## 2025-07-08 LAB — PSA SERPL-MCNC: 0.68 NG/ML

## 2025-07-08 PROCEDURE — 84153 ASSAY OF PSA TOTAL: CPT

## 2025-07-08 PROCEDURE — 36415 COLL VENOUS BLD VENIPUNCTURE: CPT | Mod: PO

## 2025-07-08 PROCEDURE — 99999 PR PBB SHADOW E&M-EST. PATIENT-LVL III: CPT | Mod: PBBFAC,,, | Performed by: FAMILY MEDICINE

## 2025-07-08 RX ORDER — ONDANSETRON 4 MG/1
4 TABLET, ORALLY DISINTEGRATING ORAL EVERY 6 HOURS PRN
Qty: 20 TABLET | Refills: 1 | Status: SHIPPED | OUTPATIENT
Start: 2025-07-08

## 2025-07-08 NOTE — PROGRESS NOTES
"Subjective:       Patient ID: Yung Mcconnell is a 69 y.o. male.    Chief Complaint: Insect Bite      HPI Comments:     Current Medications[1]      Last seen by me 7 months ago.  Since then he has knee surgery went well.  He has also been started on semaglutide weight loss clinic in his lost 32 lb during that time.      Shortly after being stung by several lost 6/20 68 developed nausea and diarrhea.  Nausea continues and has been associated with the 1st few days after his GLP 1 shots.  Diarrhea lasted 9 days in his now resolved for the last 48 hours.    He has been trying to get more protein in his diet for energy.  Also started walking more.      He wears CPAP at night with oxygen.    Cardiology gave him a 2nd dose of metoprolol and taking the afternoons if he is feeling symptomatic with chest discomfort or palpitations.      Review of Systems   Constitutional:  Positive for fatigue. Negative for activity change, appetite change and fever.   HENT:  Negative for sore throat.    Respiratory:  Negative for cough and shortness of breath.    Cardiovascular:  Negative for chest pain.   Gastrointestinal:  Positive for nausea. Negative for abdominal pain and diarrhea.   Genitourinary:  Negative for difficulty urinating.   Musculoskeletal:  Negative for arthralgias and myalgias.   Neurological:  Negative for dizziness and headaches.       Objective:      Vitals:    07/08/25 0913   BP: 122/84   Pulse: 74   Resp: 16   Temp: 97.8 °F (36.6 °C)   TempSrc: Tympanic   SpO2: 95%   Weight: 111.5 kg (245 lb 13 oz)   Height: 6' 2" (1.88 m)   PainSc: 0-No pain     Physical Exam  Vitals and nursing note reviewed.   Constitutional:       General: He is not in acute distress.     Appearance: He is well-developed. He is not diaphoretic.   HENT:      Head: Normocephalic.   Neck:      Thyroid: No thyromegaly.   Cardiovascular:      Rate and Rhythm: Normal rate and regular rhythm.      Heart sounds: Normal heart sounds. No murmur " heard.  Pulmonary:      Effort: Pulmonary effort is normal.      Breath sounds: Normal breath sounds. No wheezing or rales.   Abdominal:      General: There is no distension.      Palpations: Abdomen is soft.   Musculoskeletal:      Cervical back: Neck supple.   Lymphadenopathy:      Cervical: No cervical adenopathy.   Skin:     General: Skin is warm and dry.   Neurological:      Mental Status: He is alert and oriented to person, place, and time.   Psychiatric:         Mood and Affect: Mood normal.         Behavior: Behavior normal.         Thought Content: Thought content normal.         Judgment: Judgment normal.         Assessment:       1. Severe obesity (BMI 35.0-35.9 with comorbidity)    2. Statin myopathy    3. Atrial flutter with rapid ventricular response    4. Asthma with COPD    5. Hyperlipidemia, unspecified hyperlipidemia type    6. KEVEN (obstructive sleep apnea)    7. Diarrhea, unspecified type    8. Screening for prostate cancer        Plan:   Severe obesity (BMI 35.0-35.9 with comorbidity)  Comments:  32 lb weight loss since starting GLP 1 therapy.  Wants to get to about 100 kilos.  Nausea after shots.  Zofran Rx sent    Statin myopathy  Comments:  On Repatha    Atrial flutter with rapid ventricular response  Comments:  Says post ablation    Asthma with COPD  Comments:  Stable    Hyperlipidemia, unspecified hyperlipidemia type  Comments:  LDL 28    KEVEN (obstructive sleep apnea)  Comments:  At night with oxygen    Diarrhea, unspecified type  Comments:  For over a week.  Associated temporarily with wasp stings.  Now resolved    Screening for prostate cancer  Comments:  PSA today  Orders:  -     PSA, Screening; Future; Expected date: 07/08/2025    Other orders  -     ondansetron (ZOFRAN-ODT) 4 MG TbDL; Take 1 tablet (4 mg total) by mouth every 6 (six) hours as needed.  Dispense: 20 tablet; Refill: 1                 [1]   Current Outpatient Medications:     albuterol (PROVENTIL/VENTOLIN HFA) 90  mcg/actuation inhaler, Inhale 2 puffs into the lungs every 4 (four) hours as needed for Wheezing or Shortness of Breath., Disp: 54 g, Rfl: 3    budesonide-formoterol 160-4.5 mcg (SYMBICORT) 160-4.5 mcg/actuation HFAA, Inhale 2 puffs into the lungs every 12 (twelve) hours. Controller.Wash out mouth after use, Disp: 10.2 g, Rfl: 11    evolocumab (REPATHA SURECLICK) 140 mg/mL PnIj, Inject 1 mL (140 mg total) into the skin every 14 (fourteen) days., Disp: 2 mL, Rfl: 11    fluticasone propionate (FLONASE) 50 mcg/actuation nasal spray, 2 sprays (100 mcg total) by Each Nostril route once daily., Disp: 48 g, Rfl: 3    metoprolol succinate (TOPROL-XL) 25 MG 24 hr tablet, Take 1 tablet (25 mg total) by mouth 2 (two) times daily., Disp: 60 tablet, Rfl: 11    semaglutide, weight loss, (WEGOVY) 0.25 mg/0.5 mL PnIj, Inject 0.75 mg into the skin every 7 days., Disp: , Rfl:     tadalafiL (CIALIS) 20 MG Tab, TAKE ONE TABLET BY MOUTH ONCE A DAY AS NEEDED, Disp: 24 tablet, Rfl: 2    ondansetron (ZOFRAN-ODT) 4 MG TbDL, Take 1 tablet (4 mg total) by mouth every 6 (six) hours as needed., Disp: 20 tablet, Rfl: 1

## (undated) DEVICE — SEE MEDLINE ITEM 152622

## (undated) DEVICE — ELECTRODE REM PLYHSV RETURN 9

## (undated) DEVICE — INTRO 8.5FR 63CM SRO

## (undated) DEVICE — CATH IV CATHLON W/HUB14GAX

## (undated) DEVICE — DRAPE ABDOMINAL TIBURON 14X11

## (undated) DEVICE — SOL NS 1000CC

## (undated) DEVICE — NDL SAFETY 25G X 1.5 ECLIPSE

## (undated) DEVICE — SHEATH INTRODUCER 6FR 11CM

## (undated) DEVICE — CATH SAFIRE BI-DIR 7FR LRG

## (undated) DEVICE — CONTAINER SPECIMEN STRL 4OZ

## (undated) DEVICE — SYS IRRIG PRESSURIZED SPIKE

## (undated) DEVICE — TUBING HEATED INSUFFLATOR

## (undated) DEVICE — PACK HEART CATH BR

## (undated) DEVICE — CATH DUO DECAPOLAR 7FRX95CM

## (undated) DEVICE — SEE MEDLINE ITEM 146372

## (undated) DEVICE — SYR 30CC LUER LOCK

## (undated) DEVICE — HANDLE PISTOL GRIP HAND CNTRL

## (undated) DEVICE — GLOVE PROTEXIS HYDROGEL SZ7.5

## (undated) DEVICE — ADHESIVE MASTISOL VIAL 48/BX

## (undated) DEVICE — SEE MEDLINE ITEM 157027

## (undated) DEVICE — NDL PERCUTANEOUS 21G 7CM VASC

## (undated) DEVICE — SUT MONOCRYL 4.0 PS2 CP496G

## (undated) DEVICE — PATCH ENSITE PRECISION NAVX SE

## (undated) DEVICE — SUT VICRYL+ 27 UR-6 VIOL

## (undated) DEVICE — CLOSURE SKIN STERI STRIP 1/2X4

## (undated) DEVICE — CORD LAP 10 DISP

## (undated) DEVICE — NDL PNEUMO INSUFFLATI 120MM

## (undated) DEVICE — GUIDEWIRE EMERALD 150CM PTFE

## (undated) DEVICE — KIT ANTIFOG

## (undated) DEVICE — APPLICATOR CHLORAPREP ORN 26ML

## (undated) DEVICE — PAD RADIOLUCENT STAT ADULT

## (undated) DEVICE — SEAL SCOPE WARMER 20/BX

## (undated) DEVICE — SEE MEDLINE ITEM 157117

## (undated) DEVICE — PAD DEFIB CADENCE ADULT R2

## (undated) DEVICE — CATH DCAPLR STEER LG 6FR 2-5-2

## (undated) DEVICE — SYR 10CC LUER LOCK

## (undated) DEVICE — COVER OVERHEAD SURG LT BLUE

## (undated) DEVICE — SHEATH INTRODUCER 7FR 11CM

## (undated) DEVICE — SYR ONLY LUER LOCK 20CC

## (undated) DEVICE — TROCAR ENDOPATH XCEL 5X100MM

## (undated) DEVICE — SOL 9P NACL IRR PIC IL

## (undated) DEVICE — SCISSOR 5MMX35CM DIRECT DRIVE

## (undated) DEVICE — GAUZE SPONGE 4X4 12PLY

## (undated) DEVICE — PAD GROUNDING DISPER ELECTRODE

## (undated) DEVICE — SYR 3CC LUER LOC

## (undated) DEVICE — MANIFOLD 4 PORT

## (undated) DEVICE — SEE MEDLINE ITEM 152739